# Patient Record
Sex: MALE | Race: OTHER | NOT HISPANIC OR LATINO | Employment: OTHER | URBAN - METROPOLITAN AREA
[De-identification: names, ages, dates, MRNs, and addresses within clinical notes are randomized per-mention and may not be internally consistent; named-entity substitution may affect disease eponyms.]

---

## 2017-02-03 ENCOUNTER — TELEPHONE (OUTPATIENT)
Dept: VASCULAR LAB | Facility: MEDICAL CENTER | Age: 67
End: 2017-02-03

## 2017-02-06 LAB — INR PPP: 1.9 (ref 2–3.5)

## 2017-02-07 ENCOUNTER — ANTICOAGULATION MONITORING (OUTPATIENT)
Dept: VASCULAR LAB | Facility: MEDICAL CENTER | Age: 67
End: 2017-02-07

## 2017-02-07 DIAGNOSIS — D68.51 FACTOR 5 LEIDEN MUTATION, HETEROZYGOUS (HCC): ICD-10-CM

## 2017-02-07 DIAGNOSIS — I87.009 POST-PHLEBITIC SYNDROME: ICD-10-CM

## 2017-02-07 DIAGNOSIS — Z79.01 ANTICOAGULATED ON WARFARIN: ICD-10-CM

## 2017-02-07 DIAGNOSIS — Z86.711 HISTORY OF PULMONARY EMBOLISM: ICD-10-CM

## 2017-02-07 NOTE — PROGRESS NOTES
Anticoagulation Summary as of 2/7/2017     INR goal 2.0-3.0   Selected INR 1.9! (2/6/2017)   Maintenance plan 10 mg (5 mg x 2) on Thu; 5 mg (5 mg x 1) all other days   Weekly total 40 mg   Plan last modified MARSHALL CasillasD (12/1/2016)   Next INR check 2/27/2017   Target end date Indefinite    Indications   Post-phlebitic syndrome- RIGHT LEG [I87.009]  History of pulmonary embolism [Z86.711]  Anticoagulated on warfarin [Z79.01]  Factor 5 Leiden mutation  heterozygous (CMS-HCC) [D68.51]         Anticoagulation Episode Summary     INR check location     Preferred lab     Send INR reminders to     Comments       Anticoagulation Care Providers     Provider Role Specialty Phone number    Keron Garcia M.D. Referring Internal Medicine [phone]    [facility] Responsible  [phone]    MARSHALL MacedoD Responsible          Anticoagulation Patient Findings    Left a message on the patient's voicemail today, informing the patient of a SUB-therapeutic INR of 1.9.  Instructed the patient to call the clinic with any changes to diet or medications, with any questions/concerns, or with any signs/sx of bleeding or clotting.  Patient will take an extra 2.5mg with normal 5mg dose (for total of 7.5mg) TONIGHT ONLY, then he will resume his current regimen. Patient will follow up again in 3 weeks.    Regina MedinaD

## 2017-02-24 ENCOUNTER — ANTICOAGULATION MONITORING (OUTPATIENT)
Dept: VASCULAR LAB | Facility: MEDICAL CENTER | Age: 67
End: 2017-02-24

## 2017-02-24 DIAGNOSIS — Z79.01 ANTICOAGULATED ON WARFARIN: ICD-10-CM

## 2017-02-24 DIAGNOSIS — D68.51 FACTOR 5 LEIDEN MUTATION, HETEROZYGOUS (HCC): ICD-10-CM

## 2017-02-24 DIAGNOSIS — I87.009 POST-PHLEBITIC SYNDROME: ICD-10-CM

## 2017-02-24 DIAGNOSIS — Z86.711 HISTORY OF PULMONARY EMBOLISM: ICD-10-CM

## 2017-02-24 LAB — INR PPP: 1.8 (ref 2–3.5)

## 2017-03-13 ENCOUNTER — ANTICOAGULATION MONITORING (OUTPATIENT)
Dept: VASCULAR LAB | Facility: MEDICAL CENTER | Age: 67
End: 2017-03-13

## 2017-03-13 DIAGNOSIS — Z79.01 ANTICOAGULATED ON WARFARIN: ICD-10-CM

## 2017-03-13 DIAGNOSIS — D68.51 FACTOR 5 LEIDEN MUTATION, HETEROZYGOUS (HCC): ICD-10-CM

## 2017-03-13 DIAGNOSIS — I87.009 POST-PHLEBITIC SYNDROME: ICD-10-CM

## 2017-03-13 DIAGNOSIS — Z86.711 HISTORY OF PULMONARY EMBOLISM: ICD-10-CM

## 2017-03-13 LAB — INR PPP: 1.8 (ref 2–3.5)

## 2017-03-13 NOTE — PROGRESS NOTES
Anticoagulation Summary as of 3/13/2017     INR goal 2.0-3.0   Selected INR 1.8! (3/11/2017)   Maintenance plan 10 mg (5 mg x 2) on Mon, Thu; 5 mg (5 mg x 1) all other days   Weekly total 45 mg   Plan last modified Ganesh aMo, MARSHALLD (2/24/2017)   Next INR check 3/27/2017   Target end date Indefinite    Indications   Post-phlebitic syndrome- RIGHT LEG [I87.009]  History of pulmonary embolism [Z86.711]  Anticoagulated on warfarin [Z79.01]  Factor 5 Leiden mutation  heterozygous (CMS-HCC) [D68.51]         Anticoagulation Episode Summary     INR check location     Preferred lab     Send INR reminders to     Comments       Anticoagulation Care Providers     Provider Role Specialty Phone number    Keron Garcia M.D. Referring Internal Medicine [phone]    [facility] Anticoagulation Services Responsible  [phone]    Ganesh Mao, PHARMD Responsible          Anticoagulation Patient Findings    Left voicemail message to report a SUB therapeutic INR of 1.8.  Last voicemail that was left instucted pt to increase warfarin by 12.5%.  Because this has not been confirmed with the patient, did not increase regimen today. Pt to bolus today then continue with current warfarin dosing regimen. Requested pt contact the clinic for any s/s of unusual bleeding, bruising, clotting or any changes to diet or medication. Instructed pt to contact clinic to discuss dosing regimen.    FU 2 weeks.    Ganesh Mao, MARSHALLD

## 2017-04-07 ENCOUNTER — OFFICE VISIT (OUTPATIENT)
Dept: MEDICAL GROUP | Age: 67
End: 2017-04-07
Payer: MEDICARE

## 2017-04-07 VITALS
DIASTOLIC BLOOD PRESSURE: 84 MMHG | TEMPERATURE: 97.2 F | HEIGHT: 72 IN | HEART RATE: 81 BPM | BODY MASS INDEX: 32.23 KG/M2 | WEIGHT: 238 LBS | OXYGEN SATURATION: 93 % | SYSTOLIC BLOOD PRESSURE: 120 MMHG

## 2017-04-07 DIAGNOSIS — E66.9 OBESITY (BMI 30.0-34.9): ICD-10-CM

## 2017-04-07 DIAGNOSIS — Z79.01 ANTICOAGULATED ON WARFARIN: ICD-10-CM

## 2017-04-07 DIAGNOSIS — D48.5 NEOPLASM OF UNCERTAIN BEHAVIOR OF SKIN OF FOREARM: ICD-10-CM

## 2017-04-07 DIAGNOSIS — D48.5 NEOPLASM OF UNCERTAIN BEHAVIOR OF SKIN OF FACE: ICD-10-CM

## 2017-04-07 DIAGNOSIS — G89.4 CHRONIC PAIN SYNDROME: ICD-10-CM

## 2017-04-07 DIAGNOSIS — I87.009 POST-PHLEBITIC SYNDROME: ICD-10-CM

## 2017-04-07 DIAGNOSIS — E78.2 MIXED HYPERLIPIDEMIA: ICD-10-CM

## 2017-04-07 PROBLEM — E66.811 OBESITY (BMI 30.0-34.9): Status: ACTIVE | Noted: 2017-04-07

## 2017-04-07 PROCEDURE — 1101F PT FALLS ASSESS-DOCD LE1/YR: CPT | Performed by: INTERNAL MEDICINE

## 2017-04-07 PROCEDURE — 4040F PNEUMOC VAC/ADMIN/RCVD: CPT | Performed by: INTERNAL MEDICINE

## 2017-04-07 PROCEDURE — G8419 CALC BMI OUT NRM PARAM NOF/U: HCPCS | Performed by: INTERNAL MEDICINE

## 2017-04-07 PROCEDURE — G8432 DEP SCR NOT DOC, RNG: HCPCS | Performed by: INTERNAL MEDICINE

## 2017-04-07 PROCEDURE — 99214 OFFICE O/P EST MOD 30 MIN: CPT | Performed by: INTERNAL MEDICINE

## 2017-04-07 PROCEDURE — 1036F TOBACCO NON-USER: CPT | Performed by: INTERNAL MEDICINE

## 2017-04-07 RX ORDER — OXYCODONE HYDROCHLORIDE AND ACETAMINOPHEN 5; 325 MG/1; MG/1
1-2 TABLET ORAL EVERY 4 HOURS PRN
Qty: 120 TAB | Refills: 0 | Status: SHIPPED | OUTPATIENT
Start: 2017-04-07 | End: 2017-10-13

## 2017-04-07 ASSESSMENT — ENCOUNTER SYMPTOMS
EYES NEGATIVE: 1
MUSCULOSKELETAL NEGATIVE: 1
NEUROLOGICAL NEGATIVE: 1
RESPIRATORY NEGATIVE: 1
LEG PAIN: 1
PSYCHIATRIC NEGATIVE: 1
CARDIOVASCULAR NEGATIVE: 1
CONSTITUTIONAL NEGATIVE: 1
GASTROINTESTINAL NEGATIVE: 1

## 2017-04-07 NOTE — PROGRESS NOTES
Subjective:      Tavraes Bonilla is a 66 y.o. male who presents with Leg Pain  and   The patient is here for followup of chronic medical problems listed below. The patient is compliant with medications and having no side effects from them. Denies chest pain, abdominal pain, dyspnea, myalgias, or cough.   Patient Active Problem List    Diagnosis Date Noted   • Obesity (BMI 30.0-34.9) 04/07/2017   • Post-phlebitic syndrome- RIGHT LEG 05/22/2015   • Chronic venous hypertension due to DVT 11/17/2014   • Anticoagulated on warfarin 11/17/2014   • HLD (hyperlipidemia) 11/17/2014   • Chronic pain syndrome 03/12/2014   • Hypovitaminosis D 03/26/2012   • History of pulmonary embolism 01/24/2012   • Factor 5 Leiden mutation, heterozygous (CMS-HCC) 01/24/2012     No Known Allergies  ,  Outpatient Prescriptions Prior to Visit   Medication Sig Dispense Refill   • Coenzyme Q10 (CO Q 10) 10 MG Cap Take  by mouth.     • Cyanocobalamin (VITAMIN B-12) 1000 MCG Tab Take 1,000 mcg by mouth every day.     • oxycodone-acetaminophen (PERCOCET) 5-325 MG Tab Take 1 Tab by mouth 1 time daily as needed. 120 Tab 0   • Diclofenac Sodium (VOLTAREN) 1 % Gel Apply 1 Inch to skin as directed every 3 hours as needed. 1 Tube 11   • warfarin (COUMADIN) 5 MG Tab Take 1-2 tabs by mouth daily or as directed by coumadin clinic 120 Tab 2   • lovastatin (MEVACOR) 40 MG tablet Take 1 Tab by mouth every day. 90 Tab 3   • Cholecalciferol (VITAMIN D) 2000 UNITS Cap Take 1 Cap by mouth every day. 100 Cap 3   • oxycodone-acetaminophen (PERCOCET) 5-325 MG Tab Take 1-2 Tabs by mouth every four hours as needed. Take by mouth one-two tablets by mouth every 6 hours as needed for pain. 120 Tab 0     No facility-administered medications prior to visit.               Leg Pain  This is a chronic problem. The current episode started more than 1 year ago. The problem occurs constantly. The problem has been unchanged. The symptoms are aggravated by walking. He has tried  "NSAIDs and oral narcotics for the symptoms. The treatment provided moderate relief.       Review of Systems   Constitutional: Negative.    HENT: Negative.    Eyes: Negative.    Respiratory: Negative.    Cardiovascular: Negative.    Gastrointestinal: Negative.    Genitourinary: Negative.    Musculoskeletal: Negative.    Skin: Negative.    Neurological: Negative.    Endo/Heme/Allergies: Negative.    Psychiatric/Behavioral: Negative.           Objective:     /84 mmHg  Pulse 81  Temp(Src) 36.2 °C (97.2 °F)  Ht 1.829 m (6' 0.01\")  Wt 107.956 kg (238 lb)  BMI 32.27 kg/m2  SpO2 93%     Physical Exam   Constitutional: He is oriented to person, place, and time. He appears well-developed and well-nourished. No distress.   HENT:   Head: Normocephalic and atraumatic.   Right Ear: External ear normal.   Left Ear: External ear normal.   Mouth/Throat: Oropharynx is clear and moist. No oropharyngeal exudate.   Eyes: Conjunctivae and EOM are normal. Pupils are equal, round, and reactive to light. Right eye exhibits no discharge.   Neck: Normal range of motion. Neck supple. No JVD present. No tracheal deviation present. No thyromegaly present.   Cardiovascular: Normal rate, regular rhythm, normal heart sounds and intact distal pulses.  Exam reveals no gallop.    Pulmonary/Chest: Effort normal and breath sounds normal. No respiratory distress. He has no wheezes. He has no rales. He exhibits no tenderness.   Abdominal: Soft. Bowel sounds are normal. He exhibits no distension and no mass. There is no tenderness. There is no rebound and no guarding. No hernia.   Genitourinary: Guaiac negative stool. No penile tenderness.   Musculoskeletal: He exhibits no edema or tenderness.   Lymphadenopathy:     He has no cervical adenopathy.   Neurological: He is alert and oriented to person, place, and time. He has normal reflexes. He displays normal reflexes. No cranial nerve deficit. He exhibits normal muscle tone. Coordination normal. "   Skin: Skin is warm and dry. No rash noted. He is not diaphoretic. No erythema. No pallor.   Psychiatric: He has a normal mood and affect. His behavior is normal. Judgment and thought content normal.   Nursing note and vitals reviewed.    Anticoagulation Monitoring on 03/13/2017   Component Date Value   • INR 03/11/2017 1.8       Lab Results   Component Value Date/Time    GLYCOHEMOGLOBIN 5.8 05/01/2013 08:53 AM     Lab Results   Component Value Date/Time    SODIUM 139 03/28/2016 09:45 AM    POTASSIUM 4.5 03/28/2016 09:45 AM    CHLORIDE 108 03/28/2016 09:45 AM    CO2 25 03/28/2016 09:45 AM    GLUCOSE 82 03/28/2016 09:45 AM    BUN 20 03/28/2016 09:45 AM    CREATININE 1.08 03/28/2016 09:45 AM    ALKALINE PHOSPHATASE 63 03/28/2016 09:45 AM    AST(SGOT) 21 03/28/2016 09:45 AM    ALT(SGPT) 20 03/28/2016 09:45 AM    TOTAL BILIRUBIN 0.4 03/28/2016 09:45 AM     Lab Results   Component Value Date/Time    INR 1.8 03/11/2017    INR 1.8 02/24/2017    INR 1.9 02/06/2017     Lab Results   Component Value Date/Time    CHOLESTEROL, 03/28/2016 09:45 AM    * 03/28/2016 09:45 AM    HDL 46 03/28/2016 09:45 AM    TRIGLYCERIDES 131 03/28/2016 09:45 AM       Lab Results   Component Value Date/Time    TESTOSTERONE,TOTAL 222 01/22/2014 09:04 AM     No results found for: TSH  Lab Results   Component Value Date/Time    FREE T-4 0.84 05/01/2013 08:53 AM    FREE T-4 0.90 01/24/2012 10:38 AM     No results found for: URICACID  No components found for: VITB12  Lab Results   Component Value Date/Time    25-HYDROXY   VITAMIN D 25 40 05/01/2015 09:53 AM    25-HYDROXY   VITAMIN D 25 28* 01/22/2014 09:04 AM                  Assessment/Plan:     1. Mixed hyperlipidemia  Under good control. Continue same regimen.        - LIPID PROFILE; Future  - COMP METABOLIC PANEL; Future  - CBC WITH DIFFERENTIAL; Future    2. Post-phlebitic syndrome- RIGHT LEGUnder good control. Continue same regimen.   - oxycodone-acetaminophen (PERCOCET) 5-325 MG  Tab; Take 1-2 Tabs by mouth every four hours as needed. Take by mouth one-two tablets by mouth every 6 hours as needed for pain.  Dispense: 120 Tab; Refill: 0    3. Anticoagulated on warfarinUnder good control. Continue same regimen.      4. Neoplasm of uncertain behavior of skin of face      - REFERRAL TO DERMATOLOGY    5. Neoplasm of uncertain behavior of skin of forearm     - REFERRAL TO DERMATOLOGY    6. Obesity (BMI 30.0-34.9)   diet/exercise/lose 15 lbs.; patient counseled       - Patient identified as having weight management issue.  Appropriate orders and counseling given.    7. Chronic pain syndromeUnder good control. Continue same regimen.    - oxycodone-acetaminophen (PERCOCET) 5-325 MG Tab; Take 1-2 Tabs by mouth every four hours as needed. Take by mouth one-two tablets by mouth every 6 hours as needed for pain.  Dispense: 120 Tab; Refill: 0    30 minute face-to-face encounter took place today.  More than half of this time was spent in the coordination of care of the above problems, as well as counseling.

## 2017-04-07 NOTE — MR AVS SNAPSHOT
"        Tavares Bonilla   2017 8:20 AM   Office Visit   MRN: [id number]    Department:  [address] [place] [address]   Dept Phone:  [phone]    Description:  Male : 1950   Provider:  Keron Garcia M.D.           Reason for Visit     Leg Pain Rt Leg DVT - Lab Review - Spot on Right Forearm      Allergies as of 2017     No Known Allergies      You were diagnosed with     Mixed hyperlipidemia   [272.2.ICD-9-CM]       Post-phlebitic syndrome   [073141]       Anticoagulated on warfarin   [522470]       Neoplasm of uncertain behavior of skin of face   [967237]       Neoplasm of uncertain behavior of skin of forearm   [566932]       Obesity (BMI 30.0-34.9)   [050776]       Chronic pain syndrome   [338.4.ICD-9-CM]         Vital Signs     Blood Pressure Pulse Temperature Height Weight Body Mass Index    120/84 mmHg 81 36.2 °C (97.2 °F) 1.829 m (6' 0.01\") 107.956 kg (238 lb) 32.27 kg/m2    Oxygen Saturation Smoking Status                93% Never Smoker           Basic Information     Date Of Birth Sex Race Ethnicity Preferred Language    1950 Male White Non- English      Your appointments     Oct 13, 2017  9:00 AM   Established Patient with Keron Garcia M.D.   [address] [id number] [phone]           You will be receiving a confirmation call a few days before your appointment from our automated call confirmation system.              Problem List              ICD-10-CM Priority Class Noted - Resolved    History of pulmonary embolism Z86.711   2012 - Present    Factor 5 Leiden mutation, heterozygous (CMS-HCC) D68.51   2012 - Present    Hypovitaminosis D E55.9   3/26/2012 - Present    Chronic pain syndrome G89.4   3/12/2014 - Present    Chronic venous hypertension due to DVT I87.099   2014 - Present    Anticoagulated on warfarin Z79.01   2014 - Present    HLD (hyperlipidemia) E78.5   2014 - " Present    Post-phlebitic syndrome- RIGHT LEG I87.009   5/22/2015 - Present    Obesity (BMI 30.0-34.9) E66.9   4/7/2017 - Present      Health Maintenance        Date Due Completion Dates    IMM DTaP/Tdap/Td Vaccine (1 - Tdap) 8/23/1968 ---    IMM PNEUMOCOCCAL 65+ (ADULT) LOW/MEDIUM RISK SERIES (2 of 2 - PPSV23) 9/29/2015 9/29/2014, 2/10/2009    COLONOSCOPY 3/8/2022 3/8/2012            Current Immunizations     13-VALENT PCV PREVNAR 9/29/2014    Influenza TIV (IM) 9/29/2014, 2/10/2009 11:30 PM    Pneumococcal polysaccharide vaccine (PPSV-23) 2/10/2009 11:15 PM    SHINGLES VACCINE 9/29/2014      Below and/or attached are the medications your provider expects you to take. Review all of your home medications and newly ordered medications with your provider and/or pharmacist. Follow medication instructions as directed by your provider and/or pharmacist. Please keep your medication list with you and share with your provider. Update the information when medications are discontinued, doses are changed, or new medications (including over-the-counter products) are added; and carry medication information at all times in the event of emergency situations     Allergies:  No Known Allergies          Medications  Valid as of: April 07, 2017 -  8:46 AM    Generic Name Brand Name Tablet Size Instructions for use    Cholecalciferol (Cap) Vitamin D 2000 UNITS Take 1 Cap by mouth every day.        Coenzyme Q10 (Cap) Co Q 10 10 MG Take  by mouth.        Cyanocobalamin (Tab) vitamin B-12 1000 MCG Take 1,000 mcg by mouth every day.        Diclofenac Sodium (Gel) Diclofenac Sodium 1 % Apply 1 Inch to skin as directed every 3 hours as needed.        Lovastatin (Tab) MEVACOR 40 MG Take 1 Tab by mouth every day.        Oxycodone-Acetaminophen (Tab) PERCOCET 5-325 MG Take 1 Tab by mouth 1 time daily as needed.        Oxycodone-Acetaminophen (Tab) PERCOCET 5-325 MG Take 1-2 Tabs by mouth every four hours as needed. Take by mouth one-two tablets  by mouth every 6 hours as needed for pain.        Warfarin Sodium (Tab) COUMADIN 5 MG Take 1-2 tabs by mouth daily or as directed by coumadin clinic        .                 Medicines prescribed today were sent to:     [facility] [place], [place] - [address]    [address] [place] [address]    Phone: [phone] Fax: [phone]    Open 24 Hours?: No      Medication refill instructions:       If your prescription bottle indicates you have medication refills left, it is not necessary to call your provider’s office. Please contact your pharmacy and they will refill your medication.    If your prescription bottle indicates you do not have any refills left, you may request refills at any time through one of the following ways: The online [employer] system (except Urgent Care), by calling your provider’s office, or by asking your pharmacy to contact your provider’s office with a refill request. Medication refills are processed only during regular business hours and may not be available until the next business day. Your provider may request additional information or to have a follow-up visit with you prior to refilling your medication.   *Please Note: Medication refills are assigned a new Rx number when refilled electronically. Your pharmacy may indicate that no refills were authorized even though a new prescription for the same medication is available at the pharmacy. Please request the medicine by name with the pharmacy before contacting your provider for a refill.        Your To Do List     Future Labs/Procedures Complete By Expires    CBC WITH DIFFERENTIAL  As directed 4/7/2018    COMP METABOLIC PANEL  As directed 4/7/2018    LIPID PROFILE  As directed 4/7/2018      Referral     A referral request has been sent to our patient care coordination department. Please allow 3-5 business days for us to process this request and contact you either by phone or mail. If you do not hear from us by the 5th  business day, please call us at [phone].           MyChart Status: Patient Declined

## 2017-04-12 ENCOUNTER — TELEPHONE (OUTPATIENT)
Dept: VASCULAR LAB | Facility: MEDICAL CENTER | Age: 67
End: 2017-04-12

## 2017-04-12 NOTE — TELEPHONE ENCOUNTER
[facility]    Left a voicemail to remind pt to obtain next INR.     Ganesh Mao, PHARMD

## 2017-04-18 RX ORDER — LOVASTATIN 40 MG/1
TABLET ORAL
Qty: 90 TAB | Refills: 4 | Status: SHIPPED | OUTPATIENT
Start: 2017-04-18 | End: 2017-11-28

## 2017-04-27 ENCOUNTER — TELEPHONE (OUTPATIENT)
Dept: VASCULAR LAB | Facility: MEDICAL CENTER | Age: 67
End: 2017-04-27

## 2017-05-09 LAB — INR PPP: 2.9 (ref 2–3.5)

## 2017-05-11 ENCOUNTER — ANTICOAGULATION MONITORING (OUTPATIENT)
Dept: VASCULAR LAB | Facility: MEDICAL CENTER | Age: 67
End: 2017-05-11

## 2017-05-11 DIAGNOSIS — Z79.01 ANTICOAGULATED ON WARFARIN: ICD-10-CM

## 2017-05-11 DIAGNOSIS — D68.51 FACTOR 5 LEIDEN MUTATION, HETEROZYGOUS (HCC): ICD-10-CM

## 2017-05-11 DIAGNOSIS — I87.009 POST-PHLEBITIC SYNDROME: ICD-10-CM

## 2017-05-11 DIAGNOSIS — Z86.711 HISTORY OF PULMONARY EMBOLISM: ICD-10-CM

## 2017-05-11 NOTE — PROGRESS NOTES
Anticoagulation Summary as of 5/11/2017     INR goal 2.0-3.0   Selected INR 2.9 (5/9/2017)   Maintenance plan 10 mg (5 mg x 2) on Mon, Thu; 5 mg (5 mg x 1) all other days   Weekly total 45 mg   Plan last modified Ganesh Mao, PHARMD (2/24/2017)   Next INR check 6/6/2017   Target end date Indefinite    Indications   Post-phlebitic syndrome- RIGHT LEG [I87.009]  History of pulmonary embolism [Z86.711]  Anticoagulated on warfarin [Z79.01]  Factor 5 Leiden mutation  heterozygous (CMS-HCC) [D68.51]         Anticoagulation Episode Summary     INR check location     Preferred lab     Send INR reminders to     Comments TTR is 37.5%, consider alternative therapy      Anticoagulation Care Providers     Provider Role Specialty Phone number    Keron Garcia M.D. Referring Internal Medicine [phone]    [facility] Anticoagulation Services Responsible  [phone]    Ganesh Mao, PHARMD Responsible          Anticoagulation Patient Findings    Left voicemail message to report a therapeutic INR of 2.9Pt to continue with current warfarin dosing regimen. Requested pt contact the clinic for any s/s of unusual bleeding, bruising, clotting or any changes to diet or medication. FU 4weeks.  Danelle Burrell, MARSHALLD

## 2017-06-19 ENCOUNTER — ANTICOAGULATION MONITORING (OUTPATIENT)
Dept: VASCULAR LAB | Facility: MEDICAL CENTER | Age: 67
End: 2017-06-19

## 2017-06-19 DIAGNOSIS — I87.009 POST-PHLEBITIC SYNDROME: ICD-10-CM

## 2017-06-19 DIAGNOSIS — Z86.711 HISTORY OF PULMONARY EMBOLISM: ICD-10-CM

## 2017-06-19 DIAGNOSIS — Z79.01 ANTICOAGULATED ON WARFARIN: ICD-10-CM

## 2017-06-19 DIAGNOSIS — D68.51 FACTOR 5 LEIDEN MUTATION, HETEROZYGOUS (HCC): ICD-10-CM

## 2017-06-19 LAB — INR PPP: 2.9 (ref 2–3.5)

## 2017-06-19 NOTE — PROGRESS NOTES
OP Anticoagulation Telephone Note    Date: 6/19/2017  Anticoagulation Summary as of 6/19/2017     INR goal 2.0-3.0   Selected INR 2.9 (6/17/2017)   Maintenance plan 10 mg (5 mg x 2) on Mon, Thu; 5 mg (5 mg x 1) all other days   Weekly total 45 mg   No change documented Veronica Friedman, Med Ass't   Plan last modified Ganesh Mao, PHARMD (2/24/2017)   Next INR check 7/17/2017   Target end date Indefinite    Indications   Post-phlebitic syndrome- RIGHT LEG [I87.009]  History of pulmonary embolism [Z86.711]  Anticoagulated on warfarin [Z79.01]  Factor 5 Leiden mutation  heterozygous (CMS-HCC) [D68.51]         Anticoagulation Episode Summary     INR check location     Preferred lab     Send INR reminders to     Comments TTR is 37.5%, consider alternative therapy      Anticoagulation Care Providers     Provider Role Specialty Phone number    Keron Garcia M.D. Referring Internal Medicine [phone]    [facility] Anticoagulation Services Responsible  [phone]    Ganesh Mao, PHARMD Responsible          Anticoagulation Patient Findings   Negatives Missed Doses, Extra Doses, Medication Changes, Antibiotic Use, Diet Changes, Dental/Other Procedures, Hospitalization, Bleeding Gums, Nose Bleeds, Blood in Urine, Blood in Stool, Any Bruising, Other Complaints      Plan:  Spoke with patient on the phone. Patient is therapeutic today. Patient denies any changes in medications or diet. Patient denies any signs or symptoms of bleeding or clotting. Instructed patient to call clinic if any unusual bleeding or bruising occurs. Will continue dosing as outlined above. Will follow-up with patient in 4 weeks.    Veronica Friedman, Medical Assistant    I have reviewed and agree with the plan above on 06/21/2017      Danelle Burrell, MARSHALLD

## 2017-07-31 ENCOUNTER — TELEPHONE (OUTPATIENT)
Dept: VASCULAR LAB | Facility: MEDICAL CENTER | Age: 67
End: 2017-07-31

## 2017-08-01 ENCOUNTER — PATIENT OUTREACH (OUTPATIENT)
Dept: HEALTH INFORMATION MANAGEMENT | Facility: OTHER | Age: 67
End: 2017-08-01

## 2017-08-02 ENCOUNTER — ANTICOAGULATION MONITORING (OUTPATIENT)
Dept: VASCULAR LAB | Facility: MEDICAL CENTER | Age: 67
End: 2017-08-02

## 2017-08-02 DIAGNOSIS — Z86.711 HISTORY OF PULMONARY EMBOLISM: ICD-10-CM

## 2017-08-02 DIAGNOSIS — I87.009 POST-PHLEBITIC SYNDROME: ICD-10-CM

## 2017-08-02 DIAGNOSIS — Z79.01 ANTICOAGULATED ON WARFARIN: ICD-10-CM

## 2017-08-02 DIAGNOSIS — D68.51 FACTOR 5 LEIDEN MUTATION, HETEROZYGOUS (HCC): ICD-10-CM

## 2017-08-02 LAB — INR PPP: 1.9 (ref 2–3.5)

## 2017-08-02 NOTE — PROGRESS NOTES
Anticoagulation Summary as of 8/2/2017     INR goal 2.0-3.0   Selected INR 1.9! (8/2/2017)   Maintenance plan 10 mg (5 mg x 2) on Thu; 5 mg (5 mg x 1) all other days   Weekly total 40 mg   Plan last modified Rosette Vásquez PHARMD (8/2/2017)   Next INR check 8/30/2017   Target end date Indefinite    Indications   Post-phlebitic syndrome- RIGHT LEG [I87.009]  History of pulmonary embolism [Z86.711]  Anticoagulated on warfarin [Z79.01]  Factor 5 Leiden mutation  heterozygous (CMS-HCC) [D68.51]         Anticoagulation Episode Summary     INR check location     Preferred lab     Send INR reminders to     Comments TTR is 37.5%, consider alternative therapy      Anticoagulation Care Providers     Provider Role Specialty Phone number    Keron Garcia M.D. Referring Internal Medicine [phone]    [facility] Responsible  [phone]    MARSHALL MacedoD Responsible          Anticoagulation Patient Findings    Spoke to patient on the phone. Patients INR was subtherapeutic today at 1.9 .Patient denied any signs/symptoms of bleeding or bruising. Patient denied any recent changes to medications or diet. Patient denied any missed doses. Patient states that he has been using 10 mg on Thur and 5 mg ROW for the past 4 weeks as instructed by the anticoag clinic, but this dosing does not follow the calender above. Patient will Bolus tonight with 10 mg tonight, then continue on regular regimen. Follow up in 4 weeks    Marshall Oneill.D

## 2017-09-14 ENCOUNTER — TELEPHONE (OUTPATIENT)
Dept: VASCULAR LAB | Facility: MEDICAL CENTER | Age: 67
End: 2017-09-14

## 2017-09-15 ENCOUNTER — ANTICOAGULATION MONITORING (OUTPATIENT)
Dept: VASCULAR LAB | Facility: MEDICAL CENTER | Age: 67
End: 2017-09-15

## 2017-09-15 DIAGNOSIS — Z79.01 ANTICOAGULATED ON WARFARIN: ICD-10-CM

## 2017-09-15 DIAGNOSIS — Z86.711 HISTORY OF PULMONARY EMBOLISM: ICD-10-CM

## 2017-09-15 DIAGNOSIS — D68.51 FACTOR 5 LEIDEN MUTATION, HETEROZYGOUS (HCC): ICD-10-CM

## 2017-09-15 DIAGNOSIS — I87.009 POST-PHLEBITIC SYNDROME: ICD-10-CM

## 2017-09-15 LAB — INR PPP: 3 (ref 2–3.5)

## 2017-09-15 NOTE — TELEPHONE ENCOUNTER
OP Anticoagulation Telephone Note    Date: 9/14/2017       Plan:  Called to remind patient to get PT/INR lab done.  He states he will go tomorrow    Lorena Marie, Pharm D

## 2017-10-03 DIAGNOSIS — D68.2 FACTOR V DEFICIENCY (HCC): ICD-10-CM

## 2017-10-11 ENCOUNTER — HOSPITAL ENCOUNTER (OUTPATIENT)
Dept: LAB | Facility: MEDICAL CENTER | Age: 67
End: 2017-10-11
Attending: INTERNAL MEDICINE
Payer: MEDICARE

## 2017-10-11 DIAGNOSIS — E78.2 MIXED HYPERLIPIDEMIA: ICD-10-CM

## 2017-10-11 LAB
ALBUMIN SERPL BCP-MCNC: 3.8 G/DL (ref 3.2–4.9)
ALBUMIN/GLOB SERPL: 1.5 G/DL
ALP SERPL-CCNC: 56 U/L (ref 30–99)
ALT SERPL-CCNC: 14 U/L (ref 2–50)
ANION GAP SERPL CALC-SCNC: 6 MMOL/L (ref 0–11.9)
AST SERPL-CCNC: 15 U/L (ref 12–45)
BASOPHILS # BLD AUTO: 0.8 % (ref 0–1.8)
BASOPHILS # BLD: 0.05 K/UL (ref 0–0.12)
BILIRUB SERPL-MCNC: 0.4 MG/DL (ref 0.1–1.5)
BUN SERPL-MCNC: 18 MG/DL (ref 8–22)
CALCIUM SERPL-MCNC: 9.4 MG/DL (ref 8.5–10.5)
CHLORIDE SERPL-SCNC: 110 MMOL/L (ref 96–112)
CHOLEST SERPL-MCNC: 206 MG/DL (ref 100–199)
CO2 SERPL-SCNC: 25 MMOL/L (ref 20–33)
CREAT SERPL-MCNC: 1 MG/DL (ref 0.5–1.4)
EOSINOPHIL # BLD AUTO: 0.2 K/UL (ref 0–0.51)
EOSINOPHIL NFR BLD: 3.2 % (ref 0–6.9)
ERYTHROCYTE [DISTWIDTH] IN BLOOD BY AUTOMATED COUNT: 45.1 FL (ref 35.9–50)
GFR SERPL CREATININE-BSD FRML MDRD: >60 ML/MIN/1.73 M 2
GLOBULIN SER CALC-MCNC: 2.6 G/DL (ref 1.9–3.5)
GLUCOSE SERPL-MCNC: 101 MG/DL (ref 65–99)
HCT VFR BLD AUTO: 47.6 % (ref 42–52)
HDLC SERPL-MCNC: 39 MG/DL
HGB BLD-MCNC: 15.9 G/DL (ref 14–18)
IMM GRANULOCYTES # BLD AUTO: 0.02 K/UL (ref 0–0.11)
IMM GRANULOCYTES NFR BLD AUTO: 0.3 % (ref 0–0.9)
LDLC SERPL CALC-MCNC: 144 MG/DL
LYMPHOCYTES # BLD AUTO: 1.44 K/UL (ref 1–4.8)
LYMPHOCYTES NFR BLD: 22.9 % (ref 22–41)
MCH RBC QN AUTO: 31.9 PG (ref 27–33)
MCHC RBC AUTO-ENTMCNC: 33.4 G/DL (ref 33.7–35.3)
MCV RBC AUTO: 95.4 FL (ref 81.4–97.8)
MONOCYTES # BLD AUTO: 0.71 K/UL (ref 0–0.85)
MONOCYTES NFR BLD AUTO: 11.3 % (ref 0–13.4)
NEUTROPHILS # BLD AUTO: 3.87 K/UL (ref 1.82–7.42)
NEUTROPHILS NFR BLD: 61.5 % (ref 44–72)
NRBC # BLD AUTO: 0 K/UL
NRBC BLD AUTO-RTO: 0 /100 WBC
PLATELET # BLD AUTO: 223 K/UL (ref 164–446)
PMV BLD AUTO: 10.9 FL (ref 9–12.9)
POTASSIUM SERPL-SCNC: 4.1 MMOL/L (ref 3.6–5.5)
PROT SERPL-MCNC: 6.4 G/DL (ref 6–8.2)
RBC # BLD AUTO: 4.99 M/UL (ref 4.7–6.1)
SODIUM SERPL-SCNC: 141 MMOL/L (ref 135–145)
TRIGL SERPL-MCNC: 115 MG/DL (ref 0–149)
WBC # BLD AUTO: 6.3 K/UL (ref 4.8–10.8)

## 2017-10-11 PROCEDURE — 80053 COMPREHEN METABOLIC PANEL: CPT

## 2017-10-11 PROCEDURE — 80061 LIPID PANEL: CPT

## 2017-10-11 PROCEDURE — 36415 COLL VENOUS BLD VENIPUNCTURE: CPT

## 2017-10-11 PROCEDURE — 85025 COMPLETE CBC W/AUTO DIFF WBC: CPT

## 2017-10-12 ENCOUNTER — TELEPHONE (OUTPATIENT)
Dept: MEDICAL GROUP | Age: 67
End: 2017-10-12

## 2017-10-12 NOTE — TELEPHONE ENCOUNTER
ESTABLISHED PATIENT PRE-VISIT PLANNING     Note: Patient will not be contacted if there is no indication to call.     1.  Reviewed notes from the last few office visits within the medical group: Yes    2.  If any orders were placed at last visit or intended to be done for this visit (i.e. 6 mos follow-up), do we have Results/Consult Notes?        •  Labs - Labs ordered, completed on 10/11/17 and results are in chart.   Note: If patient appointment is for lab review and patient did not complete labs,                check with provider if OK to reschedule patient until labs completed.       •  Imaging - Imaging was not ordered at last office visit.       •  Referrals - Referral ordered, patient has NOT been seen.    3. Is this appointment scheduled as a Hospital Follow-Up? No    4.  Immunizations were updated in Epic using WebIZ?: Epic matches WebIZ       •  Web Iz Recommendations: FLU, PREVNAR (PCV13)  and TD    5.  Patient is due for the following Health Maintenance Topics:   Health Maintenance Due   Topic Date Due   • Annual Wellness Visit  1950   • IMM PNEUMOCOCCAL 65+ (ADULT) LOW/MEDIUM RISK SERIES (2 of 2 - PPSV23) 09/29/2015   • IMM INFLUENZA (1) 09/01/2017       - Patient is up-to-date on all Health Maintenance topics. No records have been requested at this time.    6.  Patient was NOT informed to arrive 15 min prior to their scheduled appointment and bring in their medication bottles.

## 2017-10-13 ENCOUNTER — OFFICE VISIT (OUTPATIENT)
Dept: MEDICAL GROUP | Age: 67
End: 2017-10-13
Payer: MEDICARE

## 2017-10-13 VITALS
WEIGHT: 236 LBS | SYSTOLIC BLOOD PRESSURE: 114 MMHG | TEMPERATURE: 97.5 F | BODY MASS INDEX: 31.97 KG/M2 | DIASTOLIC BLOOD PRESSURE: 82 MMHG | HEIGHT: 72 IN | HEART RATE: 94 BPM | OXYGEN SATURATION: 96 %

## 2017-10-13 DIAGNOSIS — Z86.711 HISTORY OF PULMONARY EMBOLISM: ICD-10-CM

## 2017-10-13 DIAGNOSIS — E66.9 OBESITY (BMI 30.0-34.9): ICD-10-CM

## 2017-10-13 DIAGNOSIS — I87.009 POST-PHLEBITIC SYNDROME: ICD-10-CM

## 2017-10-13 DIAGNOSIS — G89.4 CHRONIC PAIN SYNDROME: ICD-10-CM

## 2017-10-13 DIAGNOSIS — Z79.01 ANTICOAGULATED ON WARFARIN: ICD-10-CM

## 2017-10-13 DIAGNOSIS — Z23 NEED FOR PNEUMOCOCCAL VACCINATION: ICD-10-CM

## 2017-10-13 DIAGNOSIS — E78.2 MIXED HYPERLIPIDEMIA: ICD-10-CM

## 2017-10-13 DIAGNOSIS — Z23 NEED FOR INFLUENZA VACCINATION: ICD-10-CM

## 2017-10-13 DIAGNOSIS — D68.51 FACTOR 5 LEIDEN MUTATION, HETEROZYGOUS (HCC): ICD-10-CM

## 2017-10-13 DIAGNOSIS — I87.099 CHRONIC VENOUS HYPERTENSION DUE TO DVT: ICD-10-CM

## 2017-10-13 PROCEDURE — G0008 ADMIN INFLUENZA VIRUS VAC: HCPCS | Performed by: INTERNAL MEDICINE

## 2017-10-13 PROCEDURE — 90732 PPSV23 VACC 2 YRS+ SUBQ/IM: CPT | Performed by: INTERNAL MEDICINE

## 2017-10-13 PROCEDURE — 90662 IIV NO PRSV INCREASED AG IM: CPT | Performed by: INTERNAL MEDICINE

## 2017-10-13 PROCEDURE — 99214 OFFICE O/P EST MOD 30 MIN: CPT | Mod: 25 | Performed by: INTERNAL MEDICINE

## 2017-10-13 PROCEDURE — G0009 ADMIN PNEUMOCOCCAL VACCINE: HCPCS | Performed by: INTERNAL MEDICINE

## 2017-10-13 RX ORDER — WARFARIN SODIUM 5 MG/1
TABLET ORAL
Qty: 120 TAB | Refills: 3 | Status: SHIPPED | DISCHARGE
Start: 2017-10-13 | End: 2018-03-26 | Stop reason: SDUPTHER

## 2017-10-13 RX ORDER — OXYCODONE HYDROCHLORIDE AND ACETAMINOPHEN 5; 325 MG/1; MG/1
1 TABLET ORAL
Qty: 120 TAB | Refills: 0 | Status: SHIPPED | OUTPATIENT
Start: 2017-10-13 | End: 2017-10-18 | Stop reason: SDUPTHER

## 2017-10-13 ASSESSMENT — ENCOUNTER SYMPTOMS
CONSTITUTIONAL NEGATIVE: 1
PSYCHIATRIC NEGATIVE: 1
NEUROLOGICAL NEGATIVE: 1
CARDIOVASCULAR NEGATIVE: 1
RESPIRATORY NEGATIVE: 1
MUSCULOSKELETAL NEGATIVE: 1
EYES NEGATIVE: 1
GASTROINTESTINAL NEGATIVE: 1

## 2017-10-13 ASSESSMENT — PATIENT HEALTH QUESTIONNAIRE - PHQ9: CLINICAL INTERPRETATION OF PHQ2 SCORE: 0

## 2017-10-13 NOTE — PROGRESS NOTES
Subjective:      Tavares Bonilla is a 67 y.o. male who presents with Hyperlipidemia (evaluation )    The patient is here for followup of chronic medical problems listed below. The patient is compliant with medications and having no side effects from them. Denies chest pain, abdominal pain, dyspnea, myalgias, or cough.     Patient Active Problem List    Diagnosis Date Noted   • Obesity (BMI 30.0-34.9) 04/07/2017   • Post-phlebitic syndrome- RIGHT LEG 05/22/2015   • Chronic venous hypertension due to DVT 11/17/2014   • Anticoagulated on warfarin 11/17/2014   • Mixed hyperlipidemia 11/17/2014   • Chronic pain syndrome 03/12/2014   • Hypovitaminosis D 03/26/2012   • History of pulmonary embolism 01/24/2012   • Factor 5 Leiden mutation, heterozygous (CMS-HCC) 01/24/2012       Outpatient Medications Prior to Visit   Medication Sig Dispense Refill   • Cyanocobalamin (VITAMIN B-12) 1000 MCG Tab Take 1,000 mcg by mouth every day.     • Diclofenac Sodium (VOLTAREN) 1 % Gel Apply 1 Inch to skin as directed every 3 hours as needed. 1 Tube 11   • Cholecalciferol (VITAMIN D) 2000 UNITS Cap Take 1 Cap by mouth every day. 100 Cap 3   • lovastatin (MEVACOR) 40 MG tablet TAKE ONE TABLET BY MOUTH ONCE DAILY (Patient not taking: Reported on 10/13/2017) 90 Tab 4   • oxycodone-acetaminophen (PERCOCET) 5-325 MG Tab Take 1-2 Tabs by mouth every four hours as needed. Take by mouth one-two tablets by mouth every 6 hours as needed for pain. 120 Tab 0   • warfarin (COUMADIN) 5 MG Tab TAKE ONE TO TWO TABLETS BY MOUTH ONCE DAILY OR  AS  DIRECTED  BY  COUMADIN  CLINIC 120 Tab 3   • Coenzyme Q10 (CO Q 10) 10 MG Cap Take  by mouth.     • oxycodone-acetaminophen (PERCOCET) 5-325 MG Tab Take 1 Tab by mouth 1 time daily as needed. 120 Tab 0     No facility-administered medications prior to visit.      No Known Allergies          HPI    Review of Systems   Constitutional: Negative.    HENT: Negative.    Eyes: Negative.    Respiratory: Negative.   "  Cardiovascular: Negative.    Gastrointestinal: Negative.    Genitourinary: Negative.    Musculoskeletal: Negative.    Skin: Negative.    Neurological: Negative.    Endo/Heme/Allergies: Negative.    Psychiatric/Behavioral: Negative.           Objective:     /82   Pulse 94   Temp 36.4 °C (97.5 °F)   Ht 1.829 m (6' 0.01\")   Wt 107 kg (236 lb)   SpO2 96%   BMI 32.00 kg/m²      Physical Exam   Constitutional: He is oriented to person, place, and time. He appears well-developed and well-nourished. No distress.   HENT:   Head: Normocephalic and atraumatic.   Right Ear: External ear normal.   Left Ear: External ear normal.   Nose: Nose normal.   Mouth/Throat: Oropharynx is clear and moist. No oropharyngeal exudate.   Eyes: Conjunctivae and EOM are normal. Pupils are equal, round, and reactive to light. Right eye exhibits no discharge. Left eye exhibits no discharge. No scleral icterus.   Neck: Normal range of motion. Neck supple. No JVD present. No tracheal deviation present. No thyromegaly present.   Cardiovascular: Normal rate, regular rhythm, normal heart sounds and intact distal pulses.  Exam reveals no gallop and no friction rub.    No murmur heard.  Pulmonary/Chest: Effort normal and breath sounds normal. No stridor. No respiratory distress. He has no wheezes. He has no rales. He exhibits no tenderness.   Abdominal: Soft. Bowel sounds are normal. He exhibits no distension and no mass. There is no tenderness. There is no rebound and no guarding.   Musculoskeletal: Normal range of motion. He exhibits no edema or tenderness.   Lymphadenopathy:     He has no cervical adenopathy.   Neurological: He is alert and oriented to person, place, and time. He has normal reflexes. He displays normal reflexes. He exhibits normal muscle tone. Coordination normal.   Skin: Skin is warm and dry. No rash noted. He is not diaphoretic. No erythema. No pallor.   Psychiatric: He has a normal mood and affect. His behavior is " normal. Judgment and thought content normal.     Hospital Outpatient Visit on 10/11/2017   Component Date Value   • Cholesterol,Tot 10/11/2017 206*   • Triglycerides 10/11/2017 115    • HDL 10/11/2017 39*   • LDL 10/11/2017 144*   • Sodium 10/11/2017 141    • Potassium 10/11/2017 4.1    • Chloride 10/11/2017 110    • Co2 10/11/2017 25    • Anion Gap 10/11/2017 6.0    • Glucose 10/11/2017 101*   • Bun 10/11/2017 18    • Creatinine 10/11/2017 1.00    • Calcium 10/11/2017 9.4    • AST(SGOT) 10/11/2017 15    • ALT(SGPT) 10/11/2017 14    • Alkaline Phosphatase 10/11/2017 56    • Total Bilirubin 10/11/2017 0.4    • Albumin 10/11/2017 3.8    • Total Protein 10/11/2017 6.4    • Globulin 10/11/2017 2.6    • A-G Ratio 10/11/2017 1.5    • WBC 10/11/2017 6.3    • RBC 10/11/2017 4.99    • Hemoglobin 10/11/2017 15.9    • Hematocrit 10/11/2017 47.6    • MCV 10/11/2017 95.4    • MCH 10/11/2017 31.9    • MCHC 10/11/2017 33.4*   • RDW 10/11/2017 45.1    • Platelet Count 10/11/2017 223    • MPV 10/11/2017 10.9    • Neutrophils-Polys 10/11/2017 61.50    • Lymphocytes 10/11/2017 22.90    • Monocytes 10/11/2017 11.30    • Eosinophils 10/11/2017 3.20    • Basophils 10/11/2017 0.80    • Immature Granulocytes 10/11/2017 0.30    • Nucleated RBC 10/11/2017 0.00    • Neutrophils (Absolute) 10/11/2017 3.87    • Lymphs (Absolute) 10/11/2017 1.44    • Monos (Absolute) 10/11/2017 0.71    • Eos (Absolute) 10/11/2017 0.20    • Baso (Absolute) 10/11/2017 0.05    • Immature Granulocytes (a* 10/11/2017 0.02    • NRBC (Absolute) 10/11/2017 0.00    • GFR If  10/11/2017 >60    • GFR If Non  [employer]i* 10/11/2017 >60    Anticoagulation Monitoring on 09/15/2017   Component Date Value   • INR 09/15/2017 3.0       Lab Results   Component Value Date/Time    HBA1C 5.8 05/01/2013 08:53 AM     Lab Results   Component Value Date/Time    SODIUM 141 10/11/2017 09:01 AM    POTASSIUM 4.1 10/11/2017 09:01 AM    CHLORIDE 110 10/11/2017 09:01 AM    CO2  25 10/11/2017 09:01 AM    GLUCOSE 101 (H) 10/11/2017 09:01 AM    BUN 18 10/11/2017 09:01 AM    CREATININE 1.00 10/11/2017 09:01 AM    CREATININE 1.3 02/11/2009 04:05 AM    ALKPHOSPHAT 56 10/11/2017 09:01 AM    ASTSGOT 15 10/11/2017 09:01 AM    ALTSGPT 14 10/11/2017 09:01 AM    TBILIRUBIN 0.4 10/11/2017 09:01 AM     Lab Results   Component Value Date/Time    INR 3.0 09/15/2017    INR 1.9 08/02/2017    INR 2.9 06/17/2017     Lab Results   Component Value Date/Time    CHOLSTRLTOT 206 (H) 10/11/2017 09:01 AM     (H) 10/11/2017 09:01 AM    HDL 39 (A) 10/11/2017 09:01 AM    TRIGLYCERIDE 115 10/11/2017 09:01 AM       Lab Results   Component Value Date/Time    TESTOSTERONE 222 01/22/2014 09:04 AM     No results found for: TSH  Lab Results   Component Value Date/Time    FREET4 0.84 05/01/2013 08:53 AM    FREET4 0.90 01/24/2012 10:38 AM     No results found for: URICACID  No components found for: VITB12  Lab Results   Component Value Date/Time    25HYDROXY 40 05/01/2015 09:53 AM    25HYDROXY 28 (L) 01/22/2014 09:04 AM               Assessment/Plan:     1. Need for influenza vaccination           - INFLUENZA VACCINE, HIGH DOSE (65+ ONLY)    2.  Need for pneumococcal vaccination     - Pneumococal Polysaccharide Vaccine 23-Valent =>3yo SQ/IM    3. Chronic venous hypertension due to DVT   Under good control. Continue same regimen.    4. Obesity (BMI 30.0-34.9)     - Patient identified as having weight management issue.  Appropriate orders and counseling given.    5. Mixed hyperlipidemia    Under good control. Continue same regimen.  - COMP METABOLIC PANEL; Future  - LIPID PROFILE; Future  - CBC WITH DIFFERENTIAL; Future    6. Factor 5 Leiden mutation, heterozygous (CMS-HCC)    Under good control. Continue same regimen.    7. History of pulmonary embolism    Under good control. Continue same regimen.    8. Anticoagulated on warfarin Under good control. Continue same regimen.     - warfarin (COUMADIN) 5 MG Tab; TAKE ONE TO  TWO TABLETS BY MOUTH ONCE DAILY OR  AS  DIRECTED  BY  COUMADIN  CLINIC  Dispense: 120 Tab; Refill: 3    9. Chronic pain syndrome Under good control. Continue same regimen.      - oxycodone-acetaminophen (PERCOCET) 5-325 MG Tab; Take 1 Tab by mouth 1 time daily as needed.  Dispense: 120 Tab; Refill: 0    10. Post-phlebitic syndrome- RIGHT LEG Under good control. Continue same regimen.     - oxycodone-acetaminophen (PERCOCET) 5-325 MG Tab; Take 1 Tab by mouth 1 time daily as needed.  Dispense: 120 Tab; Refill: 0     30 minute face-to-face encounter took place today.  More than half of this time was spent in the coordination of care of the above problems, as well as counseling.

## 2017-10-16 ENCOUNTER — ANTICOAGULATION VISIT (OUTPATIENT)
Dept: MEDICAL GROUP | Facility: MEDICAL CENTER | Age: 67
End: 2017-10-16
Payer: MEDICARE

## 2017-10-16 VITALS — DIASTOLIC BLOOD PRESSURE: 77 MMHG | SYSTOLIC BLOOD PRESSURE: 127 MMHG | HEART RATE: 91 BPM

## 2017-10-16 DIAGNOSIS — Z86.711 HISTORY OF PULMONARY EMBOLISM: ICD-10-CM

## 2017-10-16 DIAGNOSIS — I87.009 POST-PHLEBITIC SYNDROME: ICD-10-CM

## 2017-10-16 DIAGNOSIS — Z79.01 ANTICOAGULATED ON WARFARIN: ICD-10-CM

## 2017-10-16 DIAGNOSIS — D68.51 FACTOR 5 LEIDEN MUTATION, HETEROZYGOUS (HCC): ICD-10-CM

## 2017-10-16 LAB — INR PPP: 2.3 (ref 2–3.5)

## 2017-10-16 PROCEDURE — 85610 PROTHROMBIN TIME: CPT | Performed by: FAMILY MEDICINE

## 2017-10-16 PROCEDURE — 99211 OFF/OP EST MAY X REQ PHY/QHP: CPT | Performed by: FAMILY MEDICINE

## 2017-10-16 NOTE — PROGRESS NOTES
Anticoagulation Summary  As of 10/16/2017    INR goal:   2.0-3.0   TTR:   74.6 % (1.4 y)   Today's INR:   2.3   Maintenance plan:   10 mg (5 mg x 2) on Thu; 5 mg (5 mg x 1) all other days   Weekly total:   40 mg   Plan last modified:   Adam LinkD (8/2/2017)   Next INR check:   11/27/2017   Target end date:   Indefinite    Indications    Post-phlebitic syndrome- RIGHT LEG [I87.009]  History of pulmonary embolism [Z86.711]  Anticoagulated on warfarin [Z79.01]  Factor 5 Leiden mutation  heterozygous (CMS-HCC) [D68.51]             Anticoagulation Episode Summary     INR check location:       Preferred lab:       Send INR reminders to:       Comments:   TTR is 37.5%, consider alternative therapy      Anticoagulation Care Providers     Provider Role Specialty Phone number    Keron Garcia M.D. Referring Internal Medicine [phone]    [facility] Responsible  [phone]    Ganesh Mao, PharmD Responsible          Anticoagulation Patient Findings      HPI:  Tavares Rankin Irene seen in clinic today, on anticoagulation therapy with warfarin for PE  Changes to current medical/health status since last appt:   Denies signs/symptoms of bleeding and/or thrombosis since the last appt.    Denies any interval changes to diet  Denies any interval changes to medications since last appt.   Denies any complications or cost restrictions with current therapy.   BP recorded in vitals.    A/P   INR  therapeutic.   Continue weekly warfarin dose as noted    Follow up appointment in 6 week(s).     Coy Ramirez, AdamD

## 2017-10-18 ENCOUNTER — TELEPHONE (OUTPATIENT)
Dept: MEDICAL GROUP | Age: 67
End: 2017-10-18

## 2017-10-18 DIAGNOSIS — I87.009 POST-PHLEBITIC SYNDROME: ICD-10-CM

## 2017-10-18 DIAGNOSIS — G89.4 CHRONIC PAIN SYNDROME: ICD-10-CM

## 2017-10-18 RX ORDER — OXYCODONE HYDROCHLORIDE AND ACETAMINOPHEN 5; 325 MG/1; MG/1
1 TABLET ORAL EVERY 6 HOURS PRN
Qty: 120 TAB | Refills: 0 | Status: SHIPPED | OUTPATIENT
Start: 2017-10-18 | End: 2018-04-18 | Stop reason: SDUPTHER

## 2017-10-18 NOTE — TELEPHONE ENCOUNTER
----- Message from Lisa Rodrigues sent at 10/18/2017  8:44 AM PDT -----  Contact: [phone]  Pt says that the pharmacy would not refill his oxy they said they cant fill the quantity that dr henson wrote. Pt says he needs a new prescription he wanted to know if they can be full strength and he can just cut them in half. Pt does not have the written prescription the pharmacy says they don't have the written prescription but pt is going to go and check with them again. Please call pt with any questions.

## 2017-10-18 NOTE — TELEPHONE ENCOUNTER
Phone Number Called: [phone] (home)     Message: Pt informed that Rx is ready to be picked up. Placed at the front.    Left Message for patient to call back: no

## 2017-11-27 ENCOUNTER — ANTICOAGULATION VISIT (OUTPATIENT)
Dept: MEDICAL GROUP | Facility: MEDICAL CENTER | Age: 67
End: 2017-11-27
Payer: MEDICARE

## 2017-11-27 VITALS
DIASTOLIC BLOOD PRESSURE: 77 MMHG | SYSTOLIC BLOOD PRESSURE: 121 MMHG | WEIGHT: 236 LBS | BODY MASS INDEX: 32 KG/M2 | HEART RATE: 52 BPM

## 2017-11-27 DIAGNOSIS — I87.009 POST-PHLEBITIC SYNDROME: ICD-10-CM

## 2017-11-27 DIAGNOSIS — D68.51 FACTOR 5 LEIDEN MUTATION, HETEROZYGOUS (HCC): ICD-10-CM

## 2017-11-27 DIAGNOSIS — Z79.01 ANTICOAGULATED ON WARFARIN: ICD-10-CM

## 2017-11-27 DIAGNOSIS — Z86.711 HISTORY OF PULMONARY EMBOLISM: ICD-10-CM

## 2017-11-27 LAB — INR PPP: 2.4 (ref 2–3.5)

## 2017-11-27 PROCEDURE — 85610 PROTHROMBIN TIME: CPT | Performed by: INTERNAL MEDICINE

## 2017-11-27 NOTE — PROGRESS NOTES
OP Anticoagulation Service Note    Date: 11/27/2017  Vitals:    11/27/17 0853   BP: 121/77   Pulse: (!) 52   Weight: 107 kg (236 lb)       Anticoagulation Summary  As of 11/27/2017    INR goal:   2.0-3.0   TTR:   76.5 % (1.5 y)   Today's INR:   2.4   Maintenance plan:   10 mg (5 mg x 2) on Thu; 5 mg (5 mg x 1) all other days   Weekly total:   40 mg   Plan last modified:   Rosette Vásquez, PharmD (8/2/2017)   Next INR check:   1/8/2018   Target end date:   Indefinite    Indications    Post-phlebitic syndrome- RIGHT LEG [I87.009]  History of pulmonary embolism [Z86.711]  Anticoagulated on warfarin [Z79.01]  Factor 5 Leiden mutation  heterozygous (CMS-HCC) [D68.51]             Anticoagulation Episode Summary     INR check location:       Preferred lab:       Send INR reminders to:       Comments:   TTR is 37.5%, consider alternative therapy      Anticoagulation Care Providers     Provider Role Specialty Phone number    Keron Garcia M.D. Referring Internal Medicine [phone]    [facility]tico[facility] Responsible  [phone]    Ganesh Mao, PharmD Responsible          Anticoagulation Patient Findings      HPI:   Tavares Bonilla seen in clinic today, they are here today for a INR check on anticoagulation therapy with warfarin because they have PE    The reason for today's visit is to prevent morbidity and mortality from a  Blood clot  and to reduce the risk of bleeding while on a anticoagulant.     Confirmed warfarin dosing regimen  Interval Changes with foods rich in vitamin K:No  Interval Changes in ETOH:  No  Interval Changes in smoking status: No  Interval Changes in medication: No   Cost restriction: No    S/S of bleeding or bruising:  No  Signs/symptoms  thrombosis since the last appt: No  Bleed risk is: low,     3 vitals included with today's appt:Yes  (BP, HR, weight, ht, RR)     A/P   INR  therapeutic.       Intervention required today to prevent:    No change in dose needed today,  they will need to continue with the same dose and diet to prevent adverse events while on a anticoagulant.      Continue weekly warfarin dose as noted      Additional education provided today regarding reducing bleed risk and dietary constraints: No      Pt educated to contact our clinic with any changes in medications or s/s of bleeding or thrombosis    Follow up appointment in 6 week(s)    Follow up interval reason: per our collaborative practice policy as their last INR was 2.3 on 10/16/2017    Justify length:   They have a TTR of 76.5 which is not at target (TTR target/goal is 100%) and requires close follow up to prevent a adverse event (the lower the TTR the higher risk of clots, strokes, or bleeding).     CHEST guidelines recommend frequent INR monitoring at regular intervals (a few days up to a max of 12 weeks) to ensure they are on the proper dose of warfarin and not having any complications from therapy.  INRs can dramatically change over a short time period due to diet, medications, and medical conditions.

## 2017-11-28 ENCOUNTER — OFFICE VISIT (OUTPATIENT)
Dept: MEDICAL GROUP | Age: 67
End: 2017-11-28
Payer: MEDICARE

## 2017-11-28 VITALS
DIASTOLIC BLOOD PRESSURE: 70 MMHG | BODY MASS INDEX: 32.48 KG/M2 | WEIGHT: 239.8 LBS | SYSTOLIC BLOOD PRESSURE: 124 MMHG | HEIGHT: 72 IN | TEMPERATURE: 97.7 F | HEART RATE: 104 BPM | OXYGEN SATURATION: 94 % | RESPIRATION RATE: 14 BRPM

## 2017-11-28 DIAGNOSIS — D68.51 FACTOR 5 LEIDEN MUTATION, HETEROZYGOUS (HCC): ICD-10-CM

## 2017-11-28 DIAGNOSIS — E78.2 MIXED HYPERLIPIDEMIA: ICD-10-CM

## 2017-11-28 DIAGNOSIS — K40.90 LEFT INGUINAL HERNIA: Primary | ICD-10-CM

## 2017-11-28 PROCEDURE — 99214 OFFICE O/P EST MOD 30 MIN: CPT | Performed by: FAMILY MEDICINE

## 2017-11-28 ASSESSMENT — PAIN SCALES - GENERAL: PAINLEVEL: 5=MODERATE PAIN

## 2017-11-28 NOTE — PROGRESS NOTES
Subjective:   CC:Left inguinal pain    HPI:     Tavares Bonilla is a 67 y.o. male, established patient of the clinic, presents with the following concerns:     Patient was in his normal state of health until 3-4 weeks ago when he develops acute left inguinal pain while trying to life heavy rocks in the back yard. He describes burning sensation at the left inguinal area, intermittent, non-radiating, worse with prolonged sitting, better with rest. He endorses small bulging mass at the left inguinal area as well. This mass appears to get larger with lifting or Valsalva. Pt thought that he might have torn a thigh muscle and was hoping that symptoms will improve on its own. However, he is concerned of persistent pain that does not appear to improve over the past few weeks. Denies fever, chills, nausea, abdominal pain, hematochezia, melena.     Pt has hx of mixed hyperlipidemia. He used to take statin, but self discontinued due to concerns of developing type 2 DM with statin.     He is on chronic Coumadin for hx of VTE secondary to factor V Leiden. INR has been stable and at goal for the past 5 years. Denies hx of active bleeding.     Current medicines (including changes today)  Current Outpatient Prescriptions   Medication Sig Dispense Refill   • oxycodone-acetaminophen (PERCOCET) 5-325 MG Tab Take 1 Tab by mouth every 6 hours as needed. 120 Tab 0   • warfarin (COUMADIN) 5 MG Tab TAKE ONE TO TWO TABLETS BY MOUTH ONCE DAILY OR  AS  DIRECTED  BY  COUMADIN  CLINIC 120 Tab 3   • Cyanocobalamin (VITAMIN B-12) 1000 MCG Tab Take 1,000 mcg by mouth every day.     • Diclofenac Sodium (VOLTAREN) 1 % Gel Apply 1 Inch to skin as directed every 3 hours as needed. 1 Tube 11   • Cholecalciferol (VITAMIN D) 2000 UNITS Cap Take 1 Cap by mouth every day. 100 Cap 3     No current facility-administered medications for this visit.      He  has no past medical history on file.    I personally reviewed patient's problem list, allergies,  medications, family hx, social hx with patient and update EPIC.     REVIEW OF SYSTEMS:  CONSTITUTIONAL:  Denies night sweats, fatigue, malaise, lethargy, fever or chills.  RESPIRATORY:  Denies cough, wheeze, hemoptysis, or shortness of breath.  CARDIOVASCULAR:  Denies chest pains, palpitations, pedal edema     Objective:     Blood pressure 124/70, pulse (!) 104, temperature 36.5 °C (97.7 °F), resp. rate 14, height 1.829 m (6'), weight 108.8 kg (239 lb 12.8 oz), SpO2 94 %. Body mass index is 32.52 kg/m².    Physical Exam:  Constitutional: awake, alert, in no distress.  Skin: Warm, dry, good turgor, no rashes, bruises, ulcers in visible areas.  Eye: conjunctiva clear, lids neg for edema or lesions.  Respiratory: Unlabored respiratory effort, lungs clear to auscultation, no wheezes, no rhonchi, no rales.  Cardiovascular: Normal S1, S2, no murmur, no pedal edema.  Abdomen: Soft, non-tender to palpation, active BS, no hernia, no hepatosplenomegaly, negative rebound or guarding. Mildly tender, bulging mass at the left inguinal area, enlarges with valsalva.   Psych: Oriented x3, affect and mood wnl, intact judgement and insight.       Assessment and Plan:   The following treatment plan was discussed    1. Left inguinal hernia  History and exam consistent with left inguinal hernia. Negative signs or symptoms of strangulation. Plan:   - REFERRAL TO GENERAL SURGERY  -strict ED precautions for worsening pain, nausea, abdominal pain, hematochezia, melena.    2. Mixed hyperlipidemia  Chronic, use to take statin, but self discontinued due to concerns of developing type 2 diabetes. Plans:  - Discussed dietary modification, exercise, weight loss.  - Follow up with PCP as instructed.    3. Factor 5 Leiden mutation, heterozygous (CMS-HCC)  Patient is on chronic anticoagulation secondary to history of DVT and factor V Leiden mutation. His INR has been stable and at goal. Patient denies history of active bleeding. Plans:  - Continue  Coumadin per [facility].  - Advised patient to discuss history of chronic anticoagulation with general surgeon if inguinal repair surgery is planned.       Constance Mart M.D.      Followup: Return for As needed.    Please note that this dictation was created using voice recognition software. I have made every reasonable attempt to correct obvious errors, but I expect that there are errors of grammar and possibly content that I did not discover before finalizing the note.

## 2017-12-01 DIAGNOSIS — G89.4 CHRONIC PAIN SYNDROME: ICD-10-CM

## 2017-12-13 ENCOUNTER — APPOINTMENT (OUTPATIENT)
Dept: ADMISSIONS | Facility: MEDICAL CENTER | Age: 67
End: 2017-12-13
Attending: SURGERY
Payer: MEDICARE

## 2017-12-13 DIAGNOSIS — Z01.812 PRE-OPERATIVE LABORATORY EXAMINATION: ICD-10-CM

## 2017-12-13 DIAGNOSIS — Z01.810 PRE-OPERATIVE CARDIOVASCULAR EXAMINATION: ICD-10-CM

## 2017-12-13 LAB
BASOPHILS # BLD AUTO: 0.5 % (ref 0–1.8)
BASOPHILS # BLD: 0.04 K/UL (ref 0–0.12)
EKG IMPRESSION: NORMAL
EOSINOPHIL # BLD AUTO: 0.18 K/UL (ref 0–0.51)
EOSINOPHIL NFR BLD: 2.3 % (ref 0–6.9)
ERYTHROCYTE [DISTWIDTH] IN BLOOD BY AUTOMATED COUNT: 43 FL (ref 35.9–50)
HCT VFR BLD AUTO: 50.3 % (ref 42–52)
HGB BLD-MCNC: 16.9 G/DL (ref 14–18)
IMM GRANULOCYTES # BLD AUTO: 0.03 K/UL (ref 0–0.11)
IMM GRANULOCYTES NFR BLD AUTO: 0.4 % (ref 0–0.9)
LYMPHOCYTES # BLD AUTO: 1.25 K/UL (ref 1–4.8)
LYMPHOCYTES NFR BLD: 16.1 % (ref 22–41)
MCH RBC QN AUTO: 31.5 PG (ref 27–33)
MCHC RBC AUTO-ENTMCNC: 33.6 G/DL (ref 33.7–35.3)
MCV RBC AUTO: 93.8 FL (ref 81.4–97.8)
MONOCYTES # BLD AUTO: 0.67 K/UL (ref 0–0.85)
MONOCYTES NFR BLD AUTO: 8.7 % (ref 0–13.4)
NEUTROPHILS # BLD AUTO: 5.57 K/UL (ref 1.82–7.42)
NEUTROPHILS NFR BLD: 72 % (ref 44–72)
NRBC # BLD AUTO: 0 K/UL
NRBC BLD AUTO-RTO: 0 /100 WBC
PLATELET # BLD AUTO: 225 K/UL (ref 164–446)
PMV BLD AUTO: 10.3 FL (ref 9–12.9)
RBC # BLD AUTO: 5.36 M/UL (ref 4.7–6.1)
WBC # BLD AUTO: 7.7 K/UL (ref 4.8–10.8)

## 2017-12-13 PROCEDURE — 36415 COLL VENOUS BLD VENIPUNCTURE: CPT

## 2017-12-13 PROCEDURE — 93005 ELECTROCARDIOGRAM TRACING: CPT

## 2017-12-13 PROCEDURE — 85025 COMPLETE CBC W/AUTO DIFF WBC: CPT

## 2017-12-13 PROCEDURE — 93010 ELECTROCARDIOGRAM REPORT: CPT | Performed by: INTERNAL MEDICINE

## 2017-12-18 ENCOUNTER — HOSPITAL ENCOUNTER (OUTPATIENT)
Facility: MEDICAL CENTER | Age: 67
End: 2017-12-18
Attending: SURGERY | Admitting: SURGERY
Payer: MEDICARE

## 2017-12-18 VITALS
WEIGHT: 235.45 LBS | TEMPERATURE: 98.2 F | BODY MASS INDEX: 31.89 KG/M2 | RESPIRATION RATE: 18 BRPM | HEART RATE: 77 BPM | HEIGHT: 72 IN | SYSTOLIC BLOOD PRESSURE: 128 MMHG | DIASTOLIC BLOOD PRESSURE: 87 MMHG | OXYGEN SATURATION: 93 %

## 2017-12-18 DIAGNOSIS — K40.90 NON-RECURRENT UNILATERAL INGUINAL HERNIA WITHOUT OBSTRUCTION OR GANGRENE: ICD-10-CM

## 2017-12-18 LAB
INR PPP: 0.98 (ref 0.87–1.13)
PROTHROMBIN TIME: 12.7 SEC (ref 12–14.6)

## 2017-12-18 PROCEDURE — A9270 NON-COVERED ITEM OR SERVICE: HCPCS

## 2017-12-18 PROCEDURE — 700111 HCHG RX REV CODE 636 W/ 250 OVERRIDE (IP)

## 2017-12-18 PROCEDURE — 501570 HCHG TROCAR, SEPARATOR: Performed by: SURGERY

## 2017-12-18 PROCEDURE — 160035 HCHG PACU - 1ST 60 MINS PHASE I: Performed by: SURGERY

## 2017-12-18 PROCEDURE — 500002 HCHG ADHESIVE, DERMABOND: Performed by: SURGERY

## 2017-12-18 PROCEDURE — A6402 STERILE GAUZE <= 16 SQ IN: HCPCS | Performed by: SURGERY

## 2017-12-18 PROCEDURE — 160036 HCHG PACU - EA ADDL 30 MINS PHASE I: Performed by: SURGERY

## 2017-12-18 PROCEDURE — 160041 HCHG SURGERY MINUTES - EA ADDL 1 MIN LEVEL 4: Performed by: SURGERY

## 2017-12-18 PROCEDURE — 160048 HCHG OR STATISTICAL LEVEL 1-5: Performed by: SURGERY

## 2017-12-18 PROCEDURE — 503366 HCHG TROCAR, 12X150 KII FIOS Z THR: Performed by: SURGERY

## 2017-12-18 PROCEDURE — 85610 PROTHROMBIN TIME: CPT

## 2017-12-18 PROCEDURE — 500868 HCHG NEEDLE, SURGI(VARES): Performed by: SURGERY

## 2017-12-18 PROCEDURE — 700101 HCHG RX REV CODE 250

## 2017-12-18 PROCEDURE — 160029 HCHG SURGERY MINUTES - 1ST 30 MINS LEVEL 4: Performed by: SURGERY

## 2017-12-18 PROCEDURE — 160046 HCHG PACU - 1ST 60 MINS PHASE II: Performed by: SURGERY

## 2017-12-18 PROCEDURE — 160025 RECOVERY II MINUTES (STATS): Performed by: SURGERY

## 2017-12-18 PROCEDURE — 160002 HCHG RECOVERY MINUTES (STAT): Performed by: SURGERY

## 2017-12-18 PROCEDURE — 501838 HCHG SUTURE GENERAL: Performed by: SURGERY

## 2017-12-18 PROCEDURE — C1781 MESH (IMPLANTABLE): HCPCS | Performed by: SURGERY

## 2017-12-18 PROCEDURE — 502714 HCHG ROBOTIC SURGERY SERVICES: Performed by: SURGERY

## 2017-12-18 PROCEDURE — 160009 HCHG ANES TIME/MIN: Performed by: SURGERY

## 2017-12-18 PROCEDURE — 700102 HCHG RX REV CODE 250 W/ 637 OVERRIDE(OP)

## 2017-12-18 DEVICE — MESH PROGRIP LAPROSCOPIC SELF FIXATING (1/CA): Type: IMPLANTABLE DEVICE | Status: FUNCTIONAL

## 2017-12-18 RX ORDER — ONDANSETRON 2 MG/ML
4 INJECTION INTRAMUSCULAR; INTRAVENOUS EVERY 4 HOURS PRN
Status: DISCONTINUED | OUTPATIENT
Start: 2017-12-18 | End: 2017-12-18 | Stop reason: HOSPADM

## 2017-12-18 RX ORDER — OXYCODONE HYDROCHLORIDE AND ACETAMINOPHEN 5; 325 MG/1; MG/1
1-2 TABLET ORAL EVERY 4 HOURS PRN
Qty: 30 TAB | Refills: 0 | Status: SHIPPED | OUTPATIENT
Start: 2017-12-18 | End: 2017-12-23

## 2017-12-18 RX ORDER — SODIUM CHLORIDE, SODIUM LACTATE, POTASSIUM CHLORIDE, CALCIUM CHLORIDE 600; 310; 30; 20 MG/100ML; MG/100ML; MG/100ML; MG/100ML
INJECTION, SOLUTION INTRAVENOUS CONTINUOUS
Status: DISCONTINUED | OUTPATIENT
Start: 2017-12-18 | End: 2017-12-18 | Stop reason: HOSPADM

## 2017-12-18 RX ORDER — BUPIVACAINE HYDROCHLORIDE AND EPINEPHRINE 5; 5 MG/ML; UG/ML
INJECTION, SOLUTION EPIDURAL; INTRACAUDAL; PERINEURAL
Status: DISCONTINUED | OUTPATIENT
Start: 2017-12-18 | End: 2017-12-18 | Stop reason: HOSPADM

## 2017-12-18 RX ORDER — ASCORBIC ACID 500 MG
500 TABLET ORAL DAILY
COMMUNITY
End: 2018-05-18

## 2017-12-18 RX ORDER — OXYCODONE HCL 5 MG/5 ML
SOLUTION, ORAL ORAL
Status: COMPLETED
Start: 2017-12-18 | End: 2017-12-18

## 2017-12-18 RX ORDER — MAGNESIUM HYDROXIDE 1200 MG/15ML
LIQUID ORAL
Status: DISCONTINUED | OUTPATIENT
Start: 2017-12-18 | End: 2017-12-18 | Stop reason: HOSPADM

## 2017-12-18 RX ADMIN — OXYCODONE HYDROCHLORIDE 10 MG: 5 SOLUTION ORAL at 17:45

## 2017-12-18 RX ADMIN — FENTANYL CITRATE 50 MCG: 50 INJECTION, SOLUTION INTRAMUSCULAR; INTRAVENOUS at 17:45

## 2017-12-18 RX ADMIN — SODIUM CHLORIDE, SODIUM LACTATE, POTASSIUM CHLORIDE, CALCIUM CHLORIDE: 600; 310; 30; 20 INJECTION, SOLUTION INTRAVENOUS at 13:29

## 2017-12-18 ASSESSMENT — PAIN SCALES - GENERAL
PAINLEVEL_OUTOF10: 1
PAINLEVEL_OUTOF10: 3
PAINLEVEL_OUTOF10: 4
PAINLEVEL_OUTOF10: 3
PAINLEVEL_OUTOF10: 5
PAINLEVEL_OUTOF10: 0
PAINLEVEL_OUTOF10: 0

## 2017-12-19 NOTE — DISCHARGE INSTRUCTIONS
ACTIVITY: Rest and take it easy for the first 24 hours.  A responsible adult is recommended to remain with you during that time.  It is normal to feel sleepy.  We encourage you to not do anything that requires balance, judgment or coordination.    MILD FLU-LIKE SYMPTOMS ARE NORMAL. YOU MAY EXPERIENCE GENERALIZED MUSCLE ACHES, THROAT IRRITATION, HEADACHE AND/OR SOME NAUSEA.    FOR 24 HOURS DO NOT:  Drive, operate machinery or run household appliances.  Drink beer or alcoholic beverages.   Make important decisions or sign legal documents.    SPECIAL INSTRUCTIONS:     Hernia Repair Discharge Instructions:    1. ACTIVITIES: Upon discharge from the hospital, the day of surgery it is requested that you do no significant physical activity and limit mental activities, as you have had sedation. The day after surgery, you may resume activities of daily living, but for four weeks, it is recommended that you do no strenuous activities or heavy lifting (greater than 15 pounds).     2. DRIVING: You may drive whenever you are off pain medications and are able to perform the activities needed to drive, i.e. turning, bending, twisting, etc.     3. WOUND: It is not unusual for patients to experience swelling and even bruising at the hernia repair site. With inguinal hernias, sometimes the bruising and swelling may extend on to the penis or into the scrotum of male patients. This will resolve over the next few days.     4. ICE: please use ice on the wound to decrease the swelling for the first 24 hours and then discontinue.     5. BATHING: The dressing can be removed two days after surgery and the wound can then be wetted in a shower as normal, but avoid submersion in water (tub bath) for at least a week.    6. PAIN MEDICATION: You will be given a prescription for pain medication at discharge. Please take these as directed. It is important to remember not to take medications on an empty stomach as this may cause nausea.     7. BOWEL  FUNCTION: After hernia repair, it is not uncommon for patients to experience constipation. This is due to decreasing activity levels as well as pain medications. You may wish to use a stool softener beginning immediately after surgery, and you may or may not need to use a laxative (Milk of Magnesia, Ex-lax; Senokot, etc.) as well.            8 .CALL IF YOU HAVE: (1) Fevers to more than 1010 F, (2) Unusual chest or leg pain, (3) Drainage or fluid from incision that may be foul smelling, increased tenderness or soreness at the wound or the wound edges are no longer together,redness or swelling at the incision site. Please do not hesitate to call with any other questions.     9. APPOINTMENT: Contact our office at [phone] for a follow-up appointment in 1 to 2 weeks following your procedure.     If you have any additional questions, please do not hesitate to call the office and speak to either myself or the physician on call.     Office address:  [address] [place] [address]      Rasta Reyes M.D.  [phone]      DIET: To avoid nausea, slowly advance diet as tolerated, avoiding spicy or greasy foods for the first day.  Add more substantial food to your diet according to your physician's instructions.  Babies can be fed formula or breast milk as soon as they are hungry.  INCREASE FLUIDS AND FIBER TO AVOID CONSTIPATION.    SURGICAL DRESSING/BATHING: The dressing can be removed two days after surgery and the wound can then be wetted in a shower as normal, but avoid submersion in water (tub bath) for at least a week.    FOLLOW-UP APPOINTMENT:  A follow-up appointment should be arranged with your doctor in 1-2 weeks; call to schedule.    You should CALL YOUR PHYSICIAN if you develop:  Fever greater than 101 degrees F.  Pain not relieved by medication, or persistent nausea or vomiting.  Excessive bleeding (blood soaking through dressing) or unexpected drainage from the wound.  Extreme redness or  swelling around the incision site, drainage of pus or foul smelling drainage.  Inability to urinate or empty your bladder within 8 hours.  Problems with breathing or chest pain.    You should call 911 if you develop problems with breathing or chest pain.  If you are unable to contact your doctor or surgical center, you should go to the nearest emergency room or urgent care center.  Physician's telephone #: Dr. Reyes [phone]    If any questions arise, call your doctor.  If your doctor is not available, please feel free to call the Surgical Center at [phone].  The Center is open Monday through Friday from 7AM to 7PM.  You can also call the HEALTH HOTLINE open 24 hours/day, 7 days/week and speak to a nurse at [phone], or toll free at [phone].    A registered nurse may call you a few days after your surgery to see how you are doing after your procedure.    MEDICATIONS: Resume taking daily medication.  Take prescribed pain medication with food.  If no medication is prescribed, you may take non-aspirin pain medication if needed.  PAIN MEDICATION CAN BE VERY CONSTIPATING.  Take a stool softener or laxative such as senokot, pericolace, or milk of magnesia if needed.    Prescription given for norco.  Last pain medication given at 5:45 pm.    If your physician has prescribed pain medication that includes Acetaminophen (Tylenol), do not take additional Acetaminophen (Tylenol) while taking the prescribed medication.    Depression / Suicide Risk    As you are discharged from this [facility] facility, it is important to learn how to keep safe from harming yourself.    Recognize the warning signs:  · Abrupt changes in personality, positive or negative- including increase in energy   · Giving away possessions  · Change in eating patterns- significant weight changes-  positive or negative  · Change in sleeping patterns- unable to sleep or sleeping all the time   · Unwillingness or inability to  communicate  · Depression  · Unusual sadness, discouragement and loneliness  · Talk of wanting to die  · Neglect of personal appearance   · Rebelliousness- reckless behavior  · Withdrawal from people/activities they love  · Confusion- inability to concentrate     If you or a loved one observes any of these behaviors or has concerns about self-harm, here's what you can do:  · Talk about it- your feelings and reasons for harming yourself  · Remove any means that you might use to hurt yourself (examples: pills, rope, extension cords, firearm)  · Get professional help from the community (Mental Health, Substance Abuse, psychological counseling)  · Do not be alone:Call your Safe Contact- someone whom you trust who will be there for you.  · Call your local CRISIS HOTLINE 575-3535 or 918-112-6360  · Call your local Children's Mobile Crisis Response Team [place] (284) 288-5985 or www.[employer]  · Call the toll free National Suicide Prevention Hotlines   · National Suicide Prevention Lifeline 366-203-MFWY (7281)  · National Hope Line Network 800-SUICIDE (856-0371)

## 2017-12-19 NOTE — OP REPORT
DATE OF SERVICE:  12/18/2017    SURGEON:  Rasta Reyes MD    PREOPERATIVE DIAGNOSIS:  Left inguinal hernia.    POSTOPERATIVE DIAGNOSIS:  Left direct inguinal hernia.    PROCEDURE PERFORMED:  Robotic repair of left inguinal hernia with mesh.    ANESTHESIA:  General endotracheal anesthesia.    ANESTHESIOLOGIST:  Jose Siddiqi MD    INDICATIONS:  A 67-year-old man with a symptomatic left inguinal hernia.    Repair was indicated.    DESCRIPTION OF PROCEDURE:  Procedure discussed in detail with the patient   including the surgical robot, potential for converting to an open procedure,   the associated risk of bleeding, infection, abscess, and hematoma.  I also   discussed the use of mesh, the risk of recurrence, the risk of vascular   injury, nerve injury, injury to intraperitoneal organs, and chronic pain.  He   understood all the above and wished to proceed.  He was placed under   anesthesia by Dr. Siddiqi.  His abdomen prepped and draped with chlorhexidine   prep and sterile drapes.  After timeout, a supraumbilical incision was made   and through this incision, a Veress needle was placed.  Low pressure was   confirmed and CO2 insufflation was used to achieve pneumoperitoneum of 50   mmHg.  Through the same incision, a 12 mm port was placed and a laparoscope   was inserted.  Under direct visualization, right and left-sided da Reid ports   were placed.  Robot was docked and instruments were placed.  A relatively   large direct hernia on the left was readily evident.  Peritoneum was incised   and a preperitoneal dissection was undertaken.  The direct hernia sac was   completely reduced.  Ramon's ligament and pubis were exposed in the midline.    Dissection proceeded laterally and a space large enough for the ProGrip mesh   was created.  The direct defect was then approximated using a Stratafix   suture.  Subsequent to this, a ProGrip mesh was placed and noted to nicely   cover the pelvic floor, including the defect.   Peritoneum was then   reapproximated over the mesh using a running PDS suture.  The 2 needles that   were used for the closure was then removed.  The 12 mm port was then removed   and the fascia at that site was approximated with an 0 Vicryl stitch using the   Endoclose device.  Remaining ports removed and pneumoperitoneum was released.    The skin on all incisions was approximated with 4-0 Vicryl sutures.    Dermabond was placed.  Patient tolerated the procedure well without apparent   complication.  Final counts were reported as correct.       ____________________________________     MD MELANIE CROWLEY / EMILIANO    DD:  12/18/2017 17:27:33  DT:  12/18/2017 18:02:24    D#:  [id number]  Job#:  [id number]

## 2017-12-19 NOTE — OR NURSING
D/c orders received. IV dc'd. Pt changed into clothing with assistance. Discharge instructions given; pt and family verbalized understanding and questions answered. Prescriptions with pt's wife. Pt dc'd in w/c with hospital escort in stable condition. Pt states pain is minimal to none.

## 2018-01-08 ENCOUNTER — ANTICOAGULATION VISIT (OUTPATIENT)
Dept: MEDICAL GROUP | Facility: MEDICAL CENTER | Age: 68
End: 2018-01-08
Payer: MEDICARE

## 2018-01-08 VITALS — DIASTOLIC BLOOD PRESSURE: 79 MMHG | HEART RATE: 94 BPM | SYSTOLIC BLOOD PRESSURE: 127 MMHG

## 2018-01-08 DIAGNOSIS — I87.009 POST-PHLEBITIC SYNDROME: ICD-10-CM

## 2018-01-08 DIAGNOSIS — Z79.01 ANTICOAGULATED ON WARFARIN: ICD-10-CM

## 2018-01-08 DIAGNOSIS — Z86.711 HISTORY OF PULMONARY EMBOLISM: ICD-10-CM

## 2018-01-08 DIAGNOSIS — D68.51 FACTOR 5 LEIDEN MUTATION, HETEROZYGOUS (HCC): ICD-10-CM

## 2018-01-08 LAB — INR PPP: 3.3 (ref 2–3.5)

## 2018-01-08 PROCEDURE — 99211 OFF/OP EST MAY X REQ PHY/QHP: CPT | Performed by: FAMILY MEDICINE

## 2018-01-08 PROCEDURE — 85610 PROTHROMBIN TIME: CPT | Performed by: FAMILY MEDICINE

## 2018-01-08 NOTE — PROGRESS NOTES
OP Anticoagulation Service Note    Date: 1/8/2018  Vitals:    01/08/18 0855   BP: 127/79   Pulse: 94       Anticoagulation Summary  As of 1/8/2018    INR goal:   2.0-3.0   TTR:   73.6 % (1.6 y)   Today's INR:   3.3!   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 Referring Internal Medicine [phone]    [facility] Anticoagulation Services Responsible  [phone]    Ganesh Mao, PharmD Responsible          Anticoagulation Patient Findings      HPI:   Tavares Bonilla seen in clinic today, on anticoagulation therapy with warfarin (a high risk medication) for hx of PE and DVT,     Pt is here today to evaluate anticoagulation therapy    Previous INR was 2.4 on 11-27-17, pt was instructed to continue current regimen. Since last visit he had his hernia repaired and he was off his warfarin for 5-7 days prior then resumed his usual regimen. He did not notify our office    Confirmed warfarin dosing regimen, denies missed or extra doses of coumadin.   Diet has been consistent with foods rich in vitamin K: No, he reports eating 1/3 of what he usually eats since his hernia repair  Changes in ETOH:  No  Changes in smoking status: No  Changes in medication: No   Cost restriction: No  S/s of bleeding:  No  Signs/symptoms  thrombosis since the last appt: No    A/P   INR   supra-therapeutic today, will require dose adjust ment today to prevent bleeding complications  and closer follow up.   D/t change in diet will have pt begin decreased weekly regimen     Pt educated to contact our clinic with any changes in medications or s/s of bleeding or thrombosis    Follow up appointment in 2 week(s) to reduce risk of adverse events from warfarin  Lorena Marie, Pharm D

## 2018-01-22 ENCOUNTER — ANTICOAGULATION VISIT (OUTPATIENT)
Dept: MEDICAL GROUP | Facility: MEDICAL CENTER | Age: 68
End: 2018-01-22
Payer: MEDICARE

## 2018-01-22 VITALS — DIASTOLIC BLOOD PRESSURE: 82 MMHG | HEART RATE: 92 BPM | SYSTOLIC BLOOD PRESSURE: 135 MMHG

## 2018-01-22 DIAGNOSIS — D68.51 FACTOR 5 LEIDEN MUTATION, HETEROZYGOUS (HCC): ICD-10-CM

## 2018-01-22 DIAGNOSIS — Z79.01 ANTICOAGULATED ON WARFARIN: ICD-10-CM

## 2018-01-22 DIAGNOSIS — Z86.711 HISTORY OF PULMONARY EMBOLISM: ICD-10-CM

## 2018-01-22 DIAGNOSIS — I87.009 POST-PHLEBITIC SYNDROME: ICD-10-CM

## 2018-01-22 LAB — INR PPP: 2.2 (ref 2–3.5)

## 2018-01-22 PROCEDURE — 85610 PROTHROMBIN TIME: CPT | Performed by: INTERNAL MEDICINE

## 2018-01-22 NOTE — PROGRESS NOTES
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 Referring Internal Medicine [phone]    [facility] Anticoagulation Services Responsible  [phone]    Ganesh Mao, PharmD Responsible          Anticoagulation Patient Findings      HPI:   Tavares Bonilla seen in clinic today, on anticoagulation therapy with warfarin (a high risk medication) for hx of PE and DVT      Pt is here today to evaluate anticoagulation therapy    Previous INR was 3.3 on 1-8-18, pt was instructed to reduce weekly regimen    Confirmed warfarin dosing regimen, denies missed or extra doses of coumadin.   Diet has been consistent with foods rich in vitamin K: Yes  Changes in ETOH:  No  Changes in smoking status: No  Changes in medication: No   Cost restriction: No  S/s of bleeding:  No  Signs/symptoms  thrombosis since the last appt: No    A/P   INR  therapeutic today,   Continue current regimen, will need closer f/u as previous INR was supratherapeutic.      Pt educated to contact our clinic with any changes in medications or s/s of bleeding or thrombosis    Follow up  appointment in 3 week(s) to reduce risk of adverse events from warfarin  Lorena Marie, Pharm D

## 2018-02-12 ENCOUNTER — ANTICOAGULATION VISIT (OUTPATIENT)
Dept: MEDICAL GROUP | Facility: MEDICAL CENTER | Age: 68
End: 2018-02-12
Payer: MEDICARE

## 2018-02-12 VITALS — SYSTOLIC BLOOD PRESSURE: 114 MMHG | DIASTOLIC BLOOD PRESSURE: 76 MMHG | HEART RATE: 80 BPM

## 2018-02-12 DIAGNOSIS — Z79.01 ANTICOAGULATED ON WARFARIN: ICD-10-CM

## 2018-02-12 DIAGNOSIS — I87.009 POST-PHLEBITIC SYNDROME: ICD-10-CM

## 2018-02-12 DIAGNOSIS — D68.51 FACTOR 5 LEIDEN MUTATION, HETEROZYGOUS (HCC): ICD-10-CM

## 2018-02-12 DIAGNOSIS — Z86.711 HISTORY OF PULMONARY EMBOLISM: ICD-10-CM

## 2018-02-12 LAB — INR PPP: 2 (ref 2–3.5)

## 2018-02-12 PROCEDURE — 99999 PR NO CHARGE: CPT | Performed by: INTERNAL MEDICINE

## 2018-02-12 PROCEDURE — 85610 PROTHROMBIN TIME: CPT | Performed by: INTERNAL MEDICINE

## 2018-02-12 NOTE — PROGRESS NOTES
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 Referring Internal Medicine [phone]    [facility] Anticoagulation Services Responsible  [phone]    Ganesh Mao, PharmD Responsible          Anticoagulation Patient Findings      HPI:   Tavares Bonilla seen in clinic today, on anticoagulation therapy with warfarin (a high risk medication) for hx of PE and DVT      Pt is here today to evaluate anticoagulation therapy    Previous INR was 2.2 on 1-22-18, pt was instructed to continue current warfarin regimen    Confirmed warfarin dosing regimen, denies missed or extra doses of coumadin.   Diet has been consistent with foods rich in vitamin K: Yes  Changes in ETOH:  No  Changes in smoking status: No  Changes in medication: No   Cost restriction: No  S/s of bleeding:  No  Signs/symptoms  thrombosis since the last appt: No    A/P   INR  therapeutic today,     Continue current warfarin regimen     Pt educated to contact our clinic with any changes in medications or s/s of bleeding or thrombosis    Follow up appointment in 4 week(s) to reduce risk of  adverse events from warfarin  Lorena Marie, Pharm D

## 2018-03-12 ENCOUNTER — ANTICOAGULATION VISIT (OUTPATIENT)
Dept: MEDICAL GROUP | Facility: MEDICAL CENTER | Age: 68
End: 2018-03-12
Payer: MEDICARE

## 2018-03-12 VITALS — HEART RATE: 90 BPM | SYSTOLIC BLOOD PRESSURE: 120 MMHG | DIASTOLIC BLOOD PRESSURE: 76 MMHG

## 2018-03-12 DIAGNOSIS — Z79.01 ANTICOAGULATED ON WARFARIN: ICD-10-CM

## 2018-03-12 DIAGNOSIS — Z86.711 HISTORY OF PULMONARY EMBOLISM: ICD-10-CM

## 2018-03-12 DIAGNOSIS — I87.009 POST-PHLEBITIC SYNDROME: ICD-10-CM

## 2018-03-12 DIAGNOSIS — D68.51 FACTOR 5 LEIDEN MUTATION, HETEROZYGOUS (HCC): ICD-10-CM

## 2018-03-12 LAB — INR PPP: 1.6 (ref 2–3.5)

## 2018-03-12 PROCEDURE — 85610 PROTHROMBIN TIME: CPT | Performed by: FAMILY MEDICINE

## 2018-03-12 NOTE — PROGRESS NOTES
OP Anticoagulation Service Note    Date: 3/12/2018  Vitals:    03/12/18 1447   BP: 120/76   Pulse: 90     Anticoagulation Summary  As of 3/12/2018    INR goal:   2.0-3.0   TTR:   71.3 % (1.8 y)   Today's INR:   1.6!   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 Referring Internal Medicine [phone]    [facility] Anticoagulation Services Responsible  [phone]    Ganesh Mao, PharmD Responsible          Anticoagulation Patient Findings      HPI:   Tavares Bonilla seen in clinic today, on anticoagulation therapy with warfarin (a high risk medication) for hx of PE      Pt is here today to evaluate anticoagulation therapy    Previous INR was  2.0 on 2-12-18    Pt was instructed to Continue current warfarin regimen     Confirmed warfarin dosing regimen, denies missed or extra doses of coumadin.   Diet has been consistent with foods rich in vitamin K: Yes but pt reports eating less and more healthy  Changes in ETOH:  No  Changes in smoking status: No  Changes in medication: No   Cost restriction: No  S/s of bleeding:  No  Signs/symptoms  thrombosis since the last appt: No    A/P   INR  sub-therapeutic today, will require dose adjust ment today to prevent recurrence of thrombosis  and closer follow up.   Increase weekly regimen as outlined     Pt educated to  contact our clinic with any changes in medications or s/s of bleeding or thrombosis    Follow up appointment in 2 week(s) to reduce risk of adverse events from warfarin  Adam Sneed D

## 2018-03-26 ENCOUNTER — ANTICOAGULATION VISIT (OUTPATIENT)
Dept: MEDICAL GROUP | Facility: MEDICAL CENTER | Age: 68
End: 2018-03-26
Payer: MEDICARE

## 2018-03-26 VITALS — HEART RATE: 90 BPM | DIASTOLIC BLOOD PRESSURE: 78 MMHG | SYSTOLIC BLOOD PRESSURE: 115 MMHG

## 2018-03-26 DIAGNOSIS — I87.009 POST-PHLEBITIC SYNDROME: ICD-10-CM

## 2018-03-26 DIAGNOSIS — Z79.01 ANTICOAGULATED ON WARFARIN: ICD-10-CM

## 2018-03-26 DIAGNOSIS — Z86.711 HISTORY OF PULMONARY EMBOLISM: ICD-10-CM

## 2018-03-26 DIAGNOSIS — D68.51 FACTOR 5 LEIDEN MUTATION, HETEROZYGOUS (HCC): ICD-10-CM

## 2018-03-26 LAB — INR PPP: 1.9 (ref 2–3.5)

## 2018-03-26 PROCEDURE — 99211 OFF/OP EST MAY X REQ PHY/QHP: CPT | Performed by: PHYSICIAN ASSISTANT

## 2018-03-26 PROCEDURE — 85610 PROTHROMBIN TIME: CPT | Performed by: PHYSICIAN ASSISTANT

## 2018-03-26 RX ORDER — WARFARIN SODIUM 5 MG/1
TABLET ORAL
Qty: 135 TAB | Refills: 1 | Status: SHIPPED | OUTPATIENT
Start: 2018-03-26 | End: 2018-05-14

## 2018-03-26 NOTE — PROGRESS NOTES
OP Anticoagulation Service Note    Date: 3/26/2018  Vitals:    03/26/18 1504   BP: 115/78   Pulse: 90         Anticoagulation Summary  As of 3/26/2018    INR goal:   2.0-3.0   TTR:   69.8 % (1.8 y)   Today's INR:   1.9!   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 Referring Internal Medicine [phone]    [facility] Anticoagulation Services Responsible  [phone]    Ganesh Mao, PharmD Responsible          Anticoagulation Patient Findings      HPI:   Tavares Bonilla seen in clinic today, on anticoagulation therapy with warfarin (a high risk medication) for hx of PE and Factor 5 leiden      Pt is here today to evaluate anticoagulation therapy    Previous INR was  1.6 on 3-12-18    Pt was instructed to increase weekly regimen    Confirmed warfarin dosing regimen, denies missed or extra doses of coumadin.   Diet has been consistent with foods rich in vitamin K: Yes  Changes in ETOH:  No  Changes in smoking status: No  Changes in medication: No   Cost restriction: No  S/s of bleeding:  No  Signs/symptoms  thrombosis since the last appt: No    A/P   INR  subtherapeutic today, will require dose adjust ment today to prevent recurrence of thrombosis  and closer follow up.   Tonight increase weekly regimen     Pt educated to contact our clinic with any  changes in medications or s/s of bleeding or thrombosis    Follow up appointment in 2 week(s) to reduce risk of adverse events from warfarin  Lorena Marie, PharmD

## 2018-04-09 ENCOUNTER — ANTICOAGULATION VISIT (OUTPATIENT)
Dept: MEDICAL GROUP | Facility: MEDICAL CENTER | Age: 68
End: 2018-04-09
Payer: MEDICARE

## 2018-04-09 VITALS — SYSTOLIC BLOOD PRESSURE: 127 MMHG | HEART RATE: 89 BPM | DIASTOLIC BLOOD PRESSURE: 70 MMHG

## 2018-04-09 DIAGNOSIS — I87.009 POST-PHLEBITIC SYNDROME: ICD-10-CM

## 2018-04-09 DIAGNOSIS — Z86.711 HISTORY OF PULMONARY EMBOLISM: ICD-10-CM

## 2018-04-09 DIAGNOSIS — D68.51 FACTOR 5 LEIDEN MUTATION, HETEROZYGOUS (HCC): ICD-10-CM

## 2018-04-09 DIAGNOSIS — Z79.01 ANTICOAGULATED ON WARFARIN: ICD-10-CM

## 2018-04-09 LAB — INR PPP: 1.8 (ref 2–3.5)

## 2018-04-09 PROCEDURE — 99211 OFF/OP EST MAY X REQ PHY/QHP: CPT | Performed by: FAMILY MEDICINE

## 2018-04-09 PROCEDURE — 85610 PROTHROMBIN TIME: CPT | Performed by: FAMILY MEDICINE

## 2018-04-09 NOTE — PROGRESS NOTES
OP Anticoagulation Service Note    Date: 4/9/2018  Vitals:    04/09/18 1522   BP: 127/70   Pulse: 89       Anticoagulation Summary  As of 4/9/2018    INR goal:   2.0-3.0   TTR:   68.3 % (1.9 y)   Today's INR:   1.8!   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 Referring Internal Medicine [phone]    [facility] Anticoagulation Services Responsible  [phone]    Ganesh Mao, PharmD Responsible          Anticoagulation Patient Findings      HPI:   Tavares Bonilla seen in clinic today, on anticoagulation therapy with warfarin (a high risk medication) for hx of PE and factor V leiden      Pt is here today to evaluate anticoagulation therapy    Previous INR was  1.9 on 3-26-18    Pt was instructed to Increase weekly warfarin regimen    Confirmed warfarin dosing regimen, denies missed or extra doses of coumadin.   Diet has been consistent with foods rich in vitamin K: Yes  Changes in ETOH:  No  Changes in smoking status: No  Changes in medication: No   Cost restriction: No  S/s of bleeding:  No  Signs/symptoms  thrombosis since the last appt: No    A/P   INR  sub-therapeutic today, will require dose adjust ment today to prevent recurrence of thrombosis  and closer follow up.   Increase weekly warfarin regimen     Pt educated to contact our clinic with any  changes in medications or s/s of bleeding or thrombosis. Pt is aware to seek immediate medical attention for falls, head injury or deep cuts    Follow up appointment in 2 week(s) to reduce risk of adverse events from warfarin  Adam Sneed D

## 2018-04-13 ENCOUNTER — APPOINTMENT (OUTPATIENT)
Dept: MEDICAL GROUP | Age: 68
End: 2018-04-13
Payer: MEDICARE

## 2018-04-14 ENCOUNTER — HOSPITAL ENCOUNTER (OUTPATIENT)
Dept: LAB | Facility: MEDICAL CENTER | Age: 68
End: 2018-04-14
Attending: INTERNAL MEDICINE | Admitting: INTERNAL MEDICINE
Payer: MEDICARE

## 2018-04-14 DIAGNOSIS — E78.2 MIXED HYPERLIPIDEMIA: ICD-10-CM

## 2018-04-14 LAB
ALBUMIN SERPL BCP-MCNC: 4.2 G/DL (ref 3.2–4.9)
ALBUMIN/GLOB SERPL: 1.3 G/DL
ALP SERPL-CCNC: 67 U/L (ref 30–99)
ALT SERPL-CCNC: 21 U/L (ref 2–50)
ANION GAP SERPL CALC-SCNC: 7 MMOL/L (ref 0–11.9)
AST SERPL-CCNC: 22 U/L (ref 12–45)
BASOPHILS # BLD AUTO: 0.6 % (ref 0–1.8)
BASOPHILS # BLD: 0.04 K/UL (ref 0–0.12)
BILIRUB SERPL-MCNC: 0.6 MG/DL (ref 0.1–1.5)
BUN SERPL-MCNC: 23 MG/DL (ref 8–22)
CALCIUM SERPL-MCNC: 9.7 MG/DL (ref 8.5–10.5)
CHLORIDE SERPL-SCNC: 106 MMOL/L (ref 96–112)
CHOLEST SERPL-MCNC: 223 MG/DL (ref 100–199)
CO2 SERPL-SCNC: 26 MMOL/L (ref 20–33)
CREAT SERPL-MCNC: 1.18 MG/DL (ref 0.5–1.4)
EOSINOPHIL # BLD AUTO: 0.21 K/UL (ref 0–0.51)
EOSINOPHIL NFR BLD: 2.9 % (ref 0–6.9)
ERYTHROCYTE [DISTWIDTH] IN BLOOD BY AUTOMATED COUNT: 43.6 FL (ref 35.9–50)
GLOBULIN SER CALC-MCNC: 3.2 G/DL (ref 1.9–3.5)
GLUCOSE SERPL-MCNC: 93 MG/DL (ref 65–99)
HCT VFR BLD AUTO: 51.2 % (ref 42–52)
HDLC SERPL-MCNC: 44 MG/DL
HGB BLD-MCNC: 17.3 G/DL (ref 14–18)
IMM GRANULOCYTES # BLD AUTO: 0.04 K/UL (ref 0–0.11)
IMM GRANULOCYTES NFR BLD AUTO: 0.6 % (ref 0–0.9)
LDLC SERPL CALC-MCNC: 151 MG/DL
LYMPHOCYTES # BLD AUTO: 1.4 K/UL (ref 1–4.8)
LYMPHOCYTES NFR BLD: 19.4 % (ref 22–41)
MCH RBC QN AUTO: 31.6 PG (ref 27–33)
MCHC RBC AUTO-ENTMCNC: 33.8 G/DL (ref 33.7–35.3)
MCV RBC AUTO: 93.4 FL (ref 81.4–97.8)
MONOCYTES # BLD AUTO: 0.68 K/UL (ref 0–0.85)
MONOCYTES NFR BLD AUTO: 9.4 % (ref 0–13.4)
NEUTROPHILS # BLD AUTO: 4.83 K/UL (ref 1.82–7.42)
NEUTROPHILS NFR BLD: 67.1 % (ref 44–72)
NRBC # BLD AUTO: 0 K/UL
NRBC BLD-RTO: 0 /100 WBC
PLATELET # BLD AUTO: 274 K/UL (ref 164–446)
PMV BLD AUTO: 10.1 FL (ref 9–12.9)
POTASSIUM SERPL-SCNC: 4.7 MMOL/L (ref 3.6–5.5)
PROT SERPL-MCNC: 7.4 G/DL (ref 6–8.2)
RBC # BLD AUTO: 5.48 M/UL (ref 4.7–6.1)
SODIUM SERPL-SCNC: 139 MMOL/L (ref 135–145)
TRIGL SERPL-MCNC: 141 MG/DL (ref 0–149)
WBC # BLD AUTO: 7.2 K/UL (ref 4.8–10.8)

## 2018-04-14 PROCEDURE — 36415 COLL VENOUS BLD VENIPUNCTURE: CPT

## 2018-04-14 PROCEDURE — 85025 COMPLETE CBC W/AUTO DIFF WBC: CPT

## 2018-04-14 PROCEDURE — 80053 COMPREHEN METABOLIC PANEL: CPT

## 2018-04-14 PROCEDURE — 80061 LIPID PANEL: CPT

## 2018-04-18 ENCOUNTER — OFFICE VISIT (OUTPATIENT)
Dept: MEDICAL GROUP | Age: 68
End: 2018-04-18
Payer: MEDICARE

## 2018-04-18 VITALS
OXYGEN SATURATION: 93 % | WEIGHT: 242 LBS | TEMPERATURE: 98.2 F | HEIGHT: 72 IN | HEART RATE: 106 BPM | RESPIRATION RATE: 16 BRPM | SYSTOLIC BLOOD PRESSURE: 124 MMHG | DIASTOLIC BLOOD PRESSURE: 72 MMHG | BODY MASS INDEX: 32.78 KG/M2

## 2018-04-18 DIAGNOSIS — Z86.711 HISTORY OF PULMONARY EMBOLISM: ICD-10-CM

## 2018-04-18 DIAGNOSIS — D68.51 FACTOR 5 LEIDEN MUTATION, HETEROZYGOUS (HCC): ICD-10-CM

## 2018-04-18 DIAGNOSIS — E55.9 HYPOVITAMINOSIS D: ICD-10-CM

## 2018-04-18 DIAGNOSIS — E66.9 OBESITY (BMI 30.0-34.9): ICD-10-CM

## 2018-04-18 DIAGNOSIS — Z79.01 ANTICOAGULATED ON WARFARIN: ICD-10-CM

## 2018-04-18 DIAGNOSIS — E78.2 MIXED HYPERLIPIDEMIA: ICD-10-CM

## 2018-04-18 DIAGNOSIS — I87.099 CHRONIC VENOUS HYPERTENSION DUE TO DVT: ICD-10-CM

## 2018-04-18 DIAGNOSIS — G89.4 CHRONIC PAIN SYNDROME: ICD-10-CM

## 2018-04-18 DIAGNOSIS — I87.009 POST-PHLEBITIC SYNDROME: ICD-10-CM

## 2018-04-18 PROCEDURE — 99214 OFFICE O/P EST MOD 30 MIN: CPT | Performed by: INTERNAL MEDICINE

## 2018-04-18 RX ORDER — OXYCODONE HYDROCHLORIDE AND ACETAMINOPHEN 5; 325 MG/1; MG/1
1 TABLET ORAL EVERY 6 HOURS PRN
Qty: 120 TAB | Refills: 0 | Status: SHIPPED | OUTPATIENT
Start: 2018-04-18 | End: 2018-05-14

## 2018-04-18 ASSESSMENT — ENCOUNTER SYMPTOMS
RESPIRATORY NEGATIVE: 1
MUSCULOSKELETAL NEGATIVE: 1
EYES NEGATIVE: 1
NEUROLOGICAL NEGATIVE: 1
PSYCHIATRIC NEGATIVE: 1
GASTROINTESTINAL NEGATIVE: 1
CONSTITUTIONAL NEGATIVE: 1
CARDIOVASCULAR NEGATIVE: 1

## 2018-04-18 NOTE — PROGRESS NOTES
Subjective:      Tavares Bonilla is a 67 y.o. male who presents with Follow-Up (l[facility])    The patient is here for followup of chronic medical problems listed below. The patient is compliant with medications and having no side effects from them. Denies chest pain, abdominal pain, dyspnea, myalgias, or cough.   Patient Active Problem List    Diagnosis Date Noted   • Obesity (BMI 30-39.9) 04/18/2018   • Obesity (BMI 30.0-34.9) 04/07/2017   • Post-phlebitic syndrome- RIGHT LEG 05/22/2015   • Chronic venous hypertension due to DVT 11/17/2014   • Anticoagulated on warfarin 11/17/2014   • Mixed hyperlipidemia 11/17/2014   • Chronic pain syndrome 03/12/2014   • Hypovitaminosis D 03/26/2012   • History of pulmonary embolism 01/24/2012   • Factor 5 Leiden mutation, heterozygous ([facility]) 01/24/2012     Allergies   Allergen Reactions   • Other Food      Peanuts gives me Vertigo   • Shellfish Allergy Hives     shrimp   • Statins [Hmg-Coa-R Inhibitors]      Muscle cramps     Outpatient Medications Prior to Visit   Medication Sig Dispense Refill   • warfarin (COUMADIN) 5 MG Tab TAKE 1 to 1.5 TABLETS BY MOUTH ONCE DAILY OR  AS  DIRECTED  BY  COUMADIN  CLINIC 135 Tab 1   • ascorbic acid (ASCORBIC ACID) 500 MG Tab Take 500 mg by mouth every day.     • Cyanocobalamin (VITAMIN B-12) 1000 MCG Tab Take 1,000 mcg by mouth every day.     • Cholecalciferol (VITAMIN D) 2000 UNITS Cap Take 1 Cap by mouth every day. 100 Cap 3   • oxycodone-acetaminophen (PERCOCET) 5-325 MG Tab Take 1 Tab by mouth every 6 hours as needed. 120 Tab 0     No facility-administered medications prior to visit.              HPI    Review of Systems   Constitutional: Negative.    HENT: Negative.    Eyes: Negative.    Respiratory: Negative.    Cardiovascular: Negative.    Gastrointestinal: Negative.    Genitourinary: Negative.    Musculoskeletal: Negative.    Skin: Negative.    Neurological: Negative.    Endo/Heme/Allergies: Negative.    Psychiatric/Behavioral:  Negative.           Objective:     /72   Pulse (!) 106   Temp 36.8 °C (98.2 °F)   Resp 16   Ht 1.829 m (6')   Wt 109.8 kg (242 lb)   SpO2 93%   BMI 32.82 kg/m²      Physical Exam   Constitutional: He is oriented to person, place, and time. He appears well-developed and well-nourished. No distress.   HENT:   Head: Normocephalic and atraumatic.   Right Ear: External ear normal.   Left Ear: External ear normal.   Nose: Nose normal.   Mouth/Throat: Oropharynx is clear and moist. No oropharyngeal exudate.   Eyes: Conjunctivae and EOM are normal. Pupils are equal, round, and reactive to light. Right eye exhibits no discharge. Left eye exhibits no discharge. No scleral icterus.   Neck: Normal range of motion. Neck supple. No JVD present. No tracheal deviation present. No thyromegaly present.   Cardiovascular: Normal rate, regular rhythm, normal heart sounds and intact distal pulses.  Exam reveals no gallop and no friction rub.    No murmur heard.  Pulmonary/Chest: Effort normal and breath sounds normal. No stridor. No respiratory distress. He has no wheezes. He has no rales. He exhibits no tenderness.   Abdominal: Soft. Bowel sounds are normal. He exhibits no distension and no mass. There is no tenderness. There is no rebound and no guarding.   Musculoskeletal: Normal range of motion. He exhibits no edema or tenderness.   Lymphadenopathy:     He has no cervical adenopathy.   Neurological: He is alert and oriented to person, place, and time. He has normal reflexes. He displays normal reflexes. He exhibits normal muscle tone. Coordination normal.   Skin: Skin is warm and dry. No rash noted. He is not diaphoretic. No erythema. No pallor.   Psychiatric: He has a normal mood and affect. His behavior is normal. Judgment and thought content normal.               Hospital Outpatient Visit on 04/14/2018   Component Date Value   • Sodium 04/14/2018 139    • Potassium 04/14/2018 4.7    • Chloride 04/14/2018 106    • Co2  04/14/2018 26    • Anion Gap 04/14/2018 7.0    • Glucose 04/14/2018 93    • Bun 04/14/2018 23*   • Creatinine 04/14/2018 1.18    • Calcium 04/14/2018 9.7    • AST(SGOT) 04/14/2018 22    • ALT(SGPT) 04/14/2018 21    • Alkaline Phosphatase 04/14/2018 67    • Total Bilirubin 04/14/2018 0.6    • Albumin 04/14/2018 4.2    • Total Protein 04/14/2018 7.4    • Globulin 04/14/2018 3.2    • A-G Ratio 04/14/2018 1.3    • Cholesterol,Tot 04/14/2018 223*   • Triglycerides 04/14/2018 141    • HDL 04/14/2018 44    • LDL 04/14/2018 151*   • WBC 04/14/2018 7.2    • RBC 04/14/2018 5.48    • Hemoglobin 04/14/2018 17.3    • Hematocrit 04/14/2018 51.2    • MCV 04/14/2018 93.4    • MCH 04/14/2018 31.6    • MCHC 04/14/2018 33.8    • RDW 04/14/2018 43.6    • Platelet Count 04/14/2018 274    • MPV 04/14/2018 10.1    • Neutrophils-Polys 04/14/2018 67.10    • Lymphocytes 04/14/2018 19.40*   • Monocytes 04/14/2018 9.40    • Eosinophils 04/14/2018 2.90    • Basophils 04/14/2018 0.60    • Immature Granulocytes 04/14/2018 0.60    • Nucleated RBC 04/14/2018 0.00    • Neutrophils (Absolute) 04/14/2018 4.83    • Lymphs (Absolute) 04/14/2018 1.40    • Monos (Absolute) 04/14/2018 0.68    • Eos (Absolute) 04/14/2018 0.21    • Baso (Absolute) 04/14/2018 0.04    • Immature Granulocytes (a* 04/14/2018 0.04    • NRBC (Absolute) 04/14/2018 0.00    • GFR If  04/14/2018 >60    • GFR If Non  [employer]* 04/14/2018 >60    Anticoagulation Visit on 04/09/2018   Component Date Value   • INR 04/09/2018 1.8    Anticoagulation Visit on 03/26/2018   Component Date Value   • INR 03/26/2018 1.9       Lab Results   Component Value Date/Time    HBA1C 5.8 05/01/2013 08:53 AM     Lab Results   Component Value Date/Time    SODIUM 139 04/14/2018 11:23 AM    POTASSIUM 4.7 04/14/2018 11:23 AM    CHLORIDE 106 04/14/2018 11:23 AM    CO2 26 04/14/2018 11:23 AM    GLUCOSE 93 04/14/2018 11:23 AM    BUN 23 (H) 04/14/2018 11:23 AM    CREATININE 1.18 04/14/2018 11:23  AM    CREATININE 1.3 02/11/2009 04:05 AM    ALKPHOSPHAT 67 04/14/2018 11:23 AM    ASTSGOT 22 04/14/2018 11:23 AM    ALTSGPT 21 04/14/2018 11:23 AM    TBILIRUBIN 0.6 04/14/2018 11:23 AM     Lab Results   Component Value Date/Time    INR 1.8 04/09/2018 03:17 PM    INR 1.9 03/26/2018 03:01 PM    INR 1.6 03/12/2018 02:46 PM     Lab Results   Component Value Date/Time    CHOLSTRLTOT 223 (H) 04/14/2018 11:23 AM     (H) 04/14/2018 11:23 AM    HDL 44 04/14/2018 11:23 AM    TRIGLYCERIDE 141 04/14/2018 11:23 AM       Lab Results   Component Value Date/Time    TESTOSTERONE 222 01/22/2014 09:04 AM     No results found for: TSH  Lab Results   Component Value Date/Time    FREET4 0.84 05/01/2013 08:53 AM    FREET4 0.90 01/24/2012 10:38 AM     No results found for: URICACID  No components found for: VITB12  Lab Results   Component Value Date/Time    25HYDROXY 40 05/01/2015 09:53 AM    25HYDROXY 28 (L) 01/22/2014 09:04 AM       Assessment/Plan:     1. Obesity (BMI 30.0-34.9)     diet/exercise/lose 15 lbs.; patient counseled    - Patient identified as having weight management issue.  Appropriate orders and counseling given.    2. Post-phlebitic syndrome- RIGHT LEG   Under good control. Continue same regimen.    - oxyCODONE-acetaminophen (PERCOCET) 5-325 MG Tab; Take 1 Tab by mouth every 6 hours as needed for up to 30 days.  Dispense: 120 Tab; Refill: 0    3. Chronic venous hypertension due to DVT      Under good control. Continue same regimen.  4. Anticoagulated on warfarin       Under good control. Continue same regimen.  5. Mixed hyperlipidemia    Under good control. Continue same regimen.  - COMP METABOLIC PANEL; Future  - LIPID PROFILE; Future  - CBC WITH DIFFERENTIAL; Future    6. Chronic pain syndrome     Under good control. Continue same regimen.  - oxyCODONE-acetaminophen (PERCOCET) 5-325 MG Tab; Take 1 Tab by mouth every 6 hours as needed for up to 30 days.  Dispense: 120 Tab; Refill: 0    7. Hypovitaminosis D       Under good control. Continue same regimen.  8. History of pulmonary embolism      Under good control. Continue same regimen.  9. Factor 5 Leiden mutation, heterozygous (CMS-HCC)    Under good control. Continue same regimen.       30 minute face-to-face encounter took place today.  More than half of this time was spent in the coordination of care of the above problems, as well as counseling.

## 2018-04-23 ENCOUNTER — APPOINTMENT (OUTPATIENT)
Dept: MEDICAL GROUP | Facility: MEDICAL CENTER | Age: 68
End: 2018-04-23
Payer: MEDICARE

## 2018-05-14 ENCOUNTER — APPOINTMENT (OUTPATIENT)
Dept: RADIOLOGY | Facility: MEDICAL CENTER | Age: 68
End: 2018-05-14
Attending: EMERGENCY MEDICINE
Payer: MEDICARE

## 2018-05-14 ENCOUNTER — HOSPITAL ENCOUNTER (OUTPATIENT)
Facility: MEDICAL CENTER | Age: 68
End: 2018-05-15
Attending: EMERGENCY MEDICINE | Admitting: HOSPITALIST
Payer: MEDICARE

## 2018-05-14 DIAGNOSIS — R07.9 CHEST PAIN, UNSPECIFIED TYPE: ICD-10-CM

## 2018-05-14 DIAGNOSIS — Z79.01 ANTICOAGULATED ON WARFARIN: ICD-10-CM

## 2018-05-14 DIAGNOSIS — N28.9 RENAL INSUFFICIENCY: ICD-10-CM

## 2018-05-14 DIAGNOSIS — R11.10 VOMITING AND DIARRHEA: ICD-10-CM

## 2018-05-14 DIAGNOSIS — R19.7 VOMITING AND DIARRHEA: ICD-10-CM

## 2018-05-14 PROBLEM — D72.829 LEUKOCYTOSIS: Status: ACTIVE | Noted: 2018-05-14

## 2018-05-14 PROBLEM — R11.2 NAUSEA & VOMITING: Status: ACTIVE | Noted: 2018-05-14

## 2018-05-14 PROBLEM — N17.9 ACUTE KIDNEY INJURY (HCC): Status: ACTIVE | Noted: 2018-05-14

## 2018-05-14 LAB
ALBUMIN SERPL BCP-MCNC: 3.7 G/DL (ref 3.2–4.9)
ALBUMIN/GLOB SERPL: 0.9 G/DL
ALP SERPL-CCNC: 74 U/L (ref 30–99)
ALT SERPL-CCNC: 34 U/L (ref 2–50)
ANION GAP SERPL CALC-SCNC: 8 MMOL/L (ref 0–11.9)
APTT PPP: 32.9 SEC (ref 24.7–36)
AST SERPL-CCNC: 29 U/L (ref 12–45)
BASOPHILS # BLD AUTO: 0.2 % (ref 0–1.8)
BASOPHILS # BLD: 0.03 K/UL (ref 0–0.12)
BILIRUB SERPL-MCNC: 0.8 MG/DL (ref 0.1–1.5)
BNP SERPL-MCNC: 103 PG/ML (ref 0–100)
BUN SERPL-MCNC: 42 MG/DL (ref 8–22)
CALCIUM SERPL-MCNC: 9.4 MG/DL (ref 8.4–10.2)
CHLORIDE SERPL-SCNC: 103 MMOL/L (ref 96–112)
CO2 SERPL-SCNC: 25 MMOL/L (ref 20–33)
CREAT SERPL-MCNC: 1.74 MG/DL (ref 0.5–1.4)
DEPRECATED D DIMER PPP IA-ACNC: <200 NG/ML(D-DU)
EKG IMPRESSION: NORMAL
EOSINOPHIL # BLD AUTO: 0.03 K/UL (ref 0–0.51)
EOSINOPHIL NFR BLD: 0.2 % (ref 0–6.9)
ERYTHROCYTE [DISTWIDTH] IN BLOOD BY AUTOMATED COUNT: 45.1 FL (ref 35.9–50)
GLOBULIN SER CALC-MCNC: 4 G/DL (ref 1.9–3.5)
GLUCOSE SERPL-MCNC: 114 MG/DL (ref 65–99)
HCT VFR BLD AUTO: 47.3 % (ref 42–52)
HGB BLD-MCNC: 15.9 G/DL (ref 14–18)
IMM GRANULOCYTES # BLD AUTO: 0.1 K/UL (ref 0–0.11)
IMM GRANULOCYTES NFR BLD AUTO: 0.6 % (ref 0–0.9)
INR PPP: 1.94 (ref 0.87–1.13)
LIPASE SERPL-CCNC: 16 U/L (ref 7–58)
LIPASE SERPL-CCNC: 20 U/L (ref 7–58)
LYMPHOCYTES # BLD AUTO: 0.81 K/UL (ref 1–4.8)
LYMPHOCYTES NFR BLD: 4.5 % (ref 22–41)
MCH RBC QN AUTO: 31.2 PG (ref 27–33)
MCHC RBC AUTO-ENTMCNC: 33.6 G/DL (ref 33.7–35.3)
MCV RBC AUTO: 92.7 FL (ref 81.4–97.8)
MONOCYTES # BLD AUTO: 2.11 K/UL (ref 0–0.85)
MONOCYTES NFR BLD AUTO: 11.7 % (ref 0–13.4)
NEUTROPHILS # BLD AUTO: 14.97 K/UL (ref 1.82–7.42)
NEUTROPHILS NFR BLD: 82.8 % (ref 44–72)
NRBC # BLD AUTO: 0 K/UL
NRBC BLD-RTO: 0 /100 WBC
PLATELET # BLD AUTO: 207 K/UL (ref 164–446)
PMV BLD AUTO: 10 FL (ref 9–12.9)
POTASSIUM SERPL-SCNC: 3.8 MMOL/L (ref 3.6–5.5)
PROT SERPL-MCNC: 7.7 G/DL (ref 6–8.2)
PROTHROMBIN TIME: 21.9 SEC (ref 12–14.6)
RBC # BLD AUTO: 5.1 M/UL (ref 4.7–6.1)
SODIUM SERPL-SCNC: 136 MMOL/L (ref 135–145)
TROPONIN I SERPL-MCNC: <0.02 NG/ML (ref 0–0.04)
WBC # BLD AUTO: 18.1 K/UL (ref 4.8–10.8)

## 2018-05-14 PROCEDURE — G0378 HOSPITAL OBSERVATION PER HR: HCPCS

## 2018-05-14 PROCEDURE — 700105 HCHG RX REV CODE 258: Performed by: EMERGENCY MEDICINE

## 2018-05-14 PROCEDURE — 83880 ASSAY OF NATRIURETIC PEPTIDE: CPT

## 2018-05-14 PROCEDURE — 85730 THROMBOPLASTIN TIME PARTIAL: CPT

## 2018-05-14 PROCEDURE — 71045 X-RAY EXAM CHEST 1 VIEW: CPT

## 2018-05-14 PROCEDURE — 85610 PROTHROMBIN TIME: CPT

## 2018-05-14 PROCEDURE — A9270 NON-COVERED ITEM OR SERVICE: HCPCS | Performed by: EMERGENCY MEDICINE

## 2018-05-14 PROCEDURE — 85379 FIBRIN DEGRADATION QUANT: CPT

## 2018-05-14 PROCEDURE — 93306 TTE W/DOPPLER COMPLETE: CPT

## 2018-05-14 PROCEDURE — 85025 COMPLETE CBC W/AUTO DIFF WBC: CPT

## 2018-05-14 PROCEDURE — 700102 HCHG RX REV CODE 250 W/ 637 OVERRIDE(OP): Performed by: HOSPITALIST

## 2018-05-14 PROCEDURE — 700105 HCHG RX REV CODE 258: Performed by: HOSPITALIST

## 2018-05-14 PROCEDURE — 93306 TTE W/DOPPLER COMPLETE: CPT | Mod: 26 | Performed by: INTERNAL MEDICINE

## 2018-05-14 PROCEDURE — 36415 COLL VENOUS BLD VENIPUNCTURE: CPT

## 2018-05-14 PROCEDURE — 84484 ASSAY OF TROPONIN QUANT: CPT

## 2018-05-14 PROCEDURE — 83690 ASSAY OF LIPASE: CPT

## 2018-05-14 PROCEDURE — 93005 ELECTROCARDIOGRAM TRACING: CPT | Performed by: EMERGENCY MEDICINE

## 2018-05-14 PROCEDURE — 93005 ELECTROCARDIOGRAM TRACING: CPT

## 2018-05-14 PROCEDURE — 700102 HCHG RX REV CODE 250 W/ 637 OVERRIDE(OP): Performed by: EMERGENCY MEDICINE

## 2018-05-14 PROCEDURE — 99220 PR INITIAL OBSERVATION CARE,LEVL III: CPT | Performed by: HOSPITALIST

## 2018-05-14 PROCEDURE — 99285 EMERGENCY DEPT VISIT HI MDM: CPT

## 2018-05-14 PROCEDURE — A9270 NON-COVERED ITEM OR SERVICE: HCPCS | Performed by: HOSPITALIST

## 2018-05-14 PROCEDURE — 80053 COMPREHEN METABOLIC PANEL: CPT

## 2018-05-14 RX ORDER — OXYCODONE HYDROCHLORIDE AND ACETAMINOPHEN 5; 325 MG/1; MG/1
1-2 TABLET ORAL EVERY 6 HOURS PRN
Status: DISCONTINUED | OUTPATIENT
Start: 2018-05-14 | End: 2018-05-15 | Stop reason: HOSPADM

## 2018-05-14 RX ORDER — POLYETHYLENE GLYCOL 3350 17 G/17G
1 POWDER, FOR SOLUTION ORAL
Status: DISCONTINUED | OUTPATIENT
Start: 2018-05-14 | End: 2018-05-15 | Stop reason: HOSPADM

## 2018-05-14 RX ORDER — BISACODYL 10 MG
10 SUPPOSITORY, RECTAL RECTAL
Status: DISCONTINUED | OUTPATIENT
Start: 2018-05-14 | End: 2018-05-15 | Stop reason: HOSPADM

## 2018-05-14 RX ORDER — ASPIRIN 325 MG
325 TABLET ORAL DAILY
Status: DISCONTINUED | OUTPATIENT
Start: 2018-05-14 | End: 2018-05-15 | Stop reason: HOSPADM

## 2018-05-14 RX ORDER — ONDANSETRON 4 MG/1
4 TABLET, ORALLY DISINTEGRATING ORAL EVERY 4 HOURS PRN
Status: DISCONTINUED | OUTPATIENT
Start: 2018-05-14 | End: 2018-05-15 | Stop reason: HOSPADM

## 2018-05-14 RX ORDER — ASPIRIN 325 MG
325 TABLET ORAL ONCE
Status: COMPLETED | OUTPATIENT
Start: 2018-05-14 | End: 2018-05-14

## 2018-05-14 RX ORDER — WARFARIN SODIUM 5 MG/1
5-7.5 TABLET ORAL DAILY
COMMUNITY
End: 2018-08-02

## 2018-05-14 RX ORDER — ASCORBIC ACID 500 MG
500 TABLET ORAL DAILY
Status: DISCONTINUED | OUTPATIENT
Start: 2018-05-14 | End: 2018-05-14

## 2018-05-14 RX ORDER — ONDANSETRON 2 MG/ML
4 INJECTION INTRAMUSCULAR; INTRAVENOUS EVERY 4 HOURS PRN
Status: DISCONTINUED | OUTPATIENT
Start: 2018-05-14 | End: 2018-05-15 | Stop reason: HOSPADM

## 2018-05-14 RX ORDER — MORPHINE SULFATE 4 MG/ML
1-2 INJECTION, SOLUTION INTRAMUSCULAR; INTRAVENOUS EVERY 4 HOURS PRN
Status: DISCONTINUED | OUTPATIENT
Start: 2018-05-14 | End: 2018-05-15 | Stop reason: HOSPADM

## 2018-05-14 RX ORDER — AMOXICILLIN 250 MG
2 CAPSULE ORAL 2 TIMES DAILY
Status: DISCONTINUED | OUTPATIENT
Start: 2018-05-14 | End: 2018-05-15 | Stop reason: HOSPADM

## 2018-05-14 RX ORDER — SODIUM CHLORIDE 9 MG/ML
1000 INJECTION, SOLUTION INTRAVENOUS ONCE
Status: COMPLETED | OUTPATIENT
Start: 2018-05-14 | End: 2018-05-14

## 2018-05-14 RX ORDER — WARFARIN SODIUM 5 MG/1
5 TABLET ORAL
Status: COMPLETED | OUTPATIENT
Start: 2018-05-14 | End: 2018-05-14

## 2018-05-14 RX ORDER — OXYCODONE HYDROCHLORIDE AND ACETAMINOPHEN 5; 325 MG/1; MG/1
1-2 TABLET ORAL EVERY 6 HOURS PRN
COMMUNITY
End: 2018-08-24

## 2018-05-14 RX ORDER — CHOLECALCIFEROL (VITAMIN D3) 125 MCG
1000 CAPSULE ORAL DAILY
Status: DISCONTINUED | OUTPATIENT
Start: 2018-05-14 | End: 2018-05-15 | Stop reason: HOSPADM

## 2018-05-14 RX ORDER — NITROGLYCERIN 0.4 MG/1
0.4 TABLET SUBLINGUAL
Status: DISCONTINUED | OUTPATIENT
Start: 2018-05-14 | End: 2018-05-15 | Stop reason: HOSPADM

## 2018-05-14 RX ORDER — SODIUM CHLORIDE 9 MG/ML
INJECTION, SOLUTION INTRAVENOUS CONTINUOUS
Status: DISCONTINUED | OUTPATIENT
Start: 2018-05-14 | End: 2018-05-15 | Stop reason: HOSPADM

## 2018-05-14 RX ORDER — ACETAMINOPHEN 325 MG/1
650 TABLET ORAL EVERY 6 HOURS PRN
Status: DISCONTINUED | OUTPATIENT
Start: 2018-05-14 | End: 2018-05-15 | Stop reason: HOSPADM

## 2018-05-14 RX ADMIN — SODIUM CHLORIDE 1000 ML: 9 INJECTION, SOLUTION INTRAVENOUS at 13:03

## 2018-05-14 RX ADMIN — SODIUM CHLORIDE: 9 INJECTION, SOLUTION INTRAVENOUS at 18:55

## 2018-05-14 RX ADMIN — ASPIRIN 325 MG ORAL TABLET 325 MG: 325 PILL ORAL at 11:18

## 2018-05-14 RX ADMIN — OXYCODONE HYDROCHLORIDE AND ACETAMINOPHEN 1 TABLET: 5; 325 TABLET ORAL at 18:55

## 2018-05-14 RX ADMIN — STANDARDIZED SENNA CONCENTRATE AND DOCUSATE SODIUM 2 TABLET: 8.6; 5 TABLET, FILM COATED ORAL at 22:19

## 2018-05-14 RX ADMIN — WARFARIN SODIUM 5 MG: 5 TABLET ORAL at 18:55

## 2018-05-14 ASSESSMENT — ENCOUNTER SYMPTOMS
ABDOMINAL PAIN: 0
FEVER: 0
SENSORY CHANGE: 0
STRIDOR: 0
WHEEZING: 0
HALLUCINATIONS: 0
TREMORS: 0
FOCAL WEAKNESS: 0
PALPITATIONS: 0
NAUSEA: 1
WEAKNESS: 0
SPEECH CHANGE: 0
SHORTNESS OF BREATH: 1
COUGH: 0
EYE DISCHARGE: 0
CHILLS: 0
HEADACHES: 1
FLANK PAIN: 0
DIAPHORESIS: 0
DIZZINESS: 0
VOMITING: 1
BLOOD IN STOOL: 0
NECK PAIN: 0
BACK PAIN: 0
DIARRHEA: 0
EYE REDNESS: 0

## 2018-05-14 ASSESSMENT — PATIENT HEALTH QUESTIONNAIRE - PHQ9
2. FEELING DOWN, DEPRESSED, IRRITABLE, OR HOPELESS: NOT AT ALL
SUM OF ALL RESPONSES TO PHQ9 QUESTIONS 1 AND 2: 0
1. LITTLE INTEREST OR PLEASURE IN DOING THINGS: NOT AT ALL

## 2018-05-14 ASSESSMENT — PAIN SCALES - GENERAL
PAINLEVEL_OUTOF10: 0
PAINLEVEL_OUTOF10: 5
PAINLEVEL_OUTOF10: 4

## 2018-05-14 ASSESSMENT — COPD QUESTIONNAIRES
DO YOU EVER COUGH UP ANY MUCUS OR PHLEGM?: NO/ONLY WITH OCCASIONAL COLDS OR INFECTIONS
IN THE PAST 12 MONTHS DO YOU DO LESS THAN YOU USED TO BECAUSE OF YOUR BREATHING PROBLEMS: DISAGREE/UNSURE
HAVE YOU SMOKED AT LEAST 100 CIGARETTES IN YOUR ENTIRE LIFE: NO/DON'T KNOW
DURING THE PAST 4 WEEKS HOW MUCH DID YOU FEEL SHORT OF BREATH: NONE/LITTLE OF THE TIME
COPD SCREENING SCORE: 2

## 2018-05-14 ASSESSMENT — LIFESTYLE VARIABLES
ALCOHOL_USE: NO
SUBSTANCE_ABUSE: 0

## 2018-05-14 NOTE — ED NOTES
Med rec updated and complete  Allergies reviewed  Pt reports no antibiotics in the last 30 days.

## 2018-05-14 NOTE — ASSESSMENT & PLAN NOTE
Likely due to dehydration associated with nausea and decreased oral intake.  Provide IV normal saline  Follow-up renal function, electrolytes and urine output.

## 2018-05-14 NOTE — PROGRESS NOTES
Inpatient Anticoagulation Service Note    Date: 5/14/2018  Reason for Anticoagulation: Pulmonary Embolism        Hemoglobin Value: 15.9  Hematocrit Value: 47.3  Lab Platelet Value: 207  Target INR: 2.0 to 3.0    INR from last 7 days     Date/Time INR Value    05/14/18 1102 (!)  1.94        Dose from last 7 days     Date/Time Dose (mg)    05/14/18 1500  5        Significant Interactions: Aspirin  Bridge Therapy: No (If less than 5 days and overlap therapy discontinued -- document reason (i.e. Bleed Risk))    (If still on overlap therapy, if No -- document reason (i.e. Bleed Risk))    Comments:  (Home dose 5 mg daily expect 7.5 mg on Sat.)    Plan:  Give Coumadin 5 mg tonight for INR 1.94.     Pharmacist suggested discharge dosing: Resume home dosing     Shilpa Nguyen

## 2018-05-14 NOTE — ED NOTES
ERP at bedside. Pt agrees with plan of care discussed by ERP. AIDET acknowledged with patient. Mary in low position, side rail up for pt safety. IV established. Blood sent to lab.Call light within reach. Will continue to monitor.

## 2018-05-14 NOTE — ASSESSMENT & PLAN NOTE
Abdominal exam benign. He has no diarrhea, hematochezia or fever-  Possible attributed to gastroenteritis, viral illness. Improved symptoms overnight.  We'll provide IV fluids  When necessary IV antiemetics

## 2018-05-14 NOTE — H&P
Hospital Medicine History and Physical    Date of Service  5/14/2018    Chief Complaint  Chief Complaint   Patient presents with   • Chest Pressure       History of Presenting Illness  67 y.o. Male Hx of right lower extremity DVT, pulmonary embolism on Coumadin who presented 5/14/2018 with chest pain. 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 He noted left chest pressure then crossed to his right side.  Symptoms have been intermittent, currently improved. He had shortness of breath at the time now resolved. Has had cough and congestion in addition to nausea and decreased oral intake since Thursday .  Felt better yesterday and had some soup and had drank milk this morning.  Patient without diarrhea.   He felt constipated up until yesterday and had a bowel movement. No bloody stools. Also has had headaches . No numbness or localized weakness no change in vision or hearing .  No reported fevers chills, no calf pain or leg swelling. No abdominal pain. Symptoms are not worsened with particular activity or exertion.     Primary Care Physician  Keron Garcia M.D.    Consultants  None     Code Status  Code: Full code    Review of Systems  Review of Systems   Constitutional: Negative for chills, diaphoresis, fever and malaise/fatigue.   HENT: Negative for congestion.    Eyes: Negative for discharge and redness.   Respiratory: Positive for shortness of breath. Negative for cough, wheezing and stridor.    Cardiovascular: Positive for chest pain. Negative for palpitations and leg swelling.   Gastrointestinal: Positive for nausea and vomiting. Negative for abdominal pain, blood in stool and diarrhea.   Genitourinary: Negative for flank pain and hematuria.   Musculoskeletal: Negative for back pain, joint pain and neck pain.   Neurological: Positive for headaches. Negative for dizziness, tremors, sensory change, speech change, focal weakness and  weakness.   Psychiatric/Behavioral: Negative for hallucinations and substance abuse.          Past Medical History  Past Medical History:   Diagnosis Date   • Blood clotting disorder (HCC)     leg and lung     Surgical History  Past Surgical History:   Procedure Laterality Date   • INGUINAL HERNIA REPAIR ROBOTIC Left 2017    Procedure: INGUINAL HERNIA REPAIR ROBOTIC- WITH MESH PLACEMENT;  Surgeon: Rasta Reyes M.D.;  Location: SURGERY [facility];  Service: General   • COLONOSCOPY  2013       Medications  No current facility-administered medications on file prior to encounter.      Current Outpatient Prescriptions on File Prior to Encounter   Medication Sig Dispense Refill   • ascorbic acid (ASCORBIC ACID) 500 MG Tab Take 500 mg by mouth every day.     • Cyanocobalamin (VITAMIN B-12) 1000 MCG Tab Take 1,000 mcg by mouth every day.     • Cholecalciferol (VITAMIN D) 2000 UNITS Cap Take 1 Cap by mouth every day. 100 Cap 3       Family History  History reviewed. No pertinent family history.    Social History  Social History   Substance Use Topics   • Smoking status: Never Smoker   • Smokeless tobacco: Never Used   • Alcohol use Yes      Comment: 1 per week       Allergies  Allergies   Allergen Reactions   • Other Food Hives     Peanuts gives me Vertigo and hives   • Shellfish Allergy Hives     shrimp   • Statins [Hmg-Coa-R Inhibitors]      Muscle cramps        Physical Exam  Laboratory   Hemodynamics  Temp (24hrs), Av.7 °C (98 °F), Min:36.7 °C (98 °F), Max:36.7 °C (98 °F)   Temperature: 36.7 °C (98 °F)  Pulse  Av  Min: 58  Max: 58    Blood Pressure : 123/70      Respiratory      Respiration: 18, Pulse Oximetry: 96 %             Physical Exam   Constitutional: He is oriented to person, place, and time. No distress.   HENT:   Head: Normocephalic and atraumatic.   Nose: Nose normal.   Dry mucous membranes   Eyes: Conjunctivae and EOM are normal. No scleral icterus.   Neck: Normal range of motion.  No JVD present.   Cardiovascular: Intact distal pulses.    No murmur heard.  Irregular rhythm, regular rate  2+ symmetric radial pulses.    Pulmonary/Chest: Effort normal. No stridor. No respiratory distress. He has no wheezes. He has no rales.   Abdominal: Soft. Bowel sounds are normal. He exhibits no distension. There is no tenderness.   Obese   Musculoskeletal: He exhibits no edema or tenderness.   Neurological: He is alert and oriented to person, place, and time. No cranial nerve deficit.   No focal weakness  No gross focal weakness   Skin: Skin is warm and dry. He is not diaphoretic. No pallor.   Psychiatric: He has a normal mood and affect. His behavior is normal.   Vitals reviewed.        Assessment/Plan  * Chest pain   Assessment & Plan    Patient with cardiovascular rate disease risk factors, obesity, gender, age.  We'll repeat serial cardiac enzymes to evaluate for acute coronary syndrome.  Start aspirin therapy when necessary nitroglycerin and morphine. Patient has had cough and congestion may suggest a musculoskeletal or  inflammatory cause. He is not hypoxic or currently short of breath or tachycardic and is on anticoagulation, less likely pulmonary embolism.  Chest x-ray without signs for pneumonia.   We'll evaluate for ischemic heart disease with stress test.        Acute kidney injury (HCC)   Assessment & Plan    Likely due to dehydration associated with nausea and decreased oral intake.  Provide IV normal saline  Follow-up renal function, electrolytes and urine output.         Nausea & vomiting   Assessment & Plan    Abdominal exam benign. He has no diarrhea, hematochezia or fever-  Possible attributed to gastroenteritis, viral illness. Improved symptoms overnight.  We'll provide IV fluids  When necessary IV antiemetics          Chronic venous hypertension due to DVT- (present on admission)   Assessment & Plan    Continue on warfarin        Leukocytosis   Assessment & Plan    With symptoms of cough  congestion nausea vomiting, possible viral illness.  Afebrile nontoxic appearing, abdominal exam benign. Follow clinically.         Obesity (BMI 30.0-34.9)- (present on admission)   Assessment & Plan    Will need continued counseling, weight loss efforts.         Anticoagulated on warfarin- (present on admission)   Assessment & Plan    To be resumed        History of pulmonary embolism- (present on admission)   Assessment & Plan    Without hypoxia or shortness of breath.   Check echocardiogram to evaluate pulmonary pressures  Continue warfarin            I anticipate this patient is appropriate for observation status at this time.    Prophylaxis: warfarin    Recent Labs      05/14/18   1102   WBC  18.1*   RBC  5.10   HEMOGLOBIN  15.9   HEMATOCRIT  47.3   MCV  92.7   MCH  31.2   MCHC  33.6*   RDW  45.1   PLATELETCT  207   MPV  10.0     Recent Labs      05/14/18   1102   SODIUM  136   POTASSIUM  3.8   CHLORIDE  103   CO2  25   GLUCOSE  114*   BUN  42*   CREATININE  1.74*   CALCIUM  9.4     Recent Labs      05/14/18   1102   ALTSGPT  34   ASTSGOT  29   ALKPHOSPHAT  74   TBILIRUBIN  0.8   GLUCOSE  114*     Recent Labs      05/14/18   1102   APTT  32.9   INR  1.94*     Recent Labs      05/14/18   1102   BNPBTYPENAT  103*         Lab Results   Component Value Date    TROPONINI <0.02 05/14/2018     EKG my interpretation reveals sinus rhythm rate approximately 98 ventricular trigeminy  Imaging  DX-CHEST-LIMITED (1 VIEW)   Final Result      Linear opacity in the left base likely represents atelectasis.

## 2018-05-14 NOTE — ASSESSMENT & PLAN NOTE
Patient with cardiovascular rate disease risk factors, obesity, gender, age.  We'll repeat serial cardiac enzymes to evaluate for acute coronary syndrome.  Start aspirin therapy when necessary nitroglycerin and morphine. Patient has had cough and congestion may suggest a musculoskeletal or  inflammatory cause. He is not hypoxic or currently short of breath or tachycardic and is on anticoagulation, less likely pulmonary embolism.  Chest x-ray without signs for pneumonia.   We'll evaluate for ischemic heart disease with stress test.

## 2018-05-14 NOTE — ASSESSMENT & PLAN NOTE
With symptoms of cough congestion nausea vomiting, possible viral illness.  Afebrile nontoxic appearing, abdominal exam benign. Follow clinically.

## 2018-05-14 NOTE — ASSESSMENT & PLAN NOTE
Without hypoxia or shortness of breath.   Check echocardiogram to evaluate pulmonary pressures  Continue warfarin

## 2018-05-14 NOTE — ED PROVIDER NOTES
ED Provider Note    CHIEF COMPLAINT  Chief Complaint   Patient presents with   • Chest Pressure       HPI  Tavares Bonilla is a 67 y.o. male who presents for evaluation of chest pain.  Patient states that 2 days ago he started developing achiness all over his body.  He states that he felt like he was getting better yesterday, but this morning developed substernal chest pain.  He describes a pressure sensation lasting 4 minutes with some associated shortness of breath but no associated diaphoresis, palpitations, or syncope.  Patient denies any radiation of the pain.  He has had generalized malaise with this.  He's had a very mild nonproductive cough and feels like he may have had a fever but is unsure.  Upon further questioning after the daughter presented to the room, the patient admits to 3-4 days of vomiting and diarrhea.  Again he denies any hematemesis melena or hematochezia.  No other acute symptomatology or complaints.    REVIEW OF SYSTEMS  See HPI for further details.  Patient relates a history of a DVT with pulmonary embolism in the past.  He is currently anticoagulated on Coumadin.  He also relates recent inguinal hernia surgery.  He denies a history of: Hypertension, diabetes, thyroid dysfunction, seizures, heart disease, cancer, stroke.  He was treated for dyslipidemia in the past.  All other systems negative.    PAST MEDICAL HISTORY  Past Medical History:   Diagnosis Date   • Blood clotting disorder (HCC) 2012    leg and lung       FAMILY HISTORY  History reviewed. No pertinent family history.    SOCIAL HISTORY  Nonsmoker; positive alcohol use;    SURGICAL HISTORY  Past Surgical History:   Procedure Laterality Date   • INGUINAL HERNIA REPAIR ROBOTIC Left 12/18/2017    Procedure: INGUINAL HERNIA REPAIR ROBOTIC- WITH MESH PLACEMENT;  Surgeon: Rasta Reyes M.D.;  Location: SURGERY [facility];  Service: General   • COLONOSCOPY  2013       CURRENT MEDICATIONS  See nurse's  notes    ALLERGIES  Allergies   Allergen Reactions   • Other Food      Peanuts gives me Vertigo   • Shellfish Allergy Hives     shrimp   • Statins [Hmg-Coa-R Inhibitors]      Muscle cramps       PHYSICAL EXAM  VITAL SIGNS: /70   Pulse (!) 58   Temp 36.7 °C (98 °F)   Resp 18   Ht 1.829 m (6') Comment: Stated  Wt 109 kg (240 lb 4.8 oz)   SpO2 96%   BMI 32.59 kg/m²    Constitutional: 59-year-old male, fairly Well nourished, appears mildly weak, Non-toxic appearance.   HENT: ,Atraumatic, Bilateral external ears normal, tympanic membranes clear, Oropharynx mildly dry, No oral exudates, Nose normal.   Eyes: PERRL, EOMI, Conjunctiva normal, No discharge.   Neck: Normal range of motion, No tenderness, Supple, No stridor.   Lymphatic: No lymphadenopathy noted.   Cardiovascular: Normal heart rate, Normal rhythm, No murmurs, No rubs, No gallops.   Thorax & Lungs: Normal Equal breath sounds, No respiratory distress, No wheezing, no stridor, no rales. No chest tenderness.   Abdomen: Soft, nontender, nondistended, no organomegaly, positive bowel sounds normal in quality. No guarding or rebound.  Skin: Good skin turgor, pink, warm, dry. No rashes, petechiae, purpura. Normal capillary refill.   Back: No tenderness, No CVA tenderness.   Extremities: Intact distal pulses, No edema, No tenderness, No cyanosis, No clubbing. Vascular: Pulses are 2+, symmetric in the upper and lower extremities.  Musculoskeletal: Mild diffuse arthritic changes. No tenderness to palpation or major deformities noted.   Neurologic: Alert & oriented x 3, Normal motor function, Normal sensory function, No gross focal deficits noted.   Psychiatric: Affect normal, Judgment normal, Mood normal.     EKG  I have interpreted: Rate 95, rhythm sinus, axis normal, P-R QRS Q-T intervals normal, frequent PVCs, no acute STEMI, 12-lead EKG, no acute change compared to previous tracings;    RADIOLOGY/PROCEDURES  DX-CHEST-LIMITED (1 VIEW)   Final Result       Linear opacity in the left base likely represents atelectasis.          COURSE & MEDICAL DECISION MAKING  Pertinent Labs & Imaging studies reviewed. (See chart for details)  1.  Monitor  2.  IV normal saline; IV fluids administered for clinical dehydration along with prerenal azotemia and prolonged vomiting and diarrhea.  3.  Aspirin    Laboratory studies: CBC shows white count of 18.1, 82% neutrophils, 4% lymphocytes, 11% monocytes, hemoglobin 15.9, hematocrit 47.3; CMP shows a random glucose 114, BUN 42, creatinine 1.74, otherwise within normal; PT 21/1.94, PTT 32; troponin less than 0.01;    Discussion/consultation: At this time, the patient presents for evaluation of chest pain.  The patient has a leukocytosis which may be related to all the vomiting and diarrhea.  This most likely represents gastroenteritis.  In addition, he has renal insufficiency which may be prerenal azotemia.  Patient was given IV fluid therapy.  I spoke with the hospitalist on-call.  The patient will be admitted for further monitoring, treatment, and care.    FINAL IMPRESSION  1. Chest pain, unspecified type    2. Anticoagulated on warfarin    3. Vomiting and diarrhea    4. Renal insufficiency        PLAN  1.  Patient will be admitted for further monitoring, treatment, and care.    Electronically signed by: Guy G Gansert, 5/14/2018 11:08 AM

## 2018-05-15 ENCOUNTER — PATIENT OUTREACH (OUTPATIENT)
Dept: HEALTH INFORMATION MANAGEMENT | Facility: OTHER | Age: 68
End: 2018-05-15

## 2018-05-15 ENCOUNTER — APPOINTMENT (OUTPATIENT)
Dept: RADIOLOGY | Facility: MEDICAL CENTER | Age: 68
End: 2018-05-15
Attending: HOSPITALIST
Payer: MEDICARE

## 2018-05-15 VITALS
HEART RATE: 85 BPM | BODY MASS INDEX: 32.34 KG/M2 | OXYGEN SATURATION: 96 % | DIASTOLIC BLOOD PRESSURE: 81 MMHG | SYSTOLIC BLOOD PRESSURE: 122 MMHG | RESPIRATION RATE: 18 BRPM | WEIGHT: 238.76 LBS | HEIGHT: 72 IN | TEMPERATURE: 99.7 F

## 2018-05-15 LAB
ANION GAP SERPL CALC-SCNC: 6 MMOL/L (ref 0–11.9)
BUN SERPL-MCNC: 28 MG/DL (ref 8–22)
CALCIUM SERPL-MCNC: 8.6 MG/DL (ref 8.4–10.2)
CHLORIDE SERPL-SCNC: 108 MMOL/L (ref 96–112)
CO2 SERPL-SCNC: 22 MMOL/L (ref 20–33)
CREAT SERPL-MCNC: 1.27 MG/DL (ref 0.5–1.4)
ERYTHROCYTE [DISTWIDTH] IN BLOOD BY AUTOMATED COUNT: 45.1 FL (ref 35.9–50)
GLUCOSE SERPL-MCNC: 122 MG/DL (ref 65–99)
HCT VFR BLD AUTO: 41 % (ref 42–52)
HGB BLD-MCNC: 14 G/DL (ref 14–18)
INR PPP: 2.63 (ref 0.87–1.13)
LV EJECT FRACT  99904: 65
LV EJECT FRACT MOD 2C 99903: 66.31
LV EJECT FRACT MOD 4C 99902: 63.14
LV EJECT FRACT MOD BP 99901: 64.4
MCH RBC QN AUTO: 31.3 PG (ref 27–33)
MCHC RBC AUTO-ENTMCNC: 34.1 G/DL (ref 33.7–35.3)
MCV RBC AUTO: 91.5 FL (ref 81.4–97.8)
PLATELET # BLD AUTO: 162 K/UL (ref 164–446)
PMV BLD AUTO: 10 FL (ref 9–12.9)
POTASSIUM SERPL-SCNC: 3.7 MMOL/L (ref 3.6–5.5)
PROTHROMBIN TIME: 27.7 SEC (ref 12–14.6)
RBC # BLD AUTO: 4.48 M/UL (ref 4.7–6.1)
SODIUM SERPL-SCNC: 136 MMOL/L (ref 135–145)
WBC # BLD AUTO: 14.8 K/UL (ref 4.8–10.8)

## 2018-05-15 PROCEDURE — 85027 COMPLETE CBC AUTOMATED: CPT

## 2018-05-15 PROCEDURE — 85610 PROTHROMBIN TIME: CPT

## 2018-05-15 PROCEDURE — A9270 NON-COVERED ITEM OR SERVICE: HCPCS | Performed by: HOSPITALIST

## 2018-05-15 PROCEDURE — 700105 HCHG RX REV CODE 258: Performed by: HOSPITALIST

## 2018-05-15 PROCEDURE — 700102 HCHG RX REV CODE 250 W/ 637 OVERRIDE(OP): Performed by: HOSPITALIST

## 2018-05-15 PROCEDURE — 93306 TTE W/DOPPLER COMPLETE: CPT

## 2018-05-15 PROCEDURE — G0378 HOSPITAL OBSERVATION PER HR: HCPCS

## 2018-05-15 PROCEDURE — 99217 PR OBSERVATION CARE DISCHARGE: CPT | Performed by: HOSPITALIST

## 2018-05-15 PROCEDURE — 80048 BASIC METABOLIC PNL TOTAL CA: CPT

## 2018-05-15 PROCEDURE — A9502 TC99M TETROFOSMIN: HCPCS

## 2018-05-15 RX ORDER — OMEPRAZOLE 20 MG/1
20 CAPSULE, DELAYED RELEASE ORAL DAILY
Qty: 30 CAP | Refills: 2 | Status: SHIPPED | OUTPATIENT
Start: 2018-05-15 | End: 2018-08-24

## 2018-05-15 RX ORDER — WARFARIN SODIUM 5 MG/1
5 TABLET ORAL
Status: DISCONTINUED | OUTPATIENT
Start: 2018-05-15 | End: 2018-05-15 | Stop reason: HOSPADM

## 2018-05-15 RX ADMIN — Medication 1000 MCG: at 08:29

## 2018-05-15 RX ADMIN — SODIUM CHLORIDE: 9 INJECTION, SOLUTION INTRAVENOUS at 03:00

## 2018-05-15 RX ADMIN — CHOLECALCIFEROL TAB 25 MCG (1000 UNIT) 2000 UNITS: 25 TAB at 08:30

## 2018-05-15 RX ADMIN — ASPIRIN 325 MG ORAL TABLET 325 MG: 325 PILL ORAL at 08:29

## 2018-05-15 RX ADMIN — STANDARDIZED SENNA CONCENTRATE AND DOCUSATE SODIUM 2 TABLET: 8.6; 5 TABLET, FILM COATED ORAL at 08:30

## 2018-05-15 ASSESSMENT — PAIN SCALES - GENERAL: PAINLEVEL_OUTOF10: 0

## 2018-05-15 NOTE — DISCHARGE INSTRUCTIONS
Discharge Instructions    Discharged to home by car with relative. Discharged via walking, hospital escort: Refused.  Special equipment needed: Not Applicable    Be sure to schedule a follow-up appointment with your primary care doctor or any specialists as instructed.     Discharge Plan:   Diet Plan: Discussed  Activity Level: Discussed  Confirmed Follow up Appointment: Appointment Scheduled  Confirmed Symptoms Management: Discussed  Medication Reconciliation Updated: Yes  Influenza Vaccine Indication: Not indicated: Previously immunized this influenza season and > 8 years of age    I understand that a diet low in cholesterol, fat, and sodium is recommended for good health. Unless I have been given specific instructions below for another diet, I accept this instruction as my diet prescription.       Special Instructions: None    · Is patient discharged on Warfarin / Coumadin?   Yes    You are receiving the drug warfarin. Please understand the importance of monitoring warfarin with scheduled PT/INR blood draws.  Follow-up with the [facility] in one week for INR lab..    IMPORTANT: HOW TO USE THIS INFORMATION:  This is a summary and does NOT have all possible information about this product. This information does not assure that this product is safe, effective, or appropriate for you. This information is not individual medical advice and does not substitute for the advice of your health care professional. Always ask your health care professional for complete information about this product and your specific health needs.      WARFARIN - ORAL (WARF-uh-rin)      COMMON BRAND NAME(S): Coumadin      WARNING:  Warfarin can cause very serious (possibly fatal) bleeding. This is more likely to occur when you first start taking this medication or if you take too much warfarin. To decrease your risk for bleeding, your doctor or other health care provider will monitor you closely and check your lab results (INR test) to make  "sure you are not taking too much warfarin. Keep all medical and laboratory appointments. Tell your doctor right away if you notice any signs of serious bleeding. See also Side Effects section.      USES:  This medication is used to treat blood clots (such as in deep vein thrombosis-DVT or pulmonary embolus-PE) and/or to prevent new clots from forming in your body. Preventing harmful blood clots helps to reduce the risk of a stroke or heart attack. Conditions that increase your risk of developing blood clots include a certain type of irregular heart rhythm (atrial fibrillation), heart valve replacement, recent heart attack, and certain surgeries (such as hip/knee replacement). Warfarin is commonly called a \"blood thinner,\" but the more correct term is \"anticoagulant.\" It helps to keep blood flowing smoothly in your body by decreasing the amount of certain substances (clotting proteins) in your blood.      HOW TO USE:  Read the Medication Guide provided by your pharmacist before you start taking warfarin and each time you get a refill. If you have any questions, ask your doctor or pharmacist. Take this medication by mouth with or without food as directed by your doctor or other health care professional, usually once a day. It is very important to take it exactly as directed. Do not increase the dose, take it more frequently, or stop using it unless directed by your doctor. Dosage is based on your medical condition, laboratory tests (such as INR), and response to treatment. Your doctor or other health care provider will monitor you closely while you are taking this medication to determine the right dose for you. Use this medication regularly to get the most benefit from it. To help you remember, take it at the same time each day. It is important to eat a balanced, consistent diet while taking warfarin. Some foods can affect how warfarin works in your body and may affect your treatment and dose. Avoid sudden large " increases or decreases in your intake of foods high in vitamin K (such as broccoli, cauliflower, cabbage, brussels sprouts, kale, spinach, and other green leafy vegetables, liver, green tea, certain vitamin supplements). If you are trying to lose weight, check with your doctor before you try to go on a diet. Cranberry products may also affect how your warfarin works. Limit the amount of cranberry juice (16 ounces/480 milliliters a day) or other cranberry products you may drink or eat.      SIDE EFFECTS:  Nausea, loss of appetite, or stomach/abdominal pain may occur. If any of these effects persist or worsen, tell your doctor or pharmacist promptly. Remember that your doctor has prescribed this medication because he or she has judged that the benefit to you is greater than the risk of side effects. Many people using this medication do not have serious side effects. This medication can cause serious bleeding if it affects your blood clotting proteins too much (shown by unusually high INR lab results). Even if your doctor stops your medication, this risk of bleeding can continue for up to a week. Tell your doctor right away if you have any signs of serious bleeding, including: unusual pain/swelling/discomfort, unusual/easy bruising, prolonged bleeding from cuts or gums, persistent/frequent nosebleeds, unusually heavy/prolonged menstrual flow, pink/dark urine, coughing up blood, vomit that is bloody or looks like coffee grounds, severe headache, dizziness/fainting, unusual or persistent tiredness/weakness, bloody/black/tarry stools, chest pain, shortness of breath, difficulty swallowing. Tell your doctor right away if any of these unlikely but serious side effects occur: persistent nausea/vomiting, severe stomach/abdominal pain, yellowing eyes/skin. This drug rarely has caused very serious (possibly fatal) problems if its effects lead to small blood clots (usually at the beginning of treatment). This can lead to severe  skin/tissue damage that may require surgery or amputation if left untreated. Patients with certain blood conditions (protein C or S deficiency) may be at greater risk. Get medical help right away if any of these rare but serious side effects occur: painful/red/purplish patches on the skin (such as on the toe, breast, abdomen), change in the amount of urine, vision changes, confusion, slurred speech, weakness on one side of the body. A very serious allergic reaction to this drug is rare. However, get medical help right away if you notice any symptoms of a serious allergic reaction, including: rash, itching/swelling (especially of the face/tongue/throat), severe dizziness, trouble breathing. This is not a complete list of possible side effects. If you notice other effects not listed above, contact your doctor or pharmacist. In the [place] - Call your doctor for medical advice about side effects. You may report side effects to FDA at 1-960-QGC-1926. In [place] - Call your doctor for medical advice about side effects. You may report side effects to [facility] [place] at 1-238.398.1027.      PRECAUTIONS:  Before taking warfarin, tell your doctor or pharmacist if you are allergic to it; or if you have any other allergies. This product may contain inactive ingredients, which can cause allergic reactions or other problems. Talk to your pharmacist for more details. Before using this medication, tell your doctor or pharmacist your medical history, especially of: blood disorders (such as anemia, hemophilia), bleeding problems (such as bleeding of the stomach/intestines, bleeding in the brain), blood vessel disorders (such as aneurysms), recent major injury/surgery, liver disease, alcohol use, mental/mood disorders (including memory problems), frequent falls/injuries. It is important that all your doctors and dentists know that you take warfarin. Before having surgery or any medical/dental procedures, tell your doctor or dentist that you  are taking this medication and about all the products you use (including prescription drugs, nonprescription drugs, and herbal products). Avoid getting injections into the muscles. If you must have an injection into a muscle (for example, a flu shot), it should be given in the arm. This way, it will be easier to check for bleeding and/or apply pressure bandages. This medication may cause stomach bleeding. Daily use of alcohol while using this medicine will increase your risk for stomach bleeding and may also affect how this medication works. Limit or avoid alcoholic beverages. If you have not been eating well, if you have an illness or infection that causes fever, vomiting, or diarrhea for more than 2 days, or if you start using any antibiotic medications, contact your doctor or pharmacist immediately because these conditions can affect how warfarin works. This medication can cause heavy bleeding. To lower the chance of getting cut, bruised, or injured, use great caution with sharp objects like safety razors and nail cutters. Use an electric razor when shaving and a soft toothbrush when brushing your teeth. Avoid activities such as contact sports. If you fall or injure yourself, especially if you hit your head, call your doctor immediately. Your doctor may need to check you. The Food & Drug Administration has stated that generic warfarin products are interchangeable. However, consult your doctor or pharmacist before switching warfarin products. Be careful not to take more than one medication that contains warfarin unless specifically directed by the doctor or health care provider who is monitoring your warfarin treatment. Older adults may be at greater risk for bleeding while using this drug. This medication is not recommended for use during pregnancy because of serious (possibly fatal) harm to an unborn baby. Discuss the use of reliable forms of birth control with your doctor. If you become pregnant or think you  "may be pregnant, tell your doctor immediately. If you are planning pregnancy, discuss a plan for managing your condition with your doctor before you become pregnant. Your doctor may switch the type of medication you use during pregnancy. Very small amounts of this medication may pass into breast milk but is unlikely to harm a nursing infant. Consult your doctor before breast-feeding.      DRUG INTERACTIONS:  Drug interactions may change how your medications work or increase your risk for serious side effects. This document does not contain all possible drug interactions. Keep a list of all the products you use (including prescription/nonprescription drugs and herbal products) and share it with your doctor and pharmacist. Do not start, stop, or change the dosage of any medicines without your doctor's approval. Warfarin interacts with many prescription, nonprescription, vitamin, and herbal products. This includes medications that are applied to the skin or inside the vagina or rectum. The interactions with warfarin usually result in an increase or decrease in the \"blood-thinning\" (anticoagulant) effect. Your doctor or other health care professional should closely monitor you to prevent serious bleeding or clotting problems. While taking warfarin, it is very important to tell your doctor or pharmacist of any changes in medications, vitamins, or herbal products that you are taking. Some products that may interact with this drug include: capecitabine, imatinib, mifepristone. Aspirin, aspirin-like drugs (salicylates), and nonsteroidal anti-inflammatory drugs (NSAIDs such as ibuprofen, naproxen, celecoxib) may have effects similar to warfarin. These drugs may increase the risk of bleeding problems if taken during treatment with warfarin. Carefully check all prescription/nonprescription product labels (including drugs applied to the skin such as pain-relieving creams) since the products may contain NSAIDs or salicylates. " Talk to your doctor about using a different medication (such as acetaminophen) to treat pain/fever. Low-dose aspirin and related drugs (such as clopidogrel, ticlopidine) should be continued if prescribed by your doctor for specific medical reasons such as heart attack or stroke prevention. Consult your doctor or pharmacist for more details. Many herbal products interact with warfarin. Tell your doctor before taking any herbal products, especially bromelains, coenzyme Q10, cranberry, danshen, dong quai, fenugreek, garlic, ginkgo biloba, ginseng, and Selina's wort, among others. This medication may interfere with a certain laboratory test to measure theophylline levels, possibly causing false test results. Make sure laboratory personnel and all your doctors know you use this drug.      OVERDOSE:  If overdose is suspected, contact a poison control center or emergency room immediately. [place] residents can call the [employer] Poison Hotline at 1-789.955.1486. [place] residents can call a provincial poison control center. Symptoms of overdose may include: bloody/black/tarry stools, pink/dark urine, unusual/prolonged bleeding.      NOTES:  Do not share this medication with others. Laboratory and/or medical tests (such as INR, complete blood count) must be performed periodically to monitor your progress or check for side effects. Consult your doctor for more details.      MISSED DOSE:  For the best possible benefit, do not miss any doses. If you do miss a dose and remember on the same day, take it as soon as you remember. If you remember on the next day, skip the missed dose and resume your usual dosing schedule. Do not double the dose to catch up because this could increase your risk for bleeding. Keep a record of missed doses to give to your doctor or pharmacist. Contact your doctor or pharmacist if you miss 2 or more doses in a row.      STORAGE:  Store at room temperature away from light and moisture. Do not store in the  bathroom. Keep all medications away from children and pets. Do not flush medications down the toilet or pour them into a drain unless instructed to do so. Properly discard this product when it is  or no longer needed. Consult your pharmacist or local waste disposal company for more details about how to safely discard your product.      MEDICAL ALERT:  Your condition and medication can cause complications in a medical emergency. For information about enrolling in MedicAlert, call 1-267.438.7655 ([place]) or 1-123.824.4617 ([place]).      Information last revised 2010 Copyright(c) 2010 [employer]         Chest Pain, Nonspecific  It is often hard to give a specific diagnosis for the cause of chest pain. There is always a chance that your pain could be related to something serious, like a heart attack or a blood clot in the lungs. You need to follow up with your caregiver for further evaluation. More lab tests or other studies such as X-rays, electrocardiography, stress testing, or cardiac imaging may be needed to find the cause of your pain.  Most of the time, nonspecific chest pain improves within 2 to 3 days with rest and mild pain medicine. For the next few days, avoid physical exertion or activities that bring on pain. Do not smoke. Avoid drinking alcohol. Call your caregiver for routine follow-up as advised.   SEEK IMMEDIATE MEDICAL CARE IF:  · You develop increased chest pain or pain that radiates to the arm, neck, jaw, back, or abdomen.   · You develop shortness of breath, increased coughing, or you start coughing up blood.   · You have severe back or abdominal pain, nausea, or vomiting.   · You develop severe weakness, fainting, fever, or chills.   Document Released: 2006 Document Revised: 2013 Document Reviewed: 2008  [employer]® Patient Information © [employer].    Omeprazole tablets   What is this medicine?  OMEPRAZOLE (oh ME pray zol) prevents the production of acid  in the stomach. It is used to treat the symptoms of heartburn. You can buy this medicine without a prescription. This product is not for long-term use, unless otherwise directed by your doctor or health care professional.  This medicine may be used for other purposes; ask your health care provider or pharmacist if you have questions.  COMMON BRAND NAME(S): Prilosec OTC  What should I tell my health care provider before I take this medicine?  They need to know if you have any of these conditions:  -black or bloody stools  -chest pain  -difficulty swallowing  -have had heartburn for over 3 months  -have heartburn with dizziness, lightheadedness or sweating  -liver disease  -lupus  -stomach pain  -unexplained weight loss  -vomiting with blood  -wheezing  -an unusual or allergic reaction to omeprazole, other medicines, foods, dyes, or preservatives  -pregnant or trying to get pregnant  -breast-feeding  How should I use this medicine?  Take this medicine by mouth. Follow the directions on the product label. If you are taking this medicine without a prescription, take one tablet every day. Do not use for longer than 14 days or repeat a course of treatment more often than every 4 months unless directed by a doctor or healthcare professional. Take your dose at regular intervals every 24 hours. Swallow the tablet whole with a drink of water. Do not crush, break or chew. This medicine works best if taken on an empty stomach 30 minutes before breakfast. If you are using this medicine with the prescription of your doctor or healthcare professional, follow the directions you were given. Do not take your medicine more often than directed.  Talk to your pediatrician regarding the use of this medicine in children. Special care may be needed.  Overdosage: If you think you have taken too much of this medicine contact a poison control center or emergency room at once.  NOTE: This medicine is only for you. Do not share this medicine  with others.  What if I miss a dose?  If you miss a dose, take it as soon as you can. If it is almost time for your next dose, take only that dose. Do not take double or extra doses.  What may interact with this medicine?  Do not take this medicine with any of the following medications:  -atazanavir  -clopidogrel  -nelfinavir  This medicine may also interact with the following medications:  -ampicillin  -certain medicines for anxiety or sleep  -certain medicines that treat or prevent blood clots like warfarin  -cyclosporine  -diazepam  -digoxin  -disulfiram  -iron salts  -methotrexate  -mycophenolate mofetil  -phenytoin  -prescription medicine for fungal or yeast infection like itraconazole, ketoconazole, voriconazole  -saquinavir  -tacrolimus  This list may not describe all possible interactions. Give your health care provider a list of all the medicines, herbs, non-prescription drugs, or dietary supplements you use. Also tell them if you smoke, drink alcohol, or use illegal drugs. Some items may interact with your medicine.  What should I watch for while using this medicine?  It can take several days before your heartburn gets better. Check with your doctor or health care professional if your condition does not start to get better, or if it gets worse.  Do not treat diarrhea with over the counter products. Contact your doctor if you have diarrhea that lasts more than 2 days or if it is severe and watery.  Do not treat yourself for heartburn with this medicine for more than 14 days in a row. You should only use this medicine for a 2-week treatment period once every 4 months. If your symptoms return shortly after your therapy is complete, or within the 4 month time frame, call your doctor or health care professional.  What side effects may I notice from receiving this medicine?  Side effects that you should report to your doctor or health care professional as soon as possible:  -allergic reactions like skin rash,  itching or hives, swelling of the face, lips, or tongue  -bone, muscle or joint pain  -breathing problems  -chest pain or chest tightness  -dark yellow or brown urine  -diarrhea  -dizziness  -fast, irregular heartbeat  -feeling faint or lightheaded  -fever or sore throat  -muscle spasm  -palpitations  -rash on cheeks or arms that gets worse in the sun  -redness, blistering, peeling or loosening of the skin, including inside the mouth  -seizures  -tremors  -unusual bleeding or bruising  -unusually weak or tired  -yellowing of the eyes or skin  Side effects that usually do not require medical attention (report to your doctor or health care professional if they continue or are bothersome):  -constipation  -dry mouth  -headache  -loose stools  -nausea  This list may not describe all possible side effects. Call your doctor for medical advice about side effects. You may report side effects to FDA at 2-070-FDA-8577.  Where should I keep my medicine?  Keep out of the reach of children.  Store at room temperature between 20 and 25 degrees C (68 and 77 degrees F). Protect from light and moisture. Throw away any unused medicine after the expiration date.  NOTE: This sheet is a summary. It may not cover all possible information. If you have questions about this medicine, talk to your doctor, pharmacist, or health care provider.  © 2018 [employer]/Gold [employer] (2017-01-19 13:06:31)      Depression / Suicide Risk    As you are discharged from this [facility] facility, it is important to learn how to keep safe from harming yourself.    Recognize the warning signs:  · Abrupt changes in personality, positive or negative- including increase in energy   · Giving away possessions  · Change in eating patterns- significant weight changes-  positive or negative  · Change in sleeping patterns- unable to sleep or sleeping all the time   · Unwillingness or inability to communicate  · Depression  · Unusual sadness, discouragement and  loneliness  · Talk of wanting to die  · Neglect of personal appearance   · Rebelliousness- reckless behavior  · Withdrawal from people/activities they love  · Confusion- inability to concentrate     If you or a loved one observes any of these behaviors or has concerns about self-harm, here's what you can do:  · Talk about it- your feelings and reasons for harming yourself  · Remove any means that you might use to hurt yourself (examples: pills, rope, extension cords, firearm)  · Get professional help from the community (Mental Health, Substance Abuse, psychological counseling)  · Do not be alone:Call your Safe Contact- someone whom you trust who will be there for you.  · Call your local CRISIS HOTLINE 056-5613 or 209-474-0222  · Call your local Children's Mobile Crisis Response Team [place] (854) 656-0191 or www.WonderHowTo  · Call the toll free National Suicide Prevention Hotlines   · National Suicide Prevention Lifeline 819-254-TYMB (6323)  · National Hope Line Network 800-SUICIDE (720-2745)

## 2018-05-15 NOTE — PROGRESS NOTES
Inpatient Anticoagulation Service Note    Date: 5/15/2018  Reason for Anticoagulation: Pulmonary Embolism        Hemoglobin Value: 14  Hematocrit Value: (!) 41  Lab Platelet Value: (!) 162  Target INR: 2.0 to 3.0    INR from last 7 days     Date/Time INR Value    05/15/18 0353 (!)  2.63    05/14/18 1102 (!)  1.94        Dose from last 7 days     Date/Time Dose (mg)    05/15/18 0353  5    05/14/18 1500  5        Significant Interactions: Aspirin  Bridge Therapy: No (Not currently indicated)    Comments:  (Home dose 5 mg daily expect 7.5 mg on Sat.)    Plan:  Warfarin 5 mg tonight per outpatient regimen.  Education Material Provided?: No ([facility] Patient)  Pharmacist suggested discharge dosing: Resume outpatient regimen listed above.     Orlando Lowry, PharmD BCPS

## 2018-05-15 NOTE — PROGRESS NOTES
VS taken. Up to bathroom to void, then back to bed. Will be NPO at this time. Denied any needs. Continue to monitor.

## 2018-05-15 NOTE — CARE PLAN
Problem: Communication  Goal: The ability to communicate needs accurately and effectively will improve    Intervention: [place] patient and significant other/support system to call light to alert staff of needs  Call light provided and educated to use for assistance, pt verbalizes understanding.       Problem: Knowledge Deficit  Goal: Knowledge of disease process/condition, treatment plan, diagnostic tests, and medications will improve    Intervention: Assess knowledge level of disease process/condition, treatment plan, diagnostic tests, and medications  POC dicussed, lab work and stress test ordered.

## 2018-05-15 NOTE — PROGRESS NOTES
EKG Rhythm Strip    VA Interval: 0.12  QRS Interval: 0.06  QT Interval: 0.32  Rhythm Interpretation: SR,ST.    Ectopy: F-PVC/PAC, coup, trigeminy.

## 2018-05-15 NOTE — PROGRESS NOTES
"D-dimer not processed yet. Lab previously notified by JOSE RAUL Mariscal to add on test to blood obtained earlier. Still appears as \"need to be collected\".  Yolanda notified by this RN to collect.  "

## 2018-05-15 NOTE — PROGRESS NOTES
Assumed care of patient. Bedside report from JOSE RAUL Mariscal. POC discussed w/ pt. Lines patent. CLIP. Fall precautions in place. Visitors at bedside.

## 2018-05-15 NOTE — DISCHARGE SUMMARY
CHIEF COMPLAINT ON ADMISSION  Chief Complaint   Patient presents with   • Chest Pressure       CODE STATUS  Full Code    HPI & HOSPITAL COURSE  This is a 67 y.o. male here with chest pressure. He was admitted, ruled out for a myocardial infarction with serial troponins and had a normal stress test here. Etiology of his pain may me GI related and he will be given a trial of PPI therapy.          Therefore, he is discharged in good and stable condition with close outpatient follow-up.    SPECIFIC OUTPATIENT FOLLOW-UP  pcp    DISCHARGE PROBLEM LIST  Principal Problem:    Chest pain POA: Unknown  Active Problems:    Chronic venous hypertension due to DVT POA: Yes    Nausea & vomiting POA: Unknown    Acute kidney injury (HCC) POA: Unknown    History of pulmonary embolism POA: Yes    Anticoagulated on warfarin POA: Yes    Obesity (BMI 30.0-34.9) POA: Yes    Leukocytosis POA: Unknown  Resolved Problems:    * No resolved hospital problems. *      FOLLOW UP  Future Appointments  Date Time Provider Department Center   5/22/2018 10:00 AM Keron Garcia M.D. [facility]   10/18/2018 3:00 PM Keron Garcia M.D. [facility] Garcia M.D.  [address]  [place] [id number]  [phone]            MEDICATIONS ON DISCHARGE   Tavares Bonilla [place]   Home Medication Instructions JEANETTE:[id number]    Printed on:05/15/18 1923   Medication Information                      ascorbic acid (ASCORBIC ACID) 500 MG Tab  Take 500 mg by mouth every day.             Cholecalciferol (VITAMIN D) 2000 UNITS Cap  Take 1 Cap by mouth every day.             Cyanocobalamin (VITAMIN B-12) 1000 MCG Tab  Take 1,000 mcg by mouth every day.             omeprazole (PRILOSEC) 20 MG delayed-release capsule  Take 1 Cap by mouth every day.             oxyCODONE-acetaminophen (PERCOCET) 5-325 MG Tab  Take 1-2 Tabs by mouth every 6 hours as needed for Severe Pain.             warfarin (COUMADIN) 5 MG Tab  Take 5-7.5 mg by mouth every day. Pt  takes 5MG every day except on Sat 7.5MG                 DIET  Orders Placed This Encounter   Procedures   • Diet Order     Standing Status:   Standing     Number of Occurrences:   1     Order Specific Question:   Diet:     Answer:   Cardiac [6]   • DISCONTINUE DIET TRAY     Standing Status:   Standing     Number of Occurrences:   1       ACTIVITY  As tolerated.  Weight bearing as tolerated      CONSULTATIONS  none    PROCEDURES  Nuc meds stress test normal.     LABORATORY  Lab Results   Component Value Date/Time    SODIUM 136 05/15/2018 03:53 AM    POTASSIUM 3.7 05/15/2018 03:53 AM    CHLORIDE 108 05/15/2018 03:53 AM    CO2 22 05/15/2018 03:53 AM    GLUCOSE 122 (H) 05/15/2018 03:53 AM    BUN 28 (H) 05/15/2018 03:53 AM    CREATININE 1.27 05/15/2018 03:53 AM    CREATININE 1.3 02/11/2009 04:05 AM        Lab Results   Component Value Date/Time    WBC 14.8 (H) 05/15/2018 03:53 AM    HEMOGLOBIN 14.0 05/15/2018 03:53 AM    HEMATOCRIT 41.0 (L) 05/15/2018 03:53 AM    PLATELETCT 162 (L) 05/15/2018 03:53 AM        Total time of the discharge process exceeds 36 minutes

## 2018-05-15 NOTE — PROGRESS NOTES
Patient has been d/c home, feeling well, no complaints, vital signs are stable, wife came to pick him up, d/c instructions provided.

## 2018-05-15 NOTE — PROGRESS NOTES
AOx4, very pleasant. Afebrile. Denied any chest pain, stated previous pain is all resolved and rated pain 0/10 now. Assessment per doc flowsheet. PIV intact & patent. IVF infusing.  States understanding of POC. Family visiting at bedside. No additional concerns at this time. CLIP. Personal belongings within reach. Non-skid socks. Bed locked & in low position.

## 2018-05-15 NOTE — PROGRESS NOTES
Bedside report given to Natasha BLUNT. POC discussed. Pt resting comfortably in bed. Safety precautions in place.

## 2018-05-15 NOTE — CARE PLAN
Problem: Venous Thromboembolism (VTW)/Deep Vein Thrombosis (DVT) Prevention:  Goal: Patient will participate in Venous Thrombosis (VTE)/Deep Vein Thrombosis (DVT)Prevention Measures  Hx PE/DVT. Coumadin per MD order. Ambulatory. Active ROM when awake. No s/s DVT/VTE at this time.      Problem: Knowledge Deficit  Goal: Knowledge of disease process/condition, treatment plan, diagnostic tests, and medications will improve  Outcome: PROGRESSING AS EXPECTED  Admit for CP. POC discussed w/ pt and family. Understands treatment plan. Educated on meds & procedures/tests. Trending troponin levels. Will have stress test in AM. Denies questions.

## 2018-05-15 NOTE — PROGRESS NOTES
Nursing care plan includes knowledge deficit, potential for discomfort, potential for compromised cardiac output. POC includes teaching, comfort measures and reassurance, and access to code cart, cardiology stand by and availability of rapid response team. Pt verbalizes good understanding of benefits and risks of Alok protocol cardiac stress test. Informed consent obtained.Pt completed six minutes of Alok protocol with isotope injected at 85% max HR and competing 2 more minutes, pt developed the following symptoms none VS stable, major symptoms resolved. To waiting room, caffeinated fluids and/or snacks given, awaiting second scan. Nursing goals met. Will return to room after second scan.

## 2018-05-15 NOTE — PROGRESS NOTES
Tele strip at 1905 shows sinus tach w/ HR of 116.   Measurements: 0.14 / 0.08 / 0.26    Tele Shift Summary:  Rhythm : SR/ST  Rate : 80-110s (up to 130s)  Ectopy : Per MT Omid, pt had frequent PVC/PACs, couplets, and trigem.     Telemetry monitoring strips placed in pt chart.

## 2018-05-18 ENCOUNTER — TELEPHONE (OUTPATIENT)
Dept: VASCULAR LAB | Facility: MEDICAL CENTER | Age: 68
End: 2018-05-18

## 2018-05-18 ENCOUNTER — HOSPITAL ENCOUNTER (OUTPATIENT)
Dept: LAB | Facility: MEDICAL CENTER | Age: 68
End: 2018-05-18
Attending: INTERNAL MEDICINE
Payer: MEDICARE

## 2018-05-18 ENCOUNTER — HOSPITAL ENCOUNTER (OUTPATIENT)
Facility: MEDICAL CENTER | Age: 68
End: 2018-05-18
Attending: INTERNAL MEDICINE
Payer: MEDICARE

## 2018-05-18 ENCOUNTER — OFFICE VISIT (OUTPATIENT)
Dept: MEDICAL GROUP | Age: 68
End: 2018-05-18
Payer: MEDICARE

## 2018-05-18 VITALS
BODY MASS INDEX: 31.69 KG/M2 | HEIGHT: 72 IN | SYSTOLIC BLOOD PRESSURE: 116 MMHG | HEART RATE: 94 BPM | DIASTOLIC BLOOD PRESSURE: 62 MMHG | WEIGHT: 234 LBS | TEMPERATURE: 98.6 F | OXYGEN SATURATION: 100 %

## 2018-05-18 DIAGNOSIS — D68.51 FACTOR 5 LEIDEN MUTATION, HETEROZYGOUS (HCC): ICD-10-CM

## 2018-05-18 DIAGNOSIS — G89.4 CHRONIC PAIN SYNDROME: ICD-10-CM

## 2018-05-18 DIAGNOSIS — N30.01 ACUTE CYSTITIS WITH HEMATURIA: ICD-10-CM

## 2018-05-18 DIAGNOSIS — R35.1 NOCTURIA: ICD-10-CM

## 2018-05-18 DIAGNOSIS — I87.009 POST-PHLEBITIC SYNDROME: ICD-10-CM

## 2018-05-18 DIAGNOSIS — K21.9 GASTROESOPHAGEAL REFLUX DISEASE WITHOUT ESOPHAGITIS: ICD-10-CM

## 2018-05-18 DIAGNOSIS — Z79.01 ANTICOAGULATED ON WARFARIN: ICD-10-CM

## 2018-05-18 DIAGNOSIS — R35.0 URINARY FREQUENCY: ICD-10-CM

## 2018-05-18 DIAGNOSIS — D72.823 LEUKEMOID REACTION: ICD-10-CM

## 2018-05-18 DIAGNOSIS — R07.89 OTHER CHEST PAIN: ICD-10-CM

## 2018-05-18 DIAGNOSIS — N18.30 STAGE 3 CHRONIC KIDNEY DISEASE (HCC): ICD-10-CM

## 2018-05-18 DIAGNOSIS — Z86.711 HISTORY OF PULMONARY EMBOLISM: ICD-10-CM

## 2018-05-18 DIAGNOSIS — E78.2 MIXED HYPERLIPIDEMIA: ICD-10-CM

## 2018-05-18 DIAGNOSIS — R31.29 MICROHEMATURIA: ICD-10-CM

## 2018-05-18 DIAGNOSIS — E66.9 OBESITY (BMI 30.0-34.9): ICD-10-CM

## 2018-05-18 DIAGNOSIS — F11.20 UNCOMPLICATED OPIOID DEPENDENCE (HCC): ICD-10-CM

## 2018-05-18 DIAGNOSIS — M54.50 ACUTE BILATERAL LOW BACK PAIN WITHOUT SCIATICA: ICD-10-CM

## 2018-05-18 DIAGNOSIS — N41.9 PROSTATITIS, UNSPECIFIED PROSTATITIS TYPE: ICD-10-CM

## 2018-05-18 PROBLEM — R11.2 NAUSEA & VOMITING: Status: RESOLVED | Noted: 2018-05-14 | Resolved: 2018-05-18

## 2018-05-18 PROBLEM — N17.9 ACUTE KIDNEY INJURY (HCC): Status: RESOLVED | Noted: 2018-05-14 | Resolved: 2018-05-18

## 2018-05-18 LAB
ALBUMIN SERPL BCP-MCNC: 3.3 G/DL (ref 3.2–4.9)
ALBUMIN/GLOB SERPL: 1 G/DL
ALP SERPL-CCNC: 97 U/L (ref 30–99)
ALT SERPL-CCNC: 42 U/L (ref 2–50)
ANION GAP SERPL CALC-SCNC: 7 MMOL/L (ref 0–11.9)
APPEARANCE UR: CLEAR
AST SERPL-CCNC: 18 U/L (ref 12–45)
BASOPHILS # BLD AUTO: 0.6 % (ref 0–1.8)
BASOPHILS # BLD: 0.08 K/UL (ref 0–0.12)
BILIRUB SERPL-MCNC: 0.5 MG/DL (ref 0.1–1.5)
BILIRUB UR STRIP-MCNC: NORMAL MG/DL
BUN SERPL-MCNC: 21 MG/DL (ref 8–22)
CALCIUM SERPL-MCNC: 9.3 MG/DL (ref 8.5–10.5)
CHLORIDE SERPL-SCNC: 106 MMOL/L (ref 96–112)
CO2 SERPL-SCNC: 26 MMOL/L (ref 20–33)
COLOR UR AUTO: YELLOW
CREAT SERPL-MCNC: 1.48 MG/DL (ref 0.5–1.4)
EOSINOPHIL # BLD AUTO: 0.09 K/UL (ref 0–0.51)
EOSINOPHIL NFR BLD: 0.6 % (ref 0–6.9)
ERYTHROCYTE [DISTWIDTH] IN BLOOD BY AUTOMATED COUNT: 47.4 FL (ref 35.9–50)
ERYTHROCYTE [SEDIMENTATION RATE] IN BLOOD BY WESTERGREN METHOD: 67 MM/HOUR (ref 0–20)
GLOBULIN SER CALC-MCNC: 3.3 G/DL (ref 1.9–3.5)
GLUCOSE SERPL-MCNC: 88 MG/DL (ref 65–99)
GLUCOSE UR STRIP.AUTO-MCNC: NEGATIVE MG/DL
HCT VFR BLD AUTO: 46.3 % (ref 42–52)
HGB BLD-MCNC: 14.7 G/DL (ref 14–18)
IMM GRANULOCYTES # BLD AUTO: 0.28 K/UL (ref 0–0.11)
IMM GRANULOCYTES NFR BLD AUTO: 1.9 % (ref 0–0.9)
KETONES UR STRIP.AUTO-MCNC: NEGATIVE MG/DL
LEUKOCYTE ESTERASE UR QL STRIP.AUTO: NORMAL
LYMPHOCYTES # BLD AUTO: 1.19 K/UL (ref 1–4.8)
LYMPHOCYTES NFR BLD: 8.2 % (ref 22–41)
MCH RBC QN AUTO: 30.3 PG (ref 27–33)
MCHC RBC AUTO-ENTMCNC: 31.7 G/DL (ref 33.7–35.3)
MCV RBC AUTO: 95.5 FL (ref 81.4–97.8)
MONOCYTES # BLD AUTO: 1.13 K/UL (ref 0–0.85)
MONOCYTES NFR BLD AUTO: 7.8 % (ref 0–13.4)
NEUTROPHILS # BLD AUTO: 11.66 K/UL (ref 1.82–7.42)
NEUTROPHILS NFR BLD: 80.9 % (ref 44–72)
NITRITE UR QL STRIP.AUTO: NEGATIVE
NRBC # BLD AUTO: 0 K/UL
NRBC BLD-RTO: 0 /100 WBC
PH UR STRIP.AUTO: 5 [PH] (ref 5–8)
PLATELET # BLD AUTO: 283 K/UL (ref 164–446)
PMV BLD AUTO: 10.5 FL (ref 9–12.9)
POTASSIUM SERPL-SCNC: 4 MMOL/L (ref 3.6–5.5)
PROT SERPL-MCNC: 6.6 G/DL (ref 6–8.2)
PROT UR QL STRIP: NEGATIVE MG/DL
RBC # BLD AUTO: 4.85 M/UL (ref 4.7–6.1)
RBC UR QL AUTO: NORMAL
SODIUM SERPL-SCNC: 139 MMOL/L (ref 135–145)
SP GR UR STRIP.AUTO: 1
TSH SERPL DL<=0.005 MIU/L-ACNC: 0.75 UIU/ML (ref 0.38–5.33)
UROBILINOGEN UR STRIP-MCNC: NEGATIVE MG/DL
WBC # BLD AUTO: 14.4 K/UL (ref 4.8–10.8)

## 2018-05-18 PROCEDURE — 80053 COMPREHEN METABOLIC PANEL: CPT

## 2018-05-18 PROCEDURE — 99215 OFFICE O/P EST HI 40 MIN: CPT | Performed by: INTERNAL MEDICINE

## 2018-05-18 PROCEDURE — 84443 ASSAY THYROID STIM HORMONE: CPT

## 2018-05-18 PROCEDURE — 85025 COMPLETE CBC W/AUTO DIFF WBC: CPT

## 2018-05-18 PROCEDURE — 36415 COLL VENOUS BLD VENIPUNCTURE: CPT

## 2018-05-18 PROCEDURE — 85652 RBC SED RATE AUTOMATED: CPT

## 2018-05-18 PROCEDURE — 87186 SC STD MICRODIL/AGAR DIL: CPT

## 2018-05-18 PROCEDURE — 81002 URINALYSIS NONAUTO W/O SCOPE: CPT | Performed by: INTERNAL MEDICINE

## 2018-05-18 PROCEDURE — 87077 CULTURE AEROBIC IDENTIFY: CPT

## 2018-05-18 PROCEDURE — 87086 URINE CULTURE/COLONY COUNT: CPT

## 2018-05-18 RX ORDER — OXYCODONE HYDROCHLORIDE AND ACETAMINOPHEN 5; 325 MG/1; MG/1
1 TABLET ORAL EVERY 6 HOURS PRN
Qty: 120 TAB | Refills: 0 | Status: SHIPPED | OUTPATIENT
Start: 2018-05-18 | End: 2018-08-24 | Stop reason: SDUPTHER

## 2018-05-18 RX ORDER — CIPROFLOXACIN 500 MG/1
500 TABLET, FILM COATED ORAL 2 TIMES DAILY
Qty: 20 TAB | Refills: 1 | Status: SHIPPED | OUTPATIENT
Start: 2018-05-18 | End: 2018-05-23

## 2018-05-18 ASSESSMENT — ENCOUNTER SYMPTOMS
NEUROLOGICAL NEGATIVE: 1
EYES NEGATIVE: 1
PSYCHIATRIC NEGATIVE: 1
CARDIOVASCULAR NEGATIVE: 1
GASTROINTESTINAL NEGATIVE: 1
FLANK PAIN: 0
RESPIRATORY NEGATIVE: 1
BACK PAIN: 1
CONSTITUTIONAL NEGATIVE: 1

## 2018-05-18 NOTE — TELEPHONE ENCOUNTER
[facility]    LM for pt to call clinic and follow up with anticoagulation and new DDI.     Ganesh Mao, PharmD

## 2018-05-18 NOTE — PROGRESS NOTES
Subjective:      Tavares Bonilla is a 67 y.o. male who presents with Low Back Pain and Urinary Retention (urine issues/prostate x1 month; nocturia x5)    In for follow-up of acute chest pain for which he was hospitalized this past week, and which was quite severe and lasted 2 hours.  He was hospitalized overnight and underwent treadmill nuclear medicine stress testing which was negative for ischemia, an echocardiogram which was normal.  Chest x-ray was normal.  CT of the chest was not done because he has been fully anticoagulated on warfarin at therapeutic doses, and suspicion of PE was very low.         In addition this he has had a one-month history of nocturia ×5 and frequent urination throughout the day but no dysuria.  He does have urgency throughout the day as well.  No fever chills flank pain or gross blood in the urine.  Symptoms are getting worse.  No pain.  Has not tried any remedies to make it better.  Not worse any particular position or situation.   No prior history of any urological difficulties.  Was told that he had a possible kidney problem when he is admitted to the hospital but review of the records just shows that his BUN was slightly elevated at 26 his creatinine was normal and his GFR was estimated to be 57.  Would like referral to urologist.    Patient also has chronic low back pain for the last few months worse in the last few weeks.  This started before his episode of chest pain.  It does not radiate and is aggravated by changes in position and is rated 5 out of 10.  Not better with any anti-inflammatories or other medications.  However he does take Percocet occasionally for his postphlebitic pain syndrome in his leg which is controlled on one a day sometimes 2 a day of the 10/325 Percocet.    Patient has had no excessive bleeding problems.  He remains on warfarin and gets his INR checked monthly, for his history of DVT ×2 and pulmonary embolus ×2.,  And a history of Leiden factor V  "deficiency.    Review his records shows he had an elevated white cell count when hospitalized earlier this week with a white count of 14,000 but no left shift.  This needs follow-up.    Other medical problems are stable.  However he stopped taking his statin because of myalgias in his legs and his cholesterol went up 100 points in the total and 50 points on his LDL.  His HDL is borderline low in the low 40s.  However he has had no problems with stroke cardiovascular disease or other vascular disorders.    He is following a high-protein low-carb diet for his obesity and trying to exercise regularly.                Urinary Retention         Review of Systems   Constitutional: Negative.    HENT: Negative.    Eyes: Negative.  Double vision: [id number].   Respiratory: Negative.    Cardiovascular: Negative.    Gastrointestinal: Negative.    Genitourinary: Positive for dysuria, frequency and urgency. Negative for flank pain and hematuria.   Musculoskeletal: Positive for back pain ([id number]).   Skin: Negative.    Neurological: Negative.    Endo/Heme/Allergies: Negative.    Psychiatric/Behavioral: Negative.           Objective:     /62   Pulse 94   Temp 37 °C (98.6 °F)   Ht 1.829 m (6' 0.01\")   Wt 106.1 kg (234 lb)   SpO2 100%   BMI 31.73 kg/m²      Physical Exam   Constitutional: He is oriented to person, place, and time. He appears well-developed and well-nourished. No distress.   HENT:   Head: Normocephalic and atraumatic.   Right Ear: External ear normal.   Left Ear: External ear normal.   Nose: Nose normal.   Mouth/Throat: Oropharynx is clear and moist. No oropharyngeal exudate.   Eyes: Conjunctivae and EOM are normal. Pupils are equal, round, and reactive to light. Right eye exhibits no discharge. Left eye exhibits no discharge. No scleral icterus.   Neck: Normal range of motion. Neck supple. No JVD present. No tracheal deviation present. No thyromegaly present.   Cardiovascular: Normal rate, regular rhythm, " normal heart sounds and intact distal pulses.  Exam reveals no gallop and no friction rub.    No murmur heard.  Pulmonary/Chest: Effort normal and breath sounds normal. No stridor. No respiratory distress. He has no wheezes. He has no rales. He exhibits no tenderness.   Abdominal: Soft. Bowel sounds are normal. He exhibits no distension and no mass. There is no tenderness. There is no rebound and no guarding.   Musculoskeletal: Normal range of motion. He exhibits no edema or tenderness.   Lymphadenopathy:     He has no cervical adenopathy.   Neurological: He is alert and oriented to person, place, and time. He has normal reflexes. He displays normal reflexes. He exhibits normal muscle tone. Coordination normal.   Skin: Skin is warm and dry. No rash noted. He is not diaphoretic. No erythema. No pallor.   Psychiatric: He has a normal mood and affect. His behavior is normal. Judgment and thought content normal.     Admission on 05/14/2018, Discharged on 05/15/2018   Component Date Value   • Troponin I 05/14/2018 <0.02    • B Natriuretic Peptide 05/14/2018 103*   • WBC 05/14/2018 18.1*   • RBC 05/14/2018 5.10    • Hemoglobin 05/14/2018 15.9    • Hematocrit 05/14/2018 47.3    • MCV 05/14/2018 92.7    • MCH 05/14/2018 31.2    • MCHC 05/14/2018 33.6*   • RDW 05/14/2018 45.1    • Platelet Count 05/14/2018 207    • MPV 05/14/2018 10.0    • Neutrophils-Polys 05/14/2018 82.80*   • Lymphocytes 05/14/2018 4.50*   • Monocytes 05/14/2018 11.70    • Eosinophils 05/14/2018 0.20    • Basophils 05/14/2018 0.20    • Immature Granulocytes 05/14/2018 0.60    • Nucleated RBC 05/14/2018 0.00    • Neutrophils (Absolute) 05/14/2018 14.97*   • Lymphs (Absolute) 05/14/2018 0.81*   • Monos (Absolute) 05/14/2018 2.11*   • Eos (Absolute) 05/14/2018 0.03    • Baso (Absolute) 05/14/2018 0.03    • Immature Granulocytes (a* 05/14/2018 0.10    • NRBC (Absolute) 05/14/2018 0.00    • Sodium 05/14/2018 136    • Potassium 05/14/2018 3.8    • Chloride  2018 103    • Co2 2018 25    • Anion Gap 2018 8.0    • Glucose 2018 114*   • Bun 2018 42*   • Creatinine 2018 1.74*   • Calcium 2018 9.4    • AST(SGOT) 2018 29    • ALT(SGPT) 2018 34    • Alkaline Phosphatase 2018 74    • Total Bilirubin 2018 0.8    • Albumin 2018 3.7    • Total Protein 2018 7.7    • Globulin 2018 4.0*   • A-G Ratio 2018 0.9    • PT 2018 21.9*   • INR 2018 1.94*   • APTT 2018 32.9    • Lipase 2018 20    • Report 2018                      Value:[facility].    Test Date:  2018  Pt Name:    CLEMENCIA JUNIOR               Department: [facility]  MRN:        [id number]                      [facility]  Gender:     Male                         Technician: ALEKSANDR  :        1950                   Requested By:ER TRIAGE PROTOCOL  Order #:    [id number]                    Reading MD:    Measurements  Intervals                                Axis  Rate:       98                           P:          64  WV:         148                          QRS:        2  QRSD:       72                           T:          58  QT:         328  QTc:        419    Interpretive Statements  SINUS TACHYCARDIA  VENTRICULAR TRIGEMINY  EARLY PRECORDIAL R/S TRANSITION  Compared to ECG 2017 10:48:17  Sinus arrhythmia no longer present     • GFR If  2018 47*   • GFR If Non  Ameri* 2018 39*   • Troponin I 2018 <0.02    • Eject.Frac. MOD BP 2018 64.4    • Eject.Frac. MOD 4C 2018 63.14    • Eject.Frac. MOD 2C 2018 66.31    • Left Ventrical Ejection * 2018 65    • Lipase 2018 16    • Troponin I 2018 <0.02    • D-Dimer Screen 2018 <200    • WBC 05/15/2018 14.8*   • RBC 05/15/2018 4.48*   • Hemoglobin 05/15/2018 14.0    • Hematocrit 05/15/2018 41.0*   • MCV 05/15/2018 91.5    • MCH  05/15/2018 31.3    • MCHC 05/15/2018 34.1    • RDW 05/15/2018 45.1    • Platelet Count 05/15/2018 162*   • MPV 05/15/2018 10.0    • Sodium 05/15/2018 136    • Potassium 05/15/2018 3.7    • Chloride 05/15/2018 108    • Co2 05/15/2018 22    • Glucose 05/15/2018 122*   • Bun 05/15/2018 28*   • Creatinine 05/15/2018 1.27    • Calcium 05/15/2018 8.6    • Anion Gap 05/15/2018 6.0    • PT 05/15/2018 27.7*   • INR 05/15/2018 2.63*   • GFR If  05/15/2018 >60    • GFR If Non  Ameri* 05/15/2018 56*      Lab Results   Component Value Date/Time    HBA1C 5.8 05/01/2013 08:53 AM     Lab Results   Component Value Date/Time    SODIUM 136 05/15/2018 03:53 AM    POTASSIUM 3.7 05/15/2018 03:53 AM    CHLORIDE 108 05/15/2018 03:53 AM    CO2 22 05/15/2018 03:53 AM    GLUCOSE 122 (H) 05/15/2018 03:53 AM    BUN 28 (H) 05/15/2018 03:53 AM    CREATININE 1.27 05/15/2018 03:53 AM    CREATININE 1.3 02/11/2009 04:05 AM    ALKPHOSPHAT 74 05/14/2018 11:02 AM    ASTSGOT 29 05/14/2018 11:02 AM    ALTSGPT 34 05/14/2018 11:02 AM    TBILIRUBIN 0.8 05/14/2018 11:02 AM     Lab Results   Component Value Date/Time    INR 2.63 (H) 05/15/2018 03:53 AM    INR 1.94 (H) 05/14/2018 11:02 AM    INR 1.8 04/09/2018 03:17 PM     Lab Results   Component Value Date/Time    CHOLSTRLTOT 223 (H) 04/14/2018 11:23 AM     (H) 04/14/2018 11:23 AM    HDL 44 04/14/2018 11:23 AM    TRIGLYCERIDE 141 04/14/2018 11:23 AM       Lab Results   Component Value Date/Time    TESTOSTERONE 222 01/22/2014 09:04 AM     No results found for: TSH  Lab Results   Component Value Date/Time    FREET4 0.84 05/01/2013 08:53 AM    FREET4 0.90 01/24/2012 10:38 AM     No results found for: URICACID  No components found for: VITB12  Lab Results   Component Value Date/Time    25HYDROXY 40 05/01/2015 09:53 AM    25HYDROXY 28 (L) 01/22/2014 09:04 AM                 Assessment/Plan:     1. Nocturia-this is a new problem.    .  Probably secondary to acute prostatitis.  UA  confirms UTI with 3+ WBCs and RBCs.  Start Cipro DS.  Less interaction and sulfa with his warfarin.  However explained that he may still have a prolongation of his ProTime/INR With the Cipro and to be aware of this.     - POCT Urinalysis  - REFERRAL TO UROLOGY  - URINE CULTURE(NEW); Future    2. Other chest pain- neg NM TM test; normal echo 5/2018- resolved     this problem has resolved.  Will check a sed rate to make sure there is no evidence of any inflammation presently.   - WESTERGREN SED RATE; Future    3. Leukemoid [id number]  If it remains persistently elevated will investigate further.   is a new problem and needs recheck.  - CBC WITH DIFFERENTIAL; Future    4. Factor 5 Leiden mutation, heterozygous (HCC)   Under good control. Continue same regimen.      5. Obesity (BMI 30.0-34.9)    diet/exercise/lose 15 lbs.; patient counseled    6. Mixed hyperlipidemia  This is not at goal.  It previously was well controlled but now has relapsed.  Secondary to stopping his statin.  Stressed importance of resuming the medication to reduce risk for stroke or heart attack but patient is convinced that taking statins will cause diabetes.  We will discuss further at follow-up visit and encouraged him to continue with diet exercise and weight reduction to control this.  - COMP METABOLIC PANEL; Future  - TSH; Future    7. Urinary frequency -as above.  This is borderline and stable.  Will follow.  GFR was only 57 and  - REFERRAL TO UROLOGY  - URINE CULTURE(NEW); Future    8. Stage 3 chronic kidney disease-this is a new problem.  May correct with correction of his dehydration and oral fluids.  - COMP METABOLIC PANEL; Future    9. Prostatitis, unspecified prostatitis type  As above  - REFERRAL TO UROLOGY  - ciprofloxacin (CIPRO) 500 MG Tab; Take 1 Tab by mouth 2 times a day.  Dispense: 20 Tab; Refill: 1  - URINE CULTURE(NEW); Future    10. Acute cystitis with hematuria     - REFERRAL TO UROLOGY  - URINE CULTURE(NEW);  Future    11. Microhematuria     - REFERRAL TO UROLOGY    12. Acute bilateral low back pain without sciatica      .  Unclear etiology.  May be related to prostatitis UTI prostatitis.  However will need further evaluation follow-up.  Will hold off on doing any imaging studies at this time until we can correct and resolve his prostatitis.    13. Gastroesophageal reflux disease without esophagitis     Under good control. Continue same regimen.  This was an acute new problem causing chest pain and resolved with Prilosec.  Continue on a as needed basis.    14. 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  Continue on Percocet once a day as needed.     - oxyCODONE-acetaminophen (PERCOCET) 5-325 MG Tab; Take 1 Tab by mouth every 6 hours as needed for up to 90 days.  Dispense: 120 Tab; Refill: 0    15. Post-phlebitic syndrome- RIGHT LEG     - oxyCODONE-acetaminophen (PERCOCET) 5-325 MG Tab; Take 1 Tab by mouth every 6 hours as needed for up to 90 days.  Dispense: 120 Tab; Refill: 0    16. Uncomplicated opioid dependence (HCC)  As above    17. Anticoagulated on warfarin  Stable.  Continue on full anticoagulation lifelong.  No complications to date other than some blood in the urine microscopically from his UTI prostatitis.    18. History of pulmonary embolism        Under good control. Continue same regimen.          40 minute face-to-face encounter took place today.  More than half of this time was spent in the coordination of care of the above problems, as well as counseling.

## 2018-05-21 ENCOUNTER — TELEPHONE (OUTPATIENT)
Dept: MEDICAL GROUP | Age: 68
End: 2018-05-21

## 2018-05-21 ENCOUNTER — TELEPHONE (OUTPATIENT)
Dept: VASCULAR LAB | Facility: MEDICAL CENTER | Age: 68
End: 2018-05-21

## 2018-05-21 NOTE — TELEPHONE ENCOUNTER
Spoke with pt.   He's starting Cipro.   Overdue for INR. Booked into  at 228p    Jacquelin Lowery, PharmD

## 2018-05-21 NOTE — TELEPHONE ENCOUNTER
Phone Number Called: [phone] (home) [phone] (work)      Message: please call office    Left Message for patient to call back: yes

## 2018-05-21 NOTE — LETTER
May 21, 2018         Patient: Tavares Bonilla   YOB: 1950   Date of Visit: 5/21/2018           To Whom it May Concern:    Tavares Bonilla was seen in my clinic on 5/18/2018. He may return to work on 05/30/2018..    If you have any questions or concerns, please don't hesitate to call.        Sincerely,           Keron Garcia M.D.  Electronically Signed

## 2018-05-21 NOTE — TELEPHONE ENCOUNTER
1. Caller Name: Tavares Bonilla                                           Call Back Number: [phone] (home) [phone] (work)      Pt called and wondering he needs a antibiotic and state Dr. Garcia is trying to figure it out what kind of antibiotic was it. Pt doesn't know what antibiotic it is. Pt also needs a Doctor note for his work. Pt state he just want this to heal up before he can go back to work.

## 2018-05-22 ENCOUNTER — ANTICOAGULATION VISIT (OUTPATIENT)
Dept: MEDICAL GROUP | Facility: MEDICAL CENTER | Age: 68
End: 2018-05-22
Payer: MEDICARE

## 2018-05-22 VITALS — HEART RATE: 87 BPM | DIASTOLIC BLOOD PRESSURE: 75 MMHG | SYSTOLIC BLOOD PRESSURE: 121 MMHG

## 2018-05-22 DIAGNOSIS — D68.51 FACTOR 5 LEIDEN MUTATION, HETEROZYGOUS (HCC): ICD-10-CM

## 2018-05-22 DIAGNOSIS — Z79.01 ANTICOAGULATED ON WARFARIN: Primary | ICD-10-CM

## 2018-05-22 DIAGNOSIS — Z86.711 HISTORY OF PULMONARY EMBOLISM: ICD-10-CM

## 2018-05-22 DIAGNOSIS — I87.009 POST-PHLEBITIC SYNDROME: ICD-10-CM

## 2018-05-22 PROBLEM — R31.9 URINARY TRACT INFECTION WITH HEMATURIA: Status: ACTIVE | Noted: 2018-05-22

## 2018-05-22 PROBLEM — N39.0 URINARY TRACT INFECTION WITH HEMATURIA: Status: ACTIVE | Noted: 2018-05-22

## 2018-05-22 PROBLEM — N41.0 ACUTE PROSTATITIS: Status: ACTIVE | Noted: 2018-05-22

## 2018-05-22 LAB
BACTERIA UR CULT: ABNORMAL
BACTERIA UR CULT: ABNORMAL
INR PPP: >8 (ref 2–3.5)
INR PPP: >8 (ref 2–3.5)
SIGNIFICANT IND 70042: ABNORMAL
SITE SITE: ABNORMAL
SOURCE SOURCE: ABNORMAL

## 2018-05-22 PROCEDURE — 85610 PROTHROMBIN TIME: CPT | Performed by: FAMILY MEDICINE

## 2018-05-22 PROCEDURE — 99211 OFF/OP EST MAY X REQ PHY/QHP: CPT | Performed by: FAMILY MEDICINE

## 2018-05-22 NOTE — PROGRESS NOTES
OP Anticoagulation Service Note    Date: 5/22/2018  Vitals:    05/22/18 0911   BP: 121/75   Pulse: 87         Anticoagulation Summary  As of 5/22/2018    INR goal:   2.0-3.0   TTR:   65.1 % (2 y)   Today's INR:   >8.0!   Warfarin maintenance plan:   5 mg (5 mg x 1) on Sun, Thu; 7.5 mg (5 mg x 1.5) all other days   Weekly warfarin total:   47.5 mg   Plan last modified:   Lorena Marie, PharmD (4/9/2018)   Next INR check:   5/24/2018   Target end date:   Indefinite    Indications    Post-phlebitic syndrome- RIGHT LEG [I87.009]  History of pulmonary embolism [Z86.711]  Anticoagulated on warfarin [Z79.01]  Factor 5 Leiden mutation  heterozygous (HCC) [D68.51]             Anticoagulation Episode Summary     INR check location:       Preferred lab:       Send INR reminders to:       Comments:   TTR is 37.5%, consider alternative therapy      Anticoagulation Care Providers     Provider Role Specialty Phone number    Keron Garcia M.D. Referring Internal Medicine [phone]    [facility] Anticoagulation [facility] Responsible  [phone]    Ganesh Mao, PharmD Responsible          Anticoagulation Patient Findings      HPI:   Tavares Bonilla seen in clinic today, on anticoagulation therapy with warfarin (a high risk medication) for hx of PE and factor 5 leiden      Pt is here today to evaluate anticoagulation therapy    Pt was hospitalized for UTI, discharged on Cipro. He has had a very poor appetite. Upon discharge from hospital he resume his usual warfarin dose    Confirmed warfarin dosing regimen, denies missed or extra doses of coumadin.   Diet has been consistent with foods rich in vitamin K: NO - poor appetite  Changes in ETOH:  No  Changes in smoking status: No  Changes in medication: Yes - cipro  Cost restriction: No  S/s of bleeding:  No  Signs/symptoms  thrombosis since the last appt: No    A/P   INR  SUPRA-therapeutic today, will require dose adjust ment today to prevent bleeding complications  and  closer follow up.   NO warfarin (Coumadin). Educated pt that INR is very high. He will go to lab for confirmation.        check referral      Pt educated to contact our clinic with any changes in medications or s/s of bleeding or thrombosis. Pt is aware to seek immediate medical attention for falls, head injury or deep cuts    Follow up appointment in 2 days(s) to reduce risk of adverse events from warfarin  Lorena Marie, Pharm D

## 2018-05-23 ENCOUNTER — TELEPHONE (OUTPATIENT)
Dept: MEDICAL GROUP | Age: 68
End: 2018-05-23

## 2018-05-23 DIAGNOSIS — N30.01 ACUTE CYSTITIS WITH HEMATURIA: ICD-10-CM

## 2018-05-23 RX ORDER — SULFAMETHOXAZOLE AND TRIMETHOPRIM 800; 160 MG/1; MG/1
1 TABLET ORAL 2 TIMES DAILY
Qty: 30 TAB | Refills: 1 | Status: SHIPPED | OUTPATIENT
Start: 2018-05-23 | End: 2018-08-02

## 2018-05-23 RX ORDER — SULFAMETHOXAZOLE AND TRIMETHOPRIM 800; 160 MG/1; MG/1
1 TABLET ORAL 2 TIMES DAILY
Qty: 30 TAB | Refills: 1 | Status: SHIPPED | OUTPATIENT
Start: 2018-05-23 | End: 2018-05-23 | Stop reason: SDUPTHER

## 2018-05-24 ENCOUNTER — TELEPHONE (OUTPATIENT)
Dept: MEDICAL GROUP | Facility: MEDICAL CENTER | Age: 68
End: 2018-05-24

## 2018-05-24 ENCOUNTER — TELEPHONE (OUTPATIENT)
Dept: MEDICAL GROUP | Age: 68
End: 2018-05-24

## 2018-05-24 NOTE — TELEPHONE ENCOUNTER
OP Anticoagulation Telephone Note    Date: 5/24/2018       Plan:  Pt no showed for appt. Called to remind patient please reschedule appt ASAP as INR was greater than 8.0     Lorena Marie, Pharm D

## 2018-05-24 NOTE — TELEPHONE ENCOUNTER
1. Caller Name: Tavares Bonilla                                           Call Back Number: [phone] (home) [phone] (work)        Patient approves a detailed voicemail message: N\A    told patient Rx has changed to sulfa please  at pharmacy

## 2018-05-24 NOTE — TELEPHONE ENCOUNTER
----- Message from Keron Garcia M.D. sent at 5/23/2018  6:57 PM PDT -----  Urine culture is growing staph epidermidis.  This is not sensitive to the Cipro we prescribed.  Please call patient and tell him that he will have to stop the Cipro and switch to Septra DS which I have sent to the pharmacy just now.  He needs to take it for 10 days.  He needs to stay off his warfarin while he is taking this unless his warfarin clinic instructs him otherwise.

## 2018-05-24 NOTE — TELEPHONE ENCOUNTER
Phone Number Called: [phone] (home) [phone] (work)      Message: left a voice mail to pt    Left Message for patient to call back: yes

## 2018-05-30 ENCOUNTER — TELEPHONE (OUTPATIENT)
Dept: MEDICAL GROUP | Age: 68
End: 2018-05-30

## 2018-05-30 NOTE — TELEPHONE ENCOUNTER
VOICEMAIL  1. Caller Name: Tavares Bonilla                        Call Back Number: [phone] (home) [phone] (work)      2. Message: can patient return to work while not taking coumadin. Work note returns patient to work 05/31/18    3. Patient approves office to leave a detailed voicemail/MyChart message: N\A

## 2018-05-30 NOTE — TELEPHONE ENCOUNTER
x1. Caller Name: Tavares Bonilla                                           Call Back Number: [phone] (home) [phone] (work)        Patient approves a detailed voicemail message: N\A    patient can return to work

## 2018-05-31 ENCOUNTER — TELEPHONE (OUTPATIENT)
Dept: VASCULAR LAB | Facility: MEDICAL CENTER | Age: 68
End: 2018-05-31

## 2018-05-31 ENCOUNTER — TELEPHONE (OUTPATIENT)
Dept: MEDICAL GROUP | Age: 68
End: 2018-05-31

## 2018-05-31 NOTE — TELEPHONE ENCOUNTER
OP Anticoagulation Telephone Note    Date: 5/31/2018       Plan:  Pt no showed for appt. Called to remind patient please reschedule appt. Left VM     Lorena Marie, Pharm D

## 2018-05-31 NOTE — TELEPHONE ENCOUNTER
VOICEMAIL  1. Caller Name: Tavares Bonilla                        Call Back Number: [phone] (home) [phone] (work)      2. Message: patient would like to be taken out of work he went back and is extremely tired and cannot do his job correctly     3. Patient approves office to leave a detailed voicemail/MyChart message: yes

## 2018-05-31 NOTE — LETTER
June 5, 2018         Patient: Tavares Bonilla   YOB: 1950   Date of Visit: 5/31/2018           To Whom it May Concern:    Tavares Bonilla was seen in my clinic on 5/31/2018. He may  return to work on 06/30/18..    If you have any questions or concerns, please don't hesitate to call.        Sincerely,           Keron Garcia M.D.  Electronically Signed

## 2018-06-06 ENCOUNTER — TELEPHONE (OUTPATIENT)
Dept: VASCULAR LAB | Facility: MEDICAL CENTER | Age: 68
End: 2018-06-06

## 2018-06-06 NOTE — TELEPHONE ENCOUNTER
[facility]    Left a voicemail to remind pt to obtain next INR.    Ganesh Mao, PharmD

## 2018-06-13 ENCOUNTER — TELEPHONE (OUTPATIENT)
Dept: VASCULAR LAB | Facility: MEDICAL CENTER | Age: 68
End: 2018-06-13

## 2018-06-13 NOTE — TELEPHONE ENCOUNTER
[facility]    Left a voicemail to remind pt to obtain next INR.  Left  with emergency contact.  Sent letter of compliance.    Ganesh Mao, PharmD

## 2018-06-18 ENCOUNTER — PATIENT OUTREACH (OUTPATIENT)
Dept: HEALTH INFORMATION MANAGEMENT | Facility: OTHER | Age: 68
End: 2018-06-18

## 2018-06-18 NOTE — PROGRESS NOTES
Outcome: Left Message    Please transfer to Patient Outreach Team at [phone] when patient returns call.    [facility] Verified: yes    Attempt # 1

## 2018-06-26 NOTE — PROGRESS NOTES
Outcome: Left Message    Please transfer to Patient Outreach Team at [phone] when patient returns call.      Attempt # 2

## 2018-06-27 NOTE — PROGRESS NOTES
Outcome: Left Message    Please transfer to Patient Outreach Team at [phone] when patient returns call.      Attempt # 3

## 2018-06-28 ENCOUNTER — TELEPHONE (OUTPATIENT)
Dept: VASCULAR LAB | Facility: MEDICAL CENTER | Age: 68
End: 2018-06-28

## 2018-06-28 NOTE — TELEPHONE ENCOUNTER
[facility]    Left another  with pt and emergency contact to urge obtaining next INR ASAP.    Ganesh Mao, PharmD

## 2018-06-29 NOTE — PROGRESS NOTES
Outcome: Left Message    Please transfer to Patient Outreach Team at [phone] when patient returns call.      Attempt # 4

## 2018-07-02 NOTE — PROGRESS NOTES
Outcome: Left Message    Please transfer to Patient Outreach Team at [phone] when patient returns call.        Attempt # 5

## 2018-07-03 ENCOUNTER — TELEPHONE (OUTPATIENT)
Dept: HEALTH INFORMATION MANAGEMENT | Facility: OTHER | Age: 68
End: 2018-07-03

## 2018-07-03 DIAGNOSIS — E78.2 MIXED HYPERLIPIDEMIA: ICD-10-CM

## 2018-07-03 NOTE — PROGRESS NOTES
1. Attempt #:6    2. [facility] Verified: yes    3. Verify PCP: yes    4. Care Team Updated:       •   DME Company (gait device, O2, CPAP, etc.): NO       •   Other Specialists (eye doctor, derm, GYN, cardiology, endo, etc): NO    5.  Reviewed/Updated the following with patient:       •   Communication Preference Obtained? YES       •   Preferred Pharmacy? NO       •   Preferred Lab? NO       •   Family History (document living status of immediate family members and if + hx of cancer, diabetes, hypertension, hyperlipidemia, heart attack, stroke) NO    6. [employer]: declined    7. [employer]: no    8. Annual Wellness Visit Scheduling  Scheduling Status:Scheduled      9. Care Gap Scheduling (Attempt to Schedule EACH Overdue Care Gap!)     Health Maintenance Due   Topic Date Due   • Annual Wellness Visit  1950        Scheduled patient for Annual Wellness Visit    10. Patient was NOT advised: “This is a free wellness visit. The provider will screen for medical conditions to help you stay healthy. If you have other concerns to address you may be asked to discuss these at a separate visit or there may be an additional fee.”     11. Patient was informed to arrive 15 min prior to their scheduled appointment and bring in their medication bottles.

## 2018-07-03 NOTE — TELEPHONE ENCOUNTER
Good Afternoon,  Tavares is requesting lab orders be placed in his chart I did tell him I will call him back after I hear from you thank you and have a nice day .

## 2018-08-01 ENCOUNTER — APPOINTMENT (OUTPATIENT)
Dept: MEDICAL GROUP | Age: 68
End: 2018-08-01
Payer: MEDICARE

## 2018-08-02 ENCOUNTER — ANTICOAGULATION VISIT (OUTPATIENT)
Dept: MEDICAL GROUP | Facility: MEDICAL CENTER | Age: 68
End: 2018-08-02
Payer: MEDICARE

## 2018-08-02 DIAGNOSIS — Z86.711 HISTORY OF PULMONARY EMBOLISM: ICD-10-CM

## 2018-08-02 DIAGNOSIS — Z79.01 ANTICOAGULATED ON WARFARIN: ICD-10-CM

## 2018-08-02 DIAGNOSIS — I87.009 POST-PHLEBITIC SYNDROME: ICD-10-CM

## 2018-08-02 DIAGNOSIS — D68.51 FACTOR 5 LEIDEN MUTATION, HETEROZYGOUS (HCC): ICD-10-CM

## 2018-08-02 LAB — INR PPP: 2.3 (ref 2–3.5)

## 2018-08-02 PROCEDURE — 85610 PROTHROMBIN TIME: CPT | Performed by: PHYSICIAN ASSISTANT

## 2018-08-02 PROCEDURE — 99999 PR NO CHARGE: CPT | Performed by: PHYSICIAN ASSISTANT

## 2018-08-02 RX ORDER — WARFARIN SODIUM 5 MG/1
TABLET ORAL
Qty: 135 TAB | Refills: 1 | Status: SHIPPED | OUTPATIENT
Start: 2018-08-02 | End: 2019-05-10 | Stop reason: SDUPTHER

## 2018-08-02 NOTE — PROGRESS NOTES
Anticoagulation Summary  As of 8/2/2018    INR goal:   2.0-3.0   TTR:   68.5 % (2.2 y)   Today's INR:   2.3   Warfarin maintenance plan:   7.5 mg (5 mg x 1.5) on Thu; 5 mg (5 mg x 1) all other days   Weekly warfarin total:   37.5 mg   Plan last modified:   Adam LarsonD (8/2/2018)   Next INR check:   8/30/2018   Target end date:   Indefinite    Indications    Post-phlebitic syndrome- RIGHT LEG [I87.009]  History of pulmonary embolism [Z86.711]  Anticoagulated on warfarin [Z79.01]  Factor 5 Leiden mutation  heterozygous (HCC) [D68.51]             Anticoagulation Episode Summary     INR check location:       Preferred lab:       Send INR reminders to:       Comments:   TTR is 37.5%, consider alternative therapy      Anticoagulation Care Providers     Provider Role Specialty Phone number    Keron Garcia M.D. Referring Internal Medicine [phone]    [facility] Responsible  [phone]    Ganesh Mao, PharmD Responsible          Anticoagulation Patient Findings    History of Present Illness: follow up appointment for chronic anticoagulation with the high risk medication, warfarin for PE.    Last INR was out of range, dosage adjusted: pt is now at goal. Pt is to continue with current warfarin dosing regimen.  Follow up in 4 weeks, to reduce risk of adverse events related to this high risk medication,  Warfarin.    Follow up in 4 weeks, to reduce risk of adverse events related to this high risk medication,  Warfarin.    Danelle Burrell, AdamD

## 2018-08-06 ENCOUNTER — TELEPHONE (OUTPATIENT)
Dept: MEDICAL GROUP | Age: 68
End: 2018-08-06

## 2018-08-06 ENCOUNTER — HOSPITAL ENCOUNTER (OUTPATIENT)
Dept: LAB | Facility: MEDICAL CENTER | Age: 68
End: 2018-08-06
Attending: PHYSICIAN ASSISTANT
Payer: MEDICARE

## 2018-08-06 DIAGNOSIS — R35.1 NOCTURIA: ICD-10-CM

## 2018-08-06 LAB
ANION GAP SERPL CALC-SCNC: 7 MMOL/L (ref 0–11.9)
BUN SERPL-MCNC: 26 MG/DL (ref 8–22)
CALCIUM SERPL-MCNC: 9.5 MG/DL (ref 8.5–10.5)
CHLORIDE SERPL-SCNC: 105 MMOL/L (ref 96–112)
CO2 SERPL-SCNC: 26 MMOL/L (ref 20–33)
CREAT SERPL-MCNC: 1.52 MG/DL (ref 0.5–1.4)
GLUCOSE SERPL-MCNC: 91 MG/DL (ref 65–99)
POTASSIUM SERPL-SCNC: 4.8 MMOL/L (ref 3.6–5.5)
SODIUM SERPL-SCNC: 138 MMOL/L (ref 135–145)

## 2018-08-06 PROCEDURE — 36415 COLL VENOUS BLD VENIPUNCTURE: CPT

## 2018-08-06 PROCEDURE — 80048 BASIC METABOLIC PNL TOTAL CA: CPT

## 2018-08-06 RX ORDER — TAMSULOSIN HYDROCHLORIDE 0.4 MG/1
0.8 CAPSULE ORAL DAILY
Qty: 180 CAP | Refills: 4 | Status: ON HOLD | OUTPATIENT
Start: 2018-08-06 | End: 2023-01-24

## 2018-08-06 RX ORDER — TAMSULOSIN HYDROCHLORIDE 0.4 MG/1
CAPSULE ORAL
COMMUNITY
Start: 2018-06-08 | End: 2018-08-06 | Stop reason: SDUPTHER

## 2018-08-24 ENCOUNTER — OFFICE VISIT (OUTPATIENT)
Dept: MEDICAL GROUP | Age: 68
End: 2018-08-24
Payer: MEDICARE

## 2018-08-24 VITALS
OXYGEN SATURATION: 95 % | SYSTOLIC BLOOD PRESSURE: 118 MMHG | WEIGHT: 239 LBS | HEART RATE: 92 BPM | DIASTOLIC BLOOD PRESSURE: 76 MMHG | HEIGHT: 72 IN | BODY MASS INDEX: 32.37 KG/M2 | TEMPERATURE: 98.1 F

## 2018-08-24 DIAGNOSIS — I87.099 CHRONIC VENOUS HYPERTENSION DUE TO DVT: ICD-10-CM

## 2018-08-24 DIAGNOSIS — G89.4 CHRONIC PAIN SYNDROME: ICD-10-CM

## 2018-08-24 DIAGNOSIS — N18.30 STAGE 3 CHRONIC KIDNEY DISEASE (HCC): ICD-10-CM

## 2018-08-24 DIAGNOSIS — E55.9 HYPOVITAMINOSIS D: ICD-10-CM

## 2018-08-24 DIAGNOSIS — F11.20 UNCOMPLICATED OPIOID DEPENDENCE (HCC): ICD-10-CM

## 2018-08-24 DIAGNOSIS — E66.9 OBESITY (BMI 30.0-34.9): ICD-10-CM

## 2018-08-24 DIAGNOSIS — D72.823 LEUKEMOID REACTION: ICD-10-CM

## 2018-08-24 DIAGNOSIS — D68.51 FACTOR 5 LEIDEN MUTATION, HETEROZYGOUS (HCC): ICD-10-CM

## 2018-08-24 DIAGNOSIS — I87.009 POST-PHLEBITIC SYNDROME: ICD-10-CM

## 2018-08-24 DIAGNOSIS — Z79.01 ANTICOAGULATED ON WARFARIN: ICD-10-CM

## 2018-08-24 DIAGNOSIS — N26.1 ATROPHY OF RIGHT KIDNEY: ICD-10-CM

## 2018-08-24 DIAGNOSIS — K21.9 GASTROESOPHAGEAL REFLUX DISEASE WITHOUT ESOPHAGITIS: ICD-10-CM

## 2018-08-24 DIAGNOSIS — N40.1 BENIGN PROSTATIC HYPERPLASIA WITH INCOMPLETE BLADDER EMPTYING: ICD-10-CM

## 2018-08-24 DIAGNOSIS — E78.2 MIXED HYPERLIPIDEMIA: ICD-10-CM

## 2018-08-24 DIAGNOSIS — R39.14 BENIGN PROSTATIC HYPERPLASIA WITH INCOMPLETE BLADDER EMPTYING: ICD-10-CM

## 2018-08-24 PROBLEM — N41.0 ACUTE PROSTATITIS: Status: RESOLVED | Noted: 2018-05-22 | Resolved: 2018-08-24

## 2018-08-24 PROBLEM — R31.9 URINARY TRACT INFECTION WITH HEMATURIA: Status: RESOLVED | Noted: 2018-05-22 | Resolved: 2018-08-24

## 2018-08-24 PROBLEM — N30.01 ACUTE CYSTITIS WITH HEMATURIA: Status: RESOLVED | Noted: 2018-05-23 | Resolved: 2018-08-24

## 2018-08-24 PROBLEM — M54.50 ACUTE BILATERAL LOW BACK PAIN WITHOUT SCIATICA: Status: RESOLVED | Noted: 2018-05-18 | Resolved: 2018-08-24

## 2018-08-24 PROBLEM — R35.1 NOCTURIA: Status: RESOLVED | Noted: 2018-05-18 | Resolved: 2018-08-24

## 2018-08-24 PROBLEM — D72.829 LEUKOCYTOSIS: Status: RESOLVED | Noted: 2018-05-14 | Resolved: 2018-08-24

## 2018-08-24 PROBLEM — R07.89 OTHER CHEST PAIN: Status: RESOLVED | Noted: 2018-05-14 | Resolved: 2018-08-24

## 2018-08-24 PROBLEM — N39.0 URINARY TRACT INFECTION WITH HEMATURIA: Status: RESOLVED | Noted: 2018-05-22 | Resolved: 2018-08-24

## 2018-08-24 PROCEDURE — 99214 OFFICE O/P EST MOD 30 MIN: CPT | Performed by: INTERNAL MEDICINE

## 2018-08-24 RX ORDER — OXYCODONE HYDROCHLORIDE AND ACETAMINOPHEN 5; 325 MG/1; MG/1
1 TABLET ORAL EVERY 6 HOURS PRN
Qty: 120 TAB | Refills: 0 | Status: SHIPPED | OUTPATIENT
Start: 2018-08-24 | End: 2018-10-18 | Stop reason: SDUPTHER

## 2018-08-24 RX ORDER — FINASTERIDE 5 MG/1
5 TABLET, FILM COATED ORAL EVERY EVENING
COMMUNITY
Start: 2018-07-02 | End: 2023-01-11

## 2018-08-24 ASSESSMENT — ENCOUNTER SYMPTOMS
MUSCULOSKELETAL NEGATIVE: 1
CONSTITUTIONAL NEGATIVE: 1
RESPIRATORY NEGATIVE: 1
PSYCHIATRIC NEGATIVE: 1
EYES NEGATIVE: 1
NEUROLOGICAL NEGATIVE: 1
CARDIOVASCULAR NEGATIVE: 1
GASTROINTESTINAL NEGATIVE: 1

## 2018-08-24 NOTE — LETTER
August 24, 2018       Patient: Tavares Bonilla   YOB: 1950   Date of Visit: 8/24/2018         To Whom It May Concern:    It is my medical opinion that Tavares Bonlila return to light duty with the following restrictions no more then 4 days per week.    If you have any questions or concerns, please don't hesitate to call [phone]          Sincerely,          Keron Garcia M.D.  Electronically Signed

## 2018-08-24 NOTE — PROGRESS NOTES
Subjective:      Tavares Bonilla is a 68 y.o. male who presents with Annual Exam (lab review)  and  The patient is here for followup of chronic medical problems listed below. The patient is compliant with medications and having no side effects from them. Denies chest pain, abdominal pain, dyspnea, myalgias, or cough.   Patient Active Problem List    Diagnosis Date Noted   • Chronic venous hypertension due to DVT 11/17/2014     Priority: Medium   • Obesity (BMI 30.0-34.9) 04/07/2017     Priority: Low   • Anticoagulated on warfarin 11/17/2014     Priority: Low   • History of pulmonary embolism 01/24/2012     Priority: Low   • Benign prostatic hyperplasia with incomplete bladder emptying 08/24/2018   • Leukemoid reaction 08/24/2018   • Atrophy of right kidney- urology nv 08/24/2018   • Stage 3 chronic kidney disease 08/24/2018   • Uncomplicated opioid dependence (HCC) 05/18/2018   • Post-phlebitic syndrome- RIGHT LEG 05/22/2015   • Mixed hyperlipidemia- mild no meds 11/17/2014   • Chronic pain syndrome- right leg from post phlebitic syn; on percocet once a day 03/12/2014   • Hypovitaminosis D 03/26/2012   • Factor 5 Leiden mutation, heterozygous ([facility]) 01/24/2012   • Gastroesophageal reflux disease without esophagitis 01/24/2012     Allergies   Allergen Reactions   • Lovastatin      Leg cramps   • Other Food Hives     Peanuts gives me Vertigo and hives   • Shellfish Allergy Hives     shrimp   • Statins [Hmg-Coa-R Inhibitors]      Muscle cramps     Outpatient Medications Prior to Visit   Medication Sig Dispense Refill   • tamsulosin (FLOMAX) 0.4 MG capsule Take 2 Caps by mouth every day. 180 Cap 4   • warfarin (COUMADIN) 5 MG Tab Take one to one and one-half (1-1.5) tablets daily as directed by [facility] 135 Tab 1   • Cyanocobalamin (VITAMIN B-12) 1000 MCG Tab Take 1,000 mcg by mouth every day.     • Cholecalciferol (VITAMIN D) 2000 UNITS Cap Take 1 Cap by mouth every day. 100 Cap 3   •  "omeprazole (PRILOSEC) 20 MG delayed-release capsule Take 1 Cap by mouth every day. 30 Cap 2   • oxyCODONE-acetaminophen (PERCOCET) 5-325 MG Tab Take 1-2 Tabs by mouth every 6 hours as needed for Severe Pain.       No facility-administered medications prior to visit.                Annual Exam         Review of Systems   Constitutional: Negative.    HENT: Negative.    Eyes: Negative.    Respiratory: Negative.    Cardiovascular: Negative.    Gastrointestinal: Negative.    Genitourinary: Negative.    Musculoskeletal: Negative.    Skin: Negative.    Neurological: Negative.    Endo/Heme/Allergies: Negative.    Psychiatric/Behavioral: Negative.           Objective:     /76   Pulse 92   Temp 36.7 °C (98.1 °F)   Ht 1.829 m (6' 0.01\")   Wt 108.4 kg (239 lb)   SpO2 95%   BMI 32.41 kg/m²      Physical Exam   Constitutional: He is oriented to person, place, and time. He appears well-developed and well-nourished. No distress.   HENT:   Head: Normocephalic and atraumatic.   Right Ear: External ear normal.   Left Ear: External ear normal.   Nose: Nose normal.   Mouth/Throat: Oropharynx is clear and moist. No oropharyngeal exudate.   Eyes: Pupils are equal, round, and reactive to light. Conjunctivae and EOM are normal. Right eye exhibits no discharge. Left eye exhibits no discharge. No scleral icterus.   Neck: Normal range of motion. Neck supple. No JVD present. No tracheal deviation present. No thyromegaly present.   Cardiovascular: Normal rate, regular rhythm, normal heart sounds and intact distal pulses.  Exam reveals no gallop and no friction rub.    No murmur heard.  Pulmonary/Chest: Effort normal and breath sounds normal. No stridor. No respiratory distress. He has no wheezes. He has no rales. He exhibits no tenderness.   Abdominal: Soft. Bowel sounds are normal. He exhibits no distension and no mass. There is no tenderness. There is no rebound and no guarding.   Musculoskeletal: Normal range of motion. He " exhibits no edema or tenderness.   Lymphadenopathy:     He has no cervical adenopathy.   Neurological: He is alert and oriented to person, place, and time. He has normal reflexes. He displays normal reflexes. He exhibits normal muscle tone. Coordination normal.   Skin: Skin is warm and dry. No rash noted. He is not diaphoretic. No erythema. No pallor.   Psychiatric: He has a normal mood and affect. His behavior is normal. Judgment and thought content normal.     Hospital Outpatient Visit on 08/06/2018   Component Date Value   • GFR If  08/06/2018 55*   • GFR If Non  Ameri* 08/06/2018 46*   • Sodium 08/06/2018 138    • Potassium 08/06/2018 4.8    • Chloride 08/06/2018 105    • Co2 08/06/2018 26    • Glucose 08/06/2018 91    • Bun 08/06/2018 26*   • Creatinine 08/06/2018 1.52*   • Calcium 08/06/2018 9.5    • Anion Gap 08/06/2018 7.0    Anticoagulation Visit on 08/02/2018   Component Date Value   • INR 08/02/2018 2.3       Lab Results   Component Value Date/Time    HBA1C 5.8 05/01/2013 08:53 AM     Lab Results   Component Value Date/Time    SODIUM 138 08/06/2018 07:09 AM    POTASSIUM 4.8 08/06/2018 07:09 AM    CHLORIDE 105 08/06/2018 07:09 AM    CO2 26 08/06/2018 07:09 AM    GLUCOSE 91 08/06/2018 07:09 AM    BUN 26 (H) 08/06/2018 07:09 AM    CREATININE 1.52 (H) 08/06/2018 07:09 AM    CREATININE 1.3 02/11/2009 04:05 AM    ALKPHOSPHAT 97 05/18/2018 10:35 AM    ASTSGOT 18 05/18/2018 10:35 AM    ALTSGPT 42 05/18/2018 10:35 AM    TBILIRUBIN 0.5 05/18/2018 10:35 AM     Lab Results   Component Value Date/Time    INR 2.3 08/02/2018 04:22 PM    INR >8.0 05/22/2018 09:09 AM    INR >8.0 05/22/2018 09:08 AM     Lab Results   Component Value Date/Time    CHOLSTRLTOT 223 (H) 04/14/2018 11:23 AM     (H) 04/14/2018 11:23 AM    HDL 44 04/14/2018 11:23 AM    TRIGLYCERIDE 141 04/14/2018 11:23 AM       Lab Results   Component Value Date/Time    TESTOSTERONE 222 01/22/2014 09:04 AM     No results found for:  TSH  Lab Results   Component Value Date/Time    FREET4 0.84 05/01/2013 08:53 AM    FREET4 0.90 01/24/2012 10:38 AM     No results found for: URICACID  No components found for: VITB12  Lab Results   Component Value Date/Time    25HYDROXY 40 05/01/2015 09:53 AM    25HYDROXY 28 (L) 01/22/2014 09:04 AM                Assessment/Plan:     1. Mixed hyperlipidemia- mild no meds    diet/exercise/lose 15 lbs.; patient counseled  - LIPID PROFILE; Future  - CBC WITH DIFFERENTIAL; Future  - COMP METABOLIC PANEL; Future    2. Benign prostatic hyperplasia with incomplete bladder emptying  No further UTIs.  Patient continues to self catheterize twice a day.  May need turp; continue Proscar and tamsulosin per urology.  - PROSTATE SPECIFIC AG DIAGNOSTIC; Future    3. Chronic pain syndrome- right leg from post phlebitic syn; on percocet once a day   Under good control. Continue same regimen.    - oxyCODONE-acetaminophen (PERCOCET) 5-325 MG Tab; Take 1 Tab by mouth every 6 hours as needed for up to 90 days.  Dispense: 120 Tab; Refill: 0  - CONSENT FOR OPIATE PRESCRIPTION  - [facility] SCREEN; Future    4. Hypovitaminosis D  Under good control. Continue same regimen.        5. Gastroesophageal reflux disease without esophagitis   Under good control. Continue same regimen.    6. Factor 5 Leiden mutation, heterozygous (HCC)   Under good control. Continue same regimen.    7. Uncomplicated opioid dependence (HCC)    Under good control. Continue same regimen.  - [facility] PAIN MANAGEMENT SCREEN; Future    8. Obesity (BMI 30.0-34.9)     diet/exercise/lose 15 lbs.; patient counseled      9. Chronic venous hypertension due to DVT   Under good control. Continue same regimen.      10. Anticoagulated on warfarin   Under good control. Continue same regimen.      11. Leukemoid reaction       12. Post-phlebitic syndrome- RIGHT LEG     - oxyCODONE-acetaminophen (PERCOCET) 5-325 MG Tab; Take 1 Tab by mouth every 6 hours as needed for  up to 90 days.  Dispense: 120 Tab; Refill: 0  - CONSENT FOR OPIATE PRESCRIPTION    13. Atrophy of right kidney- urology nv-may need cystoscopy.  Per [place] [facility].       14. Stage 3 chronic kidney disease       Under good control. Continue same regimen.      30 minute face-to-face encounter took place today.  More than half of this time was spent in the coordination of care of the above problems, as well as counseling.

## 2018-09-07 ENCOUNTER — PATIENT OUTREACH (OUTPATIENT)
Dept: HEALTH INFORMATION MANAGEMENT | Facility: OTHER | Age: 68
End: 2018-09-07

## 2018-09-21 ENCOUNTER — TELEPHONE (OUTPATIENT)
Dept: VASCULAR LAB | Facility: MEDICAL CENTER | Age: 68
End: 2018-09-21

## 2018-10-02 ENCOUNTER — PATIENT OUTREACH (OUTPATIENT)
Dept: HEALTH INFORMATION MANAGEMENT | Facility: OTHER | Age: 68
End: 2018-10-02

## 2018-10-02 NOTE — PROGRESS NOTES
Called and left message for patient regarding the [facility]. Left number for return call.

## 2018-10-05 ENCOUNTER — APPOINTMENT (OUTPATIENT)
Dept: MEDICAL GROUP | Age: 68
End: 2018-10-05
Payer: MEDICARE

## 2018-10-11 ENCOUNTER — HOSPITAL ENCOUNTER (OUTPATIENT)
Dept: LAB | Facility: MEDICAL CENTER | Age: 68
End: 2018-10-11
Attending: INTERNAL MEDICINE
Payer: MEDICARE

## 2018-10-11 DIAGNOSIS — E78.2 MIXED HYPERLIPIDEMIA: ICD-10-CM

## 2018-10-11 DIAGNOSIS — N40.1 BENIGN PROSTATIC HYPERPLASIA WITH INCOMPLETE BLADDER EMPTYING: ICD-10-CM

## 2018-10-11 DIAGNOSIS — R39.14 BENIGN PROSTATIC HYPERPLASIA WITH INCOMPLETE BLADDER EMPTYING: ICD-10-CM

## 2018-10-11 LAB
ALBUMIN SERPL BCP-MCNC: 3.9 G/DL (ref 3.2–4.9)
ALBUMIN/GLOB SERPL: 1.2 G/DL
ALP SERPL-CCNC: 80 U/L (ref 30–99)
ALT SERPL-CCNC: 14 U/L (ref 2–50)
ANION GAP SERPL CALC-SCNC: 8 MMOL/L (ref 0–11.9)
AST SERPL-CCNC: 14 U/L (ref 12–45)
BASOPHILS # BLD AUTO: 0.5 % (ref 0–1.8)
BASOPHILS # BLD: 0.05 K/UL (ref 0–0.12)
BILIRUB SERPL-MCNC: 0.4 MG/DL (ref 0.1–1.5)
BUN SERPL-MCNC: 22 MG/DL (ref 8–22)
CALCIUM SERPL-MCNC: 9.2 MG/DL (ref 8.5–10.5)
CHLORIDE SERPL-SCNC: 106 MMOL/L (ref 96–112)
CHOLEST SERPL-MCNC: 205 MG/DL (ref 100–199)
CO2 SERPL-SCNC: 25 MMOL/L (ref 20–33)
CREAT SERPL-MCNC: 1.46 MG/DL (ref 0.5–1.4)
EOSINOPHIL # BLD AUTO: 0.22 K/UL (ref 0–0.51)
EOSINOPHIL NFR BLD: 2.2 % (ref 0–6.9)
ERYTHROCYTE [DISTWIDTH] IN BLOOD BY AUTOMATED COUNT: 43.8 FL (ref 35.9–50)
GLOBULIN SER CALC-MCNC: 3.2 G/DL (ref 1.9–3.5)
GLUCOSE SERPL-MCNC: 113 MG/DL (ref 65–99)
HCT VFR BLD AUTO: 46.7 % (ref 42–52)
HDLC SERPL-MCNC: 41 MG/DL
HGB BLD-MCNC: 15.1 G/DL (ref 14–18)
IMM GRANULOCYTES # BLD AUTO: 0.07 K/UL (ref 0–0.11)
IMM GRANULOCYTES NFR BLD AUTO: 0.7 % (ref 0–0.9)
INR PPP: 2 (ref 0.87–1.13)
LDLC SERPL CALC-MCNC: 104 MG/DL
LYMPHOCYTES # BLD AUTO: 1.65 K/UL (ref 1–4.8)
LYMPHOCYTES NFR BLD: 16.7 % (ref 22–41)
MCH RBC QN AUTO: 30.3 PG (ref 27–33)
MCHC RBC AUTO-ENTMCNC: 32.3 G/DL (ref 33.7–35.3)
MCV RBC AUTO: 93.8 FL (ref 81.4–97.8)
MONOCYTES # BLD AUTO: 0.72 K/UL (ref 0–0.85)
MONOCYTES NFR BLD AUTO: 7.3 % (ref 0–13.4)
NEUTROPHILS # BLD AUTO: 7.18 K/UL (ref 1.82–7.42)
NEUTROPHILS NFR BLD: 72.6 % (ref 44–72)
NRBC # BLD AUTO: 0 K/UL
NRBC BLD-RTO: 0 /100 WBC
PLATELET # BLD AUTO: 279 K/UL (ref 164–446)
PMV BLD AUTO: 10.4 FL (ref 9–12.9)
POTASSIUM SERPL-SCNC: 4 MMOL/L (ref 3.6–5.5)
PROT SERPL-MCNC: 7.1 G/DL (ref 6–8.2)
PROTHROMBIN TIME: 22.8 SEC (ref 12–14.6)
PSA SERPL-MCNC: 1.54 NG/ML (ref 0–4)
RBC # BLD AUTO: 4.98 M/UL (ref 4.7–6.1)
SODIUM SERPL-SCNC: 139 MMOL/L (ref 135–145)
TRIGL SERPL-MCNC: 300 MG/DL (ref 0–149)
TSH SERPL DL<=0.005 MIU/L-ACNC: 1.09 UIU/ML (ref 0.38–5.33)
WBC # BLD AUTO: 9.9 K/UL (ref 4.8–10.8)

## 2018-10-11 PROCEDURE — 84153 ASSAY OF PSA TOTAL: CPT

## 2018-10-11 PROCEDURE — 85025 COMPLETE CBC W/AUTO DIFF WBC: CPT

## 2018-10-11 PROCEDURE — 85610 PROTHROMBIN TIME: CPT | Mod: GA

## 2018-10-11 PROCEDURE — 84443 ASSAY THYROID STIM HORMONE: CPT

## 2018-10-11 PROCEDURE — 36415 COLL VENOUS BLD VENIPUNCTURE: CPT

## 2018-10-11 PROCEDURE — 80061 LIPID PANEL: CPT

## 2018-10-11 PROCEDURE — 80053 COMPREHEN METABOLIC PANEL: CPT

## 2018-10-18 ENCOUNTER — OFFICE VISIT (OUTPATIENT)
Dept: MEDICAL GROUP | Age: 68
End: 2018-10-18
Payer: MEDICARE

## 2018-10-18 VITALS
SYSTOLIC BLOOD PRESSURE: 126 MMHG | WEIGHT: 246 LBS | BODY MASS INDEX: 33.32 KG/M2 | HEIGHT: 72 IN | HEART RATE: 65 BPM | OXYGEN SATURATION: 94 % | DIASTOLIC BLOOD PRESSURE: 82 MMHG | TEMPERATURE: 98.1 F

## 2018-10-18 DIAGNOSIS — F11.20 UNCOMPLICATED OPIOID DEPENDENCE (HCC): ICD-10-CM

## 2018-10-18 DIAGNOSIS — I87.009 POST-PHLEBITIC SYNDROME: ICD-10-CM

## 2018-10-18 DIAGNOSIS — N40.1 BENIGN PROSTATIC HYPERPLASIA WITH INCOMPLETE BLADDER EMPTYING: ICD-10-CM

## 2018-10-18 DIAGNOSIS — N18.30 STAGE 3 CHRONIC KIDNEY DISEASE (HCC): ICD-10-CM

## 2018-10-18 DIAGNOSIS — Z23 IMMUNIZATION DUE: ICD-10-CM

## 2018-10-18 DIAGNOSIS — E78.2 MIXED HYPERLIPIDEMIA: ICD-10-CM

## 2018-10-18 DIAGNOSIS — E66.9 OBESITY (BMI 30.0-34.9): ICD-10-CM

## 2018-10-18 DIAGNOSIS — G89.4 CHRONIC PAIN SYNDROME: ICD-10-CM

## 2018-10-18 DIAGNOSIS — R39.14 BENIGN PROSTATIC HYPERPLASIA WITH INCOMPLETE BLADDER EMPTYING: ICD-10-CM

## 2018-10-18 PROCEDURE — G0008 ADMIN INFLUENZA VIRUS VAC: HCPCS | Performed by: INTERNAL MEDICINE

## 2018-10-18 PROCEDURE — 90662 IIV NO PRSV INCREASED AG IM: CPT | Performed by: INTERNAL MEDICINE

## 2018-10-18 PROCEDURE — 99214 OFFICE O/P EST MOD 30 MIN: CPT | Mod: 25 | Performed by: INTERNAL MEDICINE

## 2018-10-18 RX ORDER — OXYCODONE HYDROCHLORIDE AND ACETAMINOPHEN 5; 325 MG/1; MG/1
1 TABLET ORAL EVERY 6 HOURS PRN
Qty: 120 TAB | Refills: 0 | Status: SHIPPED | OUTPATIENT
Start: 2018-10-18 | End: 2019-04-04 | Stop reason: SDUPTHER

## 2018-10-18 ASSESSMENT — ENCOUNTER SYMPTOMS
PSYCHIATRIC NEGATIVE: 1
RESPIRATORY NEGATIVE: 1
GASTROINTESTINAL NEGATIVE: 1
NEUROLOGICAL NEGATIVE: 1
CONSTITUTIONAL NEGATIVE: 1
CARDIOVASCULAR NEGATIVE: 1
EYES NEGATIVE: 1
MUSCULOSKELETAL NEGATIVE: 1

## 2018-10-18 NOTE — LETTER
October 18, 2018        To Whom It May It Concern      It is my medical opinion that Tavares Bonilla return to light duty with the following restrictions no more then 4 days per week. Days off  are to be consecutive.    If you have any questions or concerns, please don't hesitate to call [phone]                          Dr Keron Garcia M.D.

## 2018-10-19 NOTE — PROGRESS NOTES
Subjective:      Tavares Bonilla is a 68 y.o. male who presents with Follow-Up (lab review)  The patient is here for followup of chronic medical problems listed below. The patient is compliant with medications and having no side effects from them. Denies chest pain, abdominal pain, dyspnea, myalgias, or cough.   Patient Active Problem List    Diagnosis Date Noted   • Chronic venous hypertension due to DVT 11/17/2014     Priority: Medium   • Obesity (BMI 30.0-34.9) 04/07/2017     Priority: Low   • Anticoagulated on warfarin 11/17/2014     Priority: Low   • History of pulmonary embolism 01/24/2012     Priority: Low   • Benign prostatic hyperplasia with incomplete bladder emptying- self cath since 5/2018 08/24/2018   • Leukemoid reaction 08/24/2018   • Atrophy of right kidney- urology nv 08/24/2018   • Stage 3 chronic kidney disease (HCC) 08/24/2018   • Uncomplicated opioid dependence (HCC) 05/18/2018   • Post-phlebitic syndrome- RIGHT LEG 05/22/2015   • Mixed hyperlipidemia- mild no meds 11/17/2014   • Chronic pain syndrome- right leg from post phlebitic syn; on percocet once a day 03/12/2014   • Hypovitaminosis D 03/26/2012   • Factor 5 Leiden mutation, heterozygous (formerly [facility]) 01/24/2012   • Gastroesophageal reflux disease without esophagitis 01/24/2012     Allergies   Allergen Reactions   • Lovastatin      Leg cramps   • Other Food Hives     Peanuts gives me Vertigo and hives   • Shellfish Allergy Hives     shrimp   • Statins [Hmg-Coa-R Inhibitors]      Muscle cramps     Outpatient Medications Prior to Visit   Medication Sig Dispense Refill   • finasteride (PROSCAR) 5 MG Tab      • tamsulosin (FLOMAX) 0.4 MG capsule Take 2 Caps by mouth every day. 180 Cap 4   • warfarin (COUMADIN) 5 MG Tab Take one to one and one-half (1-1.5) tablets daily as directed by [facility] 135 Tab 1   • Cyanocobalamin (VITAMIN B-12) 1000 MCG Tab Take 1,000 mcg by mouth every day.     • Cholecalciferol (VITAMIN D) 2000  "UNITS Cap Take 1 Cap by mouth every day. 100 Cap 3   • oxyCODONE-acetaminophen (PERCOCET) 5-325 MG Tab Take 1 Tab by mouth every 6 hours as needed for up to 90 days. 120 Tab 0     No facility-administered medications prior to visit.                HPI    Review of Systems   Constitutional: Negative.    HENT: Negative.    Eyes: Negative.    Respiratory: Negative.    Cardiovascular: Negative.    Gastrointestinal: Negative.    Genitourinary: Negative.    Musculoskeletal: Negative.    Skin: Negative.    Neurological: Negative.    Endo/Heme/Allergies: Negative.    Psychiatric/Behavioral: Negative.           Objective:     /82 (BP Location: Right arm, Patient Position: Sitting)   Pulse 65   Temp 36.7 °C (98.1 °F) (Temporal)   Ht 1.829 m (6' 0.01\")   Wt 111.6 kg (246 lb)   SpO2 94%   BMI 33.36 kg/m²      Physical Exam   Constitutional: He is oriented to person, place, and time. He appears well-developed and well-nourished. No distress.   HENT:   Head: Normocephalic and atraumatic.   Right Ear: External ear normal.   Left Ear: External ear normal.   Nose: Nose normal.   Mouth/Throat: Oropharynx is clear and moist. No oropharyngeal exudate.   Eyes: Pupils are equal, round, and reactive to light. Conjunctivae and EOM are normal. Right eye exhibits no discharge. Left eye exhibits no discharge. No scleral icterus.   Neck: Normal range of motion. Neck supple. No JVD present. No tracheal deviation present. No thyromegaly present.   Cardiovascular: Normal rate, regular rhythm, normal heart sounds and intact distal pulses.  Exam reveals no gallop and no friction rub.    No murmur heard.  Pulmonary/Chest: Effort normal and breath sounds normal. No stridor. No respiratory distress. He has no wheezes. He has no rales. He exhibits no tenderness.   Abdominal: Soft. Bowel sounds are normal. He exhibits no distension and no mass. There is no tenderness. There is no rebound and no guarding.   Musculoskeletal: Normal range of " motion. He exhibits no edema or tenderness.   Lymphadenopathy:     He has no cervical adenopathy.   Neurological: He is alert and oriented to person, place, and time. He has normal reflexes. He displays normal reflexes. He exhibits normal muscle tone. Coordination normal.   Skin: Skin is warm and dry. No rash noted. He is not diaphoretic. No erythema. No pallor.   Psychiatric: He has a normal mood and affect. His behavior is normal. Judgment and thought content normal.     Hospital Outpatient Visit on 10/11/2018   Component Date Value   • TSH 10/11/2018 1.090    • Sodium 10/11/2018 139    • Potassium 10/11/2018 4.0    • Chloride 10/11/2018 106    • Co2 10/11/2018 25    • Anion Gap 10/11/2018 8.0    • Glucose 10/11/2018 113*   • Bun 10/11/2018 22    • Creatinine 10/11/2018 1.46*   • Calcium 10/11/2018 9.2    • AST(SGOT) 10/11/2018 14    • ALT(SGPT) 10/11/2018 14    • Alkaline Phosphatase 10/11/2018 80    • Total Bilirubin 10/11/2018 0.4    • Albumin 10/11/2018 3.9    • Total Protein 10/11/2018 7.1    • Globulin 10/11/2018 3.2    • A-G Ratio 10/11/2018 1.2    • Cholesterol,Tot 10/11/2018 205*   • Triglycerides 10/11/2018 300*   • HDL 10/11/2018 41    • LDL 10/11/2018 104*   • WBC 10/11/2018 9.9    • RBC 10/11/2018 4.98    • Hemoglobin 10/11/2018 15.1    • Hematocrit 10/11/2018 46.7    • MCV 10/11/2018 93.8    • MCH 10/11/2018 30.3    • MCHC 10/11/2018 32.3*   • RDW 10/11/2018 43.8    • Platelet Count 10/11/2018 279    • MPV 10/11/2018 10.4    • Neutrophils-Polys 10/11/2018 72.60*   • Lymphocytes 10/11/2018 16.70*   • Monocytes 10/11/2018 7.30    • Eosinophils 10/11/2018 2.20    • Basophils 10/11/2018 0.50    • Immature Granulocytes 10/11/2018 0.70    • Nucleated RBC 10/11/2018 0.00    • Neutrophils (Absolute) 10/11/2018 7.18    • Lymphs (Absolute) 10/11/2018 1.65    • Monos (Absolute) 10/11/2018 0.72    • Eos (Absolute) 10/11/2018 0.22    • Baso (Absolute) 10/11/2018 0.05    • Immature Granulocytes (a* 10/11/2018 0.07     • NRBC (Absolute) 10/11/2018 0.00    • Prostatic Specific Antig* 10/11/2018 1.54    • PT 10/11/2018 22.8*   • INR 10/11/2018 2.00*   • GFR If  10/11/2018 58*   • GFR If Non  Ameri* 10/11/2018 48*      Lab Results   Component Value Date/Time    HBA1C 5.8 05/01/2013 08:53 AM     Lab Results   Component Value Date/Time    SODIUM 139 10/11/2018 03:05 PM    POTASSIUM 4.0 10/11/2018 03:05 PM    CHLORIDE 106 10/11/2018 03:05 PM    CO2 25 10/11/2018 03:05 PM    GLUCOSE 113 (H) 10/11/2018 03:05 PM    BUN 22 10/11/2018 03:05 PM    CREATININE 1.46 (H) 10/11/2018 03:05 PM    CREATININE 1.3 02/11/2009 04:05 AM    ALKPHOSPHAT 80 10/11/2018 03:05 PM    ASTSGOT 14 10/11/2018 03:05 PM    ALTSGPT 14 10/11/2018 03:05 PM    TBILIRUBIN 0.4 10/11/2018 03:05 PM     Lab Results   Component Value Date/Time    INR 2.00 (H) 10/11/2018 03:05 PM    INR 2.3 08/02/2018 04:22 PM    INR >8.0 05/22/2018 09:09 AM     Lab Results   Component Value Date/Time    CHOLSTRLTOT 205 (H) 10/11/2018 03:05 PM     (H) 10/11/2018 03:05 PM    HDL 41 10/11/2018 03:05 PM    TRIGLYCERIDE 300 (H) 10/11/2018 03:05 PM       Lab Results   Component Value Date/Time    TESTOSTERONE 222 01/22/2014 09:04 AM     No results found for: TSH  Lab Results   Component Value Date/Time    FREET4 0.84 05/01/2013 08:53 AM    FREET4 0.90 01/24/2012 10:38 AM     No results found for: URICACID  No components found for: VITB12  Lab Results   Component Value Date/Time    25HYDROXY 40 05/01/2015 09:53 AM    25HYDROXY 28 (L) 01/22/2014 09:04 AM                 Assessment/Plan:     1. Immunization due      - INFLUENZA VACCINE, HIGH DOSE (65+ ONLY)    2. Post-phlebitic syndrome- RIGHT LEG   Under good control. Continue same regimen.    - oxyCODONE-acetaminophen (PERCOCET) 5-325 MG Tab; Take 1 Tab by mouth every 6 hours as needed for up to 90 days.  Dispense: 120 Tab; Refill: 0    3. Chronic pain syndrome- right leg from post phlebitic syn; on percocet once a  day  Under good control. Continue same regimen.     - oxyCODONE-acetaminophen (PERCOCET) 5-325 MG Tab; Take 1 Tab by mouth every 6 hours as needed for up to 90 days.  Dispense: 120 Tab; Refill: 0  - Consent for Opiate Prescription    4. Uncomplicated opioid dependence (HCC)  Under good control. Continue same regimen.     - oxyCODONE-acetaminophen (PERCOCET) 5-325 MG Tab; Take 1 Tab by mouth every 6 hours as needed for up to 90 days.  Dispense: 120 Tab; Refill: 0  - Consent for Opiate Prescription    5. Benign prostatic hyperplasia with incomplete bladder emptying- self cath since 5/2018   NOT WELL CONTROLLED    - REFERRAL TO UROLOGY    6. Stage 3 chronic kidney disease (HCC)  Under good control. Continue same regimen.        7. Obesity (BMI 30.0-34.9)     diet/exercise/lose 15 lbs.; patient counseled        8. Mixed hyperlipidemia- mild no meds      Under good control. Continue same regimen.      30 minute face-to-face encounter took place today.  More than half of this time was spent in the coordination of care of the above problems, as well as counseling.

## 2018-10-24 ENCOUNTER — ANTICOAGULATION MONITORING (OUTPATIENT)
Dept: VASCULAR LAB | Facility: MEDICAL CENTER | Age: 68
End: 2018-10-24

## 2018-10-24 DIAGNOSIS — Z79.01 ANTICOAGULATED ON WARFARIN: ICD-10-CM

## 2018-10-24 DIAGNOSIS — D68.51 FACTOR 5 LEIDEN MUTATION, HETEROZYGOUS (HCC): ICD-10-CM

## 2018-10-24 DIAGNOSIS — Z86.711 HISTORY OF PULMONARY EMBOLISM: ICD-10-CM

## 2018-10-24 DIAGNOSIS — I87.009 POST-PHLEBITIC SYNDROME: ICD-10-CM

## 2018-10-24 NOTE — PROGRESS NOTES
Anticoagulation Summary  As of 10/24/2018    INR goal:   2.0-3.0   TTR:   71.1 % (2.4 y)   Today's INR:   2.00 (10/11/2018)   Warfarin maintenance plan:   7.5 mg (5 mg x 1.5) on Thu; 5 mg (5 mg x 1) all other days   Weekly warfarin total:   37.5 mg   Plan last modified:   Adam LarsonD (8/2/2018)   Next INR check:   11/8/2018   Target end date:   Indefinite    Indications    Post-phlebitic syndrome- RIGHT LEG [I87.009]  History of pulmonary embolism [Z86.711]  Anticoagulated on warfarin [Z79.01]  Factor 5 Leiden mutation  heterozygous (HCC) [D68.51]             Anticoagulation Episode Summary     INR check location:       Preferred lab:       Send INR reminders to:       Comments:   TTR is 37.5%, consider alternative therapy      Anticoagulation Care Providers     Provider Role Specialty Phone number    Keron Garcia M.D. Referring Internal Medicine [phone]    [facility] Responsible  [phone]    Ganesh Mao, PharmD Responsible          Anticoagulation Patient Findings      Spoke with patient to report a therapeutic INR.    Pt instructed to continue with current warfarin dosing regimen, confirms dosing.   Pt denies any s/s of bleeding, bruising, clotting or any changes to diet or medication.    Will follow up in 4 week(s).     Jacquelin Lowery, PharmD

## 2018-11-12 ENCOUNTER — TELEPHONE (OUTPATIENT)
Dept: VASCULAR LAB | Facility: MEDICAL CENTER | Age: 68
End: 2018-11-12

## 2018-11-12 NOTE — TELEPHONE ENCOUNTER
Called pt and got him scheduled for INR check at [facility] for tomorrow at 130p    Jacquelin Lowery, AdamD

## 2018-12-06 DIAGNOSIS — D68.51 FACTOR V LEIDEN (HCC): ICD-10-CM

## 2018-12-10 ENCOUNTER — APPOINTMENT (OUTPATIENT)
Dept: RADIOLOGY | Facility: MEDICAL CENTER | Age: 68
End: 2018-12-10
Payer: COMMERCIAL

## 2018-12-10 ENCOUNTER — HOSPITAL ENCOUNTER (EMERGENCY)
Facility: MEDICAL CENTER | Age: 68
End: 2018-12-10
Attending: EMERGENCY MEDICINE
Payer: COMMERCIAL

## 2018-12-10 VITALS
RESPIRATION RATE: 17 BRPM | WEIGHT: 227.07 LBS | TEMPERATURE: 98 F | DIASTOLIC BLOOD PRESSURE: 73 MMHG | SYSTOLIC BLOOD PRESSURE: 110 MMHG | BODY MASS INDEX: 30.76 KG/M2 | HEART RATE: 99 BPM | OXYGEN SATURATION: 94 % | HEIGHT: 72 IN

## 2018-12-10 DIAGNOSIS — S62.667A CLOSED NONDISPLACED FRACTURE OF DISTAL PHALANX OF LEFT LITTLE FINGER, INITIAL ENCOUNTER: ICD-10-CM

## 2018-12-10 PROCEDURE — 99284 EMERGENCY DEPT VISIT MOD MDM: CPT

## 2018-12-10 PROCEDURE — 73130 X-RAY EXAM OF HAND: CPT | Mod: LT

## 2018-12-10 ASSESSMENT — PAIN SCALES - GENERAL: PAINLEVEL_OUTOF10: 4
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
## 2018-12-10 NOTE — ED NOTES
Discharged to home with instructions and workers comp paperwork  Patient instructed of light duty and to follow up with clinic on [facility] tomorrow
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
## 2018-12-13 ENCOUNTER — ANTICOAGULATION VISIT (OUTPATIENT)
Dept: MEDICAL GROUP | Facility: MEDICAL CENTER | Age: 68
End: 2018-12-13
Payer: MEDICARE

## 2018-12-13 DIAGNOSIS — Z79.01 ANTICOAGULATED ON WARFARIN: ICD-10-CM

## 2018-12-13 DIAGNOSIS — I87.009 POST-PHLEBITIC SYNDROME: ICD-10-CM

## 2018-12-13 DIAGNOSIS — Z86.711 HISTORY OF PULMONARY EMBOLISM: ICD-10-CM

## 2018-12-13 DIAGNOSIS — D68.51 FACTOR 5 LEIDEN MUTATION, HETEROZYGOUS (HCC): ICD-10-CM

## 2018-12-13 LAB — INR PPP: 1.7 (ref 2–3.5)

## 2018-12-13 PROCEDURE — 99211 OFF/OP EST MAY X REQ PHY/QHP: CPT | Performed by: FAMILY MEDICINE

## 2018-12-13 PROCEDURE — 85610 PROTHROMBIN TIME: CPT | Performed by: FAMILY MEDICINE

## 2018-12-13 NOTE — PROGRESS NOTES
Anticoagulation Summary  As of 12/13/2018    INR goal:   2.0-3.0   TTR:   66.3 % (2.5 y)   Today's INR:   1.7!   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 Referring Internal Medicine [phone]    [facility] Anticoagulation Services Responsible  [phone]    Ganesh Mao, PharmD Responsible          Anticoagulation Patient Findings    History of Present Illness: follow up appointment for chronic anticoagulation with the high risk medication, warfarin for factor 5 Leiden    Last INR was at goal, pt is now sub therapeutic today.  He has been eating a LOT more recently.  Will bolus dose with 10mg tonight, then resume current dosing regimen. Follow up in 2 weeks, to reduce the risk of adverse events related to this high risk medication, warfarin.    Danelle Burrell, Clinical Pharmacist    Pt declines vitals today

## 2018-12-27 NOTE — ADDENDUM NOTE
Encounter addended by: Nusrat Greene on: 12/27/2018 11:13 AM<BR>    Actions taken: Charge Capture section accepted

## 2019-01-11 ENCOUNTER — TELEPHONE (OUTPATIENT)
Dept: VASCULAR LAB | Facility: MEDICAL CENTER | Age: 69
End: 2019-01-11

## 2019-02-22 ENCOUNTER — TELEPHONE (OUTPATIENT)
Dept: VASCULAR LAB | Facility: MEDICAL CENTER | Age: 69
End: 2019-02-22

## 2019-03-14 ENCOUNTER — ANTICOAGULATION VISIT (OUTPATIENT)
Dept: MEDICAL GROUP | Facility: MEDICAL CENTER | Age: 69
End: 2019-03-14
Payer: MEDICARE

## 2019-03-14 VITALS — DIASTOLIC BLOOD PRESSURE: 81 MMHG | HEART RATE: 91 BPM | SYSTOLIC BLOOD PRESSURE: 123 MMHG

## 2019-03-14 DIAGNOSIS — Z86.711 HISTORY OF PULMONARY EMBOLISM: ICD-10-CM

## 2019-03-14 DIAGNOSIS — D68.51 FACTOR 5 LEIDEN MUTATION, HETEROZYGOUS (HCC): ICD-10-CM

## 2019-03-14 DIAGNOSIS — I87.009 POST-PHLEBITIC SYNDROME: ICD-10-CM

## 2019-03-14 DIAGNOSIS — Z79.01 ANTICOAGULATED ON WARFARIN: Primary | ICD-10-CM

## 2019-03-14 LAB — INR PPP: 1.5 (ref 2–3.5)

## 2019-03-14 PROCEDURE — 85610 PROTHROMBIN TIME: CPT | Performed by: FAMILY MEDICINE

## 2019-03-14 PROCEDURE — 99211 OFF/OP EST MAY X REQ PHY/QHP: CPT | Performed by: FAMILY MEDICINE

## 2019-03-14 NOTE — PROGRESS NOTES
OP Anticoagulation Service Note    Date: 3/14/2019  Vitals:    19 1608   BP: 123/81   BP Location: Left arm   Patient Position: Sitting   BP Cuff Size: Large adult   Pulse: 91       Anticoagulation Summary  As of 3/14/2019    INR goal:   2.0-3.0   TTR:   60.4 % (2.8 y)   INR used for dosin.5! 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 Referring Internal Medicine [phone]    [facility] Anticoagulation Services Responsible  [phone]    Ganesh Mao, PharmD Responsible          Anticoagulation Patient Findings      HPI:   Tavares Bonilla seen in clinic today, on anticoagulation therapy with warfarin (a high risk medication) for hx of DVT and factor V     Pt is here today to evaluate anticoagulation therapy    Previous INR was  1.7 on 2019    Pt was instructed to increase x 1 then resume usual regimen    Confirmed warfarin dosing regimen, denies missed or extra doses of coumadin.   Diet has been consistent with foods rich in vitamin K: Yes  Changes in ETOH:  No  Changes in smoking status: No  Changes in medication: No   Cost restriction: No  S/s of bleeding:  No  Falls or accidents since last visit No  Signs/symptoms  thrombosis since the last appt: No    A/P   INR  sub-therapeutic today, will require dose adjust  ment today to prevent  thrombosis and closer follow up.    Tonight  10 mg then increase weekly regimen.     Pt has been noncompliant with follow up, discussed need for continue INR follow up and warfarin management.      check referral    Pt educated to contact our clinic with any changes in medications or s/s of bleeding or thrombosis. Pt is aware to seek immediate medical attention for falls, head injury or deep cuts    Follow up appointment in 2 week(s) to reduce risk of adverse events from warfarin   Lorena Marie, PharmD

## 2019-03-28 ENCOUNTER — ANTICOAGULATION VISIT (OUTPATIENT)
Dept: MEDICAL GROUP | Facility: MEDICAL CENTER | Age: 69
End: 2019-03-28
Payer: MEDICARE

## 2019-03-28 DIAGNOSIS — Z86.711 HISTORY OF PULMONARY EMBOLISM: ICD-10-CM

## 2019-03-28 DIAGNOSIS — Z79.01 ANTICOAGULATED ON WARFARIN: Primary | ICD-10-CM

## 2019-03-28 DIAGNOSIS — D68.51 FACTOR 5 LEIDEN MUTATION, HETEROZYGOUS (HCC): ICD-10-CM

## 2019-03-28 DIAGNOSIS — I87.009 POST-PHLEBITIC SYNDROME: ICD-10-CM

## 2019-03-28 LAB — INR PPP: 1.9 (ref 2–3.5)

## 2019-03-28 PROCEDURE — 99211 OFF/OP EST MAY X REQ PHY/QHP: CPT | Performed by: INTERNAL MEDICINE

## 2019-03-28 PROCEDURE — 85610 PROTHROMBIN TIME: CPT | Performed by: INTERNAL MEDICINE

## 2019-03-28 NOTE — PROGRESS NOTES
OP Anticoagulation Service Note    Date: 3/28/2019      Anticoagulation Summary  As of 3/28/2019    INR goal:   2.0-3.0   TTR:   59.5 % (2.8 y)   INR used for dosin.9! 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 Referring Internal Medicine [phone]    [facility] Anticoagulation Services Responsible  [phone]    Ganesh Mao, PharmD Responsible          Anticoagulation Patient Findings      HPI:   Tavares Bonilla seen in clinic today, on anticoagulation therapy with warfarin (a high risk medication) for hx of PE and factor 5 leiden      Pt is here today to evaluate anticoagulation therapy    Previous INR was  1.5 on 3-    Pt was instructed to increase weekly regimen    Confirmed warfarin dosing regimen, denies missed or extra doses of coumadin.   Diet has been consistent with foods rich in vitamin K: Yes  Changes in ETOH:  No  Changes in smoking status: No  Changes in medication: No   Cost restriction: No  S/s of bleeding:  No  Falls or accidents since last visit No  Signs/symptoms  thrombosis since the last appt: No    A/P   INR  subtherapeutic today, will require dose adjust ment today to prevent  recurrence of thrombosis  and closer follow up.   Increase weekly regimen.         check  referral    Pt educated to contact our clinic with any changes in medications or s/s of bleeding or thrombosis. Pt is aware to seek immediate medical attention for falls, head injury or deep cuts    Follow up appointment in 2 week(s) to reduce risk of adverse events from warfarin   Lorena Marie, PharmD

## 2019-04-04 ENCOUNTER — HOSPITAL ENCOUNTER (OUTPATIENT)
Dept: LAB | Facility: MEDICAL CENTER | Age: 69
End: 2019-04-04
Attending: INTERNAL MEDICINE
Payer: MEDICARE

## 2019-04-04 ENCOUNTER — OFFICE VISIT (OUTPATIENT)
Dept: MEDICAL GROUP | Age: 69
End: 2019-04-04
Payer: MEDICARE

## 2019-04-04 VITALS
DIASTOLIC BLOOD PRESSURE: 80 MMHG | BODY MASS INDEX: 33.78 KG/M2 | SYSTOLIC BLOOD PRESSURE: 126 MMHG | TEMPERATURE: 98.2 F | HEIGHT: 72 IN | WEIGHT: 249.4 LBS | HEART RATE: 93 BPM

## 2019-04-04 DIAGNOSIS — N13.9 OBSTRUCTIVE UROPATHY: ICD-10-CM

## 2019-04-04 DIAGNOSIS — E78.2 MIXED HYPERLIPIDEMIA: ICD-10-CM

## 2019-04-04 DIAGNOSIS — R39.14 BENIGN PROSTATIC HYPERPLASIA WITH INCOMPLETE BLADDER EMPTYING: ICD-10-CM

## 2019-04-04 DIAGNOSIS — E66.9 OBESITY (BMI 30.0-34.9): ICD-10-CM

## 2019-04-04 DIAGNOSIS — E55.9 HYPOVITAMINOSIS D: ICD-10-CM

## 2019-04-04 DIAGNOSIS — G89.4 CHRONIC PAIN SYNDROME: ICD-10-CM

## 2019-04-04 DIAGNOSIS — D68.51 FACTOR 5 LEIDEN MUTATION, HETEROZYGOUS (HCC): ICD-10-CM

## 2019-04-04 DIAGNOSIS — N31.9 NEUROGENIC BLADDER: ICD-10-CM

## 2019-04-04 DIAGNOSIS — N18.30 STAGE 3 CHRONIC KIDNEY DISEASE (HCC): ICD-10-CM

## 2019-04-04 DIAGNOSIS — K21.9 GASTROESOPHAGEAL REFLUX DISEASE WITHOUT ESOPHAGITIS: ICD-10-CM

## 2019-04-04 DIAGNOSIS — N40.1 BENIGN PROSTATIC HYPERPLASIA WITH INCOMPLETE BLADDER EMPTYING: ICD-10-CM

## 2019-04-04 DIAGNOSIS — M17.11 PRIMARY OSTEOARTHRITIS OF RIGHT KNEE: ICD-10-CM

## 2019-04-04 DIAGNOSIS — Z79.01 ANTICOAGULATED ON WARFARIN: ICD-10-CM

## 2019-04-04 DIAGNOSIS — F11.20 UNCOMPLICATED OPIOID DEPENDENCE (HCC): ICD-10-CM

## 2019-04-04 DIAGNOSIS — D48.5 NEOPLASM OF UNCERTAIN BEHAVIOR OF SKIN OF FACE: ICD-10-CM

## 2019-04-04 DIAGNOSIS — I87.009 POST-PHLEBITIC SYNDROME: ICD-10-CM

## 2019-04-04 DIAGNOSIS — I87.091: ICD-10-CM

## 2019-04-04 PROBLEM — D72.823 LEUKEMOID REACTION: Status: RESOLVED | Noted: 2018-08-24 | Resolved: 2019-04-04

## 2019-04-04 LAB
ALBUMIN SERPL BCP-MCNC: 3.9 G/DL (ref 3.2–4.9)
ALBUMIN/GLOB SERPL: 1.2 G/DL
ALP SERPL-CCNC: 70 U/L (ref 30–99)
ALT SERPL-CCNC: 15 U/L (ref 2–50)
ANION GAP SERPL CALC-SCNC: 7 MMOL/L (ref 0–11.9)
AST SERPL-CCNC: 15 U/L (ref 12–45)
BASOPHILS # BLD AUTO: 0.5 % (ref 0–1.8)
BASOPHILS # BLD: 0.04 K/UL (ref 0–0.12)
BILIRUB SERPL-MCNC: 0.6 MG/DL (ref 0.1–1.5)
BUN SERPL-MCNC: 21 MG/DL (ref 8–22)
CALCIUM SERPL-MCNC: 9.2 MG/DL (ref 8.5–10.5)
CHLORIDE SERPL-SCNC: 108 MMOL/L (ref 96–112)
CHOLEST SERPL-MCNC: 222 MG/DL (ref 100–199)
CO2 SERPL-SCNC: 25 MMOL/L (ref 20–33)
CREAT SERPL-MCNC: 1.27 MG/DL (ref 0.5–1.4)
EOSINOPHIL # BLD AUTO: 0.23 K/UL (ref 0–0.51)
EOSINOPHIL NFR BLD: 3 % (ref 0–6.9)
ERYTHROCYTE [DISTWIDTH] IN BLOOD BY AUTOMATED COUNT: 45.8 FL (ref 35.9–50)
FASTING STATUS PATIENT QL REPORTED: NORMAL
GLOBULIN SER CALC-MCNC: 3.2 G/DL (ref 1.9–3.5)
GLUCOSE SERPL-MCNC: 92 MG/DL (ref 65–99)
HCT VFR BLD AUTO: 49 % (ref 42–52)
HDLC SERPL-MCNC: 43 MG/DL
HGB BLD-MCNC: 16.3 G/DL (ref 14–18)
IMM GRANULOCYTES # BLD AUTO: 0.02 K/UL (ref 0–0.11)
IMM GRANULOCYTES NFR BLD AUTO: 0.3 % (ref 0–0.9)
LDLC SERPL CALC-MCNC: 149 MG/DL
LYMPHOCYTES # BLD AUTO: 1.14 K/UL (ref 1–4.8)
LYMPHOCYTES NFR BLD: 14.8 % (ref 22–41)
MCH RBC QN AUTO: 31.4 PG (ref 27–33)
MCHC RBC AUTO-ENTMCNC: 33.3 G/DL (ref 33.7–35.3)
MCV RBC AUTO: 94.4 FL (ref 81.4–97.8)
MONOCYTES # BLD AUTO: 0.73 K/UL (ref 0–0.85)
MONOCYTES NFR BLD AUTO: 9.5 % (ref 0–13.4)
NEUTROPHILS # BLD AUTO: 5.56 K/UL (ref 1.82–7.42)
NEUTROPHILS NFR BLD: 71.9 % (ref 44–72)
NRBC # BLD AUTO: 0 K/UL
NRBC BLD-RTO: 0 /100 WBC
PLATELET # BLD AUTO: 237 K/UL (ref 164–446)
PMV BLD AUTO: 10.1 FL (ref 9–12.9)
POTASSIUM SERPL-SCNC: 4.6 MMOL/L (ref 3.6–5.5)
PROT SERPL-MCNC: 7.1 G/DL (ref 6–8.2)
RBC # BLD AUTO: 5.19 M/UL (ref 4.7–6.1)
SODIUM SERPL-SCNC: 140 MMOL/L (ref 135–145)
TRIGL SERPL-MCNC: 151 MG/DL (ref 0–149)
WBC # BLD AUTO: 7.7 K/UL (ref 4.8–10.8)

## 2019-04-04 PROCEDURE — 80061 LIPID PANEL: CPT

## 2019-04-04 PROCEDURE — 99214 OFFICE O/P EST MOD 30 MIN: CPT | Performed by: INTERNAL MEDICINE

## 2019-04-04 PROCEDURE — 36415 COLL VENOUS BLD VENIPUNCTURE: CPT

## 2019-04-04 PROCEDURE — 80053 COMPREHEN METABOLIC PANEL: CPT

## 2019-04-04 PROCEDURE — 85025 COMPLETE CBC W/AUTO DIFF WBC: CPT

## 2019-04-04 RX ORDER — OXYCODONE HYDROCHLORIDE AND ACETAMINOPHEN 5; 325 MG/1; MG/1
1 TABLET ORAL EVERY 6 HOURS PRN
Qty: 120 TAB | Refills: 0 | Status: SHIPPED | OUTPATIENT
Start: 2019-04-04 | End: 2019-07-18 | Stop reason: SDUPTHER

## 2019-04-04 ASSESSMENT — ENCOUNTER SYMPTOMS
MUSCULOSKELETAL NEGATIVE: 1
NEUROLOGICAL NEGATIVE: 1
CARDIOVASCULAR NEGATIVE: 1
GASTROINTESTINAL NEGATIVE: 1
EYES NEGATIVE: 1
PSYCHIATRIC NEGATIVE: 1
RESPIRATORY NEGATIVE: 1
CONSTITUTIONAL NEGATIVE: 1

## 2019-04-04 ASSESSMENT — PATIENT HEALTH QUESTIONNAIRE - PHQ9: CLINICAL INTERPRETATION OF PHQ2 SCORE: 0

## 2019-04-04 NOTE — LETTER
[facility]  Keron Garcia M.D.  [address]  [place] [address]  Fax: [phone]   Authorization for Release/Disclosure of   Protected Health Information   Name: TAVARES BONILLA : 1950 SSN: [id number]   Address: [address] [address]  [place] [address] Phone:    [phone] (home) [phone] (work)   I authorize the entity listed below to release/disclose the PHI below to:   [facility]/Keron Garcia M.D. and Keron Garcia M.D.   Provider or Entity Name:  [facility] [place]     Address   City, State, [facility]   Phone:      Fax:     Reason for request: continuity of care   Information to be released:    [  ] LAST COLONOSCOPY,  including any PATH REPORT and follow-up  [  ] LAST FIT/COLOGUARD RESULT [  ] LAST DEXA  [  ] LAST MAMMOGRAM  [  ] LAST PAP  [  ] LAST LABS [  ] RETINA EXAM REPORT  [  ] IMMUNIZATION RECORDS  [XXX] Release all info      [  ] Check here and initial the line next to each item to release ALL health information INCLUDING  _____ Care and treatment for drug and / or alcohol abuse  _____ HIV testing, infection status, or AIDS  _____ Genetic Testing    DATES OF SERVICE OR TIME PERIOD TO BE DISCLOSED: _____________  I understand and acknowledge that:  * This Authorization may be revoked at any time by you in writing, except if your health information has already been used or disclosed.  * Your health information that will be used or disclosed as a result of you signing this authorization could be re-disclosed by the recipient. If this occurs, your re-disclosed health information may no longer be protected by State or Federal laws.  * You may refuse to sign this Authorization. Your refusal will not affect your ability to obtain treatment.  * This Authorization becomes effective upon signing and will  on (date) __________.      If no date is indicated, this Authorization will  one (1) year from the signature date.    Name: Tavares Bonilla    Signature:   Date:       4/4/2019       PLEASE FAX REQUESTED RECORDS BACK TO: [phone]

## 2019-04-04 NOTE — PROGRESS NOTES
Subjective:      Tavares Bonilla is a 68 y.o. male who presents with Follow-Up and Chronic Kidney Disease        HPI    The patient is here for followup of chronic medical problems listed below. The patient is compliant with medications and having no side effects from them. Denies chest pain, abdominal pain, dyspnea, or cough.    Patient reports history of obstructive uropathy. He states he had an inguinal hernia repair 5-6 months ago which aggravated his incomplete bladder emptying. Patient is able to urinate on his own but does not completely empty his bladder so he self-catheterizes once per day. He notes that his urologist says prostatic surgery may resolve the problem.    Patient reports history of chronic kidney disease exacerbated by severe dehydration 6 months ago. He states he was working in the sun too long and was not drinking. He would like a referral to nephrology. Patient denies flank pain.    Patient reports history of chronic right leg pain well controlled with Diclofenac sodium 1% gel and Percocet as needed. He states he takes the Percocet every 3 days on average.    Patient Active Problem List   Diagnosis   • History of pulmonary embolism   • Factor 5 Leiden mutation, heterozygous (HCC)   • Gastroesophageal reflux disease without esophagitis   • Hypovitaminosis D   • Chronic pain syndrome- right leg from post phlebitic syn; on percocet once a day   • Chronic venous hypertension due to DVT   • Anticoagulated on warfarin   • Mixed hyperlipidemia- mild no meds   • Post-phlebitic syndrome- RIGHT LEG   • Obesity (BMI 30.0-34.9)   • Uncomplicated opioid dependence (HCC)   • Benign prostatic hyperplasia with incomplete bladder emptying- self cath since 5/2018 ing hernia repair   • Atrophy of right kidney- urology [facility]   • Stage 3 chronic kidney disease (HCC)   • Neoplasm of uncertain behavior of skin of face   • Obstructive uropathy   • Neurogenic bladder- self cath since 5/2018   • Primary  osteoarthritis of right knee   • Post-phlebitic dermatosis of right lower extremity       Outpatient Medications Prior to Visit   Medication Sig Dispense Refill   • finasteride (PROSCAR) 5 MG Tab      • tamsulosin (FLOMAX) 0.4 MG capsule Take 2 Caps by mouth every day. 180 Cap 4   • warfarin (COUMADIN) 5 MG Tab Take one to one and one-half (1-1.5) tablets daily as directed by [facility] 135 Tab 1   • Cyanocobalamin (VITAMIN B-12) 1000 MCG Tab Take 1,000 mcg by mouth every day.     • Cholecalciferol (VITAMIN D) 2000 UNITS Cap Take 1 Cap by mouth every day. 100 Cap 3   • oxyCODONE-acetaminophen (PERCOCET) 5-325 MG Tab Take 1 Tab by mouth every 6 hours as needed for up to 90 days. 120 Tab 0     No facility-administered medications prior to visit.         Allergies   Allergen Reactions   • Lovastatin      Leg cramps   • Other Food Hives     Peanuts gives me Vertigo and hives   • Shellfish Allergy Hives     shrimp   • Statins [Hmg-Coa-R Inhibitors]      Muscle cramps       Review of Systems   Constitutional: Negative.    HENT: Negative.    Eyes: Negative.    Respiratory: Negative.    Cardiovascular: Negative.    Gastrointestinal: Negative.    Genitourinary: Negative.    Musculoskeletal: Negative.    Skin: Negative.    Neurological: Negative.    Endo/Heme/Allergies: Negative.    Psychiatric/Behavioral: Negative.    All other systems reviewed and are negative.           Objective:     /80 (BP Location: Left arm, Patient Position: Sitting, BP Cuff Size: Adult)   Pulse 93   Temp 36.8 °C (98.2 °F) (Temporal)   Ht 1.829 m (6')   Wt 113.1 kg (249 lb 6.4 oz)   BMI 33.82 kg/m²     Physical Exam   Constitutional: Oriented to person, place, and time. Appears well-developed and well-nourished. No distress.   Head: Normocephalic and atraumatic.   Right Ear: External ear normal.   Left Ear: External ear normal.   Nose: Nose normal.   Mouth/Throat: Oropharynx is clear and moist. No oropharyngeal exudate.    Eyes: Pupils are equal, round, and reactive to light. Conjunctivae and EOM are normal. Right eye exhibits no discharge. Left eye exhibits no discharge. No scleral icterus.   Neck: Normal range of motion. Neck supple. No JVD present. No tracheal deviation present. No thyromegaly present.   Cardiovascular: Normal rate, regular rhythm, normal heart sounds and intact distal pulses.  Exam reveals no gallop and no friction rub.    No murmur heard.  Pulmonary/Chest: Effort normal. No stridor. No respiratory distress. No wheezing or rales. No tenderness.   Abdominal: Soft. Bowel sounds are normal. No distension and no mass. There is no tenderness. There is no rebound and no guarding. No hernia.   Musculoskeletal: Right le+ pretibial edema. No swelling to left leg. No calf tenderness. Normal range of motion No edema or tenderness.   Lymphadenopathy: No cervical adenopathy.   Neurological: Alert and oriented to person, place, and time. Normal reflexes. Normal reflexes. No cranial nerve deficit. Normal muscle tone. Coordination normal.   Skin: Skin is warm and dry. No rash noted. Not diaphoretic. No erythema. No pallor.   Psychiatric: Normal mood and affect. Behavior is normal. Judgment and thought content normal.   Nursing note and vitals reviewed.      Lab Results   Component Value Date/Time    HBA1C 5.8 2013 08:53 AM     Lab Results   Component Value Date/Time    SODIUM 139 10/11/2018 03:05 PM    POTASSIUM 4.0 10/11/2018 03:05 PM    CHLORIDE 106 10/11/2018 03:05 PM    CO2 25 10/11/2018 03:05 PM    GLUCOSE 113 (H) 10/11/2018 03:05 PM    BUN 22 10/11/2018 03:05 PM    CREATININE 1.46 (H) 10/11/2018 03:05 PM    CREATININE 1.3 2009 04:05 AM    ALKPHOSPHAT 80 10/11/2018 03:05 PM    ASTSGOT 14 10/11/2018 03:05 PM    ALTSGPT 14 10/11/2018 03:05 PM    TBILIRUBIN 0.4 10/11/2018 03:05 PM     Lab Results   Component Value Date/Time    INR 1.9 2019 03:46 PM    INR 1.5 2019 04:07 PM    INR 1.7 2018  09:14 AM     Lab Results   Component Value Date/Time    CHOLSTRLTOT 205 (H) 10/11/2018 03:05 PM     (H) 10/11/2018 03:05 PM    HDL 41 10/11/2018 03:05 PM    TRIGLYCERIDE 300 (H) 10/11/2018 03:05 PM       Lab Results   Component Value Date/Time    TESTOSTERONE 222 01/22/2014 09:04 AM     No results found for: TSH  Lab Results   Component Value Date/Time    FREET4 0.84 05/01/2013 08:53 AM    FREET4 0.90 01/24/2012 10:38 AM     No results found for: URICACID  No components found for: VITB12  Lab Results   Component Value Date/Time    25HYDROXY 40 05/01/2015 09:53 AM    25HYDROXY 28 (L) 01/22/2014 09:04 AM          Assessment/Plan:     1. Chronic pain syndrome  Under good control. Continue same regimen of Percocet as needed for pain.    - oxyCODONE-acetaminophen (PERCOCET) 5-325 MG Tab; Take 1 Tab by mouth every 6 hours as needed for up to 90 days.  Dispense: 120 Tab; Refill: 0  - Consent for Opiate Prescription    2. Benign prostatic hyperplasia with incomplete bladder emptying- self cath since 5/2018  Patient self-catheterizes once per day. Under good control. Continue same regimen of Finasteride 5mg and Tamsulosin 0.4mg.     3. Stage 3 chronic kidney disease (HCC)-recheck renal function today and if GFR is decreasing we will refer to nephrology.  Otherwise follow-up with his urologist and continue with good hydration and avoidance of nephrotoxins.  Patient denies flank pain. Under good control. Continue same regimen. Continue to follow with nephrology.    4. Neoplasm of uncertain behavior of skin of face  Patient notes some new neoplasms on his face and would like a referral to dermatology.     - REFERRAL TO DERMATOLOGY    5. Obesity (BMI 30.0-34.9)  Patient counseled to diet, exercise, and lose 15 pounds.    6. Hypovitaminosis D  Under good control. Continue same regimen of Vitamin D supplementation.    7. Mixed hyperlipidemia- mild no meds  Mild. Under good control. Continue same regimen of low fat diet.  Lipid panel completed on 10/2018. Cholesterol 205; Triglycerides 300; HDL 41; . Plan to evaluate with:    - Comp Metabolic Panel; Future  - Lipid Profile; Future  - CBC WITH DIFFERENTIAL; Future    8. Anticoagulated on warfarin  History of DVT. Under good control. Continue same regimen of Warfarin 5mg.    9. Factor 5 Leiden mutation, heterozygous (HCC)  History of DVT. Under good control. Continue same regimen of Warfarin 5mg.    10. Gastroesophageal reflux disease without esophagitis  Under good control. Continue same regimen.    11. Obstructive uropathy  Patient self-catheterizes once per day. Under good control. Continue same regimen of Finasteride 5mg and Tamsulosin 0.4mg.    12. Neurogenic bladder- self cath since 5/2018  Patient self-catheterizes once per day. Under good control. Continue same regimen of Finasteride 5mg and Tamsulosin 0.4mg.    13. Primary osteoarthritis of right knee  Under good control. Continue same regimen of Diclofenac Sodium 1 % Gel as needed.    - Diclofenac Sodium 1 % Gel; Apply 1 Inch to skin as directed every 3 hours as needed.  Dispense: 3 Tube; Refill: 4    14. Post-phlebitic dermatosis of right lower extremity  Under good control. Continue same regimen of Diclofenac Sodium 1 % Gel as needed.    - Diclofenac Sodium 1 % Gel; Apply 1 Inch to skin as directed every 3 hours as needed.  Dispense: 3 Tube; Refill: 4    15. Post-phlebitic syndrome- RIGHT LEG  Under good control. Continue same regimen of Percocet as needed for pain.    - oxyCODONE-acetaminophen (PERCOCET) 5-325 MG Tab; Take 1 Tab by mouth every 6 hours as needed for up to 90 days.  Dispense: 120 Tab; Refill: 0    16. Chronic pain syndrome- right leg from post phlebitic syn; on percocet once a day  Under good control. Continue same regimen of Percocet as needed for pain.    - oxyCODONE-acetaminophen (PERCOCET) 5-325 MG Tab; Take 1 Tab by mouth every 6 hours as needed for up to 90 days.  Dispense: 120 Tab; Refill: 0  -  Consent for Opiate Prescription    17. Uncomplicated opioid dependence (HCC)  Under good control. Continue same regimen of Percocet as needed for pain.    - oxyCODONE-acetaminophen (PERCOCET) 5-325 MG Tab; Take 1 Tab by mouth every 6 hours as needed for up to 90 days.  Dispense: 120 Tab; Refill: 0  - Consent for Opiate Prescription       30 minute face-to-face encounter took place today.  More than half of this time was spent in the coordination of care of the above problems, as well as counseling.     IAshtyn (Scribe), am scribing for, and in the presence of, Keron Garcia M.D..    Electronically signed by: Ashtyn Alexander (Dustyiberich), 4/4/2019    IKeron M.D., personally performed the services described in this documentation, as scribed by Ashtyn Alexander in my presence, and it is both accurate and complete.

## 2019-04-05 ENCOUNTER — TELEPHONE (OUTPATIENT)
Dept: MEDICAL GROUP | Age: 69
End: 2019-04-05

## 2019-04-05 DIAGNOSIS — E78.2 MIXED HYPERLIPIDEMIA: ICD-10-CM

## 2019-04-05 NOTE — TELEPHONE ENCOUNTER
The 10-year ASCVD risk score (Felicia RODRIGUEZ Jr., et al., 2013) is: 18.6%    Values used to calculate the score:      Age: 68 years      Sex: Male      Is Non- : No      Diabetic: No      Tobacco smoker: No      Systolic Blood Pressure: 126 mmHg      Is BP treated: No      HDL Cholesterol: 43 mg/dL      Total Cholesterol: 222 mg/dL

## 2019-04-06 NOTE — TELEPHONE ENCOUNTER
"Phone Number Called: [phone] (home)     Message: Left VM for Pt to contact office    \"Please call patient with results.  His cholesterol is still markedly elevated placing him at high risk for heart attack or stroke.  I would recommend he try going on a cholesterol-lowering medicine again but this time at a low dose with a medicine he has not tried before (Crestor 5 mg).  If he is agreeable this I will send in a prescription for this to his local pharmacy.  He should recheck his lipid profile again in 3-6 months.  And blood work orders have been entered for this.\"    Left Message for patient to call back: yes        "

## 2019-04-08 NOTE — TELEPHONE ENCOUNTER
Phone Number Called: [phone] (home) [phone] (work)      Message: I left a voicemail for patient to call back to inform them of message below.

## 2019-04-09 RX ORDER — ROSUVASTATIN CALCIUM 5 MG/1
5 TABLET, COATED ORAL EVERY EVENING
Qty: 30 TAB | Refills: 11 | Status: SHIPPED | OUTPATIENT
Start: 2019-04-09 | End: 2019-07-01 | Stop reason: SDUPTHER

## 2019-04-09 NOTE — TELEPHONE ENCOUNTER
Phone Number Called: [phone] (home) [phone] (work)      Message: I left a voicemail for patient to call back to inform them of message below.      Left Message for patient to call back: yes

## 2019-04-09 NOTE — TELEPHONE ENCOUNTER
Labs show no evidence of infection, and were all good except for the cholesterol as previously mentioned.  Prescription for Crestor 5 mg sent to pharmacy  to take once a day.    Please have patient activate his [employer] messaging system ASAP so we can communicate more quickly and efficiently, and not risk missed messages or dropped calls.  Thanks

## 2019-04-09 NOTE — TELEPHONE ENCOUNTER
Patient notified. He agrees to trying Crestor. He also wanted to know if his labs showed any infections. States he had hernia surgery and just wants to make sure everything looked okay. Please advise, thank you.

## 2019-04-11 ENCOUNTER — ANTICOAGULATION VISIT (OUTPATIENT)
Dept: MEDICAL GROUP | Facility: MEDICAL CENTER | Age: 69
End: 2019-04-11
Payer: MEDICARE

## 2019-04-11 DIAGNOSIS — I87.009 POST-PHLEBITIC SYNDROME: ICD-10-CM

## 2019-04-11 DIAGNOSIS — Z79.01 ANTICOAGULATED ON WARFARIN: Primary | ICD-10-CM

## 2019-04-11 DIAGNOSIS — Z86.711 HISTORY OF PULMONARY EMBOLISM: ICD-10-CM

## 2019-04-11 DIAGNOSIS — D68.51 FACTOR 5 LEIDEN MUTATION, HETEROZYGOUS (HCC): ICD-10-CM

## 2019-04-11 LAB — INR PPP: 2.2 (ref 2–3.5)

## 2019-04-11 PROCEDURE — 85610 PROTHROMBIN TIME: CPT | Performed by: NURSE PRACTITIONER

## 2019-04-11 PROCEDURE — 93793 ANTICOAG MGMT PT WARFARIN: CPT | Performed by: FAMILY MEDICINE

## 2019-04-12 NOTE — PROGRESS NOTES
OP Anticoagulation Service Note    Date: 2019      Anticoagulation Summary  As of 2019    INR goal:   2.0-3.0   TTR:   59.6 % (2.9 y)   INR used for dosin.2 (2019)   Warfarin maintenance plan:   7.5 mg (5 mg x 1.5) every Tue, Thu, Sat; 5 mg (5 mg x 1) all other days   Weekly warfarin total:   42.5 mg   Plan last modified:   Lorena Marie, PharmD (3/28/2019)   Next INR check:   2019   Target end date:   Indefinite    Indications    Post-phlebitic syndrome- RIGHT LEG [I87.009]  History of pulmonary embolism [Z86.711]  Anticoagulated on warfarin [Z79.01]  Factor 5 Leiden mutation  heterozygous (HCC) [D68.51]             Anticoagulation Episode Summary     INR check location:       Preferred lab:       Send INR reminders to:       Comments:   TTR is 37.5%, consider alternative therapy      Anticoagulation Care Providers     Provider Role Specialty Phone number    Keron Garcia M.D. Referring Internal Medicine [phone]    [facility] Responsible  [phone]    Ganesh Mao, PharmD Responsible          Anticoagulation Patient Findings      HPI:   Tavares Bonilla seen in clinic today, on anticoagulation therapy with warfarin (a high risk medication) for hx of PE and factor V    Pt is here today to evaluate anticoagulation therapy    Previous INR was  1.9 on 3-    Pt was instructed to increase weekly regimen    Confirmed warfarin dosing regimen, denies missed or extra doses of coumadin.   Diet has been consistent with foods rich in vitamin K: Yes  Changes in ETOH:  No  Changes in smoking status: No  Changes in medication: No   Cost restriction: No  S/s of bleeding:  No  Falls or accidents since last visit No  Signs/symptoms  thrombosis since the last appt: No    A/P   INR  therapeutic today, will require close follow up as previous INR was sub-therapeutic   Continue current warfarin regimen           check referral    Pt educated to contact our  clinic with any changes in medications or s/s of bleeding or thrombosis. Pt is aware to seek immediate medical attention for falls, head injury or deep cuts    Follow up appointment in 3 week(s) to reduce risk of adverse events from warfarin  Lorena Marie, PharmD

## 2019-05-10 RX ORDER — WARFARIN SODIUM 5 MG/1
TABLET ORAL
Qty: 135 TAB | Refills: 0 | Status: SHIPPED | OUTPATIENT
Start: 2019-05-10 | End: 2019-08-08 | Stop reason: SDUPTHER

## 2019-05-10 NOTE — TELEPHONE ENCOUNTER
VOICEMAIL  1. Caller Name: Tavares Bonilla                        Call Back Number: [phone] (home) [phone] (work)      2. Message: Pt LEFT VM ASKING FOR THE STATUS OF THIS REQUEST AS HE ONLY HAS 3 TABS LEFT     3. Patient approves office to leave a detailed voicemail/MyChart message: yes

## 2019-05-10 NOTE — TELEPHONE ENCOUNTER
Was the patient seen in the last year in this department? Yes    Does patient have an active prescription for medications requested? NO    Received Request Via: Pharmacy

## 2019-05-16 ENCOUNTER — HOSPITAL ENCOUNTER (OUTPATIENT)
Dept: LAB | Facility: MEDICAL CENTER | Age: 69
End: 2019-05-16
Attending: PHYSICIAN ASSISTANT
Payer: MEDICARE

## 2019-05-16 LAB — PSA SERPL-MCNC: 0.32 NG/ML (ref 0–4)

## 2019-05-16 PROCEDURE — 36415 COLL VENOUS BLD VENIPUNCTURE: CPT | Mod: GA

## 2019-05-16 PROCEDURE — 84153 ASSAY OF PSA TOTAL: CPT | Mod: GA

## 2019-05-23 ENCOUNTER — TELEPHONE (OUTPATIENT)
Dept: VASCULAR LAB | Facility: MEDICAL CENTER | Age: 69
End: 2019-05-23

## 2019-05-23 NOTE — TELEPHONE ENCOUNTER
[facility]    Left a voicemail to remind pt to obtain next INR.  Pt should be seen at  if possible.    Ganesh Mao, AdamD

## 2019-06-21 ENCOUNTER — TELEPHONE (OUTPATIENT)
Dept: VASCULAR LAB | Facility: MEDICAL CENTER | Age: 69
End: 2019-06-21

## 2019-06-21 NOTE — TELEPHONE ENCOUNTER
[facility]    Left a voicemail to remind pt to obtain next INR. Sent letter of compliance.    Ganesh Mao, AdamD

## 2019-06-21 NOTE — LETTER
June 21, 2019    Tavares Bonilla  [address] [id number]  [place] [address]    Dear Tavares Bonilla    We have been unsuccessful in our attempts to contact you regarding your Anticoagulation Service appointments.  Warfarin is a potent blood-thinning agent that requires frequent monitoring to measure its level in the blood.  If your level becomes too high, you could develop serious, sometimes life-threatening bleeding problems.  If your level becomes too low, life-threatening blood clots or stroke could result.     The last recorded INR that we have on file for you is:  INR   Date Value Ref Range Status   04/11/2019 2.2  Final     It is extremely important that you have your lab work drawn as soon as possible.  Alternatively, you may schedule an appointment for a fingerstick INR at our clinic.  We are open Monday-Friday 8 am until 5 pm.  You may reach our Service at [phone].     To monitor your warfarin level effectively, we need to be able to communicate with you.  This is a requirement to be followed by our Service.  If we are unable to contact you on three separate occasions, you will be discharged from the Anticoagulation Service.     If you repeatedly fail to keep your lab appointments, you are at risk of being discharged from the Service.        Sincerely,     Sahil Harris, PharmD, [facility]  Pharmacy Clinical Supervisor  [facility]  Outpatient Anticoagulation Service

## 2019-07-01 DIAGNOSIS — E78.2 MIXED HYPERLIPIDEMIA: ICD-10-CM

## 2019-07-01 RX ORDER — ROSUVASTATIN CALCIUM 5 MG/1
5 TABLET, COATED ORAL EVERY EVENING
Qty: 100 TAB | Refills: 1 | Status: SHIPPED | OUTPATIENT
Start: 2019-07-01 | End: 2019-07-18 | Stop reason: SDUPTHER

## 2019-07-01 NOTE — TELEPHONE ENCOUNTER
1. Caller Name: Tavares Bonilla                                           Call Back Number: [phone] (home) [phone] (work)    Patient wanted a confirmation on next appt.   Also, he is requesting a 90 day supply of medication.    Please advise.

## 2019-07-12 ENCOUNTER — HOSPITAL ENCOUNTER (OUTPATIENT)
Dept: LAB | Facility: MEDICAL CENTER | Age: 69
End: 2019-07-12
Attending: INTERNAL MEDICINE
Payer: MEDICARE

## 2019-07-12 DIAGNOSIS — E78.2 MIXED HYPERLIPIDEMIA: ICD-10-CM

## 2019-07-12 LAB
ALBUMIN SERPL BCP-MCNC: 4 G/DL (ref 3.2–4.9)
ALBUMIN/GLOB SERPL: 1.4 G/DL
ALP SERPL-CCNC: 65 U/L (ref 30–99)
ALT SERPL-CCNC: 18 U/L (ref 2–50)
ANION GAP SERPL CALC-SCNC: 6 MMOL/L (ref 0–11.9)
AST SERPL-CCNC: 17 U/L (ref 12–45)
BILIRUB SERPL-MCNC: 0.5 MG/DL (ref 0.1–1.5)
BUN SERPL-MCNC: 17 MG/DL (ref 8–22)
CALCIUM SERPL-MCNC: 8.8 MG/DL (ref 8.5–10.5)
CHLORIDE SERPL-SCNC: 108 MMOL/L (ref 96–112)
CHOLEST SERPL-MCNC: 141 MG/DL (ref 100–199)
CO2 SERPL-SCNC: 27 MMOL/L (ref 20–33)
CREAT SERPL-MCNC: 1.3 MG/DL (ref 0.5–1.4)
FASTING STATUS PATIENT QL REPORTED: NORMAL
GLOBULIN SER CALC-MCNC: 2.9 G/DL (ref 1.9–3.5)
GLUCOSE SERPL-MCNC: 104 MG/DL (ref 65–99)
HDLC SERPL-MCNC: 40 MG/DL
LDLC SERPL CALC-MCNC: 79 MG/DL
POTASSIUM SERPL-SCNC: 4.5 MMOL/L (ref 3.6–5.5)
PROT SERPL-MCNC: 6.9 G/DL (ref 6–8.2)
SODIUM SERPL-SCNC: 141 MMOL/L (ref 135–145)
TRIGL SERPL-MCNC: 111 MG/DL (ref 0–149)

## 2019-07-12 PROCEDURE — 36415 COLL VENOUS BLD VENIPUNCTURE: CPT

## 2019-07-12 PROCEDURE — 80053 COMPREHEN METABOLIC PANEL: CPT

## 2019-07-12 PROCEDURE — 80061 LIPID PANEL: CPT

## 2019-07-18 ENCOUNTER — OFFICE VISIT (OUTPATIENT)
Dept: MEDICAL GROUP | Age: 69
End: 2019-07-18
Payer: MEDICARE

## 2019-07-18 VITALS
DIASTOLIC BLOOD PRESSURE: 78 MMHG | TEMPERATURE: 98.1 F | OXYGEN SATURATION: 94 % | WEIGHT: 247.6 LBS | HEART RATE: 90 BPM | SYSTOLIC BLOOD PRESSURE: 124 MMHG | HEIGHT: 72 IN | BODY MASS INDEX: 33.54 KG/M2

## 2019-07-18 DIAGNOSIS — I87.099 CHRONIC VENOUS HYPERTENSION DUE TO DVT: ICD-10-CM

## 2019-07-18 DIAGNOSIS — E66.9 OBESITY (BMI 30.0-34.9): ICD-10-CM

## 2019-07-18 DIAGNOSIS — Z79.01 ANTICOAGULATED ON WARFARIN: ICD-10-CM

## 2019-07-18 DIAGNOSIS — D68.51 FACTOR 5 LEIDEN MUTATION, HETEROZYGOUS (HCC): ICD-10-CM

## 2019-07-18 DIAGNOSIS — E78.2 MIXED HYPERLIPIDEMIA: ICD-10-CM

## 2019-07-18 DIAGNOSIS — K21.9 GASTROESOPHAGEAL REFLUX DISEASE WITHOUT ESOPHAGITIS: ICD-10-CM

## 2019-07-18 DIAGNOSIS — F11.20 UNCOMPLICATED OPIOID DEPENDENCE (HCC): ICD-10-CM

## 2019-07-18 DIAGNOSIS — N40.1 BENIGN PROSTATIC HYPERPLASIA WITH INCOMPLETE BLADDER EMPTYING: ICD-10-CM

## 2019-07-18 DIAGNOSIS — I87.009 POST-PHLEBITIC SYNDROME: ICD-10-CM

## 2019-07-18 DIAGNOSIS — R39.14 BENIGN PROSTATIC HYPERPLASIA WITH INCOMPLETE BLADDER EMPTYING: ICD-10-CM

## 2019-07-18 DIAGNOSIS — G89.4 CHRONIC PAIN SYNDROME: ICD-10-CM

## 2019-07-18 DIAGNOSIS — N31.9 NEUROGENIC BLADDER: ICD-10-CM

## 2019-07-18 PROCEDURE — 99214 OFFICE O/P EST MOD 30 MIN: CPT | Performed by: INTERNAL MEDICINE

## 2019-07-18 RX ORDER — OXYCODONE HYDROCHLORIDE AND ACETAMINOPHEN 5; 325 MG/1; MG/1
1 TABLET ORAL EVERY 6 HOURS PRN
Qty: 120 TAB | Refills: 0 | Status: SHIPPED | OUTPATIENT
Start: 2019-07-18 | End: 2020-01-23 | Stop reason: SDUPTHER

## 2019-07-18 RX ORDER — ROSUVASTATIN CALCIUM 5 MG/1
5 TABLET, COATED ORAL EVERY EVENING
Qty: 100 TAB | Refills: 4 | Status: SHIPPED | OUTPATIENT
Start: 2019-07-18 | End: 2020-07-06 | Stop reason: SDUPTHER

## 2019-07-18 ASSESSMENT — ENCOUNTER SYMPTOMS
EYES NEGATIVE: 1
PSYCHIATRIC NEGATIVE: 1
CONSTITUTIONAL NEGATIVE: 1
RESPIRATORY NEGATIVE: 1
CARDIOVASCULAR NEGATIVE: 1
MUSCULOSKELETAL NEGATIVE: 1
GASTROINTESTINAL NEGATIVE: 1
NEUROLOGICAL NEGATIVE: 1

## 2019-07-18 NOTE — PROGRESS NOTES
Subjective:      Tavares Bonilla is a 68 y.o. male who presents with Follow-Up; Hyperlipidemia; and Gastrophageal Reflux        HPI    The patient is here for followup of chronic medical problems listed below. The patient is compliant with medications and having no side effects from them. Denies chest pain, abdominal pain, dyspnea, myalgias, or cough. The patient states his mother is close to the end of her life, and he is going to [place] in October to be with her for the next few months.     Mixed Hyperlipidemia  The patient is currently taking rosuvastatin 5 mg QD without side effects. Lipid Panel on 7/12/19 was WNL. He is requesting a 90-day supply to tide him over while he's in [place].     Factor 5 Leiden Mutation  Anticoagulation   The patient is still taking Warfarin 5 mg daily without side effects. He continues to see the Warfarin clinic to check his INRevery 2-3 months. He does have a machine at home, but doesn't use it often. Occasionally he is told to temporarily adjust his dose of his warfarin by the clinic when his INR is too low or high. He is overdue for a recheck. He will find a place in [place] to continue checking his INR. He states he has an acquaintance down there that will help him.     Benign Prostatic Hyperplasia   Neurogenic Bladder   The patient is still self-catheterizing. He continues to take tamsulosin 0.8 mg QD and finsateride 5 mg QD without side effects. He recently had a cystoscopy, which was normal. He notes his prostate looked enlarged, but his Urologist, Dr. Gracia, was not concerned.     Post-Phlebitic Syndrome  Chronic Pain Syndrome   Uncomplicated Opioid Dependence   Patient reports history of chronic right leg pain well controlled with Diclofenac sodium 1% gel and Percocet as needed. He states he takes the Percocet every 3 days on average and 120 doses lasts him 4-5 months. He is requesting a refill.          Patient Active Problem List   Diagnosis   • History of  pulmonary embolism   • Factor 5 Leiden mutation, heterozygous (HCC)   • Gastroesophageal reflux disease without esophagitis   • Hypovitaminosis D   • Chronic pain syndrome- right leg from post phlebitic syn; on percocet once a day   • Chronic venous hypertension due to DVT   • Anticoagulated on warfarin   • Mixed hyperlipidemia    • Post-phlebitic syndrome- RIGHT LEG   • Obesity (BMI 30.0-34.9)   • Uncomplicated opioid dependence (HCC)   • Benign prostatic hyperplasia with incomplete bladder emptying- self cath since 5/2018 ing hernia repair   • Atrophy of right kidney- urology [facility]   • Stage 3 chronic kidney disease (HCC)   • Neoplasm of uncertain behavior of skin of face   • Obstructive uropathy   • Neurogenic bladder- self cath since 5/2018   • Primary osteoarthritis of right knee   • Post-phlebitic dermatosis of right lower extremity       Outpatient Medications Prior to Visit   Medication Sig Dispense Refill   • rosuvastatin (CRESTOR) 5 MG Tab Take 1 Tab by mouth every evening. 100 Tab 1   • warfarin (COUMADIN) 5 MG Tab TAKE 1 TO 1 & 1/2 (ONE & ONE-HALF) TABLETS BY MOUTH ONCE DAILY AS DIRECTED BY [facility] 135 Tab 0   • Diclofenac Sodium 1 % Gel Apply 1 Inch to skin as directed every 3 hours as needed. 3 Tube 4   • finasteride (PROSCAR) 5 MG Tab      • tamsulosin (FLOMAX) 0.4 MG capsule Take 2 Caps by mouth every day. 180 Cap 4   • Cyanocobalamin (VITAMIN B-12) 1000 MCG Tab Take 1,000 mcg by mouth every day.     • Cholecalciferol (VITAMIN D) 2000 UNITS Cap Take 1 Cap by mouth every day. 100 Cap 3     No facility-administered medications prior to visit.         Allergies   Allergen Reactions   • Lovastatin      Leg cramps   • Other Food Hives     Peanuts gives me Vertigo and hives   • Shellfish Allergy Hives     shrimp   • Statins [Hmg-Coa-R Inhibitors]      Muscle cramps       Review of Systems   Constitutional: Negative.    HENT: Negative.    Eyes: Negative.    Respiratory: Negative.     Cardiovascular: Negative.    Gastrointestinal: Negative.    Genitourinary: Negative.    Musculoskeletal: Negative.    Skin: Negative.    Neurological: Negative.    Endo/Heme/Allergies: Negative.    Psychiatric/Behavioral: Negative.    All other systems reviewed and are negative.     Objective:     /78 (BP Location: Left arm, Patient Position: Sitting, BP Cuff Size: Adult)   Pulse 90   Temp 36.7 °C (98.1 °F) (Temporal)   Ht 1.829 m (6')   Wt 112.3 kg (247 lb 9.6 oz)   SpO2 94%   BMI 33.58 kg/m²     Physical Exam   Constitutional: Oriented to person, place, and time. Appears well-developed and well-nourished. No distress.   Head: Normocephalic and atraumatic.   Right Ear: External ear normal.   Left Ear: External ear normal.   Nose: Nose normal.   Mouth/Throat: Oropharynx is clear and moist. No oropharyngeal exudate.   Eyes: Pupils are equal, round, and reactive to light. Conjunctivae and EOM are normal. Right eye exhibits no discharge. Left eye exhibits no discharge. No scleral icterus.   Neck: Normal range of motion. Neck supple. No JVD present. No tracheal deviation present. No thyromegaly present.   Cardiovascular: Normal rate, regular rhythm, normal heart sounds and intact distal pulses.  Exam reveals no gallop and no friction rub.    No murmur heard.  Pulmonary/Chest: Effort normal. No stridor. No respiratory distress. No wheezing or rales. No tenderness.   Abdominal: Soft. Bowel sounds are normal. No distension and no mass. There is no tenderness. There is no rebound and no guarding. No hernia.   Musculoskeletal: Normal range of motion No edema or tenderness.   Lymphadenopathy: No cervical adenopathy.   Neurological: Alert and oriented to person, place, and time. Normal reflexes. Normal reflexes. No cranial nerve deficit. Normal muscle tone. Coordination normal.   Skin: Skin is warm and dry. No rash noted. Not diaphoretic. No erythema. No pallor.   Psychiatric: Normal mood and affect. Behavior is  normal. Judgment and thought content normal.   Nursing note and vitals reviewed.      Lab Results   Component Value Date/Time    HBA1C 5.8 05/01/2013 08:53 AM     Lab Results   Component Value Date/Time    SODIUM 141 07/12/2019 09:41 AM    POTASSIUM 4.5 07/12/2019 09:41 AM    CHLORIDE 108 07/12/2019 09:41 AM    CO2 27 07/12/2019 09:41 AM    GLUCOSE 104 (H) 07/12/2019 09:41 AM    BUN 17 07/12/2019 09:41 AM    CREATININE 1.30 07/12/2019 09:41 AM    CREATININE 1.3 02/11/2009 04:05 AM    ALKPHOSPHAT 65 07/12/2019 09:41 AM    ASTSGOT 17 07/12/2019 09:41 AM    ALTSGPT 18 07/12/2019 09:41 AM    TBILIRUBIN 0.5 07/12/2019 09:41 AM     Lab Results   Component Value Date/Time    INR 2.2 04/11/2019 03:38 PM    INR 1.9 03/28/2019 03:46 PM    INR 1.5 03/14/2019 04:07 PM     Lab Results   Component Value Date/Time    CHOLSTRLTOT 141 07/12/2019 09:41 AM    LDL 79 07/12/2019 09:41 AM    HDL 40 07/12/2019 09:41 AM    TRIGLYCERIDE 111 07/12/2019 09:41 AM       Lab Results   Component Value Date/Time    TESTOSTERONE 222 01/22/2014 09:04 AM     No results found for: TSH  Lab Results   Component Value Date/Time    FREET4 0.84 05/01/2013 08:53 AM    FREET4 0.90 01/24/2012 10:38 AM     No results found for: URICACID  No components found for: VITB12  Lab Results   Component Value Date/Time    25HYDROXY 40 05/01/2015 09:53 AM    25HYDROXY 28 (L) 01/22/2014 09:04 AM        Assessment/Plan:     1. Chronic venous hypertension due to DVT  Under good control without medication. Continue same regimen.    2. Obesity (BMI 30.0-34.9)  The patient has lost 2 lbs since his last visit. He states he is working on his diet. I advised continued diet/exercise/lose 15 lbs.; patient counseled.    3. Benign prostatic hyperplasia with incomplete bladder emptying- self cath since 5/2018 ing hernia repair  Under good control. Continue same regimen of tamsulosin and finasteride, and self catheterization.     4. Uncomplicated opioid dependence (HCC)  Under good  control. Continue same regimen of percocet as needed. I have refilled his prescription.   - oxyCODONE-acetaminophen (PERCOCET) 5-325 MG Tab; Take 1 Tab by mouth every 6 hours as needed for up to 90 days.  Dispense: 120 Tab; Refill: 0    5. Mixed hyperlipidemia   - rosuvastatin (CRESTOR) 5 MG Tab; Take 1 Tab by mouth every evening.  Dispense: 100 Tab; Refill: 4    6. Neurogenic bladder- self cath since 5/2018  Under good control. Continue same regimen of tamsulosin and finasteride, and self catheterization.     7. Gastroesophageal reflux disease without esophagitis  Under good control. Continue same regimen.    8. Anticoagulated on warfarin  I instructed the patient to try and check his INR every 2 months. Under good control. Continue same regimen of Warfarin.      9. Chronic pain syndrome- right leg from post phlebitic syn; on percocet once a day  Under good control. Continue same regimen of percocet as needed. I have refilled his prescription.   - oxyCODONE-acetaminophen (PERCOCET) 5-325 MG Tab; Take 1 Tab by mouth every 6 hours as needed for up to 90 days.  Dispense: 120 Tab; Refill: 0    10. Factor 5 Leiden mutation, heterozygous (HCC)  I instructed the patient to try and check his INR every 2 months. Under good control. Continue same regimen of Warfarin.      11. Post-phlebitic syndrome- RIGHT LEG  Under good control. Continue same regimen of percocet as needed. I have refilled his prescription.   - oxyCODONE-acetaminophen (PERCOCET) 5-325 MG Tab; Take 1 Tab by mouth every 6 hours as needed for up to 90 days.  Dispense: 120 Tab; Refill: 0        30 minute face-to-face encounter took place today.  More than half of this time was spent in the coordination of care of the above problems, as well as counseling.     Deidre BARRAZA (Scrnael), am scribing for, and in the presence of, Keron Garcia M.D..    Electronically signed by: Deidre Patel (Patricio), 7/18/2019    Keron BARRAZA M.D., personally performed  the services described in this documentation, as scribed by Deidre Patel in my presence, and it is both accurate and complete.

## 2019-07-19 ENCOUNTER — TELEPHONE (OUTPATIENT)
Dept: VASCULAR LAB | Facility: MEDICAL CENTER | Age: 69
End: 2019-07-19

## 2019-07-19 NOTE — TELEPHONE ENCOUNTER
INR   Date Value Ref Range Status   04/11/2019 2.2  Final     POC INR   Date Value Ref Range Status   07/08/2016 2.3 (H) 0.9 - 1.2 Final     Comment:     INR - Non-therapeutic Reference Range: 0.9-1.2  INR - Therapeutic Reference Range: 2.0-4.0       Tried all numbers in the chart, they are all disconnected, even ER contact.     Will send letter.     Jacquelin Lowery, PharmD

## 2019-07-19 NOTE — LETTER
July 19, 2019    Tavares Bonilla  [address] [address]  [place] [address]    Dear Tavares Bonilla    We have been unsuccessful in our attempts to contact you regarding your Anticoagulation Service appointments.  Warfarin is a potent blood-thinning agent that requires frequent monitoring to measure its level in the blood.  If your level becomes too high, you could develop serious, sometimes life-threatening bleeding problems.  If your level becomes too low, life-threatening blood clots or stroke could result.     The last recorded INR that we have on file for you is:  INR   Date Value Ref Range Status   04/11/2019 2.2  Final     It is extremely important that you have your lab work drawn as soon as possible.  Alternatively, you may schedule an appointment for a fingerstick INR at our clinic.  We are open Monday-Friday 8 am until 5 pm.  You may reach our Service at [phone].     To monitor your warfarin level effectively, we need to be able to communicate with you.  This is a requirement to be followed by our Service.  If we are unable to contact you on three separate occasions, you will be discharged from the Anticoagulation Service.     If you repeatedly fail to keep your lab appointments, you are at risk of being discharged from the Service.           Sincerely,           Sahil Harris, PharmD, [facility]  Pharmacy Clinical Supervisor  [facility]  Outpatient Anticoagulation Service

## 2019-07-31 ENCOUNTER — ANTICOAGULATION MONITORING (OUTPATIENT)
Dept: VASCULAR LAB | Facility: MEDICAL CENTER | Age: 69
End: 2019-07-31

## 2019-07-31 DIAGNOSIS — Z86.711 HISTORY OF PULMONARY EMBOLISM: ICD-10-CM

## 2019-07-31 DIAGNOSIS — D68.51 FACTOR 5 LEIDEN MUTATION, HETEROZYGOUS (HCC): ICD-10-CM

## 2019-07-31 DIAGNOSIS — I87.009 POST-PHLEBITIC SYNDROME: ICD-10-CM

## 2019-07-31 DIAGNOSIS — Z79.01 ANTICOAGULATED ON WARFARIN: ICD-10-CM

## 2019-07-31 NOTE — PROGRESS NOTES
[facility]     Unable to reach pt over the past 3 months.  No response to 4 phone calls and letter of compliance.  Unable to reach any emergency contacts.     Discharged from [facility] unless otherwise directed by our medical director.     Ganesh Mao, PharmD     CC  Dr Michael Bloch Dr Michael Haley

## 2019-08-08 ENCOUNTER — ANTICOAGULATION VISIT (OUTPATIENT)
Dept: MEDICAL GROUP | Facility: MEDICAL CENTER | Age: 69
End: 2019-08-08
Payer: MEDICARE

## 2019-08-08 DIAGNOSIS — Z79.01 ANTICOAGULATED ON WARFARIN: ICD-10-CM

## 2019-08-08 DIAGNOSIS — Z79.01 LONG TERM (CURRENT) USE OF ANTICOAGULANTS: Primary | ICD-10-CM

## 2019-08-08 LAB — INR PPP: 2.1 (ref 2–3.5)

## 2019-08-08 PROCEDURE — 93793 ANTICOAG MGMT PT WARFARIN: CPT | Performed by: INTERNAL MEDICINE

## 2019-08-08 PROCEDURE — 85610 PROTHROMBIN TIME: CPT | Performed by: INTERNAL MEDICINE

## 2019-08-08 RX ORDER — WARFARIN SODIUM 5 MG/1
TABLET ORAL
Qty: 135 TAB | Refills: 0 | Status: SHIPPED | OUTPATIENT
Start: 2019-08-08 | End: 2020-01-02

## 2019-08-08 NOTE — PROGRESS NOTES
OP Anticoagulation Service Note    Date: 2019  There were no vitals filed for this visit.   pt declined vitals    Anticoagulation Summary  As of 2019    INR goal:   2.0-3.0   TTR:   59.6 % (2.9 y)   INR used for dosin.10 (2019)   Warfarin maintenance plan:   7.5 mg (5 mg x 1.5) every Wed, Sat; 5 mg (5 mg x 1) all other days   Weekly warfarin total:   40 mg   Plan last modified:   Lorena Marie, PharmD (2019)   Next INR check:   2019   Target end date:   Indefinite    Indications    Anticoagulated on warfarin [Z79.01]  DVT (deep vein thrombosis) in pregnancy (HCC) (Resolved) [O22.30  I82.409]  DVT (deep venous thrombosis) (HCC) (Resolved) [I82.409]  Long term (current) use of anticoagulants [Z79.01]             Anticoagulation Episode Summary     INR check location:   Anticoagulation Clinic    Preferred lab:       Send INR reminders to:       Comments:         Anticoagulation Care Providers     Provider Role Specialty Phone number    [facility]   [phone]    Keron Garcia M.D.  Internal Medicine [phone]        Anticoagulation Patient Findings      HPI:   Tavares Bonilla seen in clinic today, on anticoagulation therapy with warfarin (a high risk medication) for hx of DVT     Pt is non-compliant with follow up, he is now returning to our clinic to re-establish          Confirmed warfarin dosing regimen, denies missed or extra doses of coumadin.   Diet has been consistent with foods rich in vitamin K: Yes  Changes in ETOH:  No  Changes in smoking status: No  Changes in medication: No   Cost restriction: No  S/s of bleeding:  No  Falls or accidents since last visit No  Signs/symptoms  thrombosis since the last appt: No    A/P   INR  therapeutic today,    Continue current warfarin regimen          12- check referral    Pt educated to contact our clinic with any changes in medications or s/s of bleeding or thrombosis. Pt is aware to seek immediate  medical attention for falls, head injury or deep cuts    Follow up appointment in 6 week(s) to reduce risk of adverse events from warfarin   Lorena Marie, PharmD

## 2019-08-13 ENCOUNTER — TELEPHONE (OUTPATIENT)
Dept: VASCULAR LAB | Facility: MEDICAL CENTER | Age: 69
End: 2019-08-13

## 2019-08-13 NOTE — TELEPHONE ENCOUNTER
"Received returned mail from address in patient's chart. \"Unclaimed\"    Gaurav Condon. [facility]  [facility]  "

## 2019-09-19 ENCOUNTER — APPOINTMENT (OUTPATIENT)
Dept: MEDICAL GROUP | Facility: MEDICAL CENTER | Age: 69
End: 2019-09-19
Payer: MEDICARE

## 2019-10-07 ENCOUNTER — ANTICOAGULATION MONITORING (OUTPATIENT)
Dept: VASCULAR LAB | Facility: MEDICAL CENTER | Age: 69
End: 2019-10-07

## 2019-10-07 DIAGNOSIS — Z79.01 ANTICOAGULATED ON WARFARIN: ICD-10-CM

## 2019-10-07 DIAGNOSIS — Z79.01 LONG TERM (CURRENT) USE OF ANTICOAGULANTS: ICD-10-CM

## 2019-10-08 NOTE — PROGRESS NOTES
Left message reminding patient to get INR checked. Left call back number.    Cleveland Gamble, Pharmacy Intern

## 2019-10-21 ENCOUNTER — ANTICOAGULATION MONITORING (OUTPATIENT)
Dept: VASCULAR LAB | Facility: MEDICAL CENTER | Age: 69
End: 2019-10-21

## 2019-10-21 DIAGNOSIS — Z79.01 ANTICOAGULATED ON WARFARIN: ICD-10-CM

## 2019-10-21 DIAGNOSIS — Z79.01 LONG TERM (CURRENT) USE OF ANTICOAGULANTS: ICD-10-CM

## 2019-10-22 NOTE — PROGRESS NOTES
Pt is over due for INR for warfarin monitoring. Called to remind patient to get INR lab done. He will come to appointment on 10/31 at [facility] [place] [facility]

## 2019-10-31 ENCOUNTER — ANTICOAGULATION VISIT (OUTPATIENT)
Dept: MEDICAL GROUP | Facility: MEDICAL CENTER | Age: 69
End: 2019-10-31
Payer: MEDICARE

## 2019-10-31 DIAGNOSIS — Z79.01 LONG TERM (CURRENT) USE OF ANTICOAGULANTS: ICD-10-CM

## 2019-10-31 DIAGNOSIS — Z79.01 ANTICOAGULATED ON WARFARIN: ICD-10-CM

## 2019-10-31 LAB — INR PPP: 1.4 (ref 2–3.5)

## 2019-10-31 PROCEDURE — 99211 OFF/OP EST MAY X REQ PHY/QHP: CPT | Performed by: INTERNAL MEDICINE

## 2019-10-31 PROCEDURE — 85610 PROTHROMBIN TIME: CPT | Performed by: INTERNAL MEDICINE

## 2019-10-31 NOTE — PROGRESS NOTES
Anticoagulation Summary  As of 10/31/2019    INR goal:   2.0-3.0   TTR:   3.3 % (2.5 mo)   INR used for dosin.40! (10/31/2019)   Warfarin maintenance plan:   7.5 mg (5 mg x 1.5) every Wed, Fri; 5 mg (5 mg x 1) all other days   Weekly warfarin total:   40 mg   Plan last modified:   Dulce Maria Bowens, PharmD (10/31/2019)   Next INR check:   2019   Target end date:   Indefinite    Indications    Anticoagulated on warfarin [Z79.01]  DVT (deep vein thrombosis) in pregnancy (Resolved) [O22.30]  DVT (deep venous thrombosis) (HCC) (Resolved) [I82.409]  Long term (current) use of anticoagulants [Z79.01]             Anticoagulation Episode Summary     INR check location:   Anticoagulation Clinic    Preferred lab:       Send INR reminders to:       Comments:         Anticoagulation Care Providers     Provider Role Specialty Phone number    [facility]   [phone]    Keron Garcia M.D.  Internal Medicine [phone]        Anticoagulation Patient Findings  Patient Findings     Positives:   Missed doses    Negatives:   Signs/symptoms of thrombosis, Signs/symptoms of bleeding, Laboratory test error suspected, Change in health, Change in alcohol use, Change in activity, Upcoming invasive procedure, Emergency department visit, Upcoming dental procedure, Extra doses, Change in medications, Change in diet/appetite, Hospital admission, Bruising, Other complaints            HPI:   Pt seen in clinic today, on anticoagulation therapy with warfarin for history of DVT    Patient's previous INR was therapeutic at 2.1 on , at which time patient was instructed to continue regimen.  He returns to clinic today to recheck INR to ensure it is therapeutic and thus preventing possible clotting and/or bleeding/bruising complications.    Does patient have any changes to current medical/health status since last appt (Y/N):  n  Does patient have any signs/symptoms of bleeding and/or thrombosis since the last  appt (Y/N):  n  Does patient have any interval changes to diet or medications since last appt (Y/N):  n  Are there any complications or cost restrictions with current therapy (Y/N):  n       Vitals declined    Asssessment:      INR subtherapeutic at 1.4, therefore pt at risk for thrombosis   Reason(s) for out of range INR today:  Pt has been taking lower dosage regimen than instructed: 7.5 mg W, 5 mg ROW    Plan:  Increase regimen to 7.5 mg W AND F, 5 mg ROW     Follow up:  Because warfarin is a high risk medication and current CHEST guidelines recommend regular monitoring intervals (few days up to 12 weeks), will have patient return to clinic in 1 weeks to recheck INR.    Pt's [facility] PCP is Keron Garcia M.D.    Referral due 12/2019    Dulce Maria Bowens, AdamD

## 2019-11-25 ENCOUNTER — TELEPHONE (OUTPATIENT)
Dept: VASCULAR LAB | Facility: MEDICAL CENTER | Age: 69
End: 2019-11-25

## 2019-11-25 NOTE — TELEPHONE ENCOUNTER
Left message for pt to have INR checked    INR   Date Value Ref Range Status   10/31/2019 1.40  Final     Jacquelin Lowery, PharmD

## 2019-11-26 DIAGNOSIS — Z79.01 CHRONIC ANTICOAGULATION: ICD-10-CM

## 2019-12-08 ENCOUNTER — OFFICE VISIT (OUTPATIENT)
Dept: URGENT CARE | Facility: CLINIC | Age: 69
End: 2019-12-08
Payer: MEDICARE

## 2019-12-08 VITALS
RESPIRATION RATE: 16 BRPM | TEMPERATURE: 98.6 F | WEIGHT: 233.2 LBS | DIASTOLIC BLOOD PRESSURE: 60 MMHG | SYSTOLIC BLOOD PRESSURE: 100 MMHG | HEIGHT: 72 IN | OXYGEN SATURATION: 95 % | BODY MASS INDEX: 31.59 KG/M2 | HEART RATE: 98 BPM

## 2019-12-08 DIAGNOSIS — J98.8 RTI (RESPIRATORY TRACT INFECTION): ICD-10-CM

## 2019-12-08 DIAGNOSIS — Z79.01 CHRONIC ANTICOAGULATION: ICD-10-CM

## 2019-12-08 PROCEDURE — 99214 OFFICE O/P EST MOD 30 MIN: CPT | Mod: 25 | Performed by: NURSE PRACTITIONER

## 2019-12-08 RX ORDER — DOXYCYCLINE HYCLATE 100 MG
100 TABLET ORAL 2 TIMES DAILY
Qty: 20 TAB | Refills: 0 | Status: SHIPPED | OUTPATIENT
Start: 2019-12-08 | End: 2019-12-18

## 2019-12-08 ASSESSMENT — PAIN SCALES - GENERAL: PAINLEVEL: 7=MODERATE-SEVERE PAIN

## 2019-12-08 NOTE — PROGRESS NOTES
Chief Complaint   Patient presents with   • Congestion     cough, headaches, chils and sweats, sore sinus, nasal drainage, slight wheezing, coughing as patinent's deep breathing x 1 week       HISTORY OF PRESENT ILLNESS: Patient is a 69 y.o. male who presents today due to symptoms that started several weeks ago. He was ill for one week with URI symptoms which improved for several days but then returned one week ago.  days ago. Pt reports a cough, nasal congestion, sinus pressure, mild sore throat, bilateral ear pressure, malaise, fatigue, malaise, and body aches. Denies h/o asthma/copd. Notes distant history of PNA. Has tried OTC cold medications without significant relief of symptoms. No recent ABX use. He does take coumadin daily for Factor V. No other aggravating or alleviating factors.     Patient Active Problem List    Diagnosis Date Noted   • Chronic venous hypertension due to DVT 11/17/2014     Priority: Medium   • Obesity (BMI 30.0-34.9) 04/07/2017     Priority: Low   • Anticoagulated on warfarin 11/17/2014     Priority: Low   • History of pulmonary embolism 01/24/2012     Priority: Low   • Long term (current) use of anticoagulants 08/08/2019   • Neoplasm of uncertain behavior of skin of face 04/04/2019   • Obstructive uropathy 04/04/2019   • Neurogenic bladder- self cath since 5/2018 04/04/2019   • Primary osteoarthritis of right knee 04/04/2019   • Post-phlebitic dermatosis of right lower extremity 04/04/2019   • Benign prostatic hyperplasia with incomplete bladder emptying- self cath since 5/2018 ing hernia repair 08/24/2018   • Atrophy of right kidney- urology nv 08/24/2018   • Stage 3 chronic kidney disease (HCC) 08/24/2018   • Uncomplicated opioid dependence (HCC) 05/18/2018   • Post-phlebitic syndrome- RIGHT LEG 05/22/2015   • Mixed hyperlipidemia  11/17/2014   • Chronic pain syndrome- right leg from post phlebitic syn; on percocet once a day 03/12/2014   • Hypovitaminosis D 03/26/2012   • Factor 5  Leiden mutation, heterozygous ([facility]) 01/24/2012   • Gastroesophageal reflux disease without esophagitis 01/24/2012       Allergies:Lovastatin; Other food; Shellfish allergy; and Statins [hmg-coa-r inhibitors]    Current Outpatient Medications Ordered in Epic   Medication Sig Dispense Refill   • doxycycline (VIBRAMYCIN) 100 MG Tab Take 1 Tab by mouth 2 times a day for 10 days. 20 Tab 0   • warfarin (COUMADIN) 5 MG Tab TAKE 1 TO 1 & 1/2 (ONE & ONE-HALF) TABLETS BY MOUTH ONCE DAILY AS DIRECTED BY [facility] 135 Tab 0   • rosuvastatin (CRESTOR) 5 MG Tab Take 1 Tab by mouth every evening. 100 Tab 4   • finasteride (PROSCAR) 5 MG Tab      • tamsulosin (FLOMAX) 0.4 MG capsule Take 2 Caps by mouth every day. 180 Cap 4   • Cyanocobalamin (VITAMIN B-12) 1000 MCG Tab Take 1,000 mcg by mouth every day.     • Cholecalciferol (VITAMIN D) 2000 UNITS Cap Take 1 Cap by mouth every day. 100 Cap 3   • Diclofenac Sodium 1 % Gel Apply 1 Inch to skin as directed every 3 hours as needed. (Patient not taking: Reported on 12/8/2019) 3 Tube 4     No current Epic-ordered facility-administered medications on file.        Past Medical History:   Diagnosis Date   • Blood clotting disorder ([facility]) 2012    leg and lung   • BPH (benign prostatic hyperplasia)    • Chronic pain    • Factor V deficiency ([facility])    • Stage 3 chronic kidney disease ([facility])        Social History     Tobacco Use   • Smoking status: Former Smoker     Packs/day: 0.00   • Smokeless tobacco: Never Used   Substance Use Topics   • Alcohol use: Not Currently     Alcohol/week: 0.0 - 0.6 oz   • Drug use: No       No family status information on file.   History reviewed. No pertinent family history.    ROS:  Review of Systems   Constitutional: Positive for fatigue, malaise. Negative for fever, weight loss.  HENT: Positive for congestion, ear pressure, and sore throat. Negative for ear pain, nosebleeds, and neck pain.    Eyes: Negative for vision changes.    Cardiovascular: Negative for chest pain, palpitations, orthopnea and leg swelling.   Respiratory: Positive for cough and sputum production. Negative for shortness of breath and wheezing.   Gastrointestinal: Negative for abdominal pain, nausea, vomiting or diarrhea.   Skin: Negative for rash, diaphoresis.     Exam:  /60 (BP Location: Left arm, Patient Position: Sitting, BP Cuff Size: Adult long)   Pulse 98   Temp 37 °C (98.6 °F) (Temporal)   Resp 16   Ht 1.829 m (6')   Wt 105.8 kg (233 lb 3.2 oz)   SpO2 95%   General: well-nourished, well-developed male in NAD  Head: normocephalic, atraumatic  Eyes: PERRLA, EOM within normal limits, no conjunctival injection, no scleral icterus, visual fields and acuity grossly intact.  Ears: normal shape and symmetry, no tenderness, no discharge. External canals are without any significant edema or erythema. Tympanic membranes are without any inflammation, no effusion. Gross auditory acuity is intact.  Nose: symmetrical without tenderness, mild discharge, erythema present bilateral nares.  Mouth/Throat: reasonable hygiene, no exudates or tonsillar enlargement. Mild erythema present.   Neck: no masses, range of motion within normal limits, no tracheal deviation.  Lymph: mild cervical adenopathy. No supraclavicular adenopathy.   Neuro: alert and oriented. Cranial nerves 1-12 grossly intact.   Cardiovascular: regular rate and rhythm without murmurs, rubs, or gallops. No edema.   Pulmonary: no distress. Chest is symmetrical with respiration. No wheezes, crackles, or rhonchi.   Musculoskeletal: appropriate muscle tone, gait is stable.  Skin: warm, dry, intact, no clubbing, no cyanosis.   Psych: appropriate mood, affect, judgement.         Assessment/Plan:  1. RTI (respiratory tract infection)  doxycycline (VIBRAMYCIN) 100 MG Tab   2. Chronic anticoagulation           Doxy for length of infection with double worsening. Rest, increase fluids, hand and respiratory hygiene.    Monitor INR as directed by antico clinic with doxy.   Supportive care, differential diagnoses, and indications for immediate follow-up discussed with patient.   Pathogenesis of diagnosis discussed including typical length and natural progression.  Instructed to return to clinic or nearest emergency department for any change in condition, further concerns, or worsening of symptoms.  Patient states understanding of the plan of care and discharge instructions.  Instructed to make an appointment with his primary care provider in the next 3-5 days if not significantly improving and for further care.         Please note that this dictation was created using voice recognition software. I have made every reasonable attempt to correct obvious errors, but I expect that there are errors of grammar and possibly content that I did not discover before finalizing the note.  A mask was worn for the entire visit with the patient.     ARMANI Morrissey.

## 2020-01-02 RX ORDER — WARFARIN SODIUM 5 MG/1
7.5 TABLET ORAL DAILY
Qty: 135 TAB | Refills: 4 | Status: SHIPPED
Start: 2020-01-02 | End: 2020-01-07

## 2020-01-03 ENCOUNTER — TELEPHONE (OUTPATIENT)
Dept: VASCULAR LAB | Facility: MEDICAL CENTER | Age: 70
End: 2020-01-03

## 2020-01-07 ENCOUNTER — APPOINTMENT (OUTPATIENT)
Dept: RADIOLOGY | Facility: IMAGING CENTER | Age: 70
End: 2020-01-07
Attending: NURSE PRACTITIONER
Payer: MEDICARE

## 2020-01-07 ENCOUNTER — APPOINTMENT (OUTPATIENT)
Dept: RADIOLOGY | Facility: MEDICAL CENTER | Age: 70
End: 2020-01-07
Attending: EMERGENCY MEDICINE
Payer: MEDICARE

## 2020-01-07 ENCOUNTER — OFFICE VISIT (OUTPATIENT)
Dept: URGENT CARE | Facility: CLINIC | Age: 70
End: 2020-01-07
Payer: MEDICARE

## 2020-01-07 ENCOUNTER — HOSPITAL ENCOUNTER (EMERGENCY)
Facility: MEDICAL CENTER | Age: 70
End: 2020-01-07
Attending: EMERGENCY MEDICINE
Payer: MEDICARE

## 2020-01-07 VITALS
DIASTOLIC BLOOD PRESSURE: 65 MMHG | RESPIRATION RATE: 18 BRPM | WEIGHT: 232 LBS | TEMPERATURE: 99 F | SYSTOLIC BLOOD PRESSURE: 110 MMHG | BODY MASS INDEX: 31.46 KG/M2 | HEART RATE: 124 BPM | OXYGEN SATURATION: 95 %

## 2020-01-07 VITALS
TEMPERATURE: 98.7 F | WEIGHT: 234.57 LBS | BODY MASS INDEX: 31.77 KG/M2 | OXYGEN SATURATION: 96 % | HEART RATE: 99 BPM | RESPIRATION RATE: 24 BRPM | SYSTOLIC BLOOD PRESSURE: 109 MMHG | DIASTOLIC BLOOD PRESSURE: 72 MMHG | HEIGHT: 72 IN

## 2020-01-07 DIAGNOSIS — R00.0 TACHYCARDIA: ICD-10-CM

## 2020-01-07 DIAGNOSIS — R10.10 PAIN OF UPPER ABDOMEN: ICD-10-CM

## 2020-01-07 DIAGNOSIS — N39.0 URINARY TRACT INFECTION WITHOUT HEMATURIA, SITE UNSPECIFIED: ICD-10-CM

## 2020-01-07 DIAGNOSIS — Z87.448 HISTORY OF CHRONIC KIDNEY DISEASE: ICD-10-CM

## 2020-01-07 DIAGNOSIS — R68.89 FLU-LIKE SYMPTOMS: ICD-10-CM

## 2020-01-07 DIAGNOSIS — R05.9 COUGH: ICD-10-CM

## 2020-01-07 DIAGNOSIS — D72.829 LEUKOCYTOSIS, UNSPECIFIED TYPE: ICD-10-CM

## 2020-01-07 DIAGNOSIS — R09.81 SINUS CONGESTION: ICD-10-CM

## 2020-01-07 LAB
ALBUMIN SERPL BCP-MCNC: 3.7 G/DL (ref 3.2–4.9)
ALBUMIN/GLOB SERPL: 0.9 G/DL
ALP SERPL-CCNC: 88 U/L (ref 30–99)
ALT SERPL-CCNC: 17 U/L (ref 2–50)
ANION GAP SERPL CALC-SCNC: 11 MMOL/L (ref 0–11.9)
ANION GAP SERPL CALC-SCNC: 15 MMOL/L (ref 0–11.9)
APPEARANCE UR: ABNORMAL
AST SERPL-CCNC: 14 U/L (ref 12–45)
BACTERIA #/AREA URNS HPF: ABNORMAL /HPF
BASOPHILS # BLD AUTO: 0.3 % (ref 0–1.8)
BASOPHILS # BLD: 0.05 K/UL (ref 0–0.12)
BILIRUB SERPL-MCNC: 0.6 MG/DL (ref 0.1–1.5)
BILIRUB UR QL STRIP.AUTO: NEGATIVE
BUN SERPL-MCNC: 29 MG/DL (ref 8–22)
BUN SERPL-MCNC: 30 MG/DL (ref 8–22)
CALCIUM SERPL-MCNC: 9 MG/DL (ref 8.4–10.2)
CALCIUM SERPL-MCNC: 9.8 MG/DL (ref 8.4–10.2)
CHLORIDE SERPL-SCNC: 104 MMOL/L (ref 96–112)
CHLORIDE SERPL-SCNC: 104 MMOL/L (ref 96–112)
CO2 SERPL-SCNC: 20 MMOL/L (ref 20–33)
CO2 SERPL-SCNC: 23 MMOL/L (ref 20–33)
COLOR UR: YELLOW
CREAT SERPL-MCNC: 1.57 MG/DL (ref 0.5–1.4)
CREAT SERPL-MCNC: 1.7 MG/DL (ref 0.5–1.4)
EKG IMPRESSION: NORMAL
EOSINOPHIL # BLD AUTO: 0.02 K/UL (ref 0–0.51)
EOSINOPHIL NFR BLD: 0.1 % (ref 0–6.9)
ERYTHROCYTE [DISTWIDTH] IN BLOOD BY AUTOMATED COUNT: 44.6 FL (ref 35.9–50)
FLUAV+FLUBV AG SPEC QL IA: NEGATIVE
GLOBULIN SER CALC-MCNC: 4 G/DL (ref 1.9–3.5)
GLUCOSE SERPL-MCNC: 100 MG/DL (ref 65–99)
GLUCOSE SERPL-MCNC: 121 MG/DL (ref 65–99)
GLUCOSE UR STRIP.AUTO-MCNC: NEGATIVE MG/DL
HCT VFR BLD AUTO: 43 % (ref 42–52)
HGB BLD-MCNC: 14.3 G/DL (ref 14–18)
IMM GRANULOCYTES # BLD AUTO: 0.12 K/UL (ref 0–0.11)
IMM GRANULOCYTES NFR BLD AUTO: 0.7 % (ref 0–0.9)
INT CON NEG: NORMAL
INT CON POS: NORMAL
KETONES UR STRIP.AUTO-MCNC: NEGATIVE MG/DL
LACTATE BLD-SCNC: 1 MMOL/L (ref 0.5–2)
LEUKOCYTE ESTERASE UR QL STRIP.AUTO: ABNORMAL
LIPASE SERPL-CCNC: 14 U/L (ref 7–58)
LYMPHOCYTES # BLD AUTO: 1.04 K/UL (ref 1–4.8)
LYMPHOCYTES NFR BLD: 5.8 % (ref 22–41)
MCH RBC QN AUTO: 30 PG (ref 27–33)
MCHC RBC AUTO-ENTMCNC: 33.3 G/DL (ref 33.7–35.3)
MCV RBC AUTO: 90.1 FL (ref 81.4–97.8)
MICRO URNS: ABNORMAL
MONOCYTES # BLD AUTO: 2.03 K/UL (ref 0–0.85)
MONOCYTES NFR BLD AUTO: 11.3 % (ref 0–13.4)
MUCOUS THREADS #/AREA URNS HPF: ABNORMAL /HPF
NEUTROPHILS # BLD AUTO: 14.74 K/UL (ref 1.82–7.42)
NEUTROPHILS NFR BLD: 81.8 % (ref 44–72)
NITRITE UR QL STRIP.AUTO: POSITIVE
NRBC # BLD AUTO: 0 K/UL
NRBC BLD-RTO: 0 /100 WBC
PH UR STRIP.AUTO: 6 [PH] (ref 5–8)
PLATELET # BLD AUTO: 234 K/UL (ref 164–446)
PMV BLD AUTO: 9.2 FL (ref 9–12.9)
POTASSIUM SERPL-SCNC: 4.4 MMOL/L (ref 3.6–5.5)
POTASSIUM SERPL-SCNC: 4.5 MMOL/L (ref 3.6–5.5)
PROT SERPL-MCNC: 7.7 G/DL (ref 6–8.2)
PROT UR QL STRIP: 30 MG/DL
RBC # BLD AUTO: 4.77 M/UL (ref 4.7–6.1)
RBC # URNS HPF: ABNORMAL /HPF
RBC UR QL AUTO: ABNORMAL
SODIUM SERPL-SCNC: 138 MMOL/L (ref 135–145)
SODIUM SERPL-SCNC: 139 MMOL/L (ref 135–145)
SP GR UR STRIP.AUTO: 1.01
TROPONIN T SERPL-MCNC: 17 NG/L (ref 6–19)
WBC # BLD AUTO: 18 K/UL (ref 4.8–10.8)
WBC #/AREA URNS HPF: ABNORMAL /HPF

## 2020-01-07 PROCEDURE — 94760 N-INVAS EAR/PLS OXIMETRY 1: CPT

## 2020-01-07 PROCEDURE — 83605 ASSAY OF LACTIC ACID: CPT

## 2020-01-07 PROCEDURE — 87186 SC STD MICRODIL/AGAR DIL: CPT

## 2020-01-07 PROCEDURE — 700111 HCHG RX REV CODE 636 W/ 250 OVERRIDE (IP): Performed by: EMERGENCY MEDICINE

## 2020-01-07 PROCEDURE — 93005 ELECTROCARDIOGRAM TRACING: CPT | Performed by: EMERGENCY MEDICINE

## 2020-01-07 PROCEDURE — 87804 INFLUENZA ASSAY W/OPTIC: CPT | Performed by: NURSE PRACTITIONER

## 2020-01-07 PROCEDURE — 87040 BLOOD CULTURE FOR BACTERIA: CPT

## 2020-01-07 PROCEDURE — 83690 ASSAY OF LIPASE: CPT

## 2020-01-07 PROCEDURE — 85025 COMPLETE CBC W/AUTO DIFF WBC: CPT

## 2020-01-07 PROCEDURE — 87086 URINE CULTURE/COLONY COUNT: CPT

## 2020-01-07 PROCEDURE — 71046 X-RAY EXAM CHEST 2 VIEWS: CPT | Mod: TC,FY | Performed by: NURSE PRACTITIONER

## 2020-01-07 PROCEDURE — 84484 ASSAY OF TROPONIN QUANT: CPT

## 2020-01-07 PROCEDURE — 700105 HCHG RX REV CODE 258: Performed by: EMERGENCY MEDICINE

## 2020-01-07 PROCEDURE — 96365 THER/PROPH/DIAG IV INF INIT: CPT | Mod: XU

## 2020-01-07 PROCEDURE — 99285 EMERGENCY DEPT VISIT HI MDM: CPT

## 2020-01-07 PROCEDURE — 74177 CT ABD & PELVIS W/CONTRAST: CPT

## 2020-01-07 PROCEDURE — 71250 CT THORAX DX C-: CPT

## 2020-01-07 PROCEDURE — 81001 URINALYSIS AUTO W/SCOPE: CPT

## 2020-01-07 PROCEDURE — 87077 CULTURE AEROBIC IDENTIFY: CPT

## 2020-01-07 PROCEDURE — 80053 COMPREHEN METABOLIC PANEL: CPT

## 2020-01-07 PROCEDURE — 80048 BASIC METABOLIC PNL TOTAL CA: CPT

## 2020-01-07 PROCEDURE — 36415 COLL VENOUS BLD VENIPUNCTURE: CPT

## 2020-01-07 PROCEDURE — 93005 ELECTROCARDIOGRAM TRACING: CPT

## 2020-01-07 PROCEDURE — 99214 OFFICE O/P EST MOD 30 MIN: CPT | Performed by: NURSE PRACTITIONER

## 2020-01-07 PROCEDURE — 700117 HCHG RX CONTRAST REV CODE 255: Performed by: EMERGENCY MEDICINE

## 2020-01-07 RX ORDER — CEPHALEXIN 500 MG/1
500 CAPSULE ORAL 4 TIMES DAILY
Qty: 40 CAP | Refills: 0 | Status: SHIPPED | OUTPATIENT
Start: 2020-01-07 | End: 2020-01-23 | Stop reason: SDUPTHER

## 2020-01-07 RX ORDER — SODIUM CHLORIDE 9 MG/ML
1000 INJECTION, SOLUTION INTRAVENOUS ONCE
Status: COMPLETED | OUTPATIENT
Start: 2020-01-07 | End: 2020-01-07

## 2020-01-07 RX ORDER — WARFARIN SODIUM 5 MG/1
5-7.5 TABLET ORAL EVERY MORNING
Status: SHIPPED | COMMUNITY
End: 2020-05-15 | Stop reason: SDUPTHER

## 2020-01-07 RX ADMIN — SODIUM CHLORIDE 1000 ML: 9 INJECTION, SOLUTION INTRAVENOUS at 10:24

## 2020-01-07 RX ADMIN — CEFTRIAXONE SODIUM 2 G: 2 INJECTION, POWDER, FOR SOLUTION INTRAMUSCULAR; INTRAVENOUS at 14:01

## 2020-01-07 RX ADMIN — IOHEXOL 100 ML: 350 INJECTION, SOLUTION INTRAVENOUS at 12:46

## 2020-01-07 NOTE — DISCHARGE INSTRUCTIONS
You do have a urinary tract infection.  You are being placed on antibiotics for this.  Please take the antibiotics, return if your symptoms change or worsen please follow-up with your primary doctor.

## 2020-01-07 NOTE — ED NOTES
Med Rec complete per Pt at bedside  Allergies Reviewed  No ABX in the last 14 days    Pt takes WARFARIN  7.5 mg every Tues and Saturday   5 mg all other days of the week

## 2020-01-07 NOTE — ED NOTES
assisting with care- aware of pending straight cath for urine due to pt reports self cath at home. To ct prior to same. ivf continue to infuse

## 2020-01-07 NOTE — ED NOTES
Whiteboard updated.  BC x 2 and Lactic Acid drawn and sent to lab.  Pt repositioned on gurney.  Reviewed POC w/ pt including pending tests and chart review by ERP, denied questions/concerns at this time.  Declined need for warm blanket.  Thanked RN for care.

## 2020-01-07 NOTE — ED NOTES
assisting with care-urine spec collected using straight cath with primary rn. Also clarified ct with contrast with erp prior to same

## 2020-01-07 NOTE — PROGRESS NOTES
Chief Complaint   Patient presents with   • Cough       HISTORY OF PRESENT ILLNESS: Patient is a 69 y.o. male who presents today due to symptoms that started four days ago. Pt reports a productive cough. Reports associated nasal congestion, sinus pressure, mild sore throat, bilateral ear pressure, fever, chills, fatigue, malaise, wheezing, and body aches. Denies chest pain, shortness of breath, or wheezing. Denies h/o asthma/copd/CAP. He was seen by myself one month ago, diagnosed with RTI, placed on doxycycline for 10 days. He notes approx 2 weeks of improvement now with return or URI symptoms. He did not receive a flu vaccination this year.     Patient Active Problem List    Diagnosis Date Noted   • Chronic venous hypertension due to DVT 11/17/2014     Priority: Medium   • Obesity (BMI 30.0-34.9) 04/07/2017     Priority: Low   • Anticoagulated on warfarin 11/17/2014     Priority: Low   • History of pulmonary embolism 01/24/2012     Priority: Low   • Long term (current) use of anticoagulants 08/08/2019   • Neoplasm of uncertain behavior of skin of face 04/04/2019   • Obstructive uropathy 04/04/2019   • Neurogenic bladder- self cath since 5/2018 04/04/2019   • Primary osteoarthritis of right knee 04/04/2019   • Post-phlebitic dermatosis of right lower extremity 04/04/2019   • Benign prostatic hyperplasia with incomplete bladder emptying- self cath since 5/2018 ing hernia repair 08/24/2018   • Atrophy of right kidney- urology nv 08/24/2018   • Stage 3 chronic kidney disease (HCC) 08/24/2018   • Uncomplicated opioid dependence ([facility]) 05/18/2018   • Post-phlebitic syndrome- RIGHT LEG 05/22/2015   • Mixed hyperlipidemia  11/17/2014   • Chronic pain syndrome- right leg from post phlebitic syn; on percocet once a day 03/12/2014   • Hypovitaminosis D 03/26/2012   • Factor 5 Leiden mutation, heterozygous ([facility]) 01/24/2012   • Gastroesophageal reflux disease without esophagitis 01/24/2012       Allergies:Lovastatin; Other  food; Shellfish allergy; and Statins [hmg-coa-r inhibitors]    Current Outpatient Medications Ordered in Epic   Medication Sig Dispense Refill   • warfarin (COUMADIN) 5 MG Tab Take 1.5 Tabs by mouth every day. Or as directed by anticoagulation clinic 135 Tab 4   • rosuvastatin (CRESTOR) 5 MG Tab Take 1 Tab by mouth every evening. 100 Tab 4   • finasteride (PROSCAR) 5 MG Tab      • tamsulosin (FLOMAX) 0.4 MG capsule Take 2 Caps by mouth every day. 180 Cap 4   • Diclofenac Sodium 1 % Gel Apply 1 Inch to skin as directed every 3 hours as needed. (Patient not taking: Reported on 12/8/2019) 3 Tube 4   • Cyanocobalamin (VITAMIN B-12) 1000 MCG Tab Take 1,000 mcg by mouth every day.     • Cholecalciferol (VITAMIN D) 2000 UNITS Cap Take 1 Cap by mouth every day. 100 Cap 3     No current Epic-ordered facility-administered medications on file.        Past Medical History:   Diagnosis Date   • Blood clotting disorder (HCC) 2012    leg and lung   • BPH (benign prostatic hyperplasia)    • Chronic pain    • Factor V deficiency (HCC)    • Stage 3 chronic kidney disease (HCC)        Social History     Tobacco Use   • Smoking status: Former Smoker     Packs/day: 0.00   • Smokeless tobacco: Never Used   Substance Use Topics   • Alcohol use: Not Currently     Alcohol/week: 0.0 - 0.6 oz   • Drug use: No       No family status information on file.   History reviewed. No pertinent family history.    ROS:  Review of Systems   Constitutional: Positive for subjective fever, chills, fatigue, malaise. Negative for weight loss.  HENT: Positive for congestion, ear pressure, and sore throat. Negative for ear pain, nosebleeds, and neck pain.    Eyes: Negative for vision changes.   Cardiovascular: Negative for chest pain, palpitations, orthopnea and leg swelling.   Respiratory: Positive for cough and sputum production. Negative for shortness of breath and wheezing.   Gastrointestinal: Negative for abdominal pain, nausea, vomiting or diarrhea.  "  Skin: Negative for rash, diaphoresis.     Exam:  /65   Pulse (!) 124   Temp 37.2 °C (99 °F) (Temporal)   Resp 18   Wt 105.2 kg (232 lb)   SpO2 95%   General: well-nourished, well-developed male in NAD  Head: normocephalic, atraumatic  Eyes: PERRLA, EOM within normal limits, no conjunctival injection, no scleral icterus, visual fields and acuity grossly intact.  Ears: normal shape and symmetry, no tenderness, no discharge. External canals are without any significant edema or erythema. Tympanic membranes are without any inflammation, no effusion. Gross auditory acuity is intact.  Nose: symmetrical without tenderness, mild discharge, erythema present bilateral nares. Frontal sinus tenderness.   Mouth/Throat: reasonable hygiene, no exudates or tonsillar enlargement. Mild erythema present.   Neck: no masses, range of motion within normal limits, no tracheal deviation.  Lymph: mild cervical adenopathy. No supraclavicular adenopathy.   Neuro: alert and oriented. Cranial nerves 1-12 grossly intact.   Cardiovascular: tachycardic rate and regular rhythm without murmurs, rubs, or gallops. No edema.   Pulmonary: no distress. Chest is symmetrical with respiration. No wheezes, crackles, or rhonchi.   Musculoskeletal: appropriate muscle tone, gait is stable.  Skin: warm, dry, intact, no clubbing, no cyanosis.   Psych: appropriate mood, affect, judgement.         Assessment/Plan:  1. Cough  POCT Influenza A/B    DX-CHEST-2 VIEWS    CANCELED: DX-CHEST-2 VIEWS   2. Sinus congestion     3. Tachycardia  DX-CHEST-2 VIEWS   4. History of chronic kidney disease             DX chest radiology reading \" No acute cardiopulmonary abnormality.\"    POC flu negative      The patient is a pleasant 69 year old who presents with several days of URI symptoms. He was treated by myself one month ago for similar with 10 days of doxycyline with improvement, now return of symptoms. Differential diagnoses include but are not limited to: " sinusitis, pneumonia, bronchitis, pulmonary embolism. Upon exam, he appears fatigued and is tachycardic at 124. His flu and chest x-ray are negative today.  He has several co-morbidities including clotting disorder, kidney disease, chronic anticoagulation. Due to his presentation, I feel the patient requires a higher level of care in the ED for closer monitoring, stat lab work and/or imaging for further evaluation. This has been discussed with the patient and he states agreement and understanding. The patient is stable to leave POV at this time and will go directly to ED without delay.           Please note that this dictation was created using voice recognition software. I have made every reasonable attempt to correct obvious errors, but I expect that there are errors of grammar and possibly content that I did not discover before finalizing the note.  A mask was worn for the entire visit with the patient.     ARMANI Morrsisey.

## 2020-01-07 NOTE — ED NOTES
Pt d/c home after IV antibiotics. IV removed. F/u and prescriptions reviewed with pt. Pt states no questions. Pt ambulated out of ED

## 2020-01-07 NOTE — ED PROVIDER NOTES
ED Provider Note    ED Provider    Means of arrival: Private vehicle  History obtained from: Patient  History limited by: None    CHIEF COMPLAINT  Chief Complaint   Patient presents with   • Flu Like Symptoms       HPI  Tavares Bonilla is a 69 y.o. male who presents who presents with complaints of cough, congestion, and swelling for 2 days.  He has no shortness of breath no palpitations no chest pain.  He has been having intermittent episodes of URI symptoms over the last month.  He was on one course of antibiotics already.    He was seen at the urgent care today, and it was noted that he had a tachycardia and was sent in for further evaluation.    He had a chest x-ray done that was read as no pneumonia.  He has not had any palpitations no chest pain no dizziness no lightheadedness, he did complain of sweats and feverish feeling last night but no documented fever.    He does complain of intermittent abdominal pain, different areas different times nonspecific, no nausea or vomiting with these episodes.    REVIEW OF SYSTEMS  See HPI for further details. All other systems are negative.     PAST MEDICAL HISTORY   has a past medical history of Blood clotting disorder (HCC) (2012), BPH (benign prostatic hyperplasia), Chronic pain, Factor V deficiency ([facility]), and Stage 3 chronic kidney disease ([facility]).    SOCIAL HISTORY  Social History     Tobacco Use   • Smoking status: Former Smoker     Packs/day: 0.00   • Smokeless tobacco: Never Used   Substance and Sexual Activity   • Alcohol use: Not Currently     Alcohol/week: 0.0 - 0.6 oz   • Drug use: No   • Sexual activity: Not on file       SURGICAL HISTORY   has a past surgical history that includes colonoscopy (2013) and inguinal hernia repair robotic (Left, 12/18/2017).    CURRENT MEDICATIONS  Home Medications     Reviewed by Aida Mackenzie (Pharmacy Tech) on 01/07/20 at 1103  Med List Status: Complete   Medication Last Dose Status   Cholecalciferol (VITAMIN D) 2000  UNITS Cap 1/6/2020 Active   finasteride (PROSCAR) 5 MG Tab 1/6/2020 Active   rosuvastatin (CRESTOR) 5 MG Tab 1/6/2020 Active   tamsulosin (FLOMAX) 0.4 MG capsule 1/6/2020 Active   warfarin (COUMADIN) 5 MG Tab 1/6/2020 Active                ALLERGIES  Allergies   Allergen Reactions   • Lovastatin      Leg cramps   • Other Food Hives     Peanuts gives me Vertigo and hives   • Shellfish Allergy Hives     shrimp   • Statins [Hmg-Coa-R Inhibitors]      Muscle cramps       PHYSICAL EXAM  VITAL SIGNS: /76   Pulse 93   Temp 36.9 °C (98.5 °F) (Temporal)   Resp (!) 24   Ht 1.829 m (6')   Wt 106.4 kg (234 lb 9.1 oz)   SpO2 94%   BMI 31.81 kg/m²   Constitutional: Alert in no apparent distress.  HENT: No signs of trauma, Mucous membranes are moist   Eyes:  Conjunctiva normal, Non-icteric.   Neck: Normal range of motion, No tenderness, Supple,  Lymphatic: No lymphadenopathy noted.   Cardiovascular: Tachycardia, no murmurs.   Thorax & Lungs: Normal breath sounds, No respiratory distress, No wheezing, No chest tenderness.   Abdomen: Bowel sounds normal, Soft, No tenderness, No masses, No pulsatile masses. No peritoneal signs.  Skin: Warm, Dry,Normal color  Back: No bony tenderness, No CVA tenderness.   Extremities:No edema, No tenderness, No cyanosis,    Musculoskeletal: Good range of motion in all major joints. No tenderness to palpation or major deformities noted.   Neurologic: Alert ,Oriented x4, Normal motor function, Normal sensory function, No focal deficits noted.   Psychiatric: Affect normal, Judgment normal, Mood normal.       MEDICAL DECISION MAKING  This is a 69 y.o. male who presents the patient has mild tachycardia, he fluids will be given to see if this will resolve the tachycardia.  He feels general malaise and weakness throughout but he is able to get up and still take care of himself.  He currently is afebrile, his blood pressure stable, lab test to be done to evaluate cardiac injury and electrolyte  imbalance and anemia.    DIAGNOSTIC STUDIES / PROCEDURES    EKG  Results for orders placed or performed during the hospital encounter of 01/07/20   CBC w/ Differential   Result Value Ref Range    WBC 18.0 (H) 4.8 - 10.8 K/uL    RBC 4.77 4.70 - 6.10 M/uL    Hemoglobin 14.3 14.0 - 18.0 g/dL    Hematocrit 43.0 42.0 - 52.0 %    MCV 90.1 81.4 - 97.8 fL    MCH 30.0 27.0 - 33.0 pg    MCHC 33.3 (L) 33.7 - 35.3 g/dL    RDW 44.6 35.9 - 50.0 fL    Platelet Count 234 164 - 446 K/uL    MPV 9.2 9.0 - 12.9 fL    Neutrophils-Polys 81.80 (H) 44.00 - 72.00 %    Lymphocytes 5.80 (L) 22.00 - 41.00 %    Monocytes 11.30 0.00 - 13.40 %    Eosinophils 0.10 0.00 - 6.90 %    Basophils 0.30 0.00 - 1.80 %    Immature Granulocytes 0.70 0.00 - 0.90 %    Nucleated RBC 0.00 /100 WBC    Neutrophils (Absolute) 14.74 (H) 1.82 - 7.42 K/uL    Lymphs (Absolute) 1.04 1.00 - 4.80 K/uL    Monos (Absolute) 2.03 (H) 0.00 - 0.85 K/uL    Eos (Absolute) 0.02 0.00 - 0.51 K/uL    Baso (Absolute) 0.05 0.00 - 0.12 K/uL    Immature Granulocytes (abs) 0.12 (H) 0.00 - 0.11 K/uL    NRBC (Absolute) 0.00 K/uL   Complete Metabolic Panel (CMP)   Result Value Ref Range    Sodium 139 135 - 145 mmol/L    Potassium 4.5 3.6 - 5.5 mmol/L    Chloride 104 96 - 112 mmol/L    Co2 20 20 - 33 mmol/L    Anion Gap 15.0 (H) 0.0 - 11.9    Glucose 121 (H) 65 - 99 mg/dL    Bun 30 (H) 8 - 22 mg/dL    Creatinine 1.70 (H) 0.50 - 1.40 mg/dL    Calcium 9.8 8.4 - 10.2 mg/dL    AST(SGOT) 14 12 - 45 U/L    ALT(SGPT) 17 2 - 50 U/L    Alkaline Phosphatase 88 30 - 99 U/L    Total Bilirubin 0.6 0.1 - 1.5 mg/dL    Albumin 3.7 3.2 - 4.9 g/dL    Total Protein 7.7 6.0 - 8.2 g/dL    Globulin 4.0 (H) 1.9 - 3.5 g/dL    A-G Ratio 0.9 g/dL   Troponin STAT   Result Value Ref Range    Troponin T 17 6 - 19 ng/L   Lipase   Result Value Ref Range    Lipase 14 7 - 58 U/L   LACTIC ACID   Result Value Ref Range    Lactic Acid 1.0 0.5 - 2.0 mmol/L   ESTIMATED GFR   Result Value Ref Range    GFR If  48  (A) >60 mL/min/1.73 m 2    GFR If Non African American 40 (A) >60 mL/min/1.73 m 2   URINALYSIS,CULTURE IF INDICATED   Result Value Ref Range    Color Yellow     Character Sl Cloudy (A)     Specific Gravity 1.015 <1.035    Ph 6.0 5.0 - 8.0    Glucose Negative Negative mg/dL    Ketones Negative Negative mg/dL    Protein 30 (A) Negative mg/dL    Bilirubin Negative Negative    Nitrite Positive (A) Negative    Leukocyte Esterase Moderate (A) Negative    Occult Blood Small (A) Negative    Micro Urine Req Microscopic    URINE MICROSCOPIC (W/UA)   Result Value Ref Range    WBC 20-50 (A) /hpf    RBC 2-5 (A) /hpf    Bacteria Many (A) None /hpf    Mucous Threads Rare /hpf   EKG   Result Value Ref Range    Report       [facility] Emergency Dept.    Test Date:  2020  Pt Name:    CLEMENCIA JUNIOR               Department: EDS  MRN:        [id number]                      Room:  Gender:     Male                         Technician: [id number]  :        1950                   Requested By:ER TRIAGE PROTOCOL  Order #:    [id number]                    Reading MD: ARMANDO SALAS D.O.    Measurements  Intervals                                Axis  Rate:       109                          P:          66  LA:         154                          QRS:        4  QRSD:       68                           T:          71  QT:         292  QTc:        394    Interpretive Statements  Sinus tachycardia  Compared to ECG 2018 10:47:57  Ventricular premature complex(es) no longer present  Electronically Signed On 2020 13:50:54 PST by ARMANDO SALAS D.O.           LABS      RADIOLOGY  CT-ABDOMEN-PELVIS WITH   Final Result      1.  There is no acute inflammatory process involving the bowel. There is minimal colonic diverticulosis.   2.  There are bilateral renal areas of parenchymal scarring with incidental right extrarenal pelvis.   3.  There is an incidental dependent gallstone without evidence of acute  cholecystitis or biliary dilatation.   4.  Moderate distention of the urinary bladder with mild diffuse wall thickening which could represent chronic outlet abnormalities or possibly cystitis.   5.  There are coarse prostatic calcifications.   6.  There is a small fat-containing right inguinal hernia.      CT-CHEST (THORAX) W/O   Final Result      1.  Unremarkable CT scan of the chest without contrast.   2.  There is atherosclerosis with coronary artery calcification.                COURSE  Pertinent Labs & Imaging studies reviewed. (See chart for details)    10:19 AM - Patient seen and examined at bedside. Discussed plan of care,     Patient's white blood cell count is significantly elevated, he is tachycardic.  Chest x-ray showed no pneumonia but he has URI symptoms.  CT will be done to evaluate for occult pneumonia.    Patient presented with primarily URI symptoms, tachycardia and had leukocytosis.  A CT of the chest shows no occult pneumonia or cause for his URI symptoms.  He was given IV fluids and is heart rate improved.    I do to the elevated white count is concerned about other infection.  He has been having some intermittent abdominal pain so CT was done which shows no obstructive process.  There is bladder wall thickening.  The patient does cath himself due to a history of weakened bladder wall.  And his urinalysis is positive for a UTI.  He will be started on parenteral antibiotics here and discharged home with antibiotics.      FINAL IMPRESSION  1. Leukocytosis, unspecified type    2. Tachycardia    3. Urinary tract infection without hematuria, site unspecified    4. Pain of upper abdomen

## 2020-01-09 LAB
BACTERIA UR CULT: ABNORMAL
BACTERIA UR CULT: ABNORMAL
SIGNIFICANT IND 70042: ABNORMAL
SITE SITE: ABNORMAL
SOURCE SOURCE: ABNORMAL

## 2020-01-12 LAB
BACTERIA BLD CULT: NORMAL
BACTERIA BLD CULT: NORMAL
SIGNIFICANT IND 70042: NORMAL
SIGNIFICANT IND 70042: NORMAL
SITE SITE: NORMAL
SITE SITE: NORMAL
SOURCE SOURCE: NORMAL
SOURCE SOURCE: NORMAL

## 2020-01-22 ENCOUNTER — TELEPHONE (OUTPATIENT)
Dept: VASCULAR LAB | Facility: MEDICAL CENTER | Age: 70
End: 2020-01-22

## 2020-01-22 NOTE — LETTER
January 22, 2020        Tavares Bonilla  [address] [id number]  [place] [address]        Dear Tavares Bonilla ,    We have been unsuccessful in our attempts to contact you regarding your Anticoagulation Service appointments. Warfarin is a potent blood-thinning agent that requires monitoring to ensure that the dosage is correct for your body.  If it isn't, you could develop serious, sometimes life-threatening bleeding problems or life-threatening blood clots or stroke could result.     It is extremely important that you have your lab work drawn as soon as possible.  We are open Monday-Friday 8 am until 5 pm.  You may reach our Service at [phone].     To monitor you effectively, we need to be able to communicate with you.  This is a requirement to be followed by our Service.  If we are unable to contact you on three separate occasions, you will be discharged from the Anticoagulation Service.     If you repeatedly fail to keep your lab appointments, you are at risk of being discharged from the Service.           Sincerely,           Sahil Harris, PharmD, BCPS  Pharmacy Clinical Supervisor  [facility]  Outpatient Anticoagulation Service

## 2020-01-23 ENCOUNTER — OFFICE VISIT (OUTPATIENT)
Dept: MEDICAL GROUP | Age: 70
End: 2020-01-23
Payer: MEDICARE

## 2020-01-23 VITALS
HEIGHT: 72 IN | OXYGEN SATURATION: 95 % | TEMPERATURE: 97.7 F | DIASTOLIC BLOOD PRESSURE: 70 MMHG | SYSTOLIC BLOOD PRESSURE: 110 MMHG | HEART RATE: 114 BPM | RESPIRATION RATE: 16 BRPM | WEIGHT: 240 LBS | BODY MASS INDEX: 32.51 KG/M2

## 2020-01-23 DIAGNOSIS — I87.009 POST-PHLEBITIC SYNDROME: ICD-10-CM

## 2020-01-23 DIAGNOSIS — I87.099 CHRONIC VENOUS HYPERTENSION DUE TO DVT: ICD-10-CM

## 2020-01-23 DIAGNOSIS — Z86.711 HISTORY OF PULMONARY EMBOLISM: ICD-10-CM

## 2020-01-23 DIAGNOSIS — G89.4 CHRONIC PAIN SYNDROME: ICD-10-CM

## 2020-01-23 DIAGNOSIS — Z79.01 LONG TERM (CURRENT) USE OF ANTICOAGULANTS: ICD-10-CM

## 2020-01-23 DIAGNOSIS — E78.5 DYSLIPIDEMIA: ICD-10-CM

## 2020-01-23 DIAGNOSIS — D68.51 FACTOR 5 LEIDEN MUTATION, HETEROZYGOUS (HCC): ICD-10-CM

## 2020-01-23 DIAGNOSIS — Z23 NEED FOR VACCINATION: ICD-10-CM

## 2020-01-23 DIAGNOSIS — F11.20 UNCOMPLICATED OPIOID DEPENDENCE (HCC): ICD-10-CM

## 2020-01-23 DIAGNOSIS — N10 ACUTE PYELONEPHRITIS: ICD-10-CM

## 2020-01-23 PROCEDURE — 99214 OFFICE O/P EST MOD 30 MIN: CPT | Mod: 25 | Performed by: INTERNAL MEDICINE

## 2020-01-23 PROCEDURE — 90662 IIV NO PRSV INCREASED AG IM: CPT | Performed by: INTERNAL MEDICINE

## 2020-01-23 PROCEDURE — G0008 ADMIN INFLUENZA VIRUS VAC: HCPCS | Performed by: INTERNAL MEDICINE

## 2020-01-23 RX ORDER — OXYCODONE HYDROCHLORIDE AND ACETAMINOPHEN 5; 325 MG/1; MG/1
1 TABLET ORAL EVERY 6 HOURS PRN
Qty: 120 TAB | Refills: 0 | Status: SHIPPED | OUTPATIENT
Start: 2020-01-23 | End: 2020-07-06 | Stop reason: SDUPTHER

## 2020-01-23 RX ORDER — CEPHALEXIN 500 MG/1
500 CAPSULE ORAL 4 TIMES DAILY
Qty: 40 CAP | Refills: 0 | Status: SHIPPED | OUTPATIENT
Start: 2020-01-23 | End: 2020-03-23

## 2020-01-23 ASSESSMENT — PATIENT HEALTH QUESTIONNAIRE - PHQ9: CLINICAL INTERPRETATION OF PHQ2 SCORE: 0

## 2020-01-23 NOTE — PROGRESS NOTES
Subjective:      Tavares Bonilla is a 69 y.o. male who presents with Follow-Up (Referral to Urology )        HPI    The patient is here for followup of chronic medical problems listed below. The patient is compliant with medications and having no side effects from them. Denies chest pain, abdominal pain, dyspnea, myalgias, or cough.    Patient reports recent visit to the ED on 01/07/202 for complaints of cough, congestion, and abdominal pain. He was found to have a nonspecific URI and UTI. He was discharged with course of Cephalexin which he has almost completed. He is requesting an additional prescription for antibiotics to make sure that UTI will not return. He notes that he has been placing urinary catheter himself which increases concern for return UTI. Additionally, he is requesting a referral to Urology. During his visit to the ED, chest CT showed no signs of pneumonia. He notes that symptoms are feeling improved.     He is here requesting a referral to a different clinic for Coumadin. He is required to have blood drawn every 6 weeks for refill of prescription. He notes that he has begin working again in [place] and is unavailable to visit clinic at [facility] due to their hours open. Coumadin has been increased recently to 1.5 capsules 2 times a week. He denies side effects from change in medication.     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 He is requesting refill for Percocet for pain management.     Chronic pain recheck for:    Last dose of controlled substance:    Chronic pain treated with   taken once a day    He  reports previous alcohol use.  He  reports no history of drug use.    Interval history:    Any major change in health since last appointment? No    Consequences of Chronic Opiate therapy:  (5 A's)  Analgesia: Compared to no treatment or prior treatment, pain is currently not changed  Activity: not  changed  Adverse Events: He denies constipation, dry mouth, itchy skin, nausea and sedation  Aberrant Behaviors: He reports he is taking medication as prescribed and is not veering from agreed treatment regimen or provider recommendations. There have been no inappropriate refills or lost/stolen meds reported.  Affect/Mood: Pain is not impacting patient's mood.  Patient denies depression/anxiety.    Nonnarcotic treatments that are being used: Tylenol.     Last imaging:      Opioid Risk Score: No value filed.    Interpretation of Opioid Risk Score   Score 0-3 = Low risk of abuse. Do UDS at least once per year.  Score 4-7 = Moderate risk of abuse. Do UDS 1-4 times per year.  Score 8+ = High risk of abuse. Refer to specialist.    No order of CONTROLLED SUBSTANCE TREATMENT AGREEMENT is found.     UDS Summary     Patient has no health maintenance due at this time        Most recent UDS reviewed today and is  consistent with prescribed medications.     I have reviewed the medical records, the Prescription Monitoring Program and I have determined that controlled substance treatment is medically indicated.        Patient Active Problem List   Diagnosis   • History of pulmonary embolism   • Factor 5 Leiden mutation, heterozygous (HCC)   • Gastroesophageal reflux disease without esophagitis   • Hypovitaminosis D   • Chronic pain syndrome- right leg from post phlebitic syn; on percocet once a day   • Chronic venous hypertension due to DVT   • Anticoagulated on warfarin   • Mixed hyperlipidemia    • Post-phlebitic syndrome- RIGHT LEG   • Obesity (BMI 30.0-34.9)   • Uncomplicated opioid dependence (HCC)   • Benign prostatic hyperplasia with incomplete bladder emptying- self cath since 5/2018 ing hernia repair   • Atrophy of right kidney- [place]   • Stage 3 chronic kidney disease (HCC)   • Neoplasm of uncertain behavior of skin of face   • Obstructive uropathy   • Neurogenic bladder- self cath since 5/2018   • Primary  osteoarthritis of right knee   • Post-phlebitic dermatosis of right lower extremity   • Long term (current) use of anticoagulants       Outpatient Medications Prior to Visit   Medication Sig Dispense Refill   • warfarin (COUMADIN) 5 MG Tab Take 5-7.5 mg by mouth every morning. 7.5 mg every Tues and Saturday   5 mg all other days of the week     • rosuvastatin (CRESTOR) 5 MG Tab Take 1 Tab by mouth every evening. 100 Tab 4   • finasteride (PROSCAR) 5 MG Tab Take 5 mg by mouth every evening.     • tamsulosin (FLOMAX) 0.4 MG capsule Take 2 Caps by mouth every day. 180 Cap 4   • Cholecalciferol (VITAMIN D) 2000 UNITS Cap Take 1 Cap by mouth every day. 100 Cap 3   • cephALEXin (KEFLEX) 500 MG Cap Take 1 Cap by mouth 4 times a day. 40 Cap 0     No facility-administered medications prior to visit.         Allergies   Allergen Reactions   • Lovastatin      Leg cramps   • Other Food Hives     Peanuts gives me Vertigo and hives   • Shellfish Allergy Hives     shrimp   • Statins [Hmg-Coa-R Inhibitors]      Muscle cramps       Review of Systems   All other systems reviewed and are negative.           Objective:     /70 (BP Location: Left arm, Patient Position: Sitting, BP Cuff Size: Large adult)   Pulse (!) 114   Temp 36.5 °C (97.7 °F) (Temporal)   Resp 16   Ht 1.829 m (6')   Wt 108.9 kg (240 lb)   SpO2 95%   BMI 32.55 kg/m²     Physical Exam   Constitutional: Oriented to person, place, and time. Appears well-developed and well-nourished. No distress.   Head: Normocephalic and atraumatic.   Right Ear: External ear normal.   Left Ear: External ear normal.   Nose: Nose normal.   Mouth/Throat: Oropharynx is clear and moist. No oropharyngeal exudate.   Eyes: Pupils are equal, round, and reactive to light. Conjunctivae and EOM are normal. Right eye exhibits no discharge. Left eye exhibits no discharge. No scleral icterus.   Neck: Normal range of motion. Neck supple. No JVD present. No tracheal deviation present. No  thyromegaly present.   Cardiovascular: Normal rate, regular rhythm, normal heart sounds and intact distal pulses.  Exam reveals no gallop and no friction rub.    No murmur heard.  Pulmonary/Chest: Effort normal. No stridor. No respiratory distress. No wheezing or rales. No tenderness.   Abdominal: Soft. Bowel sounds are normal. No distension and no mass. There is no tenderness. There is no rebound and no guarding. No hernia.   Musculoskeletal: Normal range of motion No edema or tenderness.   Lymphadenopathy: No cervical adenopathy.   Neurological: Alert and oriented to person, place, and time. Normal reflexes. Normal reflexes. No cranial nerve deficit. Normal muscle tone. Coordination normal.   Skin: Skin is warm and dry. No rash noted. Not diaphoretic. No erythema. No pallor.   Psychiatric: Normal mood and affect. Behavior is normal. Judgment and thought content normal.   Nursing note and vitals reviewed.      Lab Results   Component Value Date/Time    HBA1C 5.8 05/01/2013 08:53 AM     Lab Results   Component Value Date/Time    SODIUM 138 01/07/2020 01:36 PM    POTASSIUM 4.4 01/07/2020 01:36 PM    CHLORIDE 104 01/07/2020 01:36 PM    CO2 23 01/07/2020 01:36 PM    GLUCOSE 100 (H) 01/07/2020 01:36 PM    BUN 29 (H) 01/07/2020 01:36 PM    CREATININE 1.57 (H) 01/07/2020 01:36 PM    CREATININE 1.3 02/11/2009 04:05 AM    ALKPHOSPHAT 88 01/07/2020 10:14 AM    ASTSGOT 14 01/07/2020 10:14 AM    ALTSGPT 17 01/07/2020 10:14 AM    TBILIRUBIN 0.6 01/07/2020 10:14 AM     Lab Results   Component Value Date/Time    INR 1.40 10/31/2019 02:21 PM    INR 2.10 08/08/2019 10:24 AM    INR 2.2 04/11/2019 03:38 PM     Lab Results   Component Value Date/Time    CHOLSTRLTOT 141 07/12/2019 09:41 AM    LDL 79 07/12/2019 09:41 AM    HDL 40 07/12/2019 09:41 AM    TRIGLYCERIDE 111 07/12/2019 09:41 AM       Lab Results   Component Value Date/Time    TESTOSTERONE 222 01/22/2014 09:04 AM     No results found for: TSH  Lab Results   Component Value  Date/Time    FREET4 0.84 05/01/2013 08:53 AM    FREET4 0.90 01/24/2012 10:38 AM     No results found for: URICACID  No components found for: VITB12  Lab Results   Component Value Date/Time    25HYDROXY 40 05/01/2015 09:53 AM    25HYDROXY 28 (L) 01/22/2014 09:04 AM          Assessment/Plan:       1. Need for vaccination  - Patient will receive influenza vaccination during visit today.   - Influenza Vaccine, High Dose (65+ Only)    2. Long term (current) use of anticoagulants  3. History of pulmonary embolism  4. Factor 5 Leiden mutation, heterozygous (HCC)  5. Chronic venous hypertension due to DVT  - Discussed plan to refer to different clinic that patient will be able to visit to complete blood work.   - REFERRAL TO ANTICOAGULATION MONITORING  - Prothrombin Time; Future    6. Post-phlebitic syndrome- RIGHT LEG  7. Chronic pain syndrome- right leg from post phlebitic syn; on percocet once a day  8. Uncomplicated opioid dependence (HCC)  - Provided refill for Percocet for chronic pain management.   - oxyCODONE-acetaminophen (PERCOCET) 5-325 MG Tab; Take 1 Tab by mouth every 6 hours as needed for up to 90 days.  Dispense: 120 Tab; Refill: 0    9. Acute pyelonephritis  - Discussed referral to Urology. Provided patient with Keflex refill for treatment of acute pyelonephritis.   - cephALEXin (KEFLEX) 500 MG Cap; Take 1 Cap by mouth 4 times a day.  Dispense: 40 Cap; Refill: 0  - PROSTATE SPECIFIC AG DIAGNOSTIC; Future  - REFERRAL TO UROLOGY    10. Dyslipidemia  - Comp Metabolic Panel; Future  - Lipid Profile; Future  - CBC WITH DIFFERENTIAL; Future    Other orders  - Consent for Opiate Prescription           Nannette BARRAZA (Patricio), am scribing for, and in the presence of, Keron Garcia M.D..    Electronically signed by: Nannette Horn (Patricio), 1/23/2020    Keron BARRAZA M.D., personally performed the services described in this documentation, as scribed by Nannette Horn in my presence, and it is both  accurate and complete.

## 2020-02-20 ENCOUNTER — TELEPHONE (OUTPATIENT)
Dept: VASCULAR LAB | Facility: MEDICAL CENTER | Age: 70
End: 2020-02-20

## 2020-02-20 NOTE — LETTER
February 20, 2020          Tavares Bonilla  [address] [id number]  [place] [address]        Dear Tavares Bonilla ,    We have been unsuccessful in our attempts to contact you regarding your Anticoagulation Service appointments. Wafarin is a potent blood-thinning agent that requires monitoring to ensure that the dosage is correct for your body.  If it isn't, you could develop serious, sometimes life-threatening bleeding problems or life-threatening blood clots or stroke could result.     It is extremely important that you have your lab work drawn as soon as possible.  We are open Monday-Friday 8 am until 5 pm.  You may reach our Service at [phone].     To monitor you effectively, we need to be able to communicate with you.  This is a requirement to be followed by our Service.  If we are unable to contact you on three separate occasions, you will be discharged from the Anticoagulation Service.     If you repeatedly fail to keep your lab appointments, you are at risk of being discharged from the Service.           Sincerely,           Sahil Harris, PharmD, BCPS  Pharmacy Clinical Supervisor  [facility]  Outpatient Anticoagulation Service

## 2020-02-20 NOTE — TELEPHONE ENCOUNTER
Left message for pt to have INR checked  3rd call  2nd letter sent  Danelle Burrell, Clinical Pharmacist, CDE, CACP

## 2020-03-18 DIAGNOSIS — Z79.01 CHRONIC ANTICOAGULATION: ICD-10-CM

## 2020-03-18 NOTE — PROGRESS NOTES
Left message for pt to have INR checked  4th call  3rd letter sent  Danelle Burrell, Clinical Pharmacist, CDE, CACP

## 2020-03-23 RX ORDER — CEPHALEXIN 500 MG/1
CAPSULE ORAL
Qty: 40 CAP | Refills: 0 | Status: SHIPPED | OUTPATIENT
Start: 2020-03-23 | End: 2020-05-01

## 2020-04-13 ENCOUNTER — ANTICOAGULATION VISIT (OUTPATIENT)
Dept: MEDICAL GROUP | Facility: MEDICAL CENTER | Age: 70
End: 2020-04-13
Payer: MEDICARE

## 2020-04-13 DIAGNOSIS — Z79.01 LONG TERM (CURRENT) USE OF ANTICOAGULANTS: ICD-10-CM

## 2020-04-13 DIAGNOSIS — Z79.01 ANTICOAGULATED ON WARFARIN: Primary | ICD-10-CM

## 2020-04-13 LAB — INR PPP: 1.8 (ref 2–3.5)

## 2020-04-13 PROCEDURE — 99211 OFF/OP EST MAY X REQ PHY/QHP: CPT | Performed by: INTERNAL MEDICINE

## 2020-04-13 PROCEDURE — 85610 PROTHROMBIN TIME: CPT | Performed by: INTERNAL MEDICINE

## 2020-04-13 NOTE — PROGRESS NOTES
OP Anticoagulation Service Note    Date: 2020  There were no vitals filed for this visit.   pt declined vitals    Anticoagulation Summary  As of 2020    INR goal:   2.0-3.0   TTR:   1.0 % (8 mo)   INR used for dosin.80! (2020)   Warfarin maintenance plan:   7.5 mg (5 mg x 1.5) every Wed, Fri; 5 mg (5 mg x 1) all other days   Weekly warfarin total:   40 mg   Plan last modified:   Dulce Maria Bowens, PharmD (10/31/2019)   Next INR check:   2020   Target end date:   Indefinite    Indications    Anticoagulated on warfarin [Z79.01]  Long term (current) use of anticoagulants [Z79.01]  DVT (deep venous thrombosis) (HCC) (Resolved) [I82.409]             Anticoagulation Episode Summary     INR check location:   Anticoagulation Clinic    Preferred lab:       Send INR reminders to:       Comments:         Anticoagulation Care Providers     Provider Role Specialty Phone number    [facility]   [phone]    Keron Garcia M.D.  Internal Medicine [phone]        Anticoagulation Patient Findings      HPI:   Tavares Bonilla seen in clinic today, on anticoagulation therapy with warfarin (a high risk medication) for hx of DVT      Pt is here today to evaluate anticoagulation therapy    Previous INR was  1.4 on 10-31-19    Pt was instructed to increase weekly regimen    Confirmed warfarin dosing regimen, he reports he may have missed a dose of warfarin in the past week.  Diet has been consistent with foods rich in vitamin K: Yes  Changes in ETOH:  No  Changes in smoking status: No  Changes in medication: No   Cost restriction: No  S/s of bleeding:  No  Falls or accidents since last visit No  Signs/symptoms  thrombosis since the last appt: No    A/P   INR  SUBtherapeutic today, will require dose adjust ment today to prevent  recurrence of thrombosis  and closer follow up.   Tonight 7.5 mg then Continue current warfarin regimen     Discussed with pt importance of INR  monitoring           Pt educated to contact our clinic with any changes in medications or s/s of bleeding or thrombosis. Pt is aware to seek immediate medical attention for falls, head injury or deep cuts    Follow up appointment in 2 week(s) to reduce risk of adverse events from warfarin   Lorena Marie, PharmD

## 2020-04-27 ENCOUNTER — ANTICOAGULATION VISIT (OUTPATIENT)
Dept: MEDICAL GROUP | Facility: MEDICAL CENTER | Age: 70
End: 2020-04-27
Payer: MEDICARE

## 2020-04-27 DIAGNOSIS — Z79.01 ANTICOAGULATED ON WARFARIN: ICD-10-CM

## 2020-04-27 DIAGNOSIS — Z79.01 LONG TERM (CURRENT) USE OF ANTICOAGULANTS: Primary | ICD-10-CM

## 2020-04-27 LAB — INR PPP: 2.3 (ref 2–3.5)

## 2020-04-27 PROCEDURE — 93793 ANTICOAG MGMT PT WARFARIN: CPT | Performed by: INTERNAL MEDICINE

## 2020-04-27 PROCEDURE — 85610 PROTHROMBIN TIME: CPT | Performed by: INTERNAL MEDICINE

## 2020-04-27 NOTE — PROGRESS NOTES
OP Anticoagulation Service Note    Date: 2020  There were no vitals filed for this visit.   pt declined vitals    Anticoagulation Summary  As of 2020    INR goal:   2.0-3.0   TTR:   4.3 % (8.4 mo)   INR used for dosin.30 (2020)   Warfarin maintenance plan:   7.5 mg (5 mg x 1.5) every Wed, Fri; 5 mg (5 mg x 1) all other days   Weekly warfarin total:   40 mg   Plan last modified:   Dulce Maria Boewns, PharmD (10/31/2019)   Next INR check:   2020   Target end date:   Indefinite    Indications    Anticoagulated on warfarin [Z79.01]  Long term (current) use of anticoagulants [Z79.01]  DVT (deep venous thrombosis) (HCC) (Resolved) [I82.409]             Anticoagulation Episode Summary     INR check location:   Anticoagulation Clinic    Preferred lab:       Send INR reminders to:       Comments:         Anticoagulation Care Providers     Provider Role Specialty Phone number    [facility]   [phone]    Keron Garcia M.D.  Internal Medicine [phone]        Anticoagulation Patient Findings      HPI:   Tavares Bonilla seen in clinic today, on anticoagulation therapy with warfarin (a high risk medication) for hx of DVT       Pt is here today to evaluate anticoagulation therapy    Previous INR was  1.8 on 20    Pt was instructed to take 7.5 mg then increase weekly regimen    Confirmed warfarin dosing regimen, denies missed or extra doses of coumadin.   Diet has been consistent with foods rich in vitamin K: Yes  Changes in ETOH:  No  Changes in smoking status: No  Changes in medication: No   Cost restriction: No  S/s of bleeding:  No  Falls or accidents since last visit No  Signs/symptoms  thrombosis since the last appt: No    A/P   INR  therapeutic today, will require close follow up as previous INR was sub-therapeutic   Continue current warfarin regimen           check referral    Pt educated to contact our clinic with any changes in medications or s/s of  bleeding or thrombosis. Pt is aware to seek immediate medical attention for falls, head injury or deep cuts    Follow up appointment in 4 week(s) to reduce risk of adverse events from warfarin   Lorena Marie, PharmD

## 2020-05-01 RX ORDER — CEPHALEXIN 500 MG/1
CAPSULE ORAL
Qty: 40 CAP | Refills: 0 | Status: SHIPPED | OUTPATIENT
Start: 2020-05-01 | End: 2020-07-06 | Stop reason: SDUPTHER

## 2020-05-14 RX ORDER — WARFARIN SODIUM 5 MG/1
5-7.5 TABLET ORAL EVERY MORNING
Qty: 30 TAB | OUTPATIENT
Start: 2020-05-14

## 2020-05-14 NOTE — TELEPHONE ENCOUNTER
Phone Number Called:   [id number] - [place], [place] - [address]  [address]  [place] [address]  Phone: [phone] Fax: [phone]        Call outcome: Spoke to pharmacy     Message: Informed them of Dr. Garcia's message they informed me that they would attempt to contact the pt and try to receive more information on who to send the refill request too.

## 2020-05-15 DIAGNOSIS — Z79.01 CHRONIC ANTICOAGULATION: ICD-10-CM

## 2020-05-15 RX ORDER — WARFARIN SODIUM 5 MG/1
5-7.5 TABLET ORAL DAILY
Qty: 135 TAB | Refills: 1 | Status: SHIPPED | OUTPATIENT
Start: 2020-05-15 | End: 2021-01-13

## 2020-06-01 ENCOUNTER — ANTICOAGULATION VISIT (OUTPATIENT)
Dept: MEDICAL GROUP | Facility: MEDICAL CENTER | Age: 70
End: 2020-06-01
Payer: MEDICARE

## 2020-06-01 DIAGNOSIS — Z79.01 ANTICOAGULATED ON WARFARIN: ICD-10-CM

## 2020-06-01 DIAGNOSIS — Z79.01 LONG TERM (CURRENT) USE OF ANTICOAGULANTS: Primary | ICD-10-CM

## 2020-06-01 LAB — INR PPP: 2.3 (ref 2–3.5)

## 2020-06-01 PROCEDURE — 85610 PROTHROMBIN TIME: CPT | Performed by: PHYSICIAN ASSISTANT

## 2020-06-01 PROCEDURE — 93793 ANTICOAG MGMT PT WARFARIN: CPT | Performed by: FAMILY MEDICINE

## 2020-06-01 NOTE — PROGRESS NOTES
OP Anticoagulation Service Note    Date: 6/1/2020  There were no vitals filed for this visit.  pt declined vitals    Anticoagulation Summary  As of 6/1/2020    INR goal:   2.0-3.0   TTR:   4.3 % (8.4 mo)   INR used for dosing:      Plan last modified:   Dulce Maria Bowens, PharmD (10/31/2019)   Next INR check:      Target end date:   Indefinite    Indications    Anticoagulated on warfarin [Z79.01]  Long term (current) use of anticoagulants [Z79.01]  DVT (deep venous thrombosis) (HCC) (Resolved) [I82.409]             Anticoagulation Episode Summary     INR check location:   Anticoagulation Clinic    Preferred lab:       Send INR reminders to:       Comments:         Anticoagulation Care Providers     Provider Role Specialty Phone number    [facility]   [phone]    Keron Garcia M.D.  Internal Medicine [phone]        Anticoagulation Patient Findings      HPI:   Tavares Bonilla seen in clinic today, on anticoagulation therapy with warfarin (a high risk medication) for hx of DVT      Pt is here today to evaluate anticoagulation therapy    Previous INR was  2.30 on 4/27/2020    Pt was instructed to take 7.5 mg every Wed, Fri; 5 mg all other days    Confirmed warfarin dosing regimen, denies missed or extra doses of coumadin.   Diet has been consistent with foods rich in vitamin K: Yes  Changes in ETOH:  No  Changes in smoking status: No  Changes in medication: No   Cost restriction: No  S/s of bleeding:  No  Falls or accidents since last visit No  Signs/symptoms  thrombosis since the last appt: No    A/P   INR therapeutic today.    Instructed patient to take 7.5 mg Wed, Fri; 5 mg all other days.     Pt educated to contact our clinic with any changes in medications or s/s of bleeding or thrombosis. Pt is aware to seek immediate medical attention for falls, head injury or deep cuts    Follow up appointment in 6 week(s) to reduce risk of adverse events from warfarin     Alcon L  Tomas, PharmD

## 2020-07-06 ENCOUNTER — OFFICE VISIT (OUTPATIENT)
Dept: MEDICAL GROUP | Age: 70
End: 2020-07-06
Payer: MEDICARE

## 2020-07-06 VITALS
HEIGHT: 72 IN | SYSTOLIC BLOOD PRESSURE: 112 MMHG | BODY MASS INDEX: 33.59 KG/M2 | HEART RATE: 94 BPM | TEMPERATURE: 98.1 F | DIASTOLIC BLOOD PRESSURE: 74 MMHG | WEIGHT: 248 LBS | OXYGEN SATURATION: 94 %

## 2020-07-06 DIAGNOSIS — G89.4 CHRONIC PAIN SYNDROME: ICD-10-CM

## 2020-07-06 DIAGNOSIS — Z11.59 NEED FOR HEPATITIS C SCREENING TEST: ICD-10-CM

## 2020-07-06 DIAGNOSIS — R39.14 BENIGN PROSTATIC HYPERPLASIA WITH INCOMPLETE BLADDER EMPTYING: ICD-10-CM

## 2020-07-06 DIAGNOSIS — K21.9 GASTROESOPHAGEAL REFLUX DISEASE WITHOUT ESOPHAGITIS: ICD-10-CM

## 2020-07-06 DIAGNOSIS — N40.1 BENIGN PROSTATIC HYPERPLASIA WITH INCOMPLETE BLADDER EMPTYING: ICD-10-CM

## 2020-07-06 DIAGNOSIS — I87.009 POST-PHLEBITIC SYNDROME: ICD-10-CM

## 2020-07-06 DIAGNOSIS — N18.30 STAGE 3 CHRONIC KIDNEY DISEASE: ICD-10-CM

## 2020-07-06 DIAGNOSIS — F11.20 UNCOMPLICATED OPIOID DEPENDENCE (HCC): ICD-10-CM

## 2020-07-06 DIAGNOSIS — Z23 NEED FOR VACCINATION: ICD-10-CM

## 2020-07-06 DIAGNOSIS — E78.2 MIXED HYPERLIPIDEMIA: ICD-10-CM

## 2020-07-06 DIAGNOSIS — Z86.711 HISTORY OF PULMONARY EMBOLISM: ICD-10-CM

## 2020-07-06 PROCEDURE — 99214 OFFICE O/P EST MOD 30 MIN: CPT | Performed by: INTERNAL MEDICINE

## 2020-07-06 RX ORDER — ROSUVASTATIN CALCIUM 5 MG/1
5 TABLET, COATED ORAL EVERY EVENING
Qty: 90 TAB | Refills: 4 | Status: SHIPPED | OUTPATIENT
Start: 2020-07-06 | End: 2021-08-06

## 2020-07-06 RX ORDER — OXYCODONE HYDROCHLORIDE AND ACETAMINOPHEN 5; 325 MG/1; MG/1
1 TABLET ORAL EVERY 6 HOURS PRN
Qty: 120 TAB | Refills: 0 | Status: SHIPPED | OUTPATIENT
Start: 2020-07-06 | End: 2020-09-28

## 2020-07-06 RX ORDER — CEPHALEXIN 500 MG/1
500 CAPSULE ORAL DAILY
Qty: 90 CAP | Refills: 4 | Status: SHIPPED | OUTPATIENT
Start: 2020-07-06 | End: 2021-08-06

## 2020-07-06 ASSESSMENT — ENCOUNTER SYMPTOMS
MUSCULOSKELETAL NEGATIVE: 1
CARDIOVASCULAR NEGATIVE: 1
NEUROLOGICAL NEGATIVE: 1
EYES NEGATIVE: 1
PSYCHIATRIC NEGATIVE: 1
CONSTITUTIONAL NEGATIVE: 1
RESPIRATORY NEGATIVE: 1
GASTROINTESTINAL NEGATIVE: 1

## 2020-07-06 ASSESSMENT — FIBROSIS 4 INDEX: FIB4 SCORE: 1.02

## 2020-07-06 NOTE — PROGRESS NOTES
Subjective:      Derick Bonilla is a 69 y.o. male who presents with Follow-Up (6 mo F/V )  The patient is here for followup of chronic medical problems listed below. The patient is compliant with medications and having no side effects from them. Denies chest pain, abdominal pain, dyspnea, myalgias, or cough.   Patient Active Problem List    Diagnosis Date Noted   • Chronic venous hypertension due to DVT 11/17/2014     Priority: Medium   • Obesity (BMI 30.0-34.9) 04/07/2017     Priority: Low   • Anticoagulated on warfarin 11/17/2014     Priority: Low   • History of pulmonary embolism 01/24/2012     Priority: Low   • Long term (current) use of anticoagulants 08/08/2019   • Neoplasm of uncertain behavior of skin of face 04/04/2019   • Obstructive uropathy 04/04/2019   • Neurogenic bladder- self cath since 5/2018 04/04/2019   • Primary osteoarthritis of right knee 04/04/2019   • Post-phlebitic dermatosis of right lower extremity 04/04/2019   • Benign prostatic hyperplasia with incomplete bladder emptying- self cath since 5/2018 ing hernia repair 08/24/2018   • Atrophy of right kidney- [place] 08/24/2018   • Stage 3 chronic kidney disease (HCC) 08/24/2018   • Uncomplicated opioid dependence (HCC) 05/18/2018   • Post-phlebitic syndrome- RIGHT LEG 05/22/2015   • Mixed hyperlipidemia  11/17/2014   • Chronic pain syndrome- right leg from post phlebitic syn; on percocet once a day 03/12/2014   • Hypovitaminosis D 03/26/2012   • Factor 5 Leiden mutation, heterozygous ([facility]) 01/24/2012   • Gastroesophageal reflux disease without esophagitis 01/24/2012     Allergies   Allergen Reactions   • Lovastatin      Leg cramps   • Other Food Hives     Peanuts gives me Vertigo and hives   • Shellfish Allergy Hives     shrimp   • Statins [Hmg-Coa-R Inhibitors]      Muscle cramps     Outpatient Medications Prior to Visit   Medication Sig Dispense Refill   • warfarin (COUMADIN) 5 MG Tab Take 1-1.5 Tabs by mouth every day. Or as directed  by [facility] anticoagulation clinic 135 Tab 1   • finasteride (PROSCAR) 5 MG Tab Take 5 mg by mouth every evening.     • tamsulosin (FLOMAX) 0.4 MG capsule Take 2 Caps by mouth every day. 180 Cap 4   • Cholecalciferol (VITAMIN D) 2000 UNITS Cap Take 1 Cap by mouth every day. 100 Cap 3   • cephALEXin (KEFLEX) 500 MG Cap Take 1 capsule by mouth 4 times daily 40 Cap 0   • rosuvastatin (CRESTOR) 5 MG Tab Take 1 Tab by mouth every evening. 100 Tab 4     No facility-administered medications prior to visit.      No visits with results within 1 Month(s) from this visit.   Latest known visit with results is:   Anticoagulation Visit on 06/01/2020   Component Date Value   • INR 06/01/2020 2.30       Lab Results   Component Value Date/Time    HBA1C 5.8 05/01/2013 08:53 AM     Lab Results   Component Value Date/Time    SODIUM 138 01/07/2020 01:36 PM    POTASSIUM 4.4 01/07/2020 01:36 PM    CHLORIDE 104 01/07/2020 01:36 PM    CO2 23 01/07/2020 01:36 PM    GLUCOSE 100 (H) 01/07/2020 01:36 PM    BUN 29 (H) 01/07/2020 01:36 PM    CREATININE 1.57 (H) 01/07/2020 01:36 PM    CREATININE 1.3 02/11/2009 04:05 AM    ALKPHOSPHAT 88 01/07/2020 10:14 AM    ASTSGOT 14 01/07/2020 10:14 AM    ALTSGPT 17 01/07/2020 10:14 AM    TBILIRUBIN 0.6 01/07/2020 10:14 AM     Lab Results   Component Value Date/Time    INR 2.30 06/01/2020 04:36 PM    INR 2.30 04/27/2020 04:11 PM    INR 1.80 04/13/2020 03:41 PM     Lab Results   Component Value Date/Time    CHOLSTRLTOT 141 07/12/2019 09:41 AM    LDL 79 07/12/2019 09:41 AM    HDL 40 07/12/2019 09:41 AM    TRIGLYCERIDE 111 07/12/2019 09:41 AM       Lab Results   Component Value Date/Time    TESTOSTERONE 222 01/22/2014 09:04 AM     No results found for: TSH  Lab Results   Component Value Date/Time    FREET4 0.84 05/01/2013 08:53 AM    FREET4 0.90 01/24/2012 10:38 AM     No results found for: URICACID  No components found for: VITB12  Lab Results   Component Value Date/Time    25HYDROXY 40 05/01/2015 09:53 AM     25HYDROXY 28 (L) 01/22/2014 09:04 AM     \          HPI    Review of Systems   Constitutional: Negative.    HENT: Negative.    Eyes: Negative.    Respiratory: Negative.    Cardiovascular: Negative.    Gastrointestinal: Negative.    Genitourinary: Negative.    Musculoskeletal: Negative.    Skin: Negative.    Neurological: Negative.    Endo/Heme/Allergies: Negative.    Psychiatric/Behavioral: Negative.           Objective:     /74 (BP Location: Left arm, Patient Position: Sitting, BP Cuff Size: Adult)   Pulse 94   Temp 36.7 °C (98.1 °F) (Temporal)   Ht 1.829 m (6')   Wt 112.5 kg (248 lb)   SpO2 94%   BMI 33.63 kg/m²      Physical Exam  Constitutional:       General: He is not in acute distress.     Appearance: He is well-developed. He is not diaphoretic.   HENT:      Head: Normocephalic and atraumatic.      Right Ear: External ear normal.      Left Ear: External ear normal.      Nose: Nose normal.      Mouth/Throat:      Pharynx: No oropharyngeal exudate.   Eyes:      General: No scleral icterus.        Right eye: No discharge.         Left eye: No discharge.      Conjunctiva/sclera: Conjunctivae normal.      Pupils: Pupils are equal, round, and reactive to light.   Neck:      Musculoskeletal: Normal range of motion and neck supple.      Thyroid: No thyromegaly.      Vascular: No JVD.      Trachea: No tracheal deviation.   Cardiovascular:      Rate and Rhythm: Normal rate and regular rhythm.      Heart sounds: Normal heart sounds. No murmur. No friction rub. No gallop.    Pulmonary:      Effort: Pulmonary effort is normal. No respiratory distress.      Breath sounds: Normal breath sounds. No stridor. No wheezing or rales.   Chest:      Chest wall: No tenderness.   Abdominal:      General: Bowel sounds are normal. There is no distension.      Palpations: Abdomen is soft. There is no mass.      Tenderness: There is no abdominal tenderness. There is no guarding or rebound.   Musculoskeletal: Normal range of  motion.         General: No tenderness.   Lymphadenopathy:      Cervical: No cervical adenopathy.   Skin:     General: Skin is warm and dry.      Coloration: Skin is not pale.      Findings: No erythema or rash.   Neurological:      Mental Status: He is alert and oriented to person, place, and time.      Motor: No abnormal muscle tone.      Coordination: Coordination normal.      Deep Tendon Reflexes: Reflexes are normal and symmetric. Reflexes normal.   Psychiatric:         Behavior: Behavior normal.         Thought Content: Thought content normal.         Judgment: Judgment normal.                 Assessment/Plan:      1. Chronic pain syndrome- right leg from post phlebitic syn; on percocet once a day        Under good control. Continue same regimen.  - oxyCODONE-acetaminophen (PERCOCET) 5-325 MG Tab; Take 1 Tab by mouth every 6 hours as needed for up to 90 days.  Dispense: 120 Tab; Refill: 0  - Consent for Opiate Prescription    2. Gastroesophageal reflux disease without esophagitis        Under good control. Continue same regimen.  PPI PRN    3. Mixed hyperlipidemia         Under good control. Continue same regimen.  - Lipid Profile; Future  - Comp Metabolic Panel; Future  - CBC WITH DIFFERENTIAL; Future  - rosuvastatin (CRESTOR) 5 MG Tab; Take 1 Tab by mouth every evening.  Dispense: 90 Tab; Refill: 4    4. Benign prostatic hyperplasia with incomplete bladder emptying- self cath since 2018 ing hernia repair        Under good control. Continue same regimen.  5. History of pulmonary embolism        Under good control. Continue same regimen.  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 Stage 3 chronic kidney disease (HCC)        Under good control. Continue same regimen.  - PROSTATE SPECIFIC AG DIAGNOSTIC; Future    7. Need for hepatitis C screening test     - HCV Scrn ( [id number] 1xLife); Future    8. Need for vaccination     - Zoster Vac Recomb Adjuvanted (SHINGRIX) 50  MCG/0.5ML Recon Susp; 0.5 mL by Intramuscular route Once for 1 dose.  Dispense: 0.5 mL; Refill: 0    9. Post-phlebitic syndrome- RIGHT LEG        Under good control. Continue same regimen.  - oxyCODONE-acetaminophen (PERCOCET) 5-325 MG Tab; Take 1 Tab by mouth every 6 hours as needed for up to 90 days.  Dispense: 120 Tab; Refill: 0    10. Uncomplicated opioid dependence (HCC)       Under good control. Continue same regimen.    - oxyCODONE-acetaminophen (PERCOCET) 5-325 MG Tab; Take 1 Tab by mouth every 6 hours as needed for up to 90 days.  Dispense: 120 Tab; Refill: 0

## 2020-07-13 ENCOUNTER — APPOINTMENT (OUTPATIENT)
Dept: MEDICAL GROUP | Facility: MEDICAL CENTER | Age: 70
End: 2020-07-13
Payer: MEDICARE

## 2020-07-20 ENCOUNTER — ANTICOAGULATION VISIT (OUTPATIENT)
Dept: MEDICAL GROUP | Facility: MEDICAL CENTER | Age: 70
End: 2020-07-20
Payer: MEDICARE

## 2020-07-20 DIAGNOSIS — Z79.01 ANTICOAGULATED ON WARFARIN: ICD-10-CM

## 2020-07-20 DIAGNOSIS — Z79.01 LONG TERM (CURRENT) USE OF ANTICOAGULANTS: Primary | ICD-10-CM

## 2020-07-20 LAB — INR PPP: 3.4 (ref 2–3.5)

## 2020-07-20 PROCEDURE — 85610 PROTHROMBIN TIME: CPT | Performed by: INTERNAL MEDICINE

## 2020-07-20 PROCEDURE — 99211 OFF/OP EST MAY X REQ PHY/QHP: CPT | Performed by: INTERNAL MEDICINE

## 2020-07-20 NOTE — PROGRESS NOTES
OP Anticoagulation Service Note    Date: 7/20/2020  There were no vitals filed for this visit.   pt declined vitals    Anticoagulation Summary  As of 7/20/2020    INR goal:   2.0-3.0   TTR:   22.8 % (11.2 mo)   INR used for dosing:   3.40! (7/20/2020)   Warfarin maintenance plan:   7.5 mg (5 mg x 1.5) every Wed, Fri; 5 mg (5 mg x 1) all other days   Weekly warfarin total:   40 mg   Plan last modified:   Dulce Maria Bowens, PharmD (10/31/2019)   Next INR check:   8/17/2020   Target end date:   Indefinite    Indications    Anticoagulated on warfarin [Z79.01]  Long term (current) use of anticoagulants [Z79.01]  DVT (deep venous thrombosis) (HCC) (Resolved) [I82.409]             Anticoagulation Episode Summary     INR check location:   Anticoagulation Clinic    Preferred lab:       Send INR reminders to:       Comments:         Anticoagulation Care Providers     Provider Role Specialty Phone number    [facility]   [phone]    Keron Garcia M.D.  Internal Medicine [phone]        Anticoagulation Patient Findings      HPI:   Tavares Bonilla seen in clinic today, on anticoagulation therapy with warfarin (a high risk medication) for hx of DVT    Pt is here today to evaluate anticoagulation therapy    Previous INR was  2.3 on 6/1    Pt was instructed to continue current regimen    Confirmed warfarin dosing regimen, he may have doubled up on Saturday he wasn't sure if he missed it.   Diet has been consistent with foods rich in vitamin K: Yes  Changes in ETOH:  No  Changes in smoking status: No  Changes in medication: Yes - pt has been taking Tylenol COLD for sleep  Cost restriction: No  S/s of bleeding:  No  Falls or accidents since last visit No  Signs/symptoms  thrombosis since the last appt: No    A/P   INR supra therapeutic today, will require dose adjust ment today to prevent bleeding complications  and closer follow up.   Tomorrow 1/2 tablets then resume usual regimen       1/21  check referral    Pt educated to contact our clinic with any changes in medications or s/s of bleeding or thrombosis. Pt is aware to seek immediate medical attention for falls, head injury or deep cuts    Follow up appointment in 4 week(s) to reduce risk of adverse events from warfarin   Lorena Marie, PharmD

## 2020-08-17 ENCOUNTER — ANTICOAGULATION VISIT (OUTPATIENT)
Dept: MEDICAL GROUP | Facility: MEDICAL CENTER | Age: 70
End: 2020-08-17
Payer: MEDICARE

## 2020-08-17 DIAGNOSIS — Z79.01 ANTICOAGULATED ON WARFARIN: Primary | ICD-10-CM

## 2020-08-17 DIAGNOSIS — Z79.01 LONG TERM (CURRENT) USE OF ANTICOAGULANTS: ICD-10-CM

## 2020-08-17 LAB — INR PPP: 2.8 (ref 2–3.5)

## 2020-08-17 PROCEDURE — 93793 ANTICOAG MGMT PT WARFARIN: CPT | Performed by: INTERNAL MEDICINE

## 2020-08-17 PROCEDURE — 85610 PROTHROMBIN TIME: CPT | Performed by: INTERNAL MEDICINE

## 2020-08-18 NOTE — PROGRESS NOTES
OP Anticoagulation Service Note    Date: 2020  There were no vitals filed for this visit.   pt declined vitals    Anticoagulation Summary  As of 2020    INR goal:  2.0-3.0   TTR:  23.6 % (1 y)   INR used for dosin.80 (2020)   Warfarin maintenance plan:  7.5 mg (5 mg x 1.5) every Wed, Fri; 5 mg (5 mg x 1) all other days   Weekly warfarin total:  40 mg   Plan last modified:  Dulce Maria Bowens, PharmD (10/31/2019)   Next INR check:  2020   Target end date:  Indefinite    Indications    Anticoagulated on warfarin [Z79.01]  Long term (current) use of anticoagulants [Z79.01]  DVT (deep venous thrombosis) (HCC) (Resolved) [I82.409]             Anticoagulation Episode Summary     INR check location:  Anticoagulation Clinic    Preferred lab:      Send INR reminders to:      Comments:        Anticoagulation Care Providers     Provider Role Specialty Phone number    [facility]   [phone]    Keron Garcia M.D.  Internal Medicine [phone]        Anticoagulation Patient Findings      HPI:   Tavares Bonilla seen in clinic today, on anticoagulation therapy with warfarin (a high risk medication) for hx of DVT     Pt is here today to evaluate anticoagulation therapy    Previous INR was  3.4 on 20    Pt was instructed to lower x 1 then resume usual regimen    Confirmed warfarin dosing regimen, denies missed or extra doses of coumadin.   Diet has been consistent with foods rich in vitamin K: Yes  Changes in ETOH:  No  Changes in smoking status: No  Changes in medication: No   Cost restriction: No  S/s of bleeding:  No  Falls or accidents since last visit No  Signs/symptoms  thrombosis since the last appt: No    A/P   INR  -therapeutic today,   Continue current warfarin regimen           check referral    Pt educated to contact our clinic with any changes in medications or s/s of bleeding or thrombosis. Pt is aware to seek immediate medical attention for falls,  head injury or deep cuts    Follow up appointment in 6 week(s) to reduce risk of adverse events from warfarin  Lorena Marie, PharmD

## 2020-09-28 ENCOUNTER — ANTICOAGULATION VISIT (OUTPATIENT)
Dept: MEDICAL GROUP | Facility: MEDICAL CENTER | Age: 70
End: 2020-09-28
Payer: MEDICARE

## 2020-09-28 DIAGNOSIS — Z79.01 ANTICOAGULATED ON WARFARIN: ICD-10-CM

## 2020-09-28 DIAGNOSIS — Z79.01 LONG TERM (CURRENT) USE OF ANTICOAGULANTS: Primary | ICD-10-CM

## 2020-09-28 LAB — INR PPP: 1.6 (ref 2–3.5)

## 2020-09-28 PROCEDURE — 85610 PROTHROMBIN TIME: CPT | Performed by: INTERNAL MEDICINE

## 2020-09-28 PROCEDURE — 99211 OFF/OP EST MAY X REQ PHY/QHP: CPT | Performed by: INTERNAL MEDICINE

## 2020-09-28 RX ORDER — OXYCODONE HYDROCHLORIDE 5 MG/1
5-30 CAPSULE ORAL PRN
COMMUNITY
End: 2020-11-03

## 2020-09-28 NOTE — PROGRESS NOTES
OP Anticoagulation Service Note    Date: 2020  There were no vitals filed for this visit.   pt declined vitals    Anticoagulation Summary  As of 2020    INR goal:  2.0-3.0   TTR:  28.1 % (1.1 y)   INR used for dosin.60 (2020)   Warfarin maintenance plan:  7.5 mg (5 mg x 1.5) every Wed, Fri; 5 mg (5 mg x 1) all other days   Weekly warfarin total:  40 mg   Plan last modified:  Dulce Maria Bowens, PharmD (10/31/2019)   Next INR check:  10/19/2020   Target end date:  Indefinite    Indications    Anticoagulated on warfarin [Z79.01]  Long term (current) use of anticoagulants [Z79.01]  DVT (deep venous thrombosis) (HCC) (Resolved) [I82.409]             Anticoagulation Episode Summary     INR check location:  Anticoagulation Clinic    Preferred lab:      Send INR reminders to:      Comments:        Anticoagulation Care Providers     Provider Role Specialty Phone number    [facility]   [phone]    Keron Garcia M.D.  Internal Medicine [phone]        Anticoagulation Patient Findings      HPI:   Tavares Bonilla seen in clinic today, on anticoagulation therapy with warfarin (a high risk medication) for hx of DVT       Pt is here today to evaluate anticoagulation therapy    Previous INR was  2.8 on 2020    Pt was instructed to continue current regimen.    Confirmed warfarin dosing regimen, pt thinks he may have missed a dose   Diet has been consistent with foods rich in vitamin K: Yes  Changes in ETOH:  No  Changes in smoking status: No  Changes in medication: No   Cost restriction: No  S/s of bleeding:  No  Falls or accidents since last visit No  Signs/symptoms  thrombosis since the last appt: No    A/P   INR  SUBtherapeutic today, will require dose adjust ment today to prevent recurrence of thrombosis and closer follow up.   Tonight 7.5 mg then resume usual regimen        check referral    Pt educated to contact our clinic with any changes in medications or  s/s of bleeding or thrombosis. Pt is aware to seek immediate medical attention for falls, head injury or deep cuts    Follow up appointment in 2 week(s) to reduce risk of adverse events from warfarin   Lorena Marie, PharmD

## 2020-10-22 ENCOUNTER — TELEPHONE (OUTPATIENT)
Dept: VASCULAR LAB | Facility: MEDICAL CENTER | Age: 70
End: 2020-10-22

## 2020-11-03 ENCOUNTER — OFFICE VISIT (OUTPATIENT)
Dept: MEDICAL GROUP | Age: 70
End: 2020-11-03
Payer: MEDICARE

## 2020-11-03 VITALS
BODY MASS INDEX: 34.13 KG/M2 | WEIGHT: 252 LBS | OXYGEN SATURATION: 95 % | HEART RATE: 92 BPM | HEIGHT: 72 IN | SYSTOLIC BLOOD PRESSURE: 124 MMHG | TEMPERATURE: 97.3 F | DIASTOLIC BLOOD PRESSURE: 82 MMHG

## 2020-11-03 DIAGNOSIS — E55.9 HYPOVITAMINOSIS D: ICD-10-CM

## 2020-11-03 DIAGNOSIS — F11.20 UNCOMPLICATED OPIOID DEPENDENCE (HCC): ICD-10-CM

## 2020-11-03 DIAGNOSIS — R39.14 BENIGN PROSTATIC HYPERPLASIA WITH INCOMPLETE BLADDER EMPTYING: ICD-10-CM

## 2020-11-03 DIAGNOSIS — Z23 NEED FOR VACCINATION: ICD-10-CM

## 2020-11-03 DIAGNOSIS — E78.2 MIXED HYPERLIPIDEMIA: ICD-10-CM

## 2020-11-03 DIAGNOSIS — M25.50 POLYARTHRALGIA: ICD-10-CM

## 2020-11-03 DIAGNOSIS — Z79.2 USING PROPHYLACTIC ANTIBIOTIC ON DAILY BASIS: ICD-10-CM

## 2020-11-03 DIAGNOSIS — G89.4 CHRONIC PAIN SYNDROME: ICD-10-CM

## 2020-11-03 DIAGNOSIS — K21.9 GASTROESOPHAGEAL REFLUX DISEASE WITHOUT ESOPHAGITIS: ICD-10-CM

## 2020-11-03 DIAGNOSIS — N18.32 STAGE 3B CHRONIC KIDNEY DISEASE: ICD-10-CM

## 2020-11-03 DIAGNOSIS — N31.9 NEUROGENIC BLADDER: ICD-10-CM

## 2020-11-03 DIAGNOSIS — I87.009 POST-PHLEBITIC SYNDROME: ICD-10-CM

## 2020-11-03 DIAGNOSIS — N40.1 BENIGN PROSTATIC HYPERPLASIA WITH INCOMPLETE BLADDER EMPTYING: ICD-10-CM

## 2020-11-03 PROBLEM — N13.9 OBSTRUCTIVE UROPATHY: Status: RESOLVED | Noted: 2019-04-04 | Resolved: 2020-11-03

## 2020-11-03 PROBLEM — D48.5 NEOPLASM OF UNCERTAIN BEHAVIOR OF SKIN OF FACE: Status: RESOLVED | Noted: 2019-04-04 | Resolved: 2020-11-03

## 2020-11-03 PROCEDURE — 90662 IIV NO PRSV INCREASED AG IM: CPT | Performed by: INTERNAL MEDICINE

## 2020-11-03 PROCEDURE — G0008 ADMIN INFLUENZA VIRUS VAC: HCPCS | Performed by: INTERNAL MEDICINE

## 2020-11-03 PROCEDURE — 99214 OFFICE O/P EST MOD 30 MIN: CPT | Mod: 25 | Performed by: INTERNAL MEDICINE

## 2020-11-03 RX ORDER — OXYCODONE HYDROCHLORIDE AND ACETAMINOPHEN 5; 325 MG/1; MG/1
1 TABLET ORAL EVERY 6 HOURS PRN
Qty: 120 TAB | Refills: 0 | Status: SHIPPED | OUTPATIENT
Start: 2020-11-03 | End: 2021-03-03 | Stop reason: SDUPTHER

## 2020-11-03 ASSESSMENT — ENCOUNTER SYMPTOMS
MUSCULOSKELETAL NEGATIVE: 1
GASTROINTESTINAL NEGATIVE: 1
RESPIRATORY NEGATIVE: 1
PSYCHIATRIC NEGATIVE: 1
CONSTITUTIONAL NEGATIVE: 1
CARDIOVASCULAR NEGATIVE: 1
EYES NEGATIVE: 1
NEUROLOGICAL NEGATIVE: 1

## 2020-11-03 ASSESSMENT — FIBROSIS 4 INDEX: FIB4 SCORE: 1.03

## 2020-11-03 NOTE — PROGRESS NOTES
Subjective:      Derick Bonilla is a 70 y.o. male who presents with Arthritis (bilateral hands and bilateral knees )  and  'The patient is here for followup of chronic medical problems listed below. The patient is compliant with medications and having no side effects from them. Denies chest pain, abdominal pain, dyspnea, myalgias, or cough.     The last visit patient has complained of daily swelling in both knees and hands with stiffness in the morning for fevers.  Mild joint pain in both areas.  No recent viral syndrome sore throat back pain dysuria.  No skin rash.  No fever chills.      Patient Active Problem List    Diagnosis Date Noted   • Chronic venous hypertension due to DVT 11/17/2014     Priority: Medium   • Obesity (BMI 30.0-34.9) 04/07/2017     Priority: Low   • Anticoagulated on warfarin 11/17/2014     Priority: Low   • History of pulmonary embolism 01/24/2012     Priority: Low   • Using prophylactic antibiotic on daily basis- keflex for recurrent uti form self cath daily/neurogenic bladder 11/03/2020   • Long term (current) use of anticoagulants 08/08/2019   • Obstructive uropathy 04/04/2019   • Neurogenic bladder- self cath since 5/2018 04/04/2019   • Primary osteoarthritis of right knee 04/04/2019   • Post-phlebitic dermatosis of right lower extremity 04/04/2019   • Benign prostatic hyperplasia with incomplete bladder emptying- self cath since 5/2018 ing hernia repair 08/24/2018   • Atrophy of right kidney- urology nv 08/24/2018   • Stage 3b chronic kidney disease 08/24/2018   • Uncomplicated opioid dependence (HCC) 05/18/2018   • Post-phlebitic syndrome- RIGHT LEG 05/22/2015   • Mixed hyperlipidemia  11/17/2014   • Chronic pain syndrome- right leg from post phlebitic syn; on percocet once a day 03/12/2014   • Hypovitaminosis D 03/26/2012   • Factor 5 Leiden mutation, heterozygous (HCC) 01/24/2012   • Gastroesophageal reflux disease without esophagitis 01/24/2012     Allergies   Allergen Reactions    • Lovastatin      Leg cramps   • Other Food Hives     Peanuts gives me Vertigo and hives   • Shellfish Allergy Hives     shrimp   • Statins [Hmg-Coa-R Inhibitors]      Muscle cramps     Outpatient Medications Prior to Visit   Medication Sig Dispense Refill   • rosuvastatin (CRESTOR) 5 MG Tab Take 1 Tab by mouth every evening. 90 Tab 4   • cephALEXin (KEFLEX) 500 MG Cap Take 1 Cap by mouth every day. For antibiotic prophylaxis due to self catherization daily. 90 Cap 4   • warfarin (COUMADIN) 5 MG Tab Take 1-1.5 Tabs by mouth every day. Or as directed by [facility] 135 Tab 1   • finasteride (PROSCAR) 5 MG Tab Take 5 mg by mouth every evening.     • tamsulosin (FLOMAX) 0.4 MG capsule Take 2 Caps by mouth every day. 180 Cap 4   • Cholecalciferol (VITAMIN D) 2000 UNITS Cap Take 1 Cap by mouth every day. 100 Cap 3   • oxycodone 5 MG capsule Take 5-30 mg by mouth as needed.       No facility-administered medications prior to visit.      No visits with results within 1 Month(s) from this visit.   Latest known visit with results is:   Anticoagulation Visit on 09/28/2020   Component Date Value   • INR 09/28/2020 1.60       Lab Results   Component Value Date/Time    HBA1C 5.8 05/01/2013 08:53 AM     Lab Results   Component Value Date/Time    SODIUM 138 01/07/2020 01:36 PM    POTASSIUM 4.4 01/07/2020 01:36 PM    CHLORIDE 104 01/07/2020 01:36 PM    CO2 23 01/07/2020 01:36 PM    GLUCOSE 100 (H) 01/07/2020 01:36 PM    BUN 29 (H) 01/07/2020 01:36 PM    CREATININE 1.57 (H) 01/07/2020 01:36 PM    CREATININE 1.3 02/11/2009 04:05 AM    ALKPHOSPHAT 88 01/07/2020 10:14 AM    ASTSGOT 14 01/07/2020 10:14 AM    ALTSGPT 17 01/07/2020 10:14 AM    TBILIRUBIN 0.6 01/07/2020 10:14 AM     Lab Results   Component Value Date/Time    INR 1.60 09/28/2020 04:31 PM    INR 2.80 08/17/2020 04:41 PM    INR 3.40 07/20/2020 04:34 PM     Lab Results   Component Value Date/Time    CHOLSTRLTOT 141 07/12/2019 09:41 AM    LDL 79 07/12/2019  09:41 AM    HDL 40 07/12/2019 09:41 AM    TRIGLYCERIDE 111 07/12/2019 09:41 AM       Lab Results   Component Value Date/Time    TESTOSTERONE 222 01/22/2014 09:04 AM     No results found for: TSH  Lab Results   Component Value Date/Time    FREET4 0.84 05/01/2013 08:53 AM    FREET4 0.90 01/24/2012 10:38 AM     No results found for: URICACID  No components found for: VITB12  Lab Results   Component Value Date/Time    25HYDROXY 40 05/01/2015 09:53 AM    25HYDROXY 28 (L) 01/22/2014 09:04 AM     No visits with results within 1 Month(s) from this visit.   Latest known visit with results is:   Anticoagulation Visit on 09/28/2020   Component Date Value   • INR 09/28/2020 1.60                 HPI    Review of Systems   Constitutional: Negative.    HENT: Negative.    Eyes: Negative.    Respiratory: Negative.    Cardiovascular: Negative.    Gastrointestinal: Negative.    Genitourinary: Negative.    Musculoskeletal: Negative.    Skin: Negative.    Neurological: Negative.    Endo/Heme/Allergies: Negative.    Psychiatric/Behavioral: Negative.           Objective:     /82 (BP Location: Left arm, Patient Position: Sitting, BP Cuff Size: Adult)   Pulse 92   Temp 36.3 °C (97.3 °F) (Temporal)   Ht 1.829 m (6')   Wt 114.3 kg (252 lb)   SpO2 95%   BMI 34.18 kg/m²      Physical Exam  Constitutional:       General: He is not in acute distress.     Appearance: He is well-developed. He is not diaphoretic.   HENT:      Head: Normocephalic and atraumatic.      Right Ear: External ear normal.      Left Ear: External ear normal.      Nose: Nose normal.      Mouth/Throat:      Pharynx: No oropharyngeal exudate.   Eyes:      General: No scleral icterus.        Right eye: No discharge.         Left eye: No discharge.      Conjunctiva/sclera: Conjunctivae normal.      Pupils: Pupils are equal, round, and reactive to light.   Neck:      Musculoskeletal: Normal range of motion and neck supple.      Thyroid: No thyromegaly.      Vascular:  No JVD.      Trachea: No tracheal deviation.   Cardiovascular:      Rate and Rhythm: Normal rate and regular rhythm.      Heart sounds: Normal heart sounds. No murmur. No friction rub. No gallop.    Pulmonary:      Effort: Pulmonary effort is normal. No respiratory distress.      Breath sounds: Normal breath sounds. No stridor. No wheezing or rales.   Chest:      Chest wall: No tenderness.   Abdominal:      General: Bowel sounds are normal. There is no distension.      Palpations: Abdomen is soft. There is no mass.      Tenderness: There is no abdominal tenderness. There is no guarding or rebound.   Musculoskeletal: Normal range of motion.         General: No tenderness.   Lymphadenopathy:      Cervical: No cervical adenopathy.   Skin:     General: Skin is warm and dry.      Coloration: Skin is not pale.      Findings: No erythema or rash.   Neurological:      Mental Status: He is alert and oriented to person, place, and time.      Motor: No abnormal muscle tone.      Coordination: Coordination normal.      Deep Tendon Reflexes: Reflexes are normal and symmetric. Reflexes normal.   Psychiatric:         Behavior: Behavior normal.         Thought Content: Thought content normal.         Judgment: Judgment normal.                 Assessment/Plan:        1. Post-phlebitic syndrome- RIGHT LEG   Under good control. Continue same regimen.  '  The patient is doing well with 1-2 a day of Percocet 5/325 daily.  120 pills have lasted him 4 months, last prescribed on 7/6/2020  - oxyCODONE-acetaminophen (PERCOCET) 5-325 MG Tab; Take 1 Tab by mouth every 6 hours as needed for up to 90 days.  Dispense: 120 Tab; Refill: 0  - Consent for Opiate Prescription    2. Chronic pain syndrome- right leg from post phlebitic syn; on percocet  Under good control. Continue same regimen.once a day     - oxyCODONE-acetaminophen (PERCOCET) 5-325 MG Tab; Take 1 Tab by mouth every 6 hours as needed for up to 90 days.  Dispense: 120 Tab; Refill: 0  -  Consent for Opiate Prescription    3. Uncomplicated opioid dependence (HCC)    Under good control. Continue same regimen.- oxyCODONE-acetaminophen (PERCOCET) 5-325 MG Tab; Take 1 Tab by mouth every 6 hours as needed for up to 90 days.  Dispense: 120 Tab; Refill: 0  - Consent for Opiate Prescription    4. Need for vaccination     - INFLUENZA VACCINE, HIGH DOSE (65+ ONLY)    5. Mixed hyperlipidemia     Under good control. Continue same regimen.    6. Hypovitaminosis D      Under good control. Continue same regimen.  7. Gastroesophageal reflux disease without esophagitis    Under good control. Continue same regimen.    8. Stage 3b chronic kidney disease    Under good control. Continue same regimen.  9. Benign prostatic hyperplasia with incomplete bladder emptying- self cath since 5/2018 ing hernia repair    Under good control. Continue same regimen.    10. Neurogenic bladder- self cath since 5/2018; daily keflex rx    Under good control. Continue same regimen.  11. Using prophylactic antibiotic on daily basis- keflex for recurrent uti form self cath daily/neurogenic bladder          Under good control. Continue same regimen.  12.  Polyarthralgias.  Probably secondary to DJD but if symptoms persist will need connective tissue disease work-up with RA, URIAH, and CCP antibodies as well as plain films of the hands and feet.    For now the patient will just use Tylenol as needed and avoid NSAIDs due to his chronic anticoagulation and advanced age and impaired renal function.       .-Recheck 4 months

## 2020-11-12 ENCOUNTER — ANTICOAGULATION VISIT (OUTPATIENT)
Dept: MEDICAL GROUP | Facility: MEDICAL CENTER | Age: 70
End: 2020-11-12
Payer: MEDICARE

## 2020-11-12 DIAGNOSIS — Z79.01 LONG TERM (CURRENT) USE OF ANTICOAGULANTS: Primary | ICD-10-CM

## 2020-11-12 DIAGNOSIS — Z79.01 ANTICOAGULATED ON WARFARIN: ICD-10-CM

## 2020-11-12 LAB — INR PPP: 2.3 (ref 2–3.5)

## 2020-11-12 PROCEDURE — 85610 PROTHROMBIN TIME: CPT | Performed by: PHYSICIAN ASSISTANT

## 2020-11-12 PROCEDURE — 93793 ANTICOAG MGMT PT WARFARIN: CPT | Performed by: PHYSICIAN ASSISTANT

## 2020-11-12 NOTE — PROGRESS NOTES
OP Anticoagulation Service Note    Date: 2020  There were no vitals filed for this visit.   pt declined vitals    Anticoagulation Summary  As of 2020    INR goal:  2.0-3.0   TTR:  29.5 % (1.2 y)   INR used for dosin.30 (2020)   Warfarin maintenance plan:  7.5 mg (5 mg x 1.5) every Wed, Fri; 5 mg (5 mg x 1) all other days   Weekly warfarin total:  40 mg   Plan last modified:  Dulce Maria Bowens, PharmD (10/31/2019)   Next INR check:  2020   Target end date:  Indefinite    Indications    Anticoagulated on warfarin [Z79.01]  Long term (current) use of anticoagulants [Z79.01]  DVT (deep venous thrombosis) (HCC) (Resolved) [I82.409]             Anticoagulation Episode Summary     INR check location:  Anticoagulation Clinic    Preferred lab:      Send INR reminders to:      Comments:        Anticoagulation Care Providers     Provider Role Specialty Phone number    [facility]   [phone]    Keron Garcia M.D.  Internal Medicine [phone]        Anticoagulation Patient Findings      HPI:   Tavares Bonilla seen in clinic today, on anticoagulation therapy with warfarin (a high risk medication) for hx of DVT       Pt is here today to evaluate anticoagulation therapy    Previous INR was  1.6 on 2020    Pt was instructed to continue current regimen    Confirmed warfarin dosing regimen, denies missed or extra doses of coumadin.   Diet has been consistent with foods rich in vitamin K: Yes  Changes in ETOH:  No  Changes in smoking status: No  Changes in medication: No   Cost restriction: No  S/s of bleeding:  No  Falls or accidents since last visit No  Signs/symptoms  thrombosis since the last appt: No    A/P   INR  therapeutic today,  Continue current warfarin regimen           check referral    Pt educated to contact our clinic with any changes in medications or s/s of bleeding or thrombosis. Pt is aware to seek immediate medical attention for falls, head  injury or deep cuts    Follow up appointment in 5 week(s) to reduce risk of adverse events from warfarin   Lorena Marie, PharmD

## 2020-12-17 ENCOUNTER — ANTICOAGULATION VISIT (OUTPATIENT)
Dept: MEDICAL GROUP | Facility: MEDICAL CENTER | Age: 70
End: 2020-12-17
Payer: MEDICARE

## 2020-12-17 DIAGNOSIS — Z79.01 ANTICOAGULATED ON WARFARIN: ICD-10-CM

## 2020-12-17 DIAGNOSIS — Z79.01 LONG TERM (CURRENT) USE OF ANTICOAGULANTS: Primary | ICD-10-CM

## 2020-12-17 LAB — INR PPP: 2.7 (ref 2–3.5)

## 2020-12-17 PROCEDURE — 93793 ANTICOAG MGMT PT WARFARIN: CPT | Performed by: INTERNAL MEDICINE

## 2020-12-17 PROCEDURE — 85610 PROTHROMBIN TIME: CPT | Performed by: INTERNAL MEDICINE

## 2020-12-17 NOTE — PROGRESS NOTES
OP Anticoagulation Service Note    Date: 2020  There were no vitals filed for this visit.   pt declined vitals    Anticoagulation Summary  As of 2020    INR goal:  2.0-3.0   TTR:  34.6 % (1.3 y)   INR used for dosin.70 (2020)   Warfarin maintenance plan:  7.5 mg (5 mg x 1.5) every Wed, Fri; 5 mg (5 mg x 1) all other days   Weekly warfarin total:  40 mg   Plan last modified:  Dulce Maria Bowens, PharmD (10/31/2019)   Next INR check:  2021   Target end date:  Indefinite    Indications    Anticoagulated on warfarin [Z79.01]  Long term (current) use of anticoagulants [Z79.01]  DVT (deep venous thrombosis) (HCC) (Resolved) [I82.409]             Anticoagulation Episode Summary     INR check location:  Anticoagulation Clinic    Preferred lab:      Send INR reminders to:      Comments:        Anticoagulation Care Providers     Provider Role Specialty Phone number    [facility]   [phone]    Keron Garcia M.D.  Internal Medicine [phone]        Anticoagulation Patient Findings      HPI:   Tavares Bonilla seen in clinic today, on anticoagulation therapy with warfarin (a high risk medication) for hx of DVT     Pt is here today to evaluate anticoagulation therapy    Previous INR was  2.3 on 2020    Pt was instructed to continue current regimen    Confirmed warfarin dosing regimen, denies missed or extra doses of coumadin.   Diet has been consistent with foods rich in vitamin K: Yes  Changes in ETOH:  No  Changes in smoking status: No  Changes in medication: No     Cost restriction: No  S/s of bleeding:  No  Falls or accidents since last visit No  Signs/symptoms  thrombosis since the last appt: No      A/P   INR  therapeutic today,     Pt educated to contact our clinic with any changes in medications or s/s of bleeding or thrombosis. Pt is aware to seek immediate medical attention for falls, head injury or deep cuts    Follow up appointment in 6 week(s) to  reduce risk of adverse events from warfarin   Lorena Marie, PharmD

## 2021-01-13 DIAGNOSIS — Z79.01 CHRONIC ANTICOAGULATION: ICD-10-CM

## 2021-01-13 RX ORDER — WARFARIN SODIUM 5 MG/1
TABLET ORAL
Qty: 135 TAB | Refills: 1 | Status: SHIPPED | OUTPATIENT
Start: 2021-01-13 | End: 2021-09-22

## 2021-01-15 DIAGNOSIS — Z23 NEED FOR VACCINATION: ICD-10-CM

## 2021-01-15 DIAGNOSIS — Z79.01 CHRONIC ANTICOAGULATION: ICD-10-CM

## 2021-01-28 ENCOUNTER — ANTICOAGULATION VISIT (OUTPATIENT)
Dept: MEDICAL GROUP | Facility: MEDICAL CENTER | Age: 71
End: 2021-01-28
Payer: MEDICARE

## 2021-01-28 DIAGNOSIS — Z79.01 ANTICOAGULATED ON WARFARIN: ICD-10-CM

## 2021-01-28 DIAGNOSIS — Z79.01 LONG TERM (CURRENT) USE OF ANTICOAGULANTS: ICD-10-CM

## 2021-01-28 LAB — INR PPP: 3.2 (ref 2–3.5)

## 2021-01-28 PROCEDURE — 99211 OFF/OP EST MAY X REQ PHY/QHP: CPT | Performed by: INTERNAL MEDICINE

## 2021-01-28 PROCEDURE — 85610 PROTHROMBIN TIME: CPT | Performed by: INTERNAL MEDICINE

## 2021-01-28 NOTE — PROGRESS NOTES
OP Anticoagulation Service Note    Date: 1/28/2021  There were no vitals filed for this visit.   pt declined vitals    Anticoagulation Summary  As of 1/28/2021    INR goal:  2.0-3.0   TTR:  36.6 % (1.4 y)   INR used for dosing:  3.20 (1/28/2021)   Warfarin maintenance plan:  7.5 mg (5 mg x 1.5) every Wed, Fri; 5 mg (5 mg x 1) all other days   Weekly warfarin total:  40 mg   Plan last modified:  Dulce Maria Bowens, PharmD (10/31/2019)   Next INR check:  3/11/2021   Target end date:  Indefinite    Indications    Anticoagulated on warfarin [Z79.01]  Long term (current) use of anticoagulants [Z79.01]  DVT (deep venous thrombosis) (HCC) (Resolved) [I82.409]             Anticoagulation Episode Summary     INR check location:  Anticoagulation Clinic    Preferred lab:      Send INR reminders to:      Comments:        Anticoagulation Care Providers     Provider Role Specialty Phone number    [facility]   [phone]    Keron Garcia M.D.  Internal Medicine [phone]        Anticoagulation Patient Findings  Patient Findings     Positives:  Extra doses    Negatives:  Signs/symptoms of thrombosis, Signs/symptoms of bleeding, Laboratory test error suspected, Change in health, Change in alcohol use, Change in activity, Upcoming invasive procedure, Emergency department visit, Upcoming dental procedure, Missed doses, Change in medications, Change in diet/appetite, Hospital admission, Bruising, Other complaints          HPI:   Tavares Bonilla seen in clinic today, on anticoagulation therapy with warfarin (a high risk medication) for DVT    Pt is here today to evaluate anticoagulation therapy    Previous INR was  2.7 on 12/17/2020    Pt was instructed to continue regimen    Confirmed warfarin dosing regimen, denies missed doses of coumadin.   Diet has been consistent with foods rich in vitamin K: Yes  Changes in ETOH:  No  Changes in smoking status: No  Changes in medication: No   Cost restriction:  No  S/s of bleeding:  No  Falls or accidents since last visit No  Signs/symptoms  thrombosis since the last appt: No  =    A/P   INR  supra-therapeutic today, will require dose adjust ment today to prevent bleeding complications or recurrence of thrombosis or stroke) and closer follow up.    Reduce dose x 1 day then continue regimen     Our protocol suggests we test in 2 weeks.  Given the pt's risk factors and previous INR stability, it is reasonable to extend to 6 weeks to reduce Covid exposure.  This decision was made using shared decision making with the pt and benefits vs risks were discussed    01/22 check referral    Pt educated to contact our clinic with any changes in medications or s/s of bleeding or thrombosis. Pt is aware to seek immediate medical attention for falls, head injury or deep cuts    Follow up appointment in 6 week(s) to reduce risk of adverse events from warfarin   Dulce Maria Bowens, AdamD

## 2021-02-09 ENCOUNTER — HOSPITAL ENCOUNTER (OUTPATIENT)
Facility: MEDICAL CENTER | Age: 71
End: 2021-02-09
Attending: UROLOGY
Payer: MEDICARE

## 2021-02-09 PROCEDURE — 84153 ASSAY OF PSA TOTAL: CPT

## 2021-02-11 LAB — PSA SERPL-MCNC: 0.23 NG/ML (ref 0–4)

## 2021-03-03 ENCOUNTER — OFFICE VISIT (OUTPATIENT)
Dept: MEDICAL GROUP | Age: 71
End: 2021-03-03
Payer: MEDICARE

## 2021-03-03 VITALS
SYSTOLIC BLOOD PRESSURE: 118 MMHG | OXYGEN SATURATION: 91 % | BODY MASS INDEX: 32.86 KG/M2 | HEIGHT: 72 IN | DIASTOLIC BLOOD PRESSURE: 60 MMHG | TEMPERATURE: 97.5 F | HEART RATE: 89 BPM | WEIGHT: 242.6 LBS

## 2021-03-03 DIAGNOSIS — N18.32 STAGE 3B CHRONIC KIDNEY DISEASE: ICD-10-CM

## 2021-03-03 DIAGNOSIS — E53.8 VITAMIN B 12 DEFICIENCY: ICD-10-CM

## 2021-03-03 DIAGNOSIS — E78.2 MIXED HYPERLIPIDEMIA: ICD-10-CM

## 2021-03-03 DIAGNOSIS — G89.4 CHRONIC PAIN SYNDROME: ICD-10-CM

## 2021-03-03 DIAGNOSIS — G89.29 CHRONIC RIGHT SHOULDER PAIN: ICD-10-CM

## 2021-03-03 DIAGNOSIS — M25.511 CHRONIC RIGHT SHOULDER PAIN: ICD-10-CM

## 2021-03-03 DIAGNOSIS — F11.20 UNCOMPLICATED OPIOID DEPENDENCE (HCC): ICD-10-CM

## 2021-03-03 DIAGNOSIS — R73.01 IFG (IMPAIRED FASTING GLUCOSE): ICD-10-CM

## 2021-03-03 DIAGNOSIS — D68.51 FACTOR 5 LEIDEN MUTATION, HETEROZYGOUS (HCC): ICD-10-CM

## 2021-03-03 DIAGNOSIS — N40.1 BENIGN PROSTATIC HYPERPLASIA WITH LOWER URINARY TRACT SYMPTOMS, SYMPTOM DETAILS UNSPECIFIED: ICD-10-CM

## 2021-03-03 DIAGNOSIS — I87.009 POST-PHLEBITIC SYNDROME: ICD-10-CM

## 2021-03-03 DIAGNOSIS — E55.9 VITAMIN D DEFICIENCY: ICD-10-CM

## 2021-03-03 PROCEDURE — 99214 OFFICE O/P EST MOD 30 MIN: CPT | Performed by: INTERNAL MEDICINE

## 2021-03-03 RX ORDER — OXYCODONE HYDROCHLORIDE AND ACETAMINOPHEN 5; 325 MG/1; MG/1
1 TABLET ORAL EVERY 6 HOURS PRN
Qty: 120 TABLET | Refills: 0 | Status: SHIPPED | OUTPATIENT
Start: 2021-03-03 | End: 2021-06-01

## 2021-03-03 ASSESSMENT — ENCOUNTER SYMPTOMS
PSYCHIATRIC NEGATIVE: 1
EYES NEGATIVE: 1
CARDIOVASCULAR NEGATIVE: 1
NEUROLOGICAL NEGATIVE: 1
GASTROINTESTINAL NEGATIVE: 1
RESPIRATORY NEGATIVE: 1
CONSTITUTIONAL NEGATIVE: 1

## 2021-03-03 ASSESSMENT — FIBROSIS 4 INDEX: FIB4 SCORE: 1.03

## 2021-03-03 ASSESSMENT — PATIENT HEALTH QUESTIONNAIRE - PHQ9: CLINICAL INTERPRETATION OF PHQ2 SCORE: 0

## 2021-03-03 NOTE — PROGRESS NOTES
Subjective:      Derick Bonilla is a 70 y.o. male who presents with Chronic Kidney Disease (Follow-up), Medication Refill, and Shoulder Pain (Pt. had a fall 2 mnths ago and right shoulder is still hurting )  The patient is here for followup of chronic medical problems listed below. The patient is compliant with medications and having no side effects from them. Denies chest pain, abdominal pain, dyspnea, myalgias, or cough.   Patient Active Problem List    Diagnosis Date Noted   • Chronic venous hypertension due to DVT 11/17/2014     Priority: Medium   • Obesity (BMI 30.0-34.9) 04/07/2017     Priority: Low   • Anticoagulated on warfarin 11/17/2014     Priority: Low   • History of pulmonary embolism 01/24/2012     Priority: Low   • Using prophylactic antibiotic on daily basis- keflex for recurrent uti form self cath daily/neurogenic bladder 11/03/2020   • Polyarthralgia 11/03/2020   • Long term (current) use of anticoagulants 08/08/2019   • Obstructive uropathy 04/04/2019   • Neurogenic bladder- self cath since 5/2018 04/04/2019   • Primary osteoarthritis of right knee 04/04/2019   • Post-phlebitic dermatosis of right lower extremity 04/04/2019   • Benign prostatic hyperplasia with incomplete bladder emptying- self cath since 5/2018 ing hernia repair 08/24/2018   • Atrophy of right kidney- urology nv 08/24/2018   • Stage 3b chronic kidney disease 08/24/2018   • Uncomplicated opioid dependence (HCC) 05/18/2018   • Post-phlebitic syndrome- RIGHT LEG 05/22/2015   • Mixed hyperlipidemia  11/17/2014   • Chronic pain syndrome- right leg from post phlebitic syn; on percocet once a day 03/12/2014   • Hypovitaminosis D 03/26/2012   • Factor 5 Leiden mutation, heterozygous (HCC) 01/24/2012   • Gastroesophageal reflux disease without esophagitis 01/24/2012     Allergies   Allergen Reactions   • Lovastatin      Leg cramps   • Other Food Hives     Peanuts gives me Vertigo and hives   • Shellfish Allergy Hives     shrimp   •  Statins [Hmg-Coa-R Inhibitors]      Muscle cramps     Outpatient Medications Prior to Visit   Medication Sig Dispense Refill   • warfarin (COUMADIN) 5 MG Tab TAKE 1 TO 1 & 1/2 (ONE & ONE-HALF) TABLETS BY MOUTH ONCE DAILY OR AS DIRECTED BY [facility] 135 Tab 1   • rosuvastatin (CRESTOR) 5 MG Tab Take 1 Tab by mouth every evening. 90 Tab 4   • cephALEXin (KEFLEX) 500 MG Cap Take 1 Cap by mouth every day. For antibiotic prophylaxis due to self catherization daily. 90 Cap 4   • finasteride (PROSCAR) 5 MG Tab Take 5 mg by mouth every evening.     • tamsulosin (FLOMAX) 0.4 MG capsule Take 2 Caps by mouth every day. 180 Cap 4   • Cholecalciferol (VITAMIN D) 2000 UNITS Cap Take 1 Cap by mouth every day. 100 Cap 3   • oxyCODONE-acetaminophen (PERCOCET) 5-325 MG Tab Take 1 Tab by mouth every 6 hours as needed for up to 90 days. 120 Tab 0     No facility-administered medications prior to visit.               HPI    Review of Systems   Constitutional: Negative.    HENT: Negative.    Eyes: Negative.    Respiratory: Negative.    Cardiovascular: Negative.    Gastrointestinal: Negative.    Genitourinary: Negative.    Musculoskeletal: Positive for joint pain.   Skin: Negative.    Neurological: Negative.    Endo/Heme/Allergies: Negative.    Psychiatric/Behavioral: Negative.           Objective:     /60 (BP Location: Left arm, Patient Position: Sitting, BP Cuff Size: Adult)   Pulse 89   Temp 36.4 °C (97.5 °F) (Temporal)   Ht 1.829 m (6')   Wt 110 kg (242 lb 9.6 oz)   SpO2 91%   BMI 32.90 kg/m²      Physical Exam  Constitutional:       General: He is not in acute distress.     Appearance: He is well-developed. He is not diaphoretic.   HENT:      Head: Normocephalic and atraumatic.      Right Ear: External ear normal.      Left Ear: External ear normal.      Nose: Nose normal.      Mouth/Throat:      Pharynx: No oropharyngeal exudate.   Eyes:      General: No scleral icterus.        Right eye: No  discharge.         Left eye: No discharge.      Conjunctiva/sclera: Conjunctivae normal.      Pupils: Pupils are equal, round, and reactive to light.   Neck:      Thyroid: No thyromegaly.      Vascular: No JVD.      Trachea: No tracheal deviation.   Cardiovascular:      Rate and Rhythm: Normal rate and regular rhythm.      Heart sounds: Normal heart sounds. No murmur. No friction rub. No gallop.    Pulmonary:      Effort: Pulmonary effort is normal. No respiratory distress.      Breath sounds: Normal breath sounds. No stridor. No wheezing or rales.   Chest:      Chest wall: No tenderness.   Abdominal:      General: Bowel sounds are normal. There is no distension.      Palpations: Abdomen is soft. There is no mass.      Tenderness: There is no abdominal tenderness. There is no guarding or rebound.   Musculoskeletal:         General: Tenderness present. Normal range of motion.      Cervical back: Normal range of motion and neck supple.      Comments: Right shoulder   Lymphadenopathy:      Cervical: No cervical adenopathy.   Skin:     General: Skin is warm and dry.      Coloration: Skin is not pale.      Findings: No erythema or rash.   Neurological:      Mental Status: He is alert and oriented to person, place, and time.      Motor: No abnormal muscle tone.      Coordination: Coordination normal.      Deep Tendon Reflexes: Reflexes are normal and symmetric. Reflexes normal.   Psychiatric:         Behavior: Behavior normal.         Thought Content: Thought content normal.         Judgment: Judgment normal.            Hospital Outpatient Visit on 02/09/2021   Component Date Value   • Prostatic Specific Antig* 02/09/2021 0.23       Lab Results   Component Value Date/Time    HBA1C 5.8 05/01/2013 08:53 AM     Lab Results   Component Value Date/Time    SODIUM 138 01/07/2020 01:36 PM    POTASSIUM 4.4 01/07/2020 01:36 PM    CHLORIDE 104 01/07/2020 01:36 PM    CO2 23 01/07/2020 01:36 PM    GLUCOSE 100 (H) 01/07/2020 01:36 PM     BUN 29 (H) 01/07/2020 01:36 PM    CREATININE 1.57 (H) 01/07/2020 01:36 PM    CREATININE 1.3 02/11/2009 04:05 AM    ALKPHOSPHAT 88 01/07/2020 10:14 AM    ASTSGOT 14 01/07/2020 10:14 AM    ALTSGPT 17 01/07/2020 10:14 AM    TBILIRUBIN 0.6 01/07/2020 10:14 AM     Lab Results   Component Value Date/Time    INR 3.20 01/28/2021 09:35 AM    INR 2.70 12/17/2020 09:25 AM    INR 2.30 11/12/2020 09:37 AM     Lab Results   Component Value Date/Time    CHOLSTRLTOT 141 07/12/2019 09:41 AM    LDL 79 07/12/2019 09:41 AM    HDL 40 07/12/2019 09:41 AM    TRIGLYCERIDE 111 07/12/2019 09:41 AM       Lab Results   Component Value Date/Time    TESTOSTERONE 222 01/22/2014 09:04 AM     No results found for: TSH  Lab Results   Component Value Date/Time    FREET4 0.84 05/01/2013 08:53 AM    FREET4 0.90 01/24/2012 10:38 AM     No results found for: URICACID  No components found for: VITB12  Lab Results   Component Value Date/Time    25HYDROXY 40 05/01/2015 09:53 AM    25HYDROXY 28 (L) 01/22/2014 09:04 AM   '       Assessment/Plan:        1. Post-phlebitic syndrome- RIGHT LEG      - oxyCODONE-acetaminophen (PERCOCET) 5-325 MG Tab; Take 1 tablet by mouth every 6 hours as needed for Moderate Pain (for chronixc pain) for up to 90 days.  Dispense: 120 tablet; Refill: 0  - Consent for Opiate Prescription    2. Chronic pain syndrome- right leg from post phlebitic syn; on percocet once a day      - oxyCODONE-acetaminophen (PERCOCET) 5-325 MG Tab; Take 1 tablet by mouth every 6 hours as needed for Moderate Pain (for chronixc pain) for up to 90 days.  Dispense: 120 tablet; Refill: 0  - Consent for Opiate Prescription    3. Uncomplicated opioid dependence (HCC)       - oxyCODONE-acetaminophen (PERCOCET) 5-325 MG Tab; Take 1 tablet by mouth every 6 hours as needed for Moderate Pain (for chronixc pain) for up to 90 days.  Dispense: 120 tablet; Refill: 0  - Consent for Opiate Prescription    4. Chronic right shoulder pain-rule out rotator cuff tear versus  tendinitis for persistent pain following recent injury to 3 months ago.     - REFERRAL TO ORTHOPEDICS    5. Mixed hyperlipidemia    Under good control. Continue same regimen.'    - TSH; Future  - Comp Metabolic Panel; Future  - Lipid Profile; Future  - CBC WITH DIFFERENTIAL; Future    6. Stage 3b chronic kidney disease   Under good control. Continue same regimen.      7. Factor 5 Leiden mutation, heterozygous (HCC)     Under good control. Continue same regimen.'    8. Vitamin D deficiency  Under good control. Continue same regimen.     - VITAMIN D,25 HYDROXY; Future    9. Vitamin B 12 deficiency     - VITAMIN B12; Future    10. Benign prostatic hyperplasia with lower urinary tract symptoms, symptom details unspecified  Patient to get TUMT next month and advised to stop Coumadin 5 to 7 days prior to surgery and resume it 2 days postop  - PROSTATE SPECIFIC AG DIAGNOSTIC; Future    11. IFG (impaired fasting glucose)   Under good control. Continue same regimen.]  - HEMOGLOBIN A1C; Future

## 2021-03-11 ENCOUNTER — ANTICOAGULATION VISIT (OUTPATIENT)
Dept: MEDICAL GROUP | Facility: MEDICAL CENTER | Age: 71
End: 2021-03-11
Payer: MEDICARE

## 2021-03-11 DIAGNOSIS — Z79.01 LONG TERM (CURRENT) USE OF ANTICOAGULANTS: ICD-10-CM

## 2021-03-11 DIAGNOSIS — Z79.01 ANTICOAGULATED ON WARFARIN: ICD-10-CM

## 2021-03-11 LAB — INR PPP: 1.9 (ref 2–3.5)

## 2021-03-11 PROCEDURE — 99211 OFF/OP EST MAY X REQ PHY/QHP: CPT | Performed by: INTERNAL MEDICINE

## 2021-03-11 PROCEDURE — 85610 PROTHROMBIN TIME: CPT | Performed by: INTERNAL MEDICINE

## 2021-03-11 NOTE — PROGRESS NOTES
OP Anticoagulation Service Note    Date: 3/11/2021  There were no vitals filed for this visit.   pt declined vitals    Anticoagulation Summary  As of 3/11/2021    INR goal:  2.0-3.0   TTR:  39.6 % (1.6 y)   INR used for dosin.90 (3/11/2021)   Warfarin maintenance plan:  7.5 mg (5 mg x 1.5) every Wed, Fri; 5 mg (5 mg x 1) all other days   Weekly warfarin total:  40 mg   Plan last modified:  Dulce Maria Bowens, PharmD (10/31/2019)   Next INR check:  2021   Target end date:  Indefinite    Indications    Anticoagulated on warfarin [Z79.01]  Long term (current) use of anticoagulants [Z79.01]  DVT (deep venous thrombosis) (HCC) (Resolved) [I82.409]             Anticoagulation Episode Summary     INR check location:  Anticoagulation Clinic    Preferred lab:      Send INR reminders to:      Comments:        Anticoagulation Care Providers     Provider Role Specialty Phone number    [facility]   [phone]    Keron Garcia M.D.  Internal Medicine [phone]        Anticoagulation Patient Findings  Patient Findings     Positives:  Missed doses    Negatives:  Signs/symptoms of thrombosis, Signs/symptoms of bleeding, Laboratory test error suspected, Change in health, Change in alcohol use, Change in activity, Upcoming invasive procedure, Emergency department visit, Upcoming dental procedure, Extra doses, Change in medications, Change in diet/appetite, Hospital admission, Bruising, Other complaints          HPI:   Tavares Rankin Irene seen in clinic today, on anticoagulation therapy with warfarin (a high risk medication) for DVT    Pt is here today to evaluate anticoagulation therapy    Previous INR was  3.2 on 21    Pt was instructed to reduce dose x 1 day then continue regimen    Confirmed warfarin dosing regimen, denies extra doses of coumadin.   Diet has been consistent with foods rich in vitamin K: Yes  Changes in ETOH:  No  Changes in smoking status: No  Changes in medication:  No   Cost restriction: No  S/s of bleeding:  No  Falls or accidents since last visit No  Signs/symptoms  thrombosis since the last appt: No      A/P   INR  slightly sub-therapeutic today     Pt is to continue with current warfarin dosing regimen since INR is 0.1 out of range    01/22 check referral    Pt educated to contact our clinic with any changes in medications or s/s of bleeding or thrombosis. Pt is aware to seek immediate medical attention for falls, head injury or deep cuts    Follow up appointment in 6 week(s) to reduce risk of adverse events from warfarin  Dulce Maria Bowens, AdamD

## 2021-05-25 ENCOUNTER — TELEPHONE (OUTPATIENT)
Dept: VASCULAR LAB | Facility: MEDICAL CENTER | Age: 71
End: 2021-05-25

## 2021-05-25 NOTE — TELEPHONE ENCOUNTER
[facility]      Pt is over due for PT/INR for warfarin monitoring. Left message for patient to have INR checked ASAP.     Clinic phone number left for any questions or concerns.    Regina Pedersen  PharmD

## 2021-05-28 ENCOUNTER — TELEPHONE (OUTPATIENT)
Dept: MEDICAL GROUP | Facility: PHYSICIAN GROUP | Age: 71
End: 2021-05-28

## 2021-05-28 NOTE — TELEPHONE ENCOUNTER
[facility]    Received message from [facility] pharmacy stating they want our approval to release Bactrim from another provider.  I left a VM with the pt requesting a call back ASAP to discuss the DDI. We do not want to delay therapy if he is in need of the Bactrim so he must call us back to discuss getting his next INR and how to adjust warfarin according to DDI.  [facility] instructed to release the prescription.     Provided pt with our clinic phone number and our on-call phone number.    Ganesh Mao, AdamD

## 2021-06-10 ENCOUNTER — TELEPHONE (OUTPATIENT)
Dept: VASCULAR LAB | Facility: MEDICAL CENTER | Age: 71
End: 2021-06-10

## 2021-06-10 NOTE — LETTER
Tavares Bonilla  [address] [phone]  [place] [address]    06/10/21    Dear Tavares Bonilla ,    We have been unsuccessful in our attempts to contact you regarding your Anticoagulation Service appointments. Warfarin is a potent blood-thinning agent that requires monitoring to ensure that the dosage is correct for your body.  If it isn't, you could develop serious, sometimes life-threatening bleeding problems or life-threatening blood clots or stroke could result.    To monitor you effectively, we need to be able to communicate with you.  This is a requirement to be followed by our Service.       If you repeatedly fail to keep your lab appointments, you are at risk of being discharged from the Anticoagulation Service.    It is extremely important that you contact the clinic as soon as possible to arrange appropriate follow up.  We are open Monday-Friday 8 am until 5 pm.  You may reach our Service at [phone].           Sincerely,           Ganesh Mao, PharmD, [facility]  Clinic Supervisor  [facility]  Outpatient Anticoagulation Service

## 2021-06-10 NOTE — TELEPHONE ENCOUNTER
Left message for pt to have INR checked  .2nd call  Letter sent  Danelle Burrell, Clinical Pharmacist, CDE, CACP

## 2021-06-16 NOTE — TELEPHONE ENCOUNTER
Assessment & Plan     Weight loss  Likely due to decreased appetite and change in eating patterns.  It sounds as though patient sleeps on and off throughout the day.  Labs will be checked as below to rule out celiac sprue or thyroid disorder as a contributor to her symptoms.  If work-up returns negative, would consider a visit with gastroenterology.  - Celiac(Gluten)Antibody Panel  - Thyroid Cascade    Hair thinning  Labs as below to rule out celiac sprue or thyroid derangement.  Likely related to bodily stress and change in lifestyle.  - Celiac(Gluten)Antibody Panel  - Thyroid Cascade    Pelvic pain in female  Work-up undertaken as below to attempt to rule out pelvic inflammatory disease.  She was not particularly tender during the pelvic exam, which is reassuring.  Her IUD appears to be in good position.  Further work-up could include a pelvic ultrasound.  Discussed also that she could have a trial of removal of IUD to see if this resolves some of her symptoms.  - Chlamydia trachomatis & Neisseria gonorrhoeae, Amplified Detection  - Wet Prep, Vaginal  - Celiac(Gluten)Antibody Panel    Abdominal bloating  This is fairly new, occurs after eating.  She does have some pain with this, no stool changes, no loose stools.  Work-up for celiac sprue undertaken today, consider visit with gastroenterology to discuss further.  Inflammatory markers done 2 months ago were normal, symptoms have not changed since that time.  Discussed with mother that it is unlikely to represent inflammatory bowel disease.  - Celiac(Gluten)Antibody Panel    Nausea  Associated with bloating and abdominal pain.  See above for further detail, labs to rule out celiac sprue.  She has been eating gluten.  - Celiac(Gluten)Antibody Panel    Screening examination for sexually transmitted disease  After discussion of options for screening for sexually transmitted infections, patient consents to the below testing.  - Chlamydia trachomatis & Neisseria  Pt no showed his last anticoagulation services appointment. Left message for pt to have INR checked  Danelle Burrell, Clinical Pharmacist, CDE, CACP     gonorrhoeae, Amplified Detection  - Wet Prep, Vaginal  - Syphilis Screen, Cascade  - HIV Antigen/Antibody Screening Cascade  - Hepatitis C Antibody (Anti-HCV)    Moderate episode of recurrent major depressive disorder (H)  Mood has been worsened, likely due to prolonged inactivity and pain.  Recommended increasing Zoloft to 100 mg daily.  Also recommended restarting therapy, this may be cost prohibitive for the patient at present.  Discussed with mother that it would be ideal to get anxiety under better control prior to initiating Wellbutrin, which has worked quite well for mother.  - sertraline (ZOLOFT) 100 MG tablet; Take 1 tablet (100 mg total) by mouth daily. Take 1/2 tab by mouth daily for 6 days, then 1 tab daily thereafter    Generalized anxiety disorder  Recommended increasing Zoloft to 100 mg daily as above.  Also recommended reestablishing care with a therapist.  - sertraline (ZOLOFT) 100 MG tablet; Take 1 tablet (100 mg total) by mouth daily. Take 1/2 tab by mouth daily for 6 days, then 1 tab daily thereafter    Hyperalgesia  Extensive work-up has been done on patient's low back pain and this is the diagnosis that she was given by orthopedics.  She was given gabapentin and recommended to try some aquatic therapy.  She has this visit scheduled next week.  - Referral to Aquatic Therapy    Mild intermittent asthma without complication  Patient desired a refill of her albuterol inhaler.  Her asthma was not discussed in detail today.  - albuterol (PROAIR HFA;PROVENTIL HFA;VENTOLIN HFA) 90 mcg/actuation inhaler; Inhale 1-2 puffs every 4 (four) hours as needed for wheezing or shortness of breath.    Assessment requiring an independent historian(s) - family - mother Mayela  Discussion of management or test interpretation with external physician/other qualified healthcare professional/appropriate source - Dr. Cruz from Orthopedics  Ordering of each unique test  Prescription drug management       Return in  "about 4 weeks (around 4/28/2021), or if symptoms worsen or fail to improve.    Kayleigh Henderson MD  [facility]   Glenys Asher is 17 y.o. and presents today for the following health issues   HPI     Patient presents today at the behest of her orthopedist Dr. Alonso primarily.  He called me to discuss her case.  She was having pain out of proportion to her exam and work-up findings in her low back.  He was worried that she may have pelvic inflammatory disease or some other abdominal cause of her discomforts.  Patient presents today with her mother, states she would also like to discuss mood change.  Patient describes having some unintentional weight loss, thinning of her hair, continuous spotting with her IUD in place, worsening of depression and anxiety to the point where she cannot leave the house.  She also experiences bloating and constipation for the past 1.5 months.  She stools approximately every 3 days.  She notes that her stools are soft and do not hurt to pass.  She states that she is very inactive throughout the day, mostly laying around the house.  If she gets up for too long, she has some abdominal and low pelvic pain along with back pain.  She states that her back hurts all the time.  She feels nauseated when she sits up, eats laying down for this reason.  She denies any symptoms of reflux or heartburn.  She snacks throughout the day and eats one large meal in the evening.  She thinks she had her IUD placed in September 2018 at [facility].  This is a Mirena IUD.  She has had it checked since insertion and it appeared to be in good position.  She is currently sexually active with a male partner.  She describes always feeling nauseated.  Her mother has some \"bowel issues\", but no diagnosis of ulcerative colitis or Crohn's.    Labs Reviewed:  Urinalysis 2/12/2021: Normal  Comprehensive metabolic panel 2/12/2021: Normal  HLA-B27 2/12/2021: " "Negative  1/26/2021: Normal CRP, ESR, and heme2    Review of Systems  Negative except as noted above      Objective    /80 (Patient Site: Right Arm, Patient Position: Sitting, Cuff Size: Adult Regular)   Pulse 87   Temp 98  F (36.7  C) (Oral)   Resp 18   Ht 5' 6\" (1.676 m)   Wt 130 lb 8 oz (59.2 kg)   SpO2 99%   BMI 21.06 kg/m    Body mass index is 21.06 kg/m .  Physical Exam  General: Well-developed well-nourished female, no acute distress, but does lay back during the examination  Cardiovascular: Regular rate and rhythm  Neck: Supple, no palpable thyromegaly  Pelvic: External genitalia normal, vaginal vault is normal without significant discharge, cervix is normal in appearance, no cervical motion tenderness, no adnexal tenderness, IUD strings are visible emanating from the external cervical os, no purulent drainage from the cervix  Psychiatric: Speech is fluent and thought process is linear, affect is actually reactive today, mood is described as depressed and anxious, affect is not congruent with degree of reported anxiety    Results for orders placed or performed in visit on 03/31/21   Wet Prep, Vaginal    Specimen: Genital   Result Value Ref Range    Yeast Result No yeast seen No yeast seen    Trichomonas No Trichomonas seen No Trichomonas seen    Clue Cells, Wet Prep No Clue cells seen No Clue cells seen               "

## 2021-07-07 ENCOUNTER — TELEPHONE (OUTPATIENT)
Dept: VASCULAR LAB | Facility: MEDICAL CENTER | Age: 71
End: 2021-07-07

## 2021-07-07 NOTE — LETTER
Tavares Bonilla  [address] [address]  [place] [address]    07/07/21    Dear Tavares Bonilla ,    We have been unsuccessful in our attempts to contact you regarding your Anticoagulation Service appointments. Warfarin is a potent blood-thinning agent that requires monitoring to ensure that the dosage is correct for your body.  If it isn't, you could develop serious, sometimes life-threatening bleeding problems or life-threatening blood clots or stroke could result.    To monitor you effectively, we need to be able to communicate with you.  This is a requirement to be followed by our Service.       If you repeatedly fail to keep your lab appointments, you are at risk of being discharged from the Anticoagulation Service.    It is extremely important that you contact the clinic as soon as possible to arrange appropriate follow up.  We are open Monday-Friday 8 am until 5 pm.  You may reach our Service at [phone].           Sincerely,           Ganesh Mao, PharmD, [facility]  Clinic Supervisor  [facility]  Outpatient Anticoagulation Service

## 2021-07-07 NOTE — TELEPHONE ENCOUNTER
Left message for pt to have INR checked  3rd call  2nd letter sent  Danelle Burrell, Clinical Pharmacist, CDE, CACP     Home

## 2021-08-05 DIAGNOSIS — E78.2 MIXED HYPERLIPIDEMIA: ICD-10-CM

## 2021-08-06 RX ORDER — CEPHALEXIN 500 MG/1
CAPSULE ORAL
Qty: 90 CAPSULE | Refills: 3 | Status: SHIPPED | OUTPATIENT
Start: 2021-08-06 | End: 2022-09-12

## 2021-08-06 RX ORDER — ROSUVASTATIN CALCIUM 5 MG/1
TABLET, COATED ORAL
Qty: 90 TABLET | Refills: 3 | Status: SHIPPED
Start: 2021-08-06 | End: 2023-01-11

## 2021-08-11 ENCOUNTER — HOSPITAL ENCOUNTER (OUTPATIENT)
Dept: LAB | Facility: MEDICAL CENTER | Age: 71
End: 2021-08-11
Attending: INTERNAL MEDICINE
Payer: MEDICARE

## 2021-08-11 DIAGNOSIS — E55.9 VITAMIN D DEFICIENCY: ICD-10-CM

## 2021-08-11 DIAGNOSIS — E53.8 VITAMIN B 12 DEFICIENCY: ICD-10-CM

## 2021-08-11 DIAGNOSIS — E78.2 MIXED HYPERLIPIDEMIA: ICD-10-CM

## 2021-08-11 DIAGNOSIS — N40.1 BENIGN PROSTATIC HYPERPLASIA WITH LOWER URINARY TRACT SYMPTOMS, SYMPTOM DETAILS UNSPECIFIED: ICD-10-CM

## 2021-08-11 DIAGNOSIS — R73.01 IFG (IMPAIRED FASTING GLUCOSE): ICD-10-CM

## 2021-08-11 LAB
25(OH)D3 SERPL-MCNC: 80 NG/ML (ref 30–100)
ALBUMIN SERPL BCP-MCNC: 4.3 G/DL (ref 3.2–4.9)
ALBUMIN/GLOB SERPL: 1.6 G/DL
ALP SERPL-CCNC: 78 U/L (ref 30–99)
ALT SERPL-CCNC: 23 U/L (ref 2–50)
ANION GAP SERPL CALC-SCNC: 12 MMOL/L (ref 7–16)
AST SERPL-CCNC: 21 U/L (ref 12–45)
BASOPHILS # BLD AUTO: 0.2 % (ref 0–1.8)
BASOPHILS # BLD: 0.02 K/UL (ref 0–0.12)
BILIRUB SERPL-MCNC: 0.5 MG/DL (ref 0.1–1.5)
BUN SERPL-MCNC: 18 MG/DL (ref 8–22)
CALCIUM SERPL-MCNC: 9.6 MG/DL (ref 8.5–10.5)
CHLORIDE SERPL-SCNC: 106 MMOL/L (ref 96–112)
CHOLEST SERPL-MCNC: 159 MG/DL (ref 100–199)
CO2 SERPL-SCNC: 22 MMOL/L (ref 20–33)
CREAT SERPL-MCNC: 1.31 MG/DL (ref 0.5–1.4)
EOSINOPHIL # BLD AUTO: 0.23 K/UL (ref 0–0.51)
EOSINOPHIL NFR BLD: 2.9 % (ref 0–6.9)
ERYTHROCYTE [DISTWIDTH] IN BLOOD BY AUTOMATED COUNT: 47.3 FL (ref 35.9–50)
EST. AVERAGE GLUCOSE BLD GHB EST-MCNC: 117 MG/DL
FASTING STATUS PATIENT QL REPORTED: NORMAL
GLOBULIN SER CALC-MCNC: 2.7 G/DL (ref 1.9–3.5)
GLUCOSE SERPL-MCNC: 95 MG/DL (ref 65–99)
HBA1C MFR BLD: 5.7 % (ref 4–5.6)
HCT VFR BLD AUTO: 48.4 % (ref 42–52)
HDLC SERPL-MCNC: 45 MG/DL
HGB BLD-MCNC: 16.4 G/DL (ref 14–18)
IMM GRANULOCYTES # BLD AUTO: 0.04 K/UL (ref 0–0.11)
IMM GRANULOCYTES NFR BLD AUTO: 0.5 % (ref 0–0.9)
LDLC SERPL CALC-MCNC: 93 MG/DL
LYMPHOCYTES # BLD AUTO: 1.65 K/UL (ref 1–4.8)
LYMPHOCYTES NFR BLD: 20.5 % (ref 22–41)
MCH RBC QN AUTO: 32.1 PG (ref 27–33)
MCHC RBC AUTO-ENTMCNC: 33.9 G/DL (ref 33.7–35.3)
MCV RBC AUTO: 94.7 FL (ref 81.4–97.8)
MONOCYTES # BLD AUTO: 0.83 K/UL (ref 0–0.85)
MONOCYTES NFR BLD AUTO: 10.3 % (ref 0–13.4)
NEUTROPHILS # BLD AUTO: 5.28 K/UL (ref 1.82–7.42)
NEUTROPHILS NFR BLD: 65.6 % (ref 44–72)
NRBC # BLD AUTO: 0 K/UL
NRBC BLD-RTO: 0 /100 WBC
PLATELET # BLD AUTO: 216 K/UL (ref 164–446)
PMV BLD AUTO: 10.2 FL (ref 9–12.9)
POTASSIUM SERPL-SCNC: 4.3 MMOL/L (ref 3.6–5.5)
PROT SERPL-MCNC: 7 G/DL (ref 6–8.2)
PSA SERPL-MCNC: 0.28 NG/ML (ref 0–4)
RBC # BLD AUTO: 5.11 M/UL (ref 4.7–6.1)
SODIUM SERPL-SCNC: 140 MMOL/L (ref 135–145)
TRIGL SERPL-MCNC: 103 MG/DL (ref 0–149)
TSH SERPL DL<=0.005 MIU/L-ACNC: 1.78 UIU/ML (ref 0.38–5.33)
VIT B12 SERPL-MCNC: 695 PG/ML (ref 211–911)
WBC # BLD AUTO: 8.1 K/UL (ref 4.8–10.8)

## 2021-08-11 PROCEDURE — 85025 COMPLETE CBC W/AUTO DIFF WBC: CPT

## 2021-08-11 PROCEDURE — 82306 VITAMIN D 25 HYDROXY: CPT

## 2021-08-11 PROCEDURE — 84153 ASSAY OF PSA TOTAL: CPT

## 2021-08-11 PROCEDURE — 36415 COLL VENOUS BLD VENIPUNCTURE: CPT

## 2021-08-11 PROCEDURE — 84443 ASSAY THYROID STIM HORMONE: CPT

## 2021-08-11 PROCEDURE — 82607 VITAMIN B-12: CPT

## 2021-08-11 PROCEDURE — 80053 COMPREHEN METABOLIC PANEL: CPT

## 2021-08-11 PROCEDURE — 80061 LIPID PANEL: CPT

## 2021-08-11 PROCEDURE — 83036 HEMOGLOBIN GLYCOSYLATED A1C: CPT | Mod: GA

## 2021-08-12 ENCOUNTER — ANTICOAGULATION VISIT (OUTPATIENT)
Dept: MEDICAL GROUP | Facility: MEDICAL CENTER | Age: 71
End: 2021-08-12
Payer: MEDICARE

## 2021-08-12 DIAGNOSIS — Z79.01 ANTICOAGULATED ON WARFARIN: ICD-10-CM

## 2021-08-12 DIAGNOSIS — Z79.01 LONG TERM (CURRENT) USE OF ANTICOAGULANTS: Primary | ICD-10-CM

## 2021-08-12 LAB — INR PPP: 1.9 (ref 2–3.5)

## 2021-08-12 PROCEDURE — 93793 ANTICOAG MGMT PT WARFARIN: CPT | Performed by: INTERNAL MEDICINE

## 2021-08-12 PROCEDURE — 85610 PROTHROMBIN TIME: CPT | Performed by: INTERNAL MEDICINE

## 2021-08-12 NOTE — PROGRESS NOTES
OP Anticoagulation Service Note    Date: 8/12/2021  There were no vitals filed for this visit.   pt declined vitals    Anticoagulation Summary  As of 8/12/2021    INR goal:  2.0-3.0   TTR:  39.6 % (1.6 y)   INR used for dosing:     Warfarin maintenance plan:  7.5 mg (5 mg x 1.5) every Wed, Fri; 5 mg (5 mg x 1) all other days   Weekly warfarin total:  40 mg   Plan last modified:  Dulce Maria Bowens, PharmD (10/31/2019)   Next INR check:     Target end date:  Indefinite    Indications    Anticoagulated on warfarin [Z79.01]  Long term (current) use of anticoagulants [Z79.01]  DVT (deep venous thrombosis) (HCC) (Resolved) [I82.409]             Anticoagulation Episode Summary     INR check location:  Anticoagulation Clinic    Preferred lab:      Send INR reminders to:      Comments:        Anticoagulation Care Providers     Provider Role Specialty Phone number    [facility]   [phone]    Keron Garcia M.D.  Internal Medicine [phone]            HPI:   Tavares Bonilla seen in clinic today, on anticoagulation therapy with warfarin (a high risk medication) for hx of DVT      Pt is here today to evaluate anticoagulation therapy    Previous INR was  1.9 on 3/11/21    Pt was instructed to continue current regimen    Anticoagulation Patient Findings  Patient Findings     Positives:  Missed doses (missed doses d/t procedure ), Other complaints    Negatives:  Signs/symptoms of thrombosis, Signs/symptoms of bleeding, Change in health, Change in alcohol use, Upcoming invasive procedure, Upcoming dental procedure, Extra doses, Change in medications, Change in diet/appetite, Bruising    Comments:  Had procedure at [facility] in May, stopped warfarin prior          Confirmed warfarin dosing regimen    Pt is not on antiplatelet/NSAID therapy    Falls or accidents since last visit No        A/P   INR  SUB therapeutic today, will require dose adjust ment today to prevent VTE and closer follow up.    Increase weekly regimen         Pt educated to contact our clinic with any changes in medications or s/s of bleeding or thrombosis. Pt is aware to seek immediate medical attention for falls, head injury or deep cuts    Follow up appointment in 4 week(s) to reduce risk of adverse events from warfarin  Lorena Marie, PharmD

## 2021-08-18 ENCOUNTER — OFFICE VISIT (OUTPATIENT)
Dept: MEDICAL GROUP | Age: 71
End: 2021-08-18
Payer: MEDICARE

## 2021-08-18 VITALS
OXYGEN SATURATION: 92 % | SYSTOLIC BLOOD PRESSURE: 94 MMHG | HEIGHT: 72 IN | TEMPERATURE: 98.7 F | BODY MASS INDEX: 34.35 KG/M2 | DIASTOLIC BLOOD PRESSURE: 60 MMHG | HEART RATE: 89 BPM | WEIGHT: 253.6 LBS

## 2021-08-18 DIAGNOSIS — F11.20 UNCOMPLICATED OPIOID DEPENDENCE (HCC): ICD-10-CM

## 2021-08-18 DIAGNOSIS — G89.4 CHRONIC PAIN SYNDROME: ICD-10-CM

## 2021-08-18 DIAGNOSIS — I87.009 POST-PHLEBITIC SYNDROME: ICD-10-CM

## 2021-08-18 DIAGNOSIS — R21 FACIAL RASH: ICD-10-CM

## 2021-08-18 PROCEDURE — 99214 OFFICE O/P EST MOD 30 MIN: CPT | Performed by: INTERNAL MEDICINE

## 2021-08-18 RX ORDER — OXYBUTYNIN CHLORIDE 10 MG/1
TABLET, EXTENDED RELEASE ORAL
COMMUNITY
End: 2021-08-18

## 2021-08-18 RX ORDER — CIPROFLOXACIN 500 MG/1
TABLET, FILM COATED ORAL
COMMUNITY
End: 2021-08-18

## 2021-08-18 RX ORDER — PHENAZOPYRIDINE HYDROCHLORIDE 100 MG/1
TABLET, FILM COATED ORAL
COMMUNITY
End: 2021-08-18

## 2021-08-18 RX ORDER — OXYCODONE HYDROCHLORIDE AND ACETAMINOPHEN 5; 325 MG/1; MG/1
1 TABLET ORAL EVERY 6 HOURS PRN
Qty: 120 TABLET | Refills: 0 | Status: SHIPPED | OUTPATIENT
Start: 2021-08-18 | End: 2021-11-23 | Stop reason: SDUPTHER

## 2021-08-18 RX ORDER — SULFAMETHOXAZOLE AND TRIMETHOPRIM 800; 160 MG/1; MG/1
TABLET ORAL
COMMUNITY
End: 2021-08-18

## 2021-08-18 RX ORDER — SULFAMETHOXAZOLE AND TRIMETHOPRIM 800; 160 MG/1; MG/1
TABLET ORAL
COMMUNITY
Start: 2021-05-28 | End: 2021-08-18

## 2021-08-18 ASSESSMENT — ENCOUNTER SYMPTOMS
PSYCHIATRIC NEGATIVE: 1
CONSTITUTIONAL NEGATIVE: 1
RESPIRATORY NEGATIVE: 1
GASTROINTESTINAL NEGATIVE: 1
EYES NEGATIVE: 1
MUSCULOSKELETAL NEGATIVE: 1
CARDIOVASCULAR NEGATIVE: 1
NEUROLOGICAL NEGATIVE: 1

## 2021-08-18 ASSESSMENT — FIBROSIS 4 INDEX: FIB4 SCORE: 1.44

## 2021-08-18 NOTE — PROGRESS NOTES
Subjective     Derick Bonilla is a 70 y.o. male who presents with Lab Results and Referral Needed (dermatology )  The patient is here for followup of chronic medical problems listed below. The patient is compliant with medications and having no side effects from them. Denies chest pain, abdominal pain, dyspnea, myalgias, or cough.   .prop  Patient Active Problem List    Diagnosis Date Noted   • Using prophylactic antibiotic on daily basis- keflex for recurrent uti form self cath daily/neurogenic bladder 11/03/2020   • Polyarthralgia 11/03/2020   • Long term (current) use of anticoagulants 08/08/2019   • Obstructive uropathy 04/04/2019   • Neurogenic bladder- self cath since 5/2018 04/04/2019   • Primary osteoarthritis of right knee 04/04/2019   • Post-phlebitic dermatosis of right lower extremity 04/04/2019   • Benign prostatic hyperplasia with incomplete bladder emptying- self cath since 5/2018 ing hernia repair 08/24/2018   • Atrophy of right kidney- [facility] 08/24/2018   • Stage 3b chronic kidney disease (HCC) 08/24/2018   • Uncomplicated opioid dependence ([facility]) 05/18/2018   • Obesity (BMI 30.0-34.9) 04/07/2017   • Post-phlebitic syndrome- RIGHT LEG 05/22/2015   • Chronic venous hypertension due to DVT 11/17/2014   • Anticoagulated on warfarin 11/17/2014   • Mixed hyperlipidemia  11/17/2014   • Chronic pain syndrome- right leg from post phlebitic syn; on percocet once a day 03/12/2014   • Hypovitaminosis D 03/26/2012   • History of pulmonary embolism 01/24/2012   • Factor 5 Leiden mutation, heterozygous ([facility]) 01/24/2012   • Gastroesophageal reflux disease without esophagitis 01/24/2012     Allergies   Allergen Reactions   • Lovastatin      Leg cramps   • Other Food Hives     Peanuts gives me Vertigo and hives   • Shellfish Allergy Hives     shrimp   • Statins [Hmg-Coa-R Inhibitors]      Muscle cramps     Outpatient Medications Prior to Visit   Medication Sig Dispense Refill   • cephALEXin (KEFLEX) 500 MG  Cap TAKE 1 CAPSULE BY MOUTH ONCE DAILY. FOR ANTIBIOTIC PROPHYLAXIS DUE TO SELF CATHERIZATION DAILY 90 capsule 3   • rosuvastatin (CRESTOR) 5 MG Tab TAKE 1 TABLET BY MOUTH ONCE DAILY IN THE EVENING 90 tablet 3   • warfarin (COUMADIN) 5 MG Tab TAKE 1 TO 1 & 1/2 (ONE & ONE-HALF) TABLETS BY MOUTH ONCE DAILY [facility] Tab 1   • finasteride (PROSCAR) 5 MG Tab Take 5 mg by mouth every evening.     • tamsulosin (FLOMAX) 0.4 MG capsule Take 2 Caps by mouth every day. 180 Cap 4   • Cholecalciferol (VITAMIN D) 2000 UNITS Cap Take 1 Cap by mouth every day. 100 Cap 3   • ciprofloxacin (CIPRO) 500 MG Tab ciprofloxacin 500 mg tablet   TAKE 1 TABLET BY MOUTH TWICE DAILY FOR 1 DAY (Patient not taking: Reported on 8/18/2021)     • oxybutynin SR (DITROPAN-XL) 10 MG CR tablet oxybutynin chloride ER 10 mg tablet,extended release 24 hr   TAKE 1 TABLET BY MOUTH ONCE DAILY FOR 10 DAYS. USE WHILE CATHETER IS IN PLACE STOP DAY BEFORE CATHETER REMOVAL (Patient not taking: Reported on 8/18/2021)     • phenazopyridine (PYRIDIUM) 100 MG Tab phenazopyridine 100 mg tablet   TAKE 1 TABLET BY MOUTH THREE TIMES DAILY FOR 3 DAYS. START DAY OF CATHETER REMOVAL (Patient not taking: Reported on 8/18/2021)     • sulfamethoxazole-trimethoprim (BACTRIM DS) 800-160 MG tablet sulfamethoxazole 800 mg-trimethoprim 160 mg tablet   TAKE 1 TABLET BY MOUTH EVERY 12 HOURS FOR 14 DAYS (Patient not taking: Reported on 8/18/2021)     • sulfamethoxazole-trimethoprim (BACTRIM DS) 800-160 MG tablet TAKE 1 TABLET BY MOUTH EVERY 12 HOURS FOR 14 DAYS (Patient not taking: Reported on 8/18/2021)       No facility-administered medications prior to visit.               HPI    Review of Systems   Constitutional: Negative.    HENT: Negative.    Eyes: Negative.    Respiratory: Negative.    Cardiovascular: Negative.    Gastrointestinal: Negative.    Genitourinary: Negative.    Musculoskeletal: Negative.    Skin: Negative.    Neurological:  Negative.    Endo/Heme/Allergies: Negative.    Psychiatric/Behavioral: Negative.               Objective     BP (!) 94/60 (BP Location: Left arm, Patient Position: Sitting, BP Cuff Size: Adult)   Pulse 89   Temp 37.1 °C (98.7 °F) (Temporal)   Ht 1.829 m (6')   Wt 115 kg (253 lb 9.6 oz)   SpO2 92%   BMI 34.39 kg/m²      Physical Exam  Constitutional:       General: He is not in acute distress.     Appearance: He is well-developed. He is not diaphoretic.   HENT:      Head: Normocephalic and atraumatic.      Right Ear: External ear normal.      Left Ear: External ear normal.      Nose: Nose normal.      Mouth/Throat:      Pharynx: No oropharyngeal exudate.   Eyes:      General: No scleral icterus.        Right eye: No discharge.         Left eye: No discharge.      Conjunctiva/sclera: Conjunctivae normal.      Pupils: Pupils are equal, round, and reactive to light.   Neck:      Thyroid: No thyromegaly.      Vascular: No JVD.      Trachea: No tracheal deviation.   Cardiovascular:      Rate and Rhythm: Normal rate and regular rhythm.      Heart sounds: Normal heart sounds. No murmur heard.   No friction rub. No gallop.    Pulmonary:      Effort: Pulmonary effort is normal. No respiratory distress.      Breath sounds: Normal breath sounds. No stridor. No wheezing or rales.   Chest:      Chest wall: No tenderness.   Abdominal:      General: Bowel sounds are normal. There is no distension.      Palpations: Abdomen is soft. There is no mass.      Tenderness: There is no abdominal tenderness. There is no guarding or rebound.   Musculoskeletal:         General: No tenderness. Normal range of motion.      Cervical back: Normal range of motion and neck supple.   Lymphadenopathy:      Cervical: No cervical adenopathy.   Skin:     General: Skin is warm and dry.      Coloration: Skin is not pale.      Findings: No erythema or rash.   Neurological:      Mental Status: He is alert and oriented to person, place, and time.       Motor: No abnormal muscle tone.      Coordination: Coordination normal.      Deep Tendon Reflexes: Reflexes are normal and symmetric. Reflexes normal.   Psychiatric:         Behavior: Behavior normal.         Thought Content: Thought content normal.         Judgment: Judgment normal.               Anticoagulation Visit on 08/12/2021   Component Date Value   • INR 08/12/2021 1.90*   Hospital Outpatient Visit on 08/11/2021   Component Date Value   • Prostatic Specific Antig* 08/11/2021 0.28    • Vitamin B12 -True Cobala* 08/11/2021 695    • 25-Hydroxy   Vitamin D 25 08/11/2021 80    • Glycohemoglobin 08/11/2021 5.7*   • Est Avg Glucose 08/11/2021 117    • WBC 08/11/2021 8.1    • RBC 08/11/2021 5.11    • Hemoglobin 08/11/2021 16.4    • Hematocrit 08/11/2021 48.4    • MCV 08/11/2021 94.7    • MCH 08/11/2021 32.1    • MCHC 08/11/2021 33.9    • RDW 08/11/2021 47.3    • Platelet Count 08/11/2021 216    • MPV 08/11/2021 10.2    • Neutrophils-Polys 08/11/2021 65.60    • Lymphocytes 08/11/2021 20.50*   • Monocytes 08/11/2021 10.30    • Eosinophils 08/11/2021 2.90    • Basophils 08/11/2021 0.20    • Immature Granulocytes 08/11/2021 0.50    • Nucleated RBC 08/11/2021 0.00    • Neutrophils (Absolute) 08/11/2021 5.28    • Lymphs (Absolute) 08/11/2021 1.65    • Monos (Absolute) 08/11/2021 0.83    • Eos (Absolute) 08/11/2021 0.23    • Baso (Absolute) 08/11/2021 0.02    • Immature Granulocytes (a* 08/11/2021 0.04    • NRBC (Absolute) 08/11/2021 0.00    • Cholesterol,Tot 08/11/2021 159    • Triglycerides 08/11/2021 103    • HDL 08/11/2021 45    • LDL 08/11/2021 93    • Sodium 08/11/2021 140    • Potassium 08/11/2021 4.3    • Chloride 08/11/2021 106    • Co2 08/11/2021 22    • Anion Gap 08/11/2021 12.0    • Glucose 08/11/2021 95    • Bun 08/11/2021 18    • Creatinine 08/11/2021 1.31    • Calcium 08/11/2021 9.6    • AST(SGOT) 08/11/2021 21    • ALT(SGPT) 08/11/2021 23    • Alkaline Phosphatase 08/11/2021 78    • Total Bilirubin  08/11/2021 0.5    • Albumin 08/11/2021 4.3    • Total Protein 08/11/2021 7.0    • Globulin 08/11/2021 2.7    • A-G Ratio 08/11/2021 1.6    • TSH 08/11/2021 1.780    • Fasting Status 08/11/2021 Fasting    • GFR If  08/11/2021 >60    • GFR If Non  [employer]* 08/11/2021 54*      Lab Results   Component Value Date/Time    HBA1C 5.7 (H) 08/11/2021 03:17 PM    HBA1C 5.8 05/01/2013 08:53 AM     Lab Results   Component Value Date/Time    SODIUM 140 08/11/2021 03:17 PM    POTASSIUM 4.3 08/11/2021 03:17 PM    CHLORIDE 106 08/11/2021 03:17 PM    CO2 22 08/11/2021 03:17 PM    GLUCOSE 95 08/11/2021 03:17 PM    BUN 18 08/11/2021 03:17 PM    CREATININE 1.31 08/11/2021 03:17 PM    CREATININE 1.3 02/11/2009 04:05 AM    ALKPHOSPHAT 78 08/11/2021 03:17 PM    ASTSGOT 21 08/11/2021 03:17 PM    ALTSGPT 23 08/11/2021 03:17 PM    TBILIRUBIN 0.5 08/11/2021 03:17 PM     Lab Results   Component Value Date/Time    INR 1.90 (A) 08/12/2021 04:45 PM    INR 1.90 (A) 03/11/2021 09:33 AM    INR 3.20 01/28/2021 09:35 AM     Lab Results   Component Value Date/Time    CHOLSTRLTOT 159 08/11/2021 03:17 PM    LDL 93 08/11/2021 03:17 PM    HDL 45 08/11/2021 03:17 PM    TRIGLYCERIDE 103 08/11/2021 03:17 PM       Lab Results   Component Value Date/Time    TESTOSTERONE 222 01/22/2014 09:04 AM     No results found for: TSH  Lab Results   Component Value Date/Time    FREET4 0.84 05/01/2013 08:53 AM    FREET4 0.90 01/24/2012 10:38 AM     No results found for: URICACID  No components found for: VITB12  Lab Results   Component Value Date/Time    25HYDROXY 80 08/11/2021 03:17 PM    25HYDROXY 40 05/01/2015 09:53 AM                   Assessment & Plan        1. Facial rash- new prob left cheek     - REFERRAL TO DERMATOLOGY    2. Post-phlebitic syndrome- RIGHT LEG    Under good control. Continue same regimen.]  - oxyCODONE-acetaminophen (PERCOCET) 5-325 MG Tab; Take 1 Tablet by mouth every 6 hours as needed for up to 90 days.  Dispense: 120 Tablet;  Refill: 0  - Consent for Opiate Prescription    3. Chronic pain syndrome- right leg from post phlebitic syn; on percocet once a day   Under good control. Continue same regimen.    - oxyCODONE-acetaminophen (PERCOCET) 5-325 MG Tab; Take 1 Tablet by mouth every 6 hours as needed for up to 90 days.  Dispense: 120 Tablet; Refill: 0  - Consent for Opiate Prescription    4. Uncomplicated opioid dependence (HCC)  Under good control. Continue same regimen.    - oxyCODONE-acetaminophen (PERCOCET) 5-325 MG Tab; Take 1 Tablet by mouth every 6 hours as needed for up to 90 days.  Dispense: 120 Tablet; Refill: 0  - Consent for Opiate Prescription

## 2021-09-21 DIAGNOSIS — Z79.01 CHRONIC ANTICOAGULATION: ICD-10-CM

## 2021-09-22 RX ORDER — WARFARIN SODIUM 5 MG/1
TABLET ORAL
Qty: 135 TABLET | Refills: 0 | Status: SHIPPED | OUTPATIENT
Start: 2021-09-22 | End: 2021-11-30 | Stop reason: SDUPTHER

## 2021-09-30 ENCOUNTER — ANTICOAGULATION VISIT (OUTPATIENT)
Dept: MEDICAL GROUP | Facility: MEDICAL CENTER | Age: 71
End: 2021-09-30
Payer: MEDICARE

## 2021-09-30 DIAGNOSIS — Z79.01 LONG TERM (CURRENT) USE OF ANTICOAGULANTS: ICD-10-CM

## 2021-09-30 DIAGNOSIS — Z79.01 ANTICOAGULATED ON WARFARIN: ICD-10-CM

## 2021-09-30 LAB — INR PPP: 2.2 (ref 2–3.5)

## 2021-09-30 PROCEDURE — 93793 ANTICOAG MGMT PT WARFARIN: CPT | Performed by: INTERNAL MEDICINE

## 2021-09-30 PROCEDURE — 99999 PR NO CHARGE: CPT | Performed by: INTERNAL MEDICINE

## 2021-09-30 PROCEDURE — 85610 PROTHROMBIN TIME: CPT | Performed by: INTERNAL MEDICINE

## 2021-09-30 NOTE — PROGRESS NOTES
Anticoagulation Summary  As of 2021    INR goal:  2.0-3.0   TTR:  33.4 % (2.1 y)   INR used for dosin.20 (2021)   Warfarin maintenance plan:  7.5 mg (5 mg x 1.5) every Mon, Wed, Fri; 5 mg (5 mg x 1) all other days   Weekly warfarin total:  42.5 mg   Plan last modified:  Lorena Marie, PharmD (2021)   Next INR check:  2021   Target end date:  Indefinite    Indications    Anticoagulated on warfarin [Z79.01]  Long term (current) use of anticoagulants [Z79.01]  DVT (deep venous thrombosis) (HCC) (Resolved) [I82.409]             Anticoagulation Episode Summary     INR check location:  Anticoagulation Clinic    Preferred lab:      Send INR reminders to:      Comments:        Anticoagulation Care Providers     Provider Role Specialty Phone number    [facility]   [phone]    Keron Garcia M.D.  Internal Medicine [phone]        Anticoagulation Patient Findings  Patient Findings     Negatives:  Signs/symptoms of thrombosis, Signs/symptoms of bleeding, Laboratory test error suspected, Change in health, Change in alcohol use, Change in activity, Upcoming invasive procedure, Emergency department visit, Upcoming dental procedure, Missed doses, Extra doses, Change in medications, Change in diet/appetite, Hospital admission, Bruising, Other complaints              History of Present Illness: follow up appointment for chronic anticoagulation with the high risk medication, warfarin for DVT     Medications reconciled yes  Pt is not on antiplatelet therapy    Last INR was out of range, dosage adjusted: pt is back at goal. Continue current dosing regimen.  Follow up in 6 weeks, to reduce the risk of adverse events related to this high risk medication, warfarin.    Danelle Burrell, Clinical Pharmacist

## 2021-10-14 ENCOUNTER — APPOINTMENT (RX ONLY)
Dept: URBAN - METROPOLITAN AREA CLINIC 36 | Facility: CLINIC | Age: 71
Setting detail: DERMATOLOGY
End: 2021-10-14

## 2021-10-14 DIAGNOSIS — L81.4 OTHER MELANIN HYPERPIGMENTATION: ICD-10-CM

## 2021-10-14 DIAGNOSIS — L57.8 OTHER SKIN CHANGES DUE TO CHRONIC EXPOSURE TO NONIONIZING RADIATION: ICD-10-CM

## 2021-10-14 DIAGNOSIS — L57.0 ACTINIC KERATOSIS: ICD-10-CM

## 2021-10-14 PROBLEM — D48.5 NEOPLASM OF UNCERTAIN BEHAVIOR OF SKIN: Status: ACTIVE | Noted: 2021-10-14

## 2021-10-14 PROCEDURE — ? SUNSCREEN RECOMMENDATIONS

## 2021-10-14 PROCEDURE — 17000 DESTRUCT PREMALG LESION: CPT | Mod: 59

## 2021-10-14 PROCEDURE — 17003 DESTRUCT PREMALG LES 2-14: CPT

## 2021-10-14 PROCEDURE — ? BIOPSY BY SHAVE METHOD

## 2021-10-14 PROCEDURE — ? LIQUID NITROGEN

## 2021-10-14 PROCEDURE — 11102 TANGNTL BX SKIN SINGLE LES: CPT

## 2021-10-14 PROCEDURE — ? COUNSELING

## 2021-10-14 PROCEDURE — 99203 OFFICE O/P NEW LOW 30 MIN: CPT | Mod: 25

## 2021-10-14 ASSESSMENT — LOCATION SIMPLE DESCRIPTION DERM
LOCATION SIMPLE: RIGHT ZYGOMA
LOCATION SIMPLE: LEFT UPPER ARM
LOCATION SIMPLE: RIGHT CHEEK
LOCATION SIMPLE: LEFT FOREARM
LOCATION SIMPLE: RIGHT FOREARM
LOCATION SIMPLE: LEFT CHEEK
LOCATION SIMPLE: RIGHT FOREHEAD
LOCATION SIMPLE: LEFT FOREHEAD
LOCATION SIMPLE: RIGHT UPPER ARM

## 2021-10-14 ASSESSMENT — LOCATION DETAILED DESCRIPTION DERM
LOCATION DETAILED: RIGHT DISTAL DORSAL FOREARM
LOCATION DETAILED: RIGHT SUPERIOR CENTRAL MALAR CHEEK
LOCATION DETAILED: RIGHT ANTERIOR DISTAL UPPER ARM
LOCATION DETAILED: LEFT VENTRAL PROXIMAL FOREARM
LOCATION DETAILED: LEFT MEDIAL MALAR CHEEK
LOCATION DETAILED: RIGHT ANTERIOR PROXIMAL UPPER ARM
LOCATION DETAILED: RIGHT MEDIAL ZYGOMA
LOCATION DETAILED: LEFT INFERIOR LATERAL FOREHEAD
LOCATION DETAILED: LEFT ANTERIOR PROXIMAL UPPER ARM
LOCATION DETAILED: LEFT INFERIOR FOREHEAD
LOCATION DETAILED: RIGHT FOREHEAD

## 2021-10-14 ASSESSMENT — LOCATION ZONE DERM
LOCATION ZONE: ARM
LOCATION ZONE: FACE

## 2021-10-14 NOTE — PROCEDURE: BIOPSY BY SHAVE METHOD
Detail Level: Detailed
Depth Of Biopsy: dermis
Was A Bandage Applied: Yes
Size Of Lesion In Cm: 0
Biopsy Type: H and E
Biopsy Method: Dermablade
Anesthesia Type: 1% lidocaine with epinephrine
Anesthesia Volume In Cc: 0.5
Hemostasis: Drysol
Wound Care: Petrolatum
Dressing: bandage
Destruction After The Procedure: No
Type Of Destruction Used: Curettage
Curettage Text: The wound bed was treated with curettage after the biopsy was performed.
Cryotherapy Text: The wound bed was treated with cryotherapy after the biopsy was performed.
Electrodesiccation Text: The wound bed was treated with electrodesiccation after the biopsy was performed.
Electrodesiccation And Curettage Text: The wound bed was treated with electrodesiccation and curettage after the biopsy was performed.
Silver Nitrate Text: The wound bed was treated with silver nitrate after the biopsy was performed.
Lab: [id number]
Lab Facility: [id number]
Consent: Written consent was obtained and risks were reviewed including but not limited to scarring, infection, bleeding, scabbing, incomplete removal, nerve damage and allergy to anesthesia.
Post-Care Instructions: I reviewed with the patient in detail post-care instructions. Patient is to keep the biopsy site dry overnight, and then apply bacitracin twice daily until healed. Patient may apply hydrogen peroxide soaks to remove any crusting.
Notification Instructions: Patient will be notified of biopsy results. However, patient instructed to call the office if not contacted within 2 weeks.
Billing Type: Third-Party Bill
Information: Selecting Yes will display possible errors in your note based on the variables you have selected. This validation is only offered as a suggestion for you. PLEASE NOTE THAT THE VALIDATION TEXT WILL BE REMOVED WHEN YOU FINALIZE YOUR NOTE. IF YOU WANT TO FAX A PRELIMINARY NOTE YOU WILL NEED TO TOGGLE THIS TO 'NO' IF YOU DO NOT WANT IT IN YOUR FAXED NOTE.

## 2021-10-14 NOTE — PROCEDURE: MIPS QUALITY
Quality 226: Preventive Care And Screening: Tobacco Use: Screening And Cessation Intervention: Patient screened for tobacco use and is an ex/non-smoker
Quality 431: Preventive Care And Screening: Unhealthy Alcohol Use - Screening: Patient not identified as an unhealthy alcohol user when screened for unhealthy alcohol use using a systematic screening method
Detail Level: Detailed
Quality 130: Documentation Of Current Medications In The Medical Record: Current Medications Documented
Quality 111:Pneumonia Vaccination Status For Older Adults: Pneumococcal Vaccination Previously Received

## 2021-11-11 ENCOUNTER — ANTICOAGULATION VISIT (OUTPATIENT)
Dept: MEDICAL GROUP | Facility: MEDICAL CENTER | Age: 71
End: 2021-11-11
Payer: MEDICARE

## 2021-11-11 DIAGNOSIS — Z79.01 LONG TERM (CURRENT) USE OF ANTICOAGULANTS: ICD-10-CM

## 2021-11-11 DIAGNOSIS — Z79.01 ANTICOAGULATED ON WARFARIN: ICD-10-CM

## 2021-11-11 LAB — INR PPP: 2.2 (ref 2–3.5)

## 2021-11-11 PROCEDURE — 99999 PR NO CHARGE: CPT | Performed by: INTERNAL MEDICINE

## 2021-11-11 PROCEDURE — 93793 ANTICOAG MGMT PT WARFARIN: CPT | Performed by: INTERNAL MEDICINE

## 2021-11-11 PROCEDURE — 85610 PROTHROMBIN TIME: CPT | Performed by: FAMILY MEDICINE

## 2021-11-11 NOTE — PROGRESS NOTES
Anticoagulation Summary  As of 2021    INR goal:  2.0-3.0   TTR:  36.8 % (2.2 y)   INR used for dosin.20 (2021)   Warfarin maintenance plan:  7.5 mg (5 mg x 1.5) every Mon, Wed, Fri; 5 mg (5 mg x 1) all other days   Weekly warfarin total:  42.5 mg   Plan last modified:  Lorena Marie, PharmD (2021)   Next INR check:  2022   Target end date:  Indefinite    Indications    Anticoagulated on warfarin [Z79.01]  Long term (current) use of anticoagulants [Z79.01]  DVT (deep venous thrombosis) (HCC) (Resolved) [I82.409]             Anticoagulation Episode Summary     INR check location:  Anticoagulation Clinic    Preferred lab:      Send INR reminders to:      Comments:        Anticoagulation Care Providers     Provider Role Specialty Phone number    [facility]   [phone]    Keron Garcia M.D.  Internal Medicine [phone]        Anticoagulation Patient Findings  Patient Findings     Negatives:  Signs/symptoms of thrombosis, Signs/symptoms of bleeding, Laboratory test error suspected, Change in health, Change in alcohol use, Change in activity, Upcoming invasive procedure, Emergency department visit, Upcoming dental procedure, Missed doses, Extra doses, Change in medications, Change in diet/appetite, Hospital admission, Bruising, Other complaints              History of Present Illness: follow up appointment for chronic anticoagulation with the high risk medication, warfarin for DVt    Medications reconciled yes  Pt is not on antiplatelet therapy    Pt remains at goal. Continue current dosing regimen.  Follow up in 8 weeks, to reduce the risk of adverse events related to this high risk medication, warfarin.    Danelle Burrell, Clinical Pharmacist

## 2021-11-23 ENCOUNTER — OFFICE VISIT (OUTPATIENT)
Dept: MEDICAL GROUP | Age: 71
End: 2021-11-23
Payer: MEDICARE

## 2021-11-23 VITALS
HEIGHT: 72 IN | HEART RATE: 88 BPM | WEIGHT: 258.2 LBS | SYSTOLIC BLOOD PRESSURE: 108 MMHG | TEMPERATURE: 98.2 F | OXYGEN SATURATION: 95 % | BODY MASS INDEX: 34.97 KG/M2 | DIASTOLIC BLOOD PRESSURE: 68 MMHG

## 2021-11-23 DIAGNOSIS — G89.4 CHRONIC PAIN SYNDROME: ICD-10-CM

## 2021-11-23 DIAGNOSIS — F11.20 UNCOMPLICATED OPIOID DEPENDENCE (HCC): ICD-10-CM

## 2021-11-23 DIAGNOSIS — Z79.01 LONG TERM (CURRENT) USE OF ANTICOAGULANTS: ICD-10-CM

## 2021-11-23 DIAGNOSIS — Z20.822 EXPOSURE TO COVID-19 VIRUS: ICD-10-CM

## 2021-11-23 DIAGNOSIS — E78.5 DYSLIPIDEMIA: ICD-10-CM

## 2021-11-23 DIAGNOSIS — Z23 NEED FOR VACCINATION: ICD-10-CM

## 2021-11-23 DIAGNOSIS — D68.51 FACTOR 5 LEIDEN MUTATION, HETEROZYGOUS (HCC): ICD-10-CM

## 2021-11-23 DIAGNOSIS — Z11.59 NEED FOR HEPATITIS C SCREENING TEST: ICD-10-CM

## 2021-11-23 DIAGNOSIS — I87.009 POST-PHLEBITIC SYNDROME: ICD-10-CM

## 2021-11-23 DIAGNOSIS — U07.1 COVID-19 VIRUS RNA DETECTED: ICD-10-CM

## 2021-11-23 PROCEDURE — 90662 IIV NO PRSV INCREASED AG IM: CPT | Performed by: INTERNAL MEDICINE

## 2021-11-23 PROCEDURE — G0008 ADMIN INFLUENZA VIRUS VAC: HCPCS | Performed by: INTERNAL MEDICINE

## 2021-11-23 PROCEDURE — 99214 OFFICE O/P EST MOD 30 MIN: CPT | Performed by: INTERNAL MEDICINE

## 2021-11-23 RX ORDER — OXYCODONE HYDROCHLORIDE AND ACETAMINOPHEN 5; 325 MG/1; MG/1
1 TABLET ORAL EVERY 6 HOURS PRN
Qty: 120 TABLET | Refills: 0 | Status: SHIPPED | OUTPATIENT
Start: 2021-11-23 | End: 2022-03-01 | Stop reason: SDUPTHER

## 2021-11-23 ASSESSMENT — FIBROSIS 4 INDEX: FIB4 SCORE: 1.459600898399523339

## 2021-11-23 ASSESSMENT — ENCOUNTER SYMPTOMS
MUSCULOSKELETAL NEGATIVE: 1
EYES NEGATIVE: 1
CARDIOVASCULAR NEGATIVE: 1
NEUROLOGICAL NEGATIVE: 1
PSYCHIATRIC NEGATIVE: 1
CONSTITUTIONAL NEGATIVE: 1
RESPIRATORY NEGATIVE: 1
GASTROINTESTINAL NEGATIVE: 1

## 2021-11-23 NOTE — PROGRESS NOTES
Subjective     Derick Bonilla is a 71 y.o. male who presents with Follow-Up (Med-Check ), Medication Refill, and Other (pt states he is having cramps in both legs, x2-3 mon. on & off )    Patient is here primarily for 3-month follow-up visit for chronic pain syndrome and refill of his narcotic pain medication which is under good control with no increased tolerance or abuse.  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  He has had some mild intermittent calf pain but no chest pain or dyspnea.  He has had no leg swelling or persistent pain in his calves.    Patient also wants to note stating that it is okay for him to get tested weekly for COVID-19 which I agreed to do, but I stated very clearly that I am not able to sign any note stating that I have exempted him from getting the Covid vaccine, nor that we are doing the testing for him instead of getting the Covid vaccine.  I stated if he wants to be tested or his employer wants to be tested I have no problem with that.  But I prefer that he get vaccinated instead.  We a long talk regarding the safety and efficacy of the vaccine.  Patient is deathly afraid that he will get a blood clot if he gets a vaccine which I reassured him that he would not.  There is no evidence to suggest that for the mRNA vaccine.  On the contrary explained that he is more likely to get a a viral infection and blood clot if he does not get the vaccine.  Patient feels though he had Covid last fall based on symptoms that he had but no testing was done to confirm this.      \  And   the patient is here for followup of chronic medical problems listed below. The patient is compliant with medications and having no side effects from them. Denies chest pain, abdominal pain, dyspnea, myalgias, or cough.   Patient Active Problem List    Diagnosis Date Noted   • COVID-19 virus RNA detected- Fall 2020 11/23/2021   • Using prophylactic antibiotic on daily basis- keflex for recurrent uti  form self cath daily/neurogenic bladder 11/03/2020   • Polyarthralgia 11/03/2020   • Long term (current) use of anticoagulants 08/08/2019   • Obstructive uropathy 04/04/2019   • Neurogenic bladder- self cath since 5/2018 04/04/2019   • Primary osteoarthritis of right knee 04/04/2019   • Post-phlebitic dermatosis of right lower extremity 04/04/2019   • Benign prostatic hyperplasia with incomplete bladder emptying- self cath since 5/2018 ing hernia repair 08/24/2018   • Atrophy of right kidney- [facility]rology [facility] 08/24/2018   • Stage 3b chronic kidney disease (HCC) 08/24/2018   • Uncomplicated opioid dependence (HCC) 05/18/2018   • Obesity (BMI 30.0-34.9) 04/07/2017   • Post-phlebitic syndrome- RIGHT LEG 05/22/2015   • Chronic venous hypertension due to DVT 11/17/2014   • Anticoagulated on warfarin 11/17/2014   • Mixed hyperlipidemia  11/17/2014   • Chronic pain syndrome- right leg from post phlebitic syn; on percocet once a day 03/12/2014   • Hypovitaminosis D 03/26/2012   • History of pulmonary embolism 01/24/2012   • Factor 5 Leiden mutation, heterozygous ([facility]) 01/24/2012   • Gastroesophageal reflux disease without esophagitis 01/24/2012       Outpatient Medications Prior to Visit   Medication Sig Dispense Refill   • warfarin (COUMADIN) 5 MG Tab TAKE 1 TO 1 & 1/2 (ONE & ONE-HALF) TABLETS BY MOUTH ONCE DAILY OR  AS  DIRECTED  BY  [facility]  ANTICOAGULATION  CLINIC 135 Tablet 0   • cephALEXin (KEFLEX) 500 MG Cap TAKE 1 CAPSULE BY MOUTH ONCE DAILY. FOR ANTIBIOTIC PROPHYLAXIS DUE TO SELF CATHERIZATION DAILY 90 capsule 3   • rosuvastatin (CRESTOR) 5 MG Tab TAKE 1 TABLET BY MOUTH ONCE DAILY IN THE EVENING 90 tablet 3   • finasteride (PROSCAR) 5 MG Tab Take 5 mg by mouth every evening.     • tamsulosin (FLOMAX) 0.4 MG capsule Take 2 Caps by mouth every day. 180 Cap 4   • Cholecalciferol (VITAMIN D) 2000 UNITS Cap Take 1 Cap by mouth every day. 100 Cap 3     No facility-administered medications prior to visit.     Allergies    Allergen Reactions   • Lovastatin      Leg cramps   • Other Food Hives     Peanuts gives me Vertigo and hives   • Shellfish Allergy Hives     shrimp   • Statins [Hmg-Coa-R Inhibitors]      Muscle cramps   • Tree Nuts Food Allergy      /lll/    Lab Results   Component Value Date/Time    HBA1C 5.7 (H) 08/11/2021 03:17 PM    HBA1C 5.8 05/01/2013 08:53 AM     Lab Results   Component Value Date/Time    SODIUM 140 08/11/2021 03:17 PM    POTASSIUM 4.3 08/11/2021 03:17 PM    CHLORIDE 106 08/11/2021 03:17 PM    CO2 22 08/11/2021 03:17 PM    GLUCOSE 95 08/11/2021 03:17 PM    BUN 18 08/11/2021 03:17 PM    CREATININE 1.31 08/11/2021 03:17 PM    CREATININE 1.3 02/11/2009 04:05 AM    ALKPHOSPHAT 78 08/11/2021 03:17 PM    ASTSGOT 21 08/11/2021 03:17 PM    ALTSGPT 23 08/11/2021 03:17 PM    TBILIRUBIN 0.5 08/11/2021 03:17 PM     Lab Results   Component Value Date/Time    INR 2.20 11/11/2021 03:19 PM    INR 2.20 09/30/2021 03:14 PM    INR 1.90 (A) 08/12/2021 04:45 PM     Lab Results   Component Value Date/Time    CHOLSTRLTOT 159 08/11/2021 03:17 PM    LDL 93 08/11/2021 03:17 PM    HDL 45 08/11/2021 03:17 PM    TRIGLYCERIDE 103 08/11/2021 03:17 PM       Lab Results   Component Value Date/Time    TESTOSTERONE 222 01/22/2014 09:04 AM     No results found for: TSH  Lab Results   Component Value Date/Time    FREET4 0.84 05/01/2013 08:53 AM    FREET4 0.90 01/24/2012 10:38 AM     No results found for: URICACID  No components found for: VITB12  Lab Results   Component Value Date/Time    25HYDROXY 80 08/11/2021 03:17 PM    25HYDROXY 40 05/01/2015 09:53 AM             HPI    Review of Systems   Constitutional: Negative.    HENT: Negative.    Eyes: Negative.    Respiratory: Negative.    Cardiovascular: Negative.    Gastrointestinal: Negative.    Genitourinary: Negative.    Musculoskeletal: Negative.    Skin: Negative.    Neurological: Negative.    Endo/Heme/Allergies: Negative.    Psychiatric/Behavioral: Negative.               Objective      /68 (BP Location: Right arm, Patient Position: Sitting, BP Cuff Size: Adult)   Pulse 88   Temp 36.8 °C (98.2 °F) (Temporal)   Ht 1.829 m (6')   Wt 117 kg (258 lb 3.2 oz)   SpO2 95%   BMI 35.02 kg/m²      Physical Exam  Constitutional:       General: He is not in acute distress.     Appearance: He is well-developed. He is not diaphoretic.   HENT:      Head: Normocephalic and atraumatic.      Right Ear: External ear normal.      Left Ear: External ear normal.      Nose: Nose normal.      Mouth/Throat:      Pharynx: No oropharyngeal exudate.   Eyes:      General: No scleral icterus.        Right eye: No discharge.         Left eye: No discharge.      Conjunctiva/sclera: Conjunctivae normal.      Pupils: Pupils are equal, round, and reactive to light.   Neck:      Thyroid: No thyromegaly.      Vascular: No JVD.      Trachea: No tracheal deviation.   Cardiovascular:      Rate and Rhythm: Normal rate and regular rhythm.      Heart sounds: Normal heart sounds. No murmur heard.  No friction rub. No gallop.    Pulmonary:      Effort: Pulmonary effort is normal. No respiratory distress.      Breath sounds: Normal breath sounds. No stridor. No wheezing or rales.   Chest:      Chest wall: No tenderness.   Abdominal:      General: Bowel sounds are normal. There is no distension.      Palpations: Abdomen is soft. There is no mass.      Tenderness: There is no abdominal tenderness. There is no guarding or rebound.   Musculoskeletal:         General: No tenderness. Normal range of motion.      Cervical back: Normal range of motion and neck supple.   Lymphadenopathy:      Cervical: No cervical adenopathy.   Skin:     General: Skin is warm and dry.      Coloration: Skin is not pale.      Findings: No erythema or rash.   Neurological:      Mental Status: He is alert and oriented to person, place, and time.      Motor: No abnormal muscle tone.      Coordination: Coordination normal.      Deep Tendon Reflexes: Reflexes  are normal and symmetric. Reflexes normal.   Psychiatric:         Behavior: Behavior normal.         Thought Content: Thought content normal.         Judgment: Judgment normal.                             Assessment & Plan        1. Need for vaccination     - Influenza Vaccine, High Dose (65+ Only)    2. COVID-19 virus  suspected in the 2020 based on symptoms but no testing was done to confirm this.       3. Need for hepatitis C screening test     - HCV Scrn ( [id number] 1xLife); Future    4. Post-phlebitic syndrome- RIGHT LEG  Stable under good control continue current regimen.  - oxyCODONE-acetaminophen (PERCOCET) 5-325 MG Tab; Take 1 Tablet by mouth every 6 hours as needed for up to 90 days.  Dispense: 120 Tablet; Refill: 0    5. Chronic pain syndrome- right leg from post phlebitic syn; on percocet once a day  As above  - oxyCODONE-acetaminophen (PERCOCET) 5-325 MG Tab; Take 1 Tablet by mouth every 6 hours as needed for up to 90 days.  Dispense: 120 Tablet; Refill: 0    6. Uncomplicated opioid dependence (HCC)  As above  - oxyCODONE-acetaminophen (PERCOCET) 5-325 MG Tab; Take 1 Tablet by mouth every 6 hours as needed for up to 90 days.  Dispense: 120 Tablet; Refill: 0    7. Dyslipidemia        Good control continue current regimen check labs  - CBC WITH DIFFERENTIAL; Future  - Lipid Profile; Future  - Comp Metabolic Panel; Future    8. Exposure to COVID-19 virus  Patient once weekly screening test to rule out COVID-19 at his employer's request.  I agreed to do the screening but I was not able to provide him with any documentation that we approve of him doing this in place of getting the vaccine.  Explained that this may or may not be covered by his insurance and the only time will tell.  In the meantime he will think about getting the vaccination.  - POCT SARS-COV Antigen EUGENIO manual result ([facility] Only); Standing  - POCT SARS-COV Antigen EUGENIO manual result ([facility] Only)    9. Long term (current) use of  anticoagulants  Under good control continue warfarin.  Continue monthly INRs.  These have been therapeutic between 2 and 3.    10. Factor 5 Leiden mutation, heterozygous (HCC)  Continue anticoagulation lifelong.

## 2021-11-23 NOTE — LETTER
November 23, 2021        Tavares Bonilla  [address] [facility] [place] [address]        Dear To Whom it May Concern:    Starting immediately patient will be getting COVID testing here at our location once a week.    If you have any questions or concerns, please don't hesitate to call.        Sincerely,        Keron Garcia M.D.    Electronically Signed

## 2021-11-30 DIAGNOSIS — Z79.01 CHRONIC ANTICOAGULATION: ICD-10-CM

## 2021-11-30 RX ORDER — WARFARIN SODIUM 5 MG/1
TABLET ORAL
Qty: 135 TABLET | Refills: 1 | Status: SHIPPED | OUTPATIENT
Start: 2021-11-30 | End: 2022-11-02 | Stop reason: SDUPTHER

## 2021-12-13 ENCOUNTER — HOSPITAL ENCOUNTER (OUTPATIENT)
Facility: MEDICAL CENTER | Age: 71
End: 2021-12-13
Attending: INTERNAL MEDICINE
Payer: MEDICARE

## 2021-12-13 ENCOUNTER — NON-PROVIDER VISIT (OUTPATIENT)
Dept: MEDICAL GROUP | Age: 71
End: 2021-12-13
Payer: MEDICARE

## 2021-12-13 DIAGNOSIS — Z28.21 COVID-19 VACCINATION DECLINED: ICD-10-CM

## 2021-12-13 PROCEDURE — U0003 INFECTIOUS AGENT DETECTION BY NUCLEIC ACID (DNA OR RNA); SEVERE ACUTE RESPIRATORY SYNDROME CORONAVIRUS 2 (SARS-COV-2) (CORONAVIRUS DISEASE [COVID-19]), AMPLIFIED PROBE TECHNIQUE, MAKING USE OF HIGH THROUGHPUT TECHNOLOGIES AS DESCRIBED BY CMS-2020-01-R: HCPCS

## 2021-12-13 PROCEDURE — U0005 INFEC AGEN DETEC AMPLI PROBE: HCPCS

## 2021-12-13 NOTE — PROGRESS NOTES
Derick Bonilla is a 71 y.o. male here for a non-provider visit for COVID testing.    Clinic collect COVID order in system?: Yes    Patient tolerated specimen collection and no adverse effects were observed or reported: Yes

## 2021-12-14 DIAGNOSIS — Z28.21 COVID-19 VACCINATION DECLINED: ICD-10-CM

## 2021-12-14 LAB — COVID ORDER STATUS COVID19: NORMAL

## 2021-12-15 LAB
SARS-COV-2 RNA RESP QL NAA+PROBE: DETECTED
SPECIMEN SOURCE: ABNORMAL

## 2021-12-16 ENCOUNTER — TELEPHONE (OUTPATIENT)
Dept: MEDICAL GROUP | Age: 71
End: 2021-12-16

## 2021-12-16 NOTE — TELEPHONE ENCOUNTER
----- Message from Keron Garcia M.D. sent at 12/15/2021  5:38 PM PST -----  Called patient's home phone and left a message voicemail stating that he is positive for COVID-19 by PCR nasal swab and that he needs to self quarantine for least 10 days and that we could offer him the Regeneron monoclonal antibody injections if he qualifies needs to call us tomorrow to get that set up and to assess his eligibility for it.  I did mention on the voicemail that he would have to get the injection within 10 days of onset of symptoms of COVID-19.  I also said that if he is having any shortness of breath or difficulty breathing or high fever chills listlessness severe headache or mental confusion he also needs to go the emergency room immediately for further treatment evaluation.

## 2021-12-16 NOTE — TELEPHONE ENCOUNTER
Received message from Dr Garcia. He spoke to pt who stated his symptoms began 2 weeks ago. He does not qualify for regeneron d/t symptoms starting > 10 days since positive test.

## 2022-01-11 DIAGNOSIS — Z79.01 CHRONIC ANTICOAGULATION: ICD-10-CM

## 2022-01-27 ENCOUNTER — TELEPHONE (OUTPATIENT)
Dept: VASCULAR LAB | Facility: MEDICAL CENTER | Age: 72
End: 2022-01-27

## 2022-02-03 ENCOUNTER — APPOINTMENT (OUTPATIENT)
Dept: MEDICAL GROUP | Facility: MEDICAL CENTER | Age: 72
End: 2022-02-03
Payer: MEDICARE

## 2022-02-10 ENCOUNTER — ANTICOAGULATION VISIT (OUTPATIENT)
Dept: MEDICAL GROUP | Facility: MEDICAL CENTER | Age: 72
End: 2022-02-10
Payer: MEDICARE

## 2022-02-10 DIAGNOSIS — Z79.01 ANTICOAGULATED ON WARFARIN: ICD-10-CM

## 2022-02-10 DIAGNOSIS — Z79.01 LONG TERM (CURRENT) USE OF ANTICOAGULANTS: Primary | ICD-10-CM

## 2022-02-10 LAB — INR PPP: 2.5 (ref 2–3.5)

## 2022-02-10 PROCEDURE — 85610 PROTHROMBIN TIME: CPT | Performed by: FAMILY MEDICINE

## 2022-02-10 PROCEDURE — 93793 ANTICOAG MGMT PT WARFARIN: CPT | Performed by: FAMILY MEDICINE

## 2022-02-10 NOTE — PROGRESS NOTES
OP Anticoagulation Service Note    Date: 2/10/2022  There were no vitals filed for this visit.   pt declined vitals    Anticoagulation Summary  As of 2/10/2022    INR goal:  2.0-3.0   TTR:  43.2 % (2.5 y)   INR used for dosin.50 (2/10/2022)   Warfarin maintenance plan:  7.5 mg (5 mg x 1.5) every Wed, Sat; 5 mg (5 mg x 1) all other days   Weekly warfarin total:  40 mg   Plan last modified:  Lorena Marie, PharmD (2/10/2022)   Next INR check:  2022   Target end date:  Indefinite    Indications    Anticoagulated on warfarin [Z79.01]  Long term (current) use of anticoagulants [Z79.01]  DVT (deep venous thrombosis) (HCC) (Resolved) [I82.409]             Anticoagulation Episode Summary     INR check location:  Anticoagulation Clinic    Preferred lab:      Send INR reminders to:      Comments:        Anticoagulation Care Providers     Provider Role Specialty Phone number    [facility]   [phone]    Keron Garcia M.D.  Internal Medicine [phone]            HPI:   Tavares Bonilla seen in clinic today, on anticoagulation therapy with warfarin (a high risk medication) for hx of DVT       Pt is here today to evaluate anticoagulation therapy    Previous INR was  2.2 on 2022    Pt was instructed to continue current regimen    Anticoagulation Patient Findings  Patient Findings     Positives:  Other complaints    Negatives:  Signs/symptoms of thrombosis, Signs/symptoms of bleeding, Laboratory test error suspected, Change in health, Change in alcohol use, Change in activity, Upcoming invasive procedure, Emergency department visit, Upcoming dental procedure, Missed doses, Extra doses, Change in medications, Change in diet/appetite, Hospital admission, Bruising    Comments:  Patient has been taking warfarin 7.5 mg Wed/Sat then 5 mg ROW,          Confirmed warfarin dosing regimen    Pt is not on antiplatelet/NSAID therapy    Falls or accidents since last visit No        A/P   INR   -therapeutic today,  Continue current warfarin regimen            Pt educated to contact our clinic with any changes in medications or s/s of bleeding or thrombosis. Pt is aware to seek immediate medical attention for falls, head injury or deep cuts    Follow up appointment in 12 week(s) to reduce risk of adverse events from warfarin  Lorena Marie, PharmD

## 2022-03-01 ENCOUNTER — OFFICE VISIT (OUTPATIENT)
Dept: MEDICAL GROUP | Age: 72
End: 2022-03-01
Payer: MEDICARE

## 2022-03-01 VITALS
TEMPERATURE: 97.8 F | RESPIRATION RATE: 14 BRPM | OXYGEN SATURATION: 96 % | HEART RATE: 61 BPM | DIASTOLIC BLOOD PRESSURE: 58 MMHG | BODY MASS INDEX: 33.86 KG/M2 | SYSTOLIC BLOOD PRESSURE: 104 MMHG | HEIGHT: 72 IN | WEIGHT: 250 LBS

## 2022-03-01 DIAGNOSIS — G89.4 CHRONIC PAIN SYNDROME: ICD-10-CM

## 2022-03-01 DIAGNOSIS — I87.009 POST-PHLEBITIC SYNDROME: ICD-10-CM

## 2022-03-01 DIAGNOSIS — F11.20 UNCOMPLICATED OPIOID DEPENDENCE (HCC): ICD-10-CM

## 2022-03-01 PROCEDURE — 99214 OFFICE O/P EST MOD 30 MIN: CPT | Performed by: INTERNAL MEDICINE

## 2022-03-01 RX ORDER — OXYCODONE HYDROCHLORIDE AND ACETAMINOPHEN 5; 325 MG/1; MG/1
1 TABLET ORAL EVERY 6 HOURS PRN
Qty: 120 TABLET | Refills: 0 | Status: SHIPPED | OUTPATIENT
Start: 2022-03-01 | End: 2022-06-06 | Stop reason: SDUPTHER

## 2022-03-01 ASSESSMENT — FIBROSIS 4 INDEX: FIB4 SCORE: 1.459600898399523339

## 2022-03-01 ASSESSMENT — ENCOUNTER SYMPTOMS
PSYCHIATRIC NEGATIVE: 1
MUSCULOSKELETAL NEGATIVE: 1
CONSTITUTIONAL NEGATIVE: 1
GASTROINTESTINAL NEGATIVE: 1
CARDIOVASCULAR NEGATIVE: 1
NEUROLOGICAL NEGATIVE: 1
EYES NEGATIVE: 1
RESPIRATORY NEGATIVE: 1

## 2022-03-01 ASSESSMENT — PATIENT HEALTH QUESTIONNAIRE - PHQ9: CLINICAL INTERPRETATION OF PHQ2 SCORE: 0

## 2022-03-02 NOTE — PROGRESS NOTES
Subjective     Tavares Bonilla is a 71 y.o. male who presents with Follow-Up  The patient is here for followup of chronic medical problems listed below. The patient is compliant with medications and having no side effects from them. Denies chest pain, abdominal pain, dyspnea, myalgias, or cough.   Patient Active Problem List    Diagnosis Date Noted   • COVID-19 virus RNA detected- 12/14/2020 11/23/2021   • Using prophylactic antibiotic on daily basis- keflex for recurrent uti form self cath daily/neurogenic bladder 11/03/2020   • Polyarthralgia 11/03/2020   • Long term (current) use of anticoagulants 08/08/2019   • Obstructive uropathy 04/04/2019   • Neurogenic bladder- self cath since 5/2018 04/04/2019   • Primary osteoarthritis of right knee 04/04/2019   • Post-phlebitic dermatosis of right lower extremity 04/04/2019   • Benign prostatic hyperplasia with incomplete bladder emptying- self cath since 5/2018 ing hernia repair 08/24/2018   • Atrophy of right kidney- [place]rology [facility] 08/24/2018   • Stage 3b chronic kidney disease (HCC) 08/24/2018   • Uncomplicated opioid dependence (HCC) 05/18/2018   • Obesity (BMI 30.0-34.9) 04/07/2017   • Post-phlebitic syndrome- RIGHT LEG 05/22/2015   • Chronic venous hypertension due to DVT 11/17/2014   • Anticoagulated on warfarin 11/17/2014   • Mixed hyperlipidemia  11/17/2014   • Chronic pain syndrome- right leg from post phlebitic syn; on percocet once a day 03/12/2014   • Hypovitaminosis D 03/26/2012   • History of pulmonary embolism 01/24/2012   • Factor 5 Leiden mutation, heterozygous ([facility]) 01/24/2012   • Gastroesophageal reflux disease without esophagitis 01/24/2012     /a  Allergies   Allergen Reactions   • Lovastatin      Leg cramps   • Other Food Hives     Peanuts gives me Vertigo and hives   • Shellfish Allergy Hives     shrimp   • Statins [Hmg-Coa-R Inhibitors]      Muscle cramps   • Tree Nuts Food Allergy      Outpatient Medications Prior to Visit   Medication Sig Dispense  Refill   • warfarin (COUMADIN) 5 MG Tab TAKE 1 TO 1 & 1/2 (ONE & ONE-HALF) TABLETS BY MOUTH ONCE DAILY OR  AS  DIRECTED  BY  [facility]  ANTICOAGULATION  CLINIC 135 Tablet 1   • cephALEXin (KEFLEX) 500 MG Cap TAKE 1 CAPSULE BY MOUTH ONCE DAILY. FOR ANTIBIOTIC PROPHYLAXIS DUE TO SELF CATHERIZATION DAILY 90 capsule 3   • rosuvastatin (CRESTOR) 5 MG Tab TAKE 1 TABLET BY MOUTH ONCE DAILY IN THE EVENING 90 tablet 3   • finasteride (PROSCAR) 5 MG Tab Take 5 mg by mouth every evening.     • tamsulosin (FLOMAX) 0.4 MG capsule Take 2 Caps by mouth every day. 180 Cap 4   • Cholecalciferol (VITAMIN D) 2000 UNITS Cap Take 1 Cap by mouth every day. 100 Cap 3     No facility-administered medications prior to visit.               HPI    Review of Systems   Constitutional: Negative.    HENT: Negative.    Eyes: Negative.    Respiratory: Negative.    Cardiovascular: Negative.    Gastrointestinal: Negative.    Genitourinary: Negative.    Musculoskeletal: Negative.    Skin: Negative.    Neurological: Negative.    Endo/Heme/Allergies: Negative.    Psychiatric/Behavioral: Negative.               Objective     /58   Pulse 61   Temp 36.6 °C (97.8 °F) (Temporal)   Resp 14   Ht 1.829 m (6')   Wt 113 kg (250 lb)   SpO2 96%   BMI 33.91 kg/m²      Physical Exam  Constitutional:       General: He is not in acute distress.     Appearance: He is well-developed. He is not diaphoretic.   HENT:      Head: Normocephalic and atraumatic.      Right Ear: External ear normal.      Left Ear: External ear normal.      Nose: Nose normal.      Mouth/Throat:      Pharynx: No oropharyngeal exudate.   Eyes:      General: No scleral icterus.        Right eye: No discharge.         Left eye: No discharge.      Conjunctiva/sclera: Conjunctivae normal.      Pupils: Pupils are equal, round, and reactive to light.   Neck:      Thyroid: No thyromegaly.      Vascular: No JVD.      Trachea: No tracheal deviation.   Cardiovascular:      Rate and Rhythm: Normal  rate and regular rhythm.      Heart sounds: Normal heart sounds. No murmur heard.    No friction rub. No gallop.   Pulmonary:      Effort: Pulmonary effort is normal. No respiratory distress.      Breath sounds: Normal breath sounds. No stridor. No wheezing or rales.   Chest:      Chest wall: No tenderness.   Abdominal:      General: Bowel sounds are normal. There is no distension.      Palpations: Abdomen is soft. There is no mass.      Tenderness: There is no abdominal tenderness. There is no guarding or rebound.   Musculoskeletal:         General: No tenderness. Normal range of motion.      Cervical back: Normal range of motion and neck supple.   Lymphadenopathy:      Cervical: No cervical adenopathy.   Skin:     General: Skin is warm and dry.      Coloration: Skin is not pale.      Findings: No erythema or rash.   Neurological:      Mental Status: He is alert and oriented to person, place, and time.      Motor: No abnormal muscle tone.      Coordination: Coordination normal.      Deep Tendon Reflexes: Reflexes are normal and symmetric. Reflexes normal.   Psychiatric:         Behavior: Behavior normal.         Thought Content: Thought content normal.         Judgment: Judgment normal.                  Anticoagulation Visit on 02/10/2022   Component Date Value   • INR 02/10/2022 2.50       Lab Results   Component Value Date/Time    HBA1C 5.7 (H) 08/11/2021 03:17 PM    HBA1C 5.8 05/01/2013 08:53 AM     Lab Results   Component Value Date/Time    SODIUM 140 08/11/2021 03:17 PM    POTASSIUM 4.3 08/11/2021 03:17 PM    CHLORIDE 106 08/11/2021 03:17 PM    CO2 22 08/11/2021 03:17 PM    GLUCOSE 95 08/11/2021 03:17 PM    BUN 18 08/11/2021 03:17 PM    CREATININE 1.31 08/11/2021 03:17 PM    CREATININE 1.3 02/11/2009 04:05 AM    ALKPHOSPHAT 78 08/11/2021 03:17 PM    ASTSGOT 21 08/11/2021 03:17 PM    ALTSGPT 23 08/11/2021 03:17 PM    TBILIRUBIN 0.5 08/11/2021 03:17 PM     Lab Results   Component Value Date/Time    INR 2.50  02/10/2022 02:46 PM    INR 2.20 11/11/2021 03:19 PM    INR 2.20 09/30/2021 03:14 PM     Lab Results   Component Value Date/Time    CHOLSTRLTOT 159 08/11/2021 03:17 PM    LDL 93 08/11/2021 03:17 PM    HDL 45 08/11/2021 03:17 PM    TRIGLYCERIDE 103 08/11/2021 03:17 PM       Lab Results   Component Value Date/Time    TESTOSTERONE 222 01/22/2014 09:04 AM     No results found for: TSH  Lab Results   Component Value Date/Time    FREET4 0.84 05/01/2013 08:53 AM    FREET4 0.90 01/24/2012 10:38 AM     No results found for: URICACID  No components found for: VITB12  Lab Results   Component Value Date/Time    25HYDROXY 80 08/11/2021 03:17 PM    25HYDROXY 40 05/01/2015 09:53 AM                 Assessment & Plan        1. Post-phlebitic syndrome- RIGHT LEG    Stable under good control.  Continue current regimen  - oxyCODONE-acetaminophen (PERCOCET) 5-325 MG Tab; Take 1 Tablet by mouth every 6 hours as needed for up to 90 days.  Dispense: 120 Tablet; Refill: 0    2. Chronic pain syndrome- right leg from post phlebitic syn; on percocet once a day        Stable under good control.  Continue current regimen  - oxyCODONE-acetaminophen (PERCOCET) 5-325 MG Tab; Take 1 Tablet by mouth every 6 hours as needed for up to 90 days.  Dispense: 120 Tablet; Refill: 0    3. Uncomplicated opioid dependence (HCC)   Stable under good control.  Continue current regimen  - oxyCODONE-acetaminophen (PERCOCET) 5-325 MG Tab; Take 1 Tablet by mouth every 6 hours as needed for up to 90 days.  Dispense: 120 Tablet; Refill: 0

## 2022-05-04 ENCOUNTER — HOSPITAL ENCOUNTER (OUTPATIENT)
Facility: MEDICAL CENTER | Age: 72
End: 2022-05-04
Attending: PHYSICIAN ASSISTANT
Payer: MEDICARE

## 2022-05-04 PROCEDURE — 84153 ASSAY OF PSA TOTAL: CPT

## 2022-05-05 LAB — PSA SERPL-MCNC: 1.87 NG/ML (ref 0–4)

## 2022-06-06 ENCOUNTER — OFFICE VISIT (OUTPATIENT)
Dept: MEDICAL GROUP | Age: 72
End: 2022-06-06
Payer: MEDICARE

## 2022-06-06 VITALS
BODY MASS INDEX: 33 KG/M2 | HEART RATE: 90 BPM | RESPIRATION RATE: 16 BRPM | OXYGEN SATURATION: 96 % | WEIGHT: 243.6 LBS | DIASTOLIC BLOOD PRESSURE: 68 MMHG | TEMPERATURE: 98.3 F | HEIGHT: 72 IN | SYSTOLIC BLOOD PRESSURE: 110 MMHG

## 2022-06-06 DIAGNOSIS — E78.2 MIXED HYPERLIPIDEMIA: ICD-10-CM

## 2022-06-06 DIAGNOSIS — F11.20 UNCOMPLICATED OPIOID DEPENDENCE (HCC): ICD-10-CM

## 2022-06-06 DIAGNOSIS — R39.14 BENIGN PROSTATIC HYPERPLASIA WITH INCOMPLETE BLADDER EMPTYING: ICD-10-CM

## 2022-06-06 DIAGNOSIS — N40.1 BENIGN PROSTATIC HYPERPLASIA WITH INCOMPLETE BLADDER EMPTYING: ICD-10-CM

## 2022-06-06 DIAGNOSIS — I87.009 POST-PHLEBITIC SYNDROME: ICD-10-CM

## 2022-06-06 DIAGNOSIS — G89.4 CHRONIC PAIN SYNDROME: ICD-10-CM

## 2022-06-06 DIAGNOSIS — F43.21 GRIEF REACTION: ICD-10-CM

## 2022-06-06 DIAGNOSIS — E55.9 VITAMIN D DEFICIENCY: ICD-10-CM

## 2022-06-06 PROBLEM — F43.20 GRIEF REACTION: Status: ACTIVE | Noted: 2022-06-06

## 2022-06-06 PROCEDURE — 99214 OFFICE O/P EST MOD 30 MIN: CPT | Performed by: INTERNAL MEDICINE

## 2022-06-06 RX ORDER — FENOFIBRATE 160 MG/1
160 TABLET ORAL DAILY
Qty: 90 TABLET | Refills: 3 | Status: SHIPPED | OUTPATIENT
Start: 2022-06-06 | End: 2023-03-07 | Stop reason: SDUPTHER

## 2022-06-06 RX ORDER — OXYCODONE HYDROCHLORIDE AND ACETAMINOPHEN 5; 325 MG/1; MG/1
1 TABLET ORAL EVERY 6 HOURS PRN
Qty: 120 TABLET | Refills: 0 | Status: SHIPPED | OUTPATIENT
Start: 2022-06-06 | End: 2022-09-07 | Stop reason: SDUPTHER

## 2022-06-06 ASSESSMENT — ENCOUNTER SYMPTOMS
GASTROINTESTINAL NEGATIVE: 1
PSYCHIATRIC NEGATIVE: 1
RESPIRATORY NEGATIVE: 1
CONSTITUTIONAL NEGATIVE: 1
NEUROLOGICAL NEGATIVE: 1
EYES NEGATIVE: 1
CARDIOVASCULAR NEGATIVE: 1
MUSCULOSKELETAL NEGATIVE: 1

## 2022-06-06 ASSESSMENT — FIBROSIS 4 INDEX: FIB4 SCORE: 1.459600898399523339

## 2022-06-06 NOTE — PROGRESS NOTES
Subjective     Tavares Bonilla is a 71 y.o. male who presents with Follow-Up (Lab review / statin gives cramps )  The patient is here for followup of chronic medical problems listed below. The patient is compliant with medications and having no side effects from them. Denies chest pain, abdominal pain, dyspnea,  , or cough.     Since patient's last visit he has been quite stressed and had to quit his job.  His wife was recently diagnosed with stage IV ovarian cancer and patient is becoming a full-time caregiver.  She is now on hospice yet because she still receiving chemotherapy.          Patient Active Problem List    Diagnosis Date Noted   • Grief reaction- wife recently dxd with stage 4 ovararian cancer 06/06/2022   • COVID-19 virus RNA detected- 12/14/2021 11/23/2021   • Using prophylactic antibiotic on daily basis- keflex for recurrent uti form self cath daily/neurogenic bladder 11/03/2020   • Polyarthralgia 11/03/2020   • Long term (current) use of anticoagulants 08/08/2019   • Obstructive uropathy 04/04/2019   • Neurogenic bladder- self cath since 5/2018 04/04/2019   • Primary osteoarthritis of right knee 04/04/2019   • Post-phlebitic dermatosis of right lower extremity 04/04/2019   • Benign prostatic hyperplasia with incomplete bladder emptying- self cath since 5/2018 ing hernia repair 08/24/2018   • Atrophy of right kidney- urology nv 08/24/2018   • Stage 3b chronic kidney disease (HCC) 08/24/2018   • Uncomplicated opioid dependence (HCC) 05/18/2018   • Obesity (BMI 30.0-34.9) 04/07/2017   • Post-phlebitic syndrome- RIGHT LEG 05/22/2015   • Chronic venous hypertension due to DVT 11/17/2014   • Anticoagulated on warfarin 11/17/2014   • Mixed hyperlipidemia  11/17/2014   • Chronic pain syndrome- right leg from post phlebitic syn; on percocet once a day 03/12/2014   • Hypovitaminosis D 03/26/2012   • History of pulmonary embolism 01/24/2012   • Factor 5 Leiden mutation, heterozygous ([facility]) 01/24/2012   •  Gastroesophageal reflux disease without esophagitis 01/24/2012     Allergies   Allergen Reactions   • Lovastatin      Leg cramps   • Other Food Hives     Peanuts gives me Vertigo and hives   • Shellfish Allergy Hives     shrimp   • Statins [Hmg-Coa-R Inhibitors] Myalgia     Muscle cramps- crestor and another unspecified statin   • Tree Nuts Food Allergy      Outpatient Medications Prior to Visit   Medication Sig Dispense Refill   • warfarin (COUMADIN) 5 MG Tab TAKE 1 TO 1 & 1/2 (ONE & ONE-HALF) TABLETS BY MOUTH ONCE DAILY OR  AS  DIRECTED  BY  [facility]  ANTICOAGULATION  CLINIC 135 Tablet 1   • cephALEXin (KEFLEX) 500 MG Cap TAKE 1 CAPSULE BY MOUTH ONCE DAILY. FOR ANTIBIOTIC PROPHYLAXIS DUE TO SELF CATHERIZATION DAILY 90 capsule 3   • rosuvastatin (CRESTOR) 5 MG Tab TAKE 1 TABLET BY MOUTH ONCE DAILY IN THE EVENING 90 tablet 3   • finasteride (PROSCAR) 5 MG Tab Take 5 mg by mouth every evening.     • tamsulosin (FLOMAX) 0.4 MG capsule Take 2 Caps by mouth every day. 180 Cap 4   • Cholecalciferol (VITAMIN D) 2000 UNITS Cap Take 1 Cap by mouth every day. 100 Cap 3     No facility-administered medications prior to visit.     ]          HPI    Review of Systems   Constitutional: Negative.    HENT: Negative.    Eyes: Negative.    Respiratory: Negative.    Cardiovascular: Negative.    Gastrointestinal: Negative.    Genitourinary: Negative.    Musculoskeletal: Negative.    Skin: Negative.    Neurological: Negative.    Endo/Heme/Allergies: Negative.    Psychiatric/Behavioral: Negative.               Objective     /68 (BP Location: Left arm, Patient Position: Sitting, BP Cuff Size: Large adult)   Pulse 90   Temp 36.8 °C (98.3 °F) (Temporal)   Resp 16   Ht 1.829 m (6')   Wt 110 kg (243 lb 9.6 oz)   SpO2 96%   BMI 33.04 kg/m²      Physical Exam  Constitutional:       General: He is not in acute distress.     Appearance: He is well-developed. He is not diaphoretic.   HENT:      Head: Normocephalic and atraumatic.       Right Ear: External ear normal.      Left Ear: External ear normal.      Nose: Nose normal.      Mouth/Throat:      Pharynx: No oropharyngeal exudate.   Eyes:      General: No scleral icterus.        Right eye: No discharge.         Left eye: No discharge.      Conjunctiva/sclera: Conjunctivae normal.      Pupils: Pupils are equal, round, and reactive to light.   Neck:      Thyroid: No thyromegaly.      Vascular: No JVD.      Trachea: No tracheal deviation.   Cardiovascular:      Rate and Rhythm: Normal rate and regular rhythm.      Heart sounds: Normal heart sounds. No murmur heard.    No friction rub. No gallop.   Pulmonary:      Effort: Pulmonary effort is normal. No respiratory distress.      Breath sounds: Normal breath sounds. No stridor. No wheezing or rales.   Chest:      Chest wall: No tenderness.   Abdominal:      General: Bowel sounds are normal. There is no distension.      Palpations: Abdomen is soft. There is no mass.      Tenderness: There is no abdominal tenderness. There is no guarding or rebound.   Musculoskeletal:         General: No tenderness. Normal range of motion.      Cervical back: Normal range of motion and neck supple.   Lymphadenopathy:      Cervical: No cervical adenopathy.   Skin:     General: Skin is warm and dry.      Coloration: Skin is not pale.      Findings: No erythema or rash.   Neurological:      Mental Status: He is alert and oriented to person, place, and time.      Motor: No abnormal muscle tone.      Coordination: Coordination normal.      Deep Tendon Reflexes: Reflexes are normal and symmetric. Reflexes normal.   Psychiatric:         Behavior: Behavior normal.         Thought Content: Thought content normal.         Judgment: Judgment normal.                  No visits with results within 1 Month(s) from this visit.   Latest known visit with results is:   Hospital Outpatient Visit on 05/04/2022   Component Date Value   • Prostatic Specific Antig* 05/04/2022 1.87       Lab  Results   Component Value Date/Time    HBA1C 5.7 (H) 08/11/2021 03:17 PM    HBA1C 5.8 05/01/2013 08:53 AM     Lab Results   Component Value Date/Time    SODIUM 140 08/11/2021 03:17 PM    POTASSIUM 4.3 08/11/2021 03:17 PM    CHLORIDE 106 08/11/2021 03:17 PM    CO2 22 08/11/2021 03:17 PM    GLUCOSE 95 08/11/2021 03:17 PM    BUN 18 08/11/2021 03:17 PM    CREATININE 1.31 08/11/2021 03:17 PM    CREATININE 1.3 02/11/2009 04:05 AM    ALKPHOSPHAT 78 08/11/2021 03:17 PM    ASTSGOT 21 08/11/2021 03:17 PM    ALTSGPT 23 08/11/2021 03:17 PM    TBILIRUBIN 0.5 08/11/2021 03:17 PM     Lab Results   Component Value Date/Time    INR 2.50 02/10/2022 02:46 PM    INR 2.20 11/11/2021 03:19 PM    INR 2.20 09/30/2021 03:14 PM     Lab Results   Component Value Date/Time    CHOLSTRLTOT 159 08/11/2021 03:17 PM    LDL 93 08/11/2021 03:17 PM    HDL 45 08/11/2021 03:17 PM    TRIGLYCERIDE 103 08/11/2021 03:17 PM       Lab Results   Component Value Date/Time    TESTOSTERONE 222 01/22/2014 09:04 AM     No results found for: TSH  Lab Results   Component Value Date/Time    FREET4 0.84 05/01/2013 08:53 AM    FREET4 0.90 01/24/2012 10:38 AM     No results found for: URICACID  No components found for: VITB12  Lab Results   Component Value Date/Time    25HYDROXY 80 08/11/2021 03:17 PM    25HYDROXY 40 05/01/2015 09:53 AM   '             Assessment & Plan        1. Mixed hyperlipidemia   Not at goal.  Patient had stopped his statin due to myalgias. .  Start fenofibrate and recheck in 3 months assess response.  - fenofibrate (TRIGLIDE) 160 MG tablet; Take 1 Tablet by mouth every day. (use generic fenofibrate only)  Dispense: 90 Tablet; Refill: 3  - TSH; Future  - Comp Metabolic Panel; Future  - Lipid Profile; Future  - CBC WITH DIFFERENTIAL; Future    2. Chronic pain syndrome- right leg from post phlebitic syn; on percocet once a day   Under good control. Continue same regimen  - oxyCODONE-acetaminophen (PERCOCET) 5-325 MG Tab; Take 1 Tablet by mouth every  6 hours as needed for Severe Pain for up to 90 days.  Dispense: 120 Tablet; Refill: 0  - Consent for Opiate Prescription    3. Uncomplicated opioid dependence (HCC)   Under good control. Continue same regimen  - oxyCODONE-acetaminophen (PERCOCET) 5-325 MG Tab; Take 1 Tablet by mouth every 6 hours as needed for Severe Pain for up to 90 days.  Dispense: 120 Tablet; Refill: 0    4. Benign prostatic hyperplasia with incomplete bladder emptying- self cath since 5/2018 ing hernia repair   Under good control. Continue same regimen  - PROSTATE SPECIFIC AG DIAGNOSTIC; Future    5. Vitamin D deficiencyUnder good control. Continue same regimen   ,     - VITAMIN D,25 HYDROXY; Future    6. Grief reaction- wife recently dxd with stage 4 ovararian cancer   declines ref for counseling or meds    7. Post-phlebitic syndrome- RIGHT LEG  Under good control. Continue same regimen.'- oxyCODONE-acetaminophen (PERCOCET) 5-325 MG Tab; Take 1 Tablet by mouth every 6 hours as needed for Severe Pain for up to 90 days.  Dispense: 120 Tablet; Refill: 0  - Consent for Opiate Prescription

## 2022-06-15 ENCOUNTER — TELEPHONE (OUTPATIENT)
Dept: VASCULAR LAB | Facility: MEDICAL CENTER | Age: 72
End: 2022-06-15
Payer: MEDICARE

## 2022-06-29 ENCOUNTER — TELEPHONE (OUTPATIENT)
Dept: VASCULAR LAB | Facility: MEDICAL CENTER | Age: 72
End: 2022-06-29
Payer: MEDICARE

## 2022-06-29 NOTE — TELEPHONE ENCOUNTER
Left message for pt to have INR checked  2nd call  Letter sent    Danelle Burrlel, Clinical Pharmacist, CDE, CACP

## 2022-07-28 ENCOUNTER — TELEPHONE (OUTPATIENT)
Dept: VASCULAR LAB | Facility: MEDICAL CENTER | Age: 72
End: 2022-07-28
Payer: MEDICARE

## 2022-07-28 NOTE — TELEPHONE ENCOUNTER
Left message for pt to have INR checked  3rd call  2nd letter sent  INR   Date Value Ref Range Status   02/10/2022 2.50 2 - 3.5 Final     POC INR   Date Value Ref Range Status   07/08/2016 2.3 (H) 0.9 - 1.2 Final     Comment:     INR - Non-therapeutic Reference Range: 0.9-1.2  INR - Therapeutic Reference Range: 2.0-4.0       Danelle Burrell, Clinical Pharmacist, CDE, CACP

## 2022-07-28 NOTE — LETTER
Tavares Bonilla  [address] [facility] [address]    07/28/22    Dear Tavares Bonilla ,    We have been unsuccessful in our attempts to contact you regarding your Anticoagulation Service appointments. Warfarin is a potent blood-thinning agent that requires monitoring to ensure that the dosage is correct for your body.  If it isn't, you could develop serious, sometimes life-threatening bleeding problems or life-threatening blood clots or stroke could result.    To monitor you effectively, we need to be able to communicate with you.  This is a requirement to be followed by our Service.       If you repeatedly fail to keep your lab appointments, you are at risk of being discharged from the Anticoagulation Service.    It is extremely important that you contact the clinic as soon as possible to arrange appropriate follow up.  We are open Monday-Friday 8 am until 5 pm.  You may reach our Service at [phone].           Sincerely,           Ganesh Mao, PharmD, [facility]  Clinic Supervisor  [facility]  Outpatient Anticoagulation Service

## 2022-08-30 ENCOUNTER — HOSPITAL ENCOUNTER (OUTPATIENT)
Dept: LAB | Facility: MEDICAL CENTER | Age: 72
End: 2022-08-30
Attending: INTERNAL MEDICINE
Payer: MEDICARE

## 2022-08-30 DIAGNOSIS — E55.9 VITAMIN D DEFICIENCY: ICD-10-CM

## 2022-08-30 DIAGNOSIS — N40.1 BENIGN PROSTATIC HYPERPLASIA WITH INCOMPLETE BLADDER EMPTYING: ICD-10-CM

## 2022-08-30 DIAGNOSIS — Z11.59 NEED FOR HEPATITIS C SCREENING TEST: ICD-10-CM

## 2022-08-30 DIAGNOSIS — E78.5 DYSLIPIDEMIA: ICD-10-CM

## 2022-08-30 DIAGNOSIS — R39.14 BENIGN PROSTATIC HYPERPLASIA WITH INCOMPLETE BLADDER EMPTYING: ICD-10-CM

## 2022-08-30 DIAGNOSIS — E78.2 MIXED HYPERLIPIDEMIA: ICD-10-CM

## 2022-08-30 LAB
25(OH)D3 SERPL-MCNC: 58 NG/ML (ref 30–100)
ALBUMIN SERPL BCP-MCNC: 4.2 G/DL (ref 3.2–4.9)
ALBUMIN/GLOB SERPL: 1.7 G/DL
ALP SERPL-CCNC: 49 U/L (ref 30–99)
ALT SERPL-CCNC: 29 U/L (ref 2–50)
ANION GAP SERPL CALC-SCNC: 13 MMOL/L (ref 7–16)
AST SERPL-CCNC: 29 U/L (ref 12–45)
BASOPHILS # BLD AUTO: 0.3 % (ref 0–1.8)
BASOPHILS # BLD: 0.02 K/UL (ref 0–0.12)
BILIRUB SERPL-MCNC: 0.5 MG/DL (ref 0.1–1.5)
BUN SERPL-MCNC: 32 MG/DL (ref 8–22)
CALCIUM SERPL-MCNC: 9.8 MG/DL (ref 8.5–10.5)
CHLORIDE SERPL-SCNC: 105 MMOL/L (ref 96–112)
CHOLEST SERPL-MCNC: 192 MG/DL (ref 100–199)
CO2 SERPL-SCNC: 20 MMOL/L (ref 20–33)
CREAT SERPL-MCNC: 1.64 MG/DL (ref 0.5–1.4)
EOSINOPHIL # BLD AUTO: 0.19 K/UL (ref 0–0.51)
EOSINOPHIL NFR BLD: 2.8 % (ref 0–6.9)
ERYTHROCYTE [DISTWIDTH] IN BLOOD BY AUTOMATED COUNT: 46.3 FL (ref 35.9–50)
FASTING STATUS PATIENT QL REPORTED: NORMAL
GFR SERPLBLD CREATININE-BSD FMLA CKD-EPI: 44 ML/MIN/1.73 M 2
GLOBULIN SER CALC-MCNC: 2.5 G/DL (ref 1.9–3.5)
GLUCOSE SERPL-MCNC: 93 MG/DL (ref 65–99)
HCT VFR BLD AUTO: 47.2 % (ref 42–52)
HCV AB SER QL: NORMAL
HDLC SERPL-MCNC: 45 MG/DL
HGB BLD-MCNC: 16.2 G/DL (ref 14–18)
IMM GRANULOCYTES # BLD AUTO: 0.03 K/UL (ref 0–0.11)
IMM GRANULOCYTES NFR BLD AUTO: 0.4 % (ref 0–0.9)
LDLC SERPL CALC-MCNC: 129 MG/DL
LYMPHOCYTES # BLD AUTO: 1.37 K/UL (ref 1–4.8)
LYMPHOCYTES NFR BLD: 20.1 % (ref 22–41)
MCH RBC QN AUTO: 32.5 PG (ref 27–33)
MCHC RBC AUTO-ENTMCNC: 34.3 G/DL (ref 33.7–35.3)
MCV RBC AUTO: 94.6 FL (ref 81.4–97.8)
MONOCYTES # BLD AUTO: 0.66 K/UL (ref 0–0.85)
MONOCYTES NFR BLD AUTO: 9.7 % (ref 0–13.4)
NEUTROPHILS # BLD AUTO: 4.55 K/UL (ref 1.82–7.42)
NEUTROPHILS NFR BLD: 66.7 % (ref 44–72)
NRBC # BLD AUTO: 0 K/UL
NRBC BLD-RTO: 0 /100 WBC
PLATELET # BLD AUTO: 285 K/UL (ref 164–446)
PMV BLD AUTO: 10.3 FL (ref 9–12.9)
POTASSIUM SERPL-SCNC: 4.2 MMOL/L (ref 3.6–5.5)
PROT SERPL-MCNC: 6.7 G/DL (ref 6–8.2)
PSA SERPL-MCNC: 1.67 NG/ML (ref 0–4)
RBC # BLD AUTO: 4.99 M/UL (ref 4.7–6.1)
SODIUM SERPL-SCNC: 138 MMOL/L (ref 135–145)
TRIGL SERPL-MCNC: 89 MG/DL (ref 0–149)
TSH SERPL DL<=0.005 MIU/L-ACNC: 2.1 UIU/ML (ref 0.38–5.33)
WBC # BLD AUTO: 6.8 K/UL (ref 4.8–10.8)

## 2022-08-30 PROCEDURE — 84153 ASSAY OF PSA TOTAL: CPT

## 2022-08-30 PROCEDURE — G0472 HEP C SCREEN HIGH RISK/OTHER: HCPCS

## 2022-08-30 PROCEDURE — 80053 COMPREHEN METABOLIC PANEL: CPT

## 2022-08-30 PROCEDURE — 36415 COLL VENOUS BLD VENIPUNCTURE: CPT

## 2022-08-30 PROCEDURE — 82306 VITAMIN D 25 HYDROXY: CPT

## 2022-08-30 PROCEDURE — 85025 COMPLETE CBC W/AUTO DIFF WBC: CPT

## 2022-08-30 PROCEDURE — 84443 ASSAY THYROID STIM HORMONE: CPT

## 2022-08-30 PROCEDURE — 80061 LIPID PANEL: CPT

## 2022-09-02 ENCOUNTER — DOCUMENTATION (OUTPATIENT)
Dept: VASCULAR LAB | Facility: MEDICAL CENTER | Age: 72
End: 2022-09-02
Payer: MEDICARE

## 2022-09-02 NOTE — PROGRESS NOTES
[facility]      Pt is over due for PT/INR for warfarin monitoring.   Left message for patient to have INR checked ASAP.     Clinic phone number left for any questions or concerns.  Sent non-compliance letter to Charleen.    Regina MedinaD

## 2022-09-07 ENCOUNTER — OFFICE VISIT (OUTPATIENT)
Dept: MEDICAL GROUP | Age: 72
End: 2022-09-07
Payer: MEDICARE

## 2022-09-07 VITALS
HEIGHT: 72 IN | TEMPERATURE: 97.4 F | SYSTOLIC BLOOD PRESSURE: 120 MMHG | WEIGHT: 238 LBS | OXYGEN SATURATION: 96 % | DIASTOLIC BLOOD PRESSURE: 62 MMHG | BODY MASS INDEX: 32.23 KG/M2 | HEART RATE: 95 BPM | RESPIRATION RATE: 16 BRPM

## 2022-09-07 DIAGNOSIS — Z12.11 SCREENING FOR COLORECTAL CANCER: ICD-10-CM

## 2022-09-07 DIAGNOSIS — I87.009 POST-PHLEBITIC SYNDROME: ICD-10-CM

## 2022-09-07 DIAGNOSIS — G89.4 CHRONIC PAIN SYNDROME: ICD-10-CM

## 2022-09-07 DIAGNOSIS — Z86.711 HISTORY OF PULMONARY EMBOLISM: ICD-10-CM

## 2022-09-07 DIAGNOSIS — E66.9 OBESITY (BMI 30.0-34.9): ICD-10-CM

## 2022-09-07 DIAGNOSIS — F11.20 UNCOMPLICATED OPIOID DEPENDENCE (HCC): ICD-10-CM

## 2022-09-07 DIAGNOSIS — D68.51 FACTOR 5 LEIDEN MUTATION, HETEROZYGOUS (HCC): ICD-10-CM

## 2022-09-07 DIAGNOSIS — E66.09 CLASS 1 OBESITY DUE TO EXCESS CALORIES WITH SERIOUS COMORBIDITY AND BODY MASS INDEX (BMI) OF 32.0 TO 32.9 IN ADULT: ICD-10-CM

## 2022-09-07 DIAGNOSIS — Z79.01 LONG TERM (CURRENT) USE OF ANTICOAGULANTS: ICD-10-CM

## 2022-09-07 DIAGNOSIS — N18.32 STAGE 3B CHRONIC KIDNEY DISEASE: ICD-10-CM

## 2022-09-07 DIAGNOSIS — E78.2 MIXED HYPERLIPIDEMIA: ICD-10-CM

## 2022-09-07 DIAGNOSIS — F43.21 GRIEF REACTION: ICD-10-CM

## 2022-09-07 DIAGNOSIS — Z12.12 SCREENING FOR COLORECTAL CANCER: ICD-10-CM

## 2022-09-07 PROBLEM — N18.31 CHRONIC KIDNEY DISEASE, STAGE 3A: Status: ACTIVE | Noted: 2022-09-07

## 2022-09-07 PROCEDURE — 99214 OFFICE O/P EST MOD 30 MIN: CPT | Performed by: INTERNAL MEDICINE

## 2022-09-07 RX ORDER — OXYCODONE HYDROCHLORIDE AND ACETAMINOPHEN 5; 325 MG/1; MG/1
1 TABLET ORAL EVERY 6 HOURS PRN
Qty: 120 TABLET | Refills: 0 | Status: SHIPPED | OUTPATIENT
Start: 2022-09-07 | End: 2022-12-07 | Stop reason: SDUPTHER

## 2022-09-07 ASSESSMENT — ENCOUNTER SYMPTOMS
PSYCHIATRIC NEGATIVE: 1
RESPIRATORY NEGATIVE: 1
NEUROLOGICAL NEGATIVE: 1
MUSCULOSKELETAL NEGATIVE: 1
EYES NEGATIVE: 1
GASTROINTESTINAL NEGATIVE: 1
CARDIOVASCULAR NEGATIVE: 1
CONSTITUTIONAL NEGATIVE: 1

## 2022-09-07 ASSESSMENT — FIBROSIS 4 INDEX: FIB4 SCORE: 1.38

## 2022-09-07 NOTE — PROGRESS NOTES
Subjective     Tavares Bonilla is a 72 y.o. male who presents with Follow-Up (Lab review )  The patient is here for followup of chronic medical problems listed below. The patient is compliant with medications and having no side effects from them. Denies chest pain, abdominal pain, dyspnea, myalgias, or cough.   Patient Active Problem List    Diagnosis Date Noted    Obesity due to excess calories with serious comorbidity 09/07/2022    Chronic kidney disease, stage 3a (HCC) 09/07/2022    Grief reaction- wife dxd with stage 4 ovararian cancer; awqaiting hospice 06/06/2022    COVID-19 virus RNA detected- 12/14/2021 11/23/2021    Using prophylactic antibiotic on daily basis- keflex for recurrent uti form self cath daily/neurogenic bladder 11/03/2020    Polyarthralgia 11/03/2020    Long term (current) use of anticoagulants 08/08/2019    Obstructive uropathy 04/04/2019    Neurogenic bladder- self cath since 5/2018 04/04/2019    Primary osteoarthritis of right knee 04/04/2019    Post-phlebitic dermatosis of right lower extremity 04/04/2019    Benign prostatic hyperplasia with incomplete bladder emptying- self cath since 5/2018 ing hernia repair 08/24/2018    Atrophy of right kidney- urology nv 08/24/2018    Stage 3b chronic kidney disease (HCC) 08/24/2018    Uncomplicated opioid dependence (HCC) 05/18/2018    Obesity (BMI 30.0-34.9) 04/07/2017    Post-phlebitic syndrome- RIGHT LEG 05/22/2015    Chronic venous hypertension due to DVT 11/17/2014    Anticoagulated on warfarin 11/17/2014    Mixed hyperlipidemia  11/17/2014    Chronic pain syndrome- right leg from post phlebitic syn; on percocet once a day 03/12/2014    Hypovitaminosis D 03/26/2012    History of pulmonary embolism 01/24/2012    Factor 5 Leiden mutation, heterozygous (HCC) 01/24/2012    Gastroesophageal reflux disease without esophagitis 01/24/2012     all    Outpatient Medications Prior to Visit   Medication Sig Dispense Refill    fenofibrate (TRIGLIDE)  160 MG tablet Take 1 Tablet by mouth every day. (use generic fenofibrate only) 90 Tablet 3    warfarin (COUMADIN) 5 MG Tab TAKE 1 TO 1 & 1/2 (ONE & ONE-HALF) TABLETS BY MOUTH ONCE DAILY OR  AS  DIRECTED  BY  [facility]  ANTICOAGULATION  CLINIC 135 Tablet 1    cephALEXin (KEFLEX) 500 MG Cap TAKE 1 CAPSULE BY MOUTH ONCE DAILY. FOR ANTIBIOTIC PROPHYLAXIS DUE TO SELF CATHERIZATION DAILY 90 capsule 3    rosuvastatin (CRESTOR) 5 MG Tab TAKE 1 TABLET BY MOUTH ONCE DAILY IN THE EVENING 90 tablet 3    finasteride (PROSCAR) 5 MG Tab Take 5 mg by mouth every evening.      tamsulosin (FLOMAX) 0.4 MG capsule Take 2 Caps by mouth every day. 180 Cap 4    Cholecalciferol (VITAMIN D) 2000 UNITS Cap Take 1 Cap by mouth every day. 100 Cap 3     No facility-administered medications prior to visit.             HPI    Review of Systems   Constitutional: Negative.    HENT: Negative.     Eyes: Negative.    Respiratory: Negative.     Cardiovascular: Negative.    Gastrointestinal: Negative.    Genitourinary: Negative.    Musculoskeletal: Negative.    Skin: Negative.    Neurological: Negative.    Endo/Heme/Allergies: Negative.    Psychiatric/Behavioral: Negative.              Objective     /62 (BP Location: Right arm, Patient Position: Sitting, BP Cuff Size: Adult)   Pulse 95   Temp 36.3 °C (97.4 °F) (Temporal)   Resp 16   Ht 1.829 m (6')   Wt 108 kg (238 lb)   SpO2 96%   BMI 32.28 kg/m²      Physical Exam  Constitutional:       General: He is not in acute distress.     Appearance: He is well-developed. He is not diaphoretic.   HENT:      Head: Normocephalic and atraumatic.      Right Ear: External ear normal.      Left Ear: External ear normal.      Nose: Nose normal.      Mouth/Throat:      Pharynx: No oropharyngeal exudate.   Eyes:      General: No scleral icterus.        Right eye: No discharge.         Left eye: No discharge.      Conjunctiva/sclera: Conjunctivae normal.      Pupils: Pupils are equal, round, and reactive to  light.   Neck:      Thyroid: No thyromegaly.      Vascular: No JVD.      Trachea: No tracheal deviation.   Cardiovascular:      Rate and Rhythm: Normal rate and regular rhythm.      Heart sounds: Normal heart sounds. No murmur heard.    No friction rub. No gallop.   Pulmonary:      Effort: Pulmonary effort is normal. No respiratory distress.      Breath sounds: Normal breath sounds. No stridor. No wheezing or rales.   Chest:      Chest wall: No tenderness.   Abdominal:      General: Bowel sounds are normal. There is no distension.      Palpations: Abdomen is soft. There is no mass.      Tenderness: There is no abdominal tenderness. There is no guarding or rebound.   Musculoskeletal:         General: No tenderness. Normal range of motion.      Cervical back: Normal range of motion and neck supple.   Lymphadenopathy:      Cervical: No cervical adenopathy.   Skin:     General: Skin is warm and dry.      Coloration: Skin is not pale.      Findings: No erythema or rash.   Neurological:      Mental Status: He is alert and oriented to person, place, and time.      Motor: No abnormal muscle tone.      Coordination: Coordination normal.      Deep Tendon Reflexes: Reflexes are normal and symmetric. Reflexes normal.   Psychiatric:         Behavior: Behavior normal.         Thought Content: Thought content normal.         Judgment: Judgment normal.                Hospital Outpatient Visit on 08/30/2022   Component Date Value    WBC 08/30/2022 6.8     RBC 08/30/2022 4.99     Hemoglobin 08/30/2022 16.2     Hematocrit 08/30/2022 47.2     MCV 08/30/2022 94.6     MCH 08/30/2022 32.5     MCHC 08/30/2022 34.3     RDW 08/30/2022 46.3     Platelet Count 08/30/2022 285     MPV 08/30/2022 10.3     Neutrophils-Polys 08/30/2022 66.70     Lymphocytes 08/30/2022 20.10 (A)    Monocytes 08/30/2022 9.70     Eosinophils 08/30/2022 2.80     Basophils 08/30/2022 0.30     Immature Granulocytes 08/30/2022 0.40     Nucleated RBC 08/30/2022 0.00      Neutrophils (Absolute) 08/30/2022 4.55     Lymphs (Absolute) 08/30/2022 1.37     Monos (Absolute) 08/30/2022 0.66     Eos (Absolute) 08/30/2022 0.19     Baso (Absolute) 08/30/2022 0.02     Immature Granulocytes (a* 08/30/2022 0.03     NRBC (Absolute) 08/30/2022 0.00     Cholesterol,Tot 08/30/2022 192     Triglycerides 08/30/2022 89     HDL 08/30/2022 45     LDL 08/30/2022 129 (A)    Sodium 08/30/2022 138     Potassium 08/30/2022 4.2     Chloride 08/30/2022 105     Co2 08/30/2022 20     Anion Gap 08/30/2022 13.0     Glucose 08/30/2022 93     Bun 08/30/2022 32 (A)    Creatinine 08/30/2022 1.64 (A)    Calcium 08/30/2022 9.8     AST(SGOT) 08/30/2022 29     ALT(SGPT) 08/30/2022 29     Alkaline Phosphatase 08/30/2022 49     Total Bilirubin 08/30/2022 0.5     Albumin 08/30/2022 4.2     Total Protein 08/30/2022 6.7     Globulin 08/30/2022 2.5     A-G Ratio 08/30/2022 1.7     TSH 08/30/2022 2.100     25-Hydroxy   Vitamin D 25 08/30/2022 58     Prostatic Specific Antig* 08/30/2022 1.67     Fasting Status 08/30/2022 Fasting     Hepatitis C Antibody 08/30/2022 Non-Reactive     GFR (CKD-EPI) 08/30/2022 44 (A)      Lab Results   Component Value Date/Time    HBA1C 5.7 (H) 08/11/2021 03:17 PM    HBA1C 5.8 05/01/2013 08:53 AM     Lab Results   Component Value Date/Time    SODIUM 138 08/30/2022 11:36 AM    POTASSIUM 4.2 08/30/2022 11:36 AM    CHLORIDE 105 08/30/2022 11:36 AM    CO2 20 08/30/2022 11:36 AM    GLUCOSE 93 08/30/2022 11:36 AM    BUN 32 (H) 08/30/2022 11:36 AM    CREATININE 1.64 (H) 08/30/2022 11:36 AM    CREATININE 1.3 02/11/2009 04:05 AM    ALKPHOSPHAT 49 08/30/2022 11:36 AM    ASTSGOT 29 08/30/2022 11:36 AM    ALTSGPT 29 08/30/2022 11:36 AM    TBILIRUBIN 0.5 08/30/2022 11:36 AM     Lab Results   Component Value Date/Time    INR 2.50 02/10/2022 02:46 PM    INR 2.20 11/11/2021 03:19 PM    INR 2.20 09/30/2021 03:14 PM     Lab Results   Component Value Date/Time    CHOLSTRLTOT 192 08/30/2022 11:36 AM     (H)  08/30/2022 11:36 AM    HDL 45 08/30/2022 11:36 AM    TRIGLYCERIDE 89 08/30/2022 11:36 AM       Lab Results   Component Value Date/Time    TESTOSTERONE 222 01/22/2014 09:04 AM     No results found for: TSH  Lab Results   Component Value Date/Time    FREET4 0.84 05/01/2013 08:53 AM    FREET4 0.90 01/24/2012 10:38 AM     No results found for: URICACID  No components found for: VITB12  Lab Results   Component Value Date/Time    25HYDROXY 58 08/30/2022 11:36 AM    25HYDROXY 80 08/11/2021 03:17 PM   '           Assessment & Plan        1. Chronic pain syndrome- right leg from post phlebitic syn; on percocet once a day    Under good control. Continue same regimen.    - oxyCODONE-acetaminophen (PERCOCET) 5-325 MG Tab; Take 1 Tablet by mouth every 6 hours as needed for Severe Pain for up to 90 days.  Dispense: 120 Tablet; Refill: 0  - Consent for Opiate Prescription    2. Uncomplicated opioid dependence (HCC)     Under good control. Continue same regimen.  - oxyCODONE-acetaminophen (PERCOCET) 5-325 MG Tab; Take 1 Tablet by mouth every 6 hours as needed for Severe Pain for up to 90 days.  Dispense: 120 Tablet; Refill: 0  - Consent for Opiate Prescription    3. Grief reaction- wife dxd with stage 4 ovararian cancer; awqaiting hospice    Under good control. Continue same regimen.    4. Stage 3b chronic kidney disease (HCC)       Under good control. Continue same regimen.  5. Obesity (BMI 30.0-34.9)    Under good control. Continue same regimen.  - Patient identified as having weight management issue.  Appropriate orders and counseling given.    6. Mixed hyperlipidemia     Under good control. Continue same regimen.    7. Long term (current) use of anticoagulants    Under good control. Continue same regimen.    8. History of pulmonary embolism    Under good control. Continue same regimen.    9. Factor 5 Leiden mutation, heterozygous (HCC)    Under good control. Continue same regimen.    10. Post-phlebitic syndrome- RIGHT LEG     Under good control. Continue same regimen.  - oxyCODONE-acetaminophen (PERCOCET) 5-325 MG Tab; Take 1 Tablet by mouth every 6 hours as needed for Severe Pain for up to 90 days.  Dispense: 120 Tablet; Refill: 0  - Consent for Opiate Prescription    11. Screening for colorectal cancer    Under good control. Continue same regimen.  - Referral to GI for Colonoscopy    12. Class 1 obesity due to excess calories with serious comorbidity and body mass index (BMI) of 32.0 to 32.9 in adult    Under good control. Continue same regimen.  - Patient identified as having weight management issue.  Appropriate orders and counseling given.    13. Chronic kidney disease, stage 3a (HCC)    Under good control. Continue same regimen.

## 2022-09-12 ENCOUNTER — ANTICOAGULATION VISIT (OUTPATIENT)
Dept: MEDICAL GROUP | Facility: MEDICAL CENTER | Age: 72
End: 2022-09-12
Payer: MEDICARE

## 2022-09-12 DIAGNOSIS — Z79.01 ANTICOAGULATED ON WARFARIN: ICD-10-CM

## 2022-09-12 DIAGNOSIS — Z79.01 LONG TERM (CURRENT) USE OF ANTICOAGULANTS: ICD-10-CM

## 2022-09-12 LAB — INR PPP: 2.1 (ref 2–3.5)

## 2022-09-12 PROCEDURE — 93793 ANTICOAG MGMT PT WARFARIN: CPT | Performed by: NURSE PRACTITIONER

## 2022-09-12 PROCEDURE — 85610 PROTHROMBIN TIME: CPT | Performed by: INTERNAL MEDICINE

## 2022-09-12 PROCEDURE — 99999 PR NO CHARGE: CPT | Performed by: INTERNAL MEDICINE

## 2022-09-12 NOTE — PROGRESS NOTES
Anticoagulation Summary  As of 2022      INR goal:  2.0-3.0   TTR:  54.0 % (3.1 y)   INR used for dosin.1 (2022)   Warfarin maintenance plan:  7.5 mg (5 mg x 1.5) every Tue, Sat; 5 mg (5 mg x 1) all other days   Weekly warfarin total:  40 mg   Plan last modified:  Ganesh Mao, PharmD (2022)   Next INR check:  2022   Target end date:  Indefinite    Indications    Anticoagulated on warfarin [Z79.01]  Long term (current) use of anticoagulants [Z79.01]  DVT (deep venous thrombosis) (HCC) (Resolved) [I82.409]                 Anticoagulation Episode Summary       INR check location:  Anticoagulation Clinic    Preferred lab:      Send INR reminders to:      Comments:            Anticoagulation Care Providers       Provider Role Specialty Phone number    [facility]   [phone]    Keron Garcia M.D.  Internal Medicine [phone]          Anticoagulation Patient Findings      HPI:  Tavares Choeghluigi Bonilla seen in clinic today, on anticoagulation therapy with warfarin for DVT.   Reason for today's visit (per our collaborative practice policy) is because their last INR was > 3 months ago.   Pt I snot on antiplatelet therapy.  Changes to current medical/health status since last appt: none  No signs/symptoms of bleeding and/or thrombosis since the last appt.    No interval changes to diet or any interval changes to medications since last appt.   No complications or cost restrictions with current therapy.   BP recorded in vitals.  Confirmed dosing regimen.     A/P   INR  therapeutic.     Pt is to continue with current warfarin dosing regimen.     Follow up appointment in 12 weeks to reduce risk of adverse events related to this high risk medication, Warfarin.    Purpose of next visit:    They have a TTR of 54% which is not at target (TTR target/goal is 100%) and requires close follow up to prevent a adverse event (the lower the TTR the higher risk of clots, strokes, or  bleeding).     Other info:  Pt educated to contact our clinic with any changes in medications or s/s of bleeding or thrombosis  [facility]ST guidelines recommend frequent INR monitoring at regular intervals (a few days up to a max of 12 weeks) to ensure they are on the proper dose of warfarin and not having any complications from therapy. INRs can dramatically change over a short time period due to diet, medications, and medical conditions.     Ganesh Mao, PharmD

## 2022-10-27 ENCOUNTER — OFFICE VISIT (OUTPATIENT)
Dept: MEDICAL GROUP | Age: 72
End: 2022-10-27
Payer: MEDICARE

## 2022-10-27 VITALS
TEMPERATURE: 97.5 F | RESPIRATION RATE: 16 BRPM | WEIGHT: 243 LBS | HEART RATE: 91 BPM | DIASTOLIC BLOOD PRESSURE: 76 MMHG | OXYGEN SATURATION: 95 % | BODY MASS INDEX: 32.91 KG/M2 | HEIGHT: 72 IN | SYSTOLIC BLOOD PRESSURE: 110 MMHG

## 2022-10-27 DIAGNOSIS — F43.21 GRIEF REACTION: ICD-10-CM

## 2022-10-27 DIAGNOSIS — K40.90 RIGHT INGUINAL HERNIA: ICD-10-CM

## 2022-10-27 DIAGNOSIS — Z23 NEED FOR VACCINATION: ICD-10-CM

## 2022-10-27 DIAGNOSIS — L72.3 SEBACEOUS CYST: ICD-10-CM

## 2022-10-27 PROCEDURE — 99214 OFFICE O/P EST MOD 30 MIN: CPT | Mod: 25 | Performed by: INTERNAL MEDICINE

## 2022-10-27 PROCEDURE — 90662 IIV NO PRSV INCREASED AG IM: CPT | Performed by: INTERNAL MEDICINE

## 2022-10-27 PROCEDURE — G0008 ADMIN INFLUENZA VIRUS VAC: HCPCS | Performed by: INTERNAL MEDICINE

## 2022-10-27 ASSESSMENT — ENCOUNTER SYMPTOMS
CONSTITUTIONAL NEGATIVE: 1
CARDIOVASCULAR NEGATIVE: 1
GASTROINTESTINAL NEGATIVE: 1
NEUROLOGICAL NEGATIVE: 1
PSYCHIATRIC NEGATIVE: 1
MUSCULOSKELETAL NEGATIVE: 1
EYES NEGATIVE: 1
RESPIRATORY NEGATIVE: 1

## 2022-10-27 ASSESSMENT — FIBROSIS 4 INDEX: FIB4 SCORE: 1.38

## 2022-10-27 NOTE — PROGRESS NOTES
Subjective     Tavares Bonilla is a 72 y.o. male who presents with Follow-Up (Possible hernia on right groan / and in grown hair )  Patient complains of pain in the right groin and notices a bulge there for last several weeks after heavy lifting of his wife home he was taking With end-stage metastatic cancer.  His wife passed away just a few days ago.  Not suicidal but he has been quite despondent.  He has having worsening pain in the right groin but he notices that the bulge reduces easily when he lays down.    Is has a cyst on his anterior lower pelvic area at this very tiny measuring a few millimeters and was concerned about that although without putting any symptoms.          HPI    Review of Systems   Constitutional: Negative.    HENT: Negative.     Eyes: Negative.    Respiratory: Negative.     Cardiovascular: Negative.    Gastrointestinal: Negative.    Genitourinary: Negative.         Very large reducible right inguinal hernia.  Measures about 7 or 8 cm.   Musculoskeletal: Negative.    Skin: Negative.    Neurological: Negative.    Endo/Heme/Allergies: Negative.    Psychiatric/Behavioral: Negative.          Outpatient Medications Prior to Visit   Medication Sig Dispense Refill    oxyCODONE-acetaminophen (PERCOCET) 5-325 MG Tab Take 1 Tablet by mouth every 6 hours as needed for Severe Pain for up to 90 days. 120 Tablet 0    fenofibrate (TRIGLIDE) 160 MG tablet Take 1 Tablet by mouth every day. (use generic fenofibrate only) 90 Tablet 3    warfarin (COUMADIN) 5 MG Tab TAKE 1 TO 1 & 1/2 (ONE & ONE-HALF) TABLETS BY MOUTH ONCE DAILY OR  AS  DIRECTED  BY  [facility]  ANTICOAGULATION  CLINIC 135 Tablet 1    rosuvastatin (CRESTOR) 5 MG Tab TAKE 1 TABLET BY MOUTH ONCE DAILY IN THE EVENING 90 tablet 3    finasteride (PROSCAR) 5 MG Tab Take 5 mg by mouth every evening.      tamsulosin (FLOMAX) 0.4 MG capsule Take 2 Caps by mouth every day. 180 Cap 4    Cholecalciferol (VITAMIN D) 2000 UNITS Cap Take 1 Cap by mouth every  day. 100 Cap 3     No facility-administered medications prior to visit.     Patient Active Problem List   Diagnosis    History of pulmonary embolism    Factor 5 Leiden mutation, heterozygous (HCC)    Gastroesophageal reflux disease without esophagitis    Hypovitaminosis D    Chronic pain syndrome- right leg from post phlebitic syn; on percocet once a day    Chronic venous hypertension due to DVT    Anticoagulated on warfarin    Mixed hyperlipidemia     Post-phlebitic syndrome- RIGHT LEG    Obesity (BMI 30.0-34.9)    Uncomplicated opioid dependence (HCC)    Benign prostatic hyperplasia with incomplete bladder emptying- self cath since 2018 ing hernia repair    Atrophy of right kidney- urology [facility]    Stage 3b chronic kidney disease (HCC)    Obstructive uropathy    Neurogenic bladder- self cath since 2018    Primary osteoarthritis of right knee    Post-phlebitic dermatosis of right lower extremity    Long term (current) use of anticoagulants    Using prophylactic antibiotic on daily basis- keflex for recurrent uti form self cath daily/neurogenic bladder    Polyarthralgia    COVID-19 virus RNA detected- 2021    Grief reaction- wife  with stage 4 ovararian cancer oct 2022         Obesity due to excess calories with serious comorbidity           Objective     /76 (BP Location: Right arm, Patient Position: Sitting, BP Cuff Size: Adult)   Pulse 91   Temp 36.4 °C (97.5 °F) (Temporal)   Resp 16   Ht 1.829 m (6')   Wt 110 kg (243 lb)   SpO2 95%   BMI 32.96 kg/m²      Physical Exam  Constitutional:       General: He is not in acute distress.     Appearance: He is well-developed. He is not diaphoretic.   HENT:      Head: Normocephalic and atraumatic.      Right Ear: External ear normal.      Left Ear: External ear normal.      Nose: Nose normal.      Mouth/Throat:      Pharynx: No oropharyngeal exudate.   Eyes:      General: No scleral icterus.        Right eye: No discharge.         Left eye: No  discharge.      Conjunctiva/sclera: Conjunctivae normal.      Pupils: Pupils are equal, round, and reactive to light.   Neck:      Thyroid: No thyromegaly.      Vascular: No JVD.      Trachea: No tracheal deviation.   Cardiovascular:      Rate and Rhythm: Normal rate and regular rhythm.      Heart sounds: Normal heart sounds. No murmur heard.    No friction rub. No gallop.   Pulmonary:      Effort: Pulmonary effort is normal. No respiratory distress.      Breath sounds: Normal breath sounds. No stridor. No wheezing or rales.   Chest:      Chest wall: No tenderness.   Abdominal:      General: Bowel sounds are normal. There is no distension.      Palpations: Abdomen is soft. There is no mass.      Tenderness: There is no abdominal tenderness. There is no guarding or rebound.   Musculoskeletal:         General: No tenderness. Normal range of motion.      Cervical back: Normal range of motion and neck supple.   Lymphadenopathy:      Cervical: No cervical adenopathy.   Skin:     General: Skin is warm and dry.      Coloration: Skin is not pale.      Findings: No erythema or rash.   Neurological:      Mental Status: He is alert and oriented to person, place, and time.      Motor: No abnormal muscle tone.      Coordination: Coordination normal.      Deep Tendon Reflexes: Reflexes are normal and symmetric. Reflexes normal.   Psychiatric:         Behavior: Behavior normal.         Thought Content: Thought content normal.         Judgment: Judgment normal.     No visits with results within 1 Month(s) from this visit.   Latest known visit with results is:   Anticoagulation Visit on 09/12/2022   Component Date Value    INR 09/12/2022 2.1       Lab Results   Component Value Date/Time    HBA1C 5.7 (H) 08/11/2021 03:17 PM    HBA1C 5.8 05/01/2013 08:53 AM     Lab Results   Component Value Date/Time    SODIUM 138 08/30/2022 11:36 AM    POTASSIUM 4.2 08/30/2022 11:36 AM    CHLORIDE 105 08/30/2022 11:36 AM    CO2 20 08/30/2022 11:36 AM     GLUCOSE 93 2022 11:36 AM    BUN 32 (H) 2022 11:36 AM    CREATININE 1.64 (H) 2022 11:36 AM    CREATININE 1.3 2009 04:05 AM    ALKPHOSPHAT 49 2022 11:36 AM    ASTSGOT 29 2022 11:36 AM    ALTSGPT 29 2022 11:36 AM    TBILIRUBIN 0.5 2022 11:36 AM     Lab Results   Component Value Date/Time    INR 2.1 2022 08:04 AM    INR 2.50 02/10/2022 02:46 PM    INR 2.20 2021 03:19 PM     Lab Results   Component Value Date/Time    CHOLSTRLTOT 192 2022 11:36 AM     (H) 2022 11:36 AM    HDL 45 2022 11:36 AM    TRIGLYCERIDE 89 2022 11:36 AM       Lab Results   Component Value Date/Time    TESTOSTERONE 222 2014 09:04 AM     No results found for: TSH  Lab Results   Component Value Date/Time    FREET4 0.84 2013 08:53 AM    FREET4 0.90 2012 10:38 AM     No results found for: URICACID  No components found for: VITB12  Lab Results   Component Value Date/Time    25HYDROXY 58 2022 11:36 AM    25HYDROXY 80 2021 03:17 PM     '                      Assessment & Plan        1. Right inguinal hernia     - Referral to General Surgery    2. Sebaceous cyst- pelvis  Moist hot soaks.    3. Grief reaction- wife  with stage 4 ovararian cancer oct 2022       Reassurance.  Counseled.    4. Need for vaccination     - Influenza Vaccine, High Dose (65+ Only)

## 2022-11-02 DIAGNOSIS — Z79.01 CHRONIC ANTICOAGULATION: ICD-10-CM

## 2022-11-02 RX ORDER — WARFARIN SODIUM 5 MG/1
TABLET ORAL
Qty: 135 TABLET | Refills: 1 | Status: SHIPPED | OUTPATIENT
Start: 2022-11-02 | End: 2023-07-20

## 2022-11-08 ENCOUNTER — PATIENT MESSAGE (OUTPATIENT)
Dept: HEALTH INFORMATION MANAGEMENT | Facility: OTHER | Age: 72
End: 2022-11-08

## 2022-12-05 ENCOUNTER — ANTICOAGULATION VISIT (OUTPATIENT)
Dept: MEDICAL GROUP | Facility: MEDICAL CENTER | Age: 72
End: 2022-12-05
Payer: MEDICARE

## 2022-12-05 DIAGNOSIS — Z79.01 LONG TERM (CURRENT) USE OF ANTICOAGULANTS: ICD-10-CM

## 2022-12-05 DIAGNOSIS — Z79.01 ANTICOAGULATED ON WARFARIN: ICD-10-CM

## 2022-12-05 LAB — INR PPP: 2.3 (ref 2–3.5)

## 2022-12-05 PROCEDURE — 93793 ANTICOAG MGMT PT WARFARIN: CPT

## 2022-12-05 PROCEDURE — 85610 PROTHROMBIN TIME: CPT | Performed by: INTERNAL MEDICINE

## 2022-12-05 PROCEDURE — 99999 PR NO CHARGE: CPT | Performed by: INTERNAL MEDICINE

## 2022-12-05 NOTE — PROGRESS NOTES
Anticoagulation Summary  As of 2022      INR goal:  2.0-3.0   TTR:  57.2 % (3.3 y)   INR used for dosin.30 (2022)   Warfarin maintenance plan:  7.5 mg (5 mg x 1.5) every Tue, Sat; 5 mg (5 mg x 1) all other days; Starting 2022   Weekly warfarin total:  40 mg   Plan last modified:  Ganesh Mao, PharmD (2022)   Next INR check:  2023   Target end date:  Indefinite    Indications    Anticoagulated on warfarin [Z79.01]  Long term (current) use of anticoagulants [Z79.01]  DVT (deep venous thrombosis) (HCC) (Resolved) [I82.409]                 Anticoagulation Episode Summary       INR check location:  Anticoagulation Clinic    Preferred lab:      Send INR reminders to:      Comments:            Anticoagulation Care Providers       Provider Role Specialty Phone number    [facility]   [phone]    Keron Garcia M.D.  Internal Medicine [phone]                  Refer to Patient Findings for HPI:  Patient Findings       Negatives:  Signs/symptoms of thrombosis, Signs/symptoms of bleeding, Laboratory test error suspected, Change in health, Change in alcohol use, Change in activity, Upcoming invasive procedure, Emergency department visit, Upcoming dental procedure, Missed doses, Extra doses, Change in medications, Change in diet/appetite, Hospital admission, Bruising, Other complaints            There were no vitals filed for this visit.   pt declined vitals    Verified current warfarin dosing schedule.    Medications reconciled   Pt is not on antiplatelet therapy      A/P   INR  -therapeutic. Patient stable on current regimen.     Warfarin dosing recommendation: Continue current warfarin regimen as above without changes      Pt educated to contact our clinic with any changes in medications or s/s of bleeding or thrombosis. Pt is aware to seek immediate medical attention for falls, head injury or deep cuts.    Follow up appointment in 12 week(s).    Ammy Mclaughlin  PharmD

## 2022-12-07 ENCOUNTER — OFFICE VISIT (OUTPATIENT)
Dept: MEDICAL GROUP | Age: 72
End: 2022-12-07
Payer: MEDICARE

## 2022-12-07 ENCOUNTER — TELEPHONE (OUTPATIENT)
Dept: VASCULAR LAB | Facility: MEDICAL CENTER | Age: 72
End: 2022-12-07

## 2022-12-07 VITALS
TEMPERATURE: 96.9 F | BODY MASS INDEX: 33.05 KG/M2 | HEART RATE: 91 BPM | HEIGHT: 72 IN | SYSTOLIC BLOOD PRESSURE: 110 MMHG | DIASTOLIC BLOOD PRESSURE: 70 MMHG | OXYGEN SATURATION: 8 % | WEIGHT: 244 LBS | RESPIRATION RATE: 16 BRPM

## 2022-12-07 DIAGNOSIS — N39.0 RECURRENT UTI: ICD-10-CM

## 2022-12-07 DIAGNOSIS — N41.0 PROSTATITIS, ACUTE: ICD-10-CM

## 2022-12-07 DIAGNOSIS — N18.32 STAGE 3B CHRONIC KIDNEY DISEASE: ICD-10-CM

## 2022-12-07 DIAGNOSIS — G89.4 CHRONIC PAIN SYNDROME: ICD-10-CM

## 2022-12-07 DIAGNOSIS — D68.51 FACTOR 5 LEIDEN MUTATION, HETEROZYGOUS (HCC): ICD-10-CM

## 2022-12-07 DIAGNOSIS — I87.009 POST-PHLEBITIC SYNDROME: ICD-10-CM

## 2022-12-07 DIAGNOSIS — Z79.2 USING PROPHYLACTIC ANTIBIOTIC ON DAILY BASIS: ICD-10-CM

## 2022-12-07 DIAGNOSIS — K40.90 RIGHT INGUINAL HERNIA: ICD-10-CM

## 2022-12-07 DIAGNOSIS — F11.20 UNCOMPLICATED OPIOID DEPENDENCE (HCC): ICD-10-CM

## 2022-12-07 DIAGNOSIS — Z12.12 SCREENING FOR COLORECTAL CANCER: ICD-10-CM

## 2022-12-07 DIAGNOSIS — Z12.11 SCREENING FOR COLORECTAL CANCER: ICD-10-CM

## 2022-12-07 PROCEDURE — 99214 OFFICE O/P EST MOD 30 MIN: CPT | Performed by: INTERNAL MEDICINE

## 2022-12-07 RX ORDER — CIPROFLOXACIN 500 MG/1
500 TABLET, FILM COATED ORAL 2 TIMES DAILY
Qty: 20 TABLET | Refills: 2 | Status: SHIPPED | OUTPATIENT
Start: 2022-12-07 | End: 2023-03-07 | Stop reason: SDUPTHER

## 2022-12-07 RX ORDER — OXYCODONE HYDROCHLORIDE AND ACETAMINOPHEN 5; 325 MG/1; MG/1
1 TABLET ORAL EVERY 6 HOURS PRN
Qty: 120 TABLET | Refills: 0 | Status: ON HOLD | OUTPATIENT
Start: 2022-12-07 | End: 2023-01-24

## 2022-12-07 ASSESSMENT — FIBROSIS 4 INDEX: FIB4 SCORE: 1.38

## 2022-12-07 NOTE — TELEPHONE ENCOUNTER
Received notification that pt will be starting cipro which could increase INR d/t DDI with warfarin    S/w pt - he only takes this on an as needed basis.    Dulce Maria Russell, PharmD

## 2022-12-08 ASSESSMENT — ENCOUNTER SYMPTOMS
PSYCHIATRIC NEGATIVE: 1
NEUROLOGICAL NEGATIVE: 1
EYES NEGATIVE: 1
ABDOMINAL PAIN: 1
CONSTITUTIONAL NEGATIVE: 1
CARDIOVASCULAR NEGATIVE: 1
RESPIRATORY NEGATIVE: 1

## 2022-12-08 NOTE — PROGRESS NOTES
Subjective     Tavares Bonilla is a 72 y.o. male who presents with Follow-Up (Medication management )  The patient is here for followup of chronic medical problems listed below. The patient is compliant with medications and having no side effects from them. Denies chest pain, abdominal pain, dyspnea, myalgias, or cough.   Patient Active Problem List   Diagnosis    History of pulmonary embolism    Factor 5 Leiden mutation, heterozygous (HCC)    Gastroesophageal reflux disease without esophagitis    Hypovitaminosis D    Chronic pain syndrome- right leg from post phlebitic syn; on percocet once a day    Chronic venous hypertension due to DVT    Anticoagulated on warfarin    Mixed hyperlipidemia     Post-phlebitic syndrome- RIGHT LEG    Obesity (BMI 30.0-34.9)    Uncomplicated opioid dependence (HCC)    Benign prostatic hyperplasia with incomplete bladder emptying- self cath since 2018 ing hernia repair    Atrophy of right kidney- urology [facility]    Stage 3b chronic kidney disease (HCC)    Obstructive uropathy    Neurogenic bladder- self cath since 2018    Primary osteoarthritis of right knee    Post-phlebitic dermatosis of right lower extremity    Long term (current) use of anticoagulants    Using prophylactic antibiotic on daily basis- keflex for recurrent uti form self cath daily/neurogenic bladder    Polyarthralgia    COVID-19 virus RNA detected- 2021    Grief reaction- wife  with stage 4 ovararian cancer oct 2022         Obesity due to excess calories with serious comorbidity    Right inguinal hernia- repair tbd 2023 dr sandoval     Outpatient Medications Prior to Visit   Medication Sig Dispense Refill    warfarin (COUMADIN) 5 MG Tab TAKE 1 TO 1 & 1/2 (ONE & ONE-HALF) TABLETS BY MOUTH ONCE DAILY OR  AS  DIRECTED  BY  [facility]  ANTICOAGULATION  CLINIC 135 Tablet 1    fenofibrate (TRIGLIDE) 160 MG tablet Take 1 Tablet by mouth every day. (use generic fenofibrate only) 90 Tablet 3    rosuvastatin  (CRESTOR) 5 MG Tab TAKE 1 TABLET BY MOUTH ONCE DAILY IN THE EVENING 90 tablet 3    finasteride (PROSCAR) 5 MG Tab Take 5 mg by mouth every evening.      tamsulosin (FLOMAX) 0.4 MG capsule Take 2 Caps by mouth every day. 180 Cap 4    Cholecalciferol (VITAMIN D) 2000 UNITS Cap Take 1 Cap by mouth every day. 100 Cap 3     No facility-administered medications prior to visit.     Lab Results   Component Value Date/Time    HBA1C 5.7 (H) 08/11/2021 03:17 PM    HBA1C 5.8 05/01/2013 08:53 AM     Lab Results   Component Value Date/Time    SODIUM 138 08/30/2022 11:36 AM    POTASSIUM 4.2 08/30/2022 11:36 AM    CHLORIDE 105 08/30/2022 11:36 AM    CO2 20 08/30/2022 11:36 AM    GLUCOSE 93 08/30/2022 11:36 AM    BUN 32 (H) 08/30/2022 11:36 AM    CREATININE 1.64 (H) 08/30/2022 11:36 AM    CREATININE 1.3 02/11/2009 04:05 AM    ALKPHOSPHAT 49 08/30/2022 11:36 AM    ASTSGOT 29 08/30/2022 11:36 AM    ALTSGPT 29 08/30/2022 11:36 AM    TBILIRUBIN 0.5 08/30/2022 11:36 AM     Lab Results   Component Value Date/Time    INR 2.30 12/05/2022 08:03 AM    INR 2.1 09/12/2022 08:04 AM    INR 2.50 02/10/2022 02:46 PM     Lab Results   Component Value Date/Time    CHOLSTRLTOT 192 08/30/2022 11:36 AM     (H) 08/30/2022 11:36 AM    HDL 45 08/30/2022 11:36 AM    TRIGLYCERIDE 89 08/30/2022 11:36 AM       Lab Results   Component Value Date/Time    TESTOSTERONE 222 01/22/2014 09:04 AM     No results found for: TSH  Lab Results   Component Value Date/Time    FREET4 0.84 05/01/2013 08:53 AM    FREET4 0.90 01/24/2012 10:38 AM     No results found for: URICACID  No components found for: VITB12  Lab Results   Component Value Date/Time    25HYDROXY 58 08/30/2022 11:36 AM    25HYDROXY 80 08/11/2021 03:17 PM     Anticoagulation Visit on 12/05/2022   Component Date Value    INR 12/05/2022 2.30       .alll  Lab Results   Component Value Date/Time    HBA1C 5.7 (H) 08/11/2021 03:17 PM    HBA1C 5.8 05/01/2013 08:53 AM     Lab Results   Component Value Date/Time     SODIUM 138 08/30/2022 11:36 AM    POTASSIUM 4.2 08/30/2022 11:36 AM    CHLORIDE 105 08/30/2022 11:36 AM    CO2 20 08/30/2022 11:36 AM    GLUCOSE 93 08/30/2022 11:36 AM    BUN 32 (H) 08/30/2022 11:36 AM    CREATININE 1.64 (H) 08/30/2022 11:36 AM    CREATININE 1.3 02/11/2009 04:05 AM    ALKPHOSPHAT 49 08/30/2022 11:36 AM    ASTSGOT 29 08/30/2022 11:36 AM    ALTSGPT 29 08/30/2022 11:36 AM    TBILIRUBIN 0.5 08/30/2022 11:36 AM     Lab Results   Component Value Date/Time    INR 2.30 12/05/2022 08:03 AM    INR 2.1 09/12/2022 08:04 AM    INR 2.50 02/10/2022 02:46 PM     Lab Results   Component Value Date/Time    CHOLSTRLTOT 192 08/30/2022 11:36 AM     (H) 08/30/2022 11:36 AM    HDL 45 08/30/2022 11:36 AM    TRIGLYCERIDE 89 08/30/2022 11:36 AM       Lab Results   Component Value Date/Time    TESTOSTERONE 222 01/22/2014 09:04 AM     No results found for: TSH  Lab Results   Component Value Date/Time    FREET4 0.84 05/01/2013 08:53 AM    FREET4 0.90 01/24/2012 10:38 AM     No results found for: URICACID  No components found for: VITB12  Lab Results   Component Value Date/Time    25HYDROXY 58 08/30/2022 11:36 AM    25HYDROXY 80 08/11/2021 03:17 PM               HPI    Review of Systems   Constitutional: Negative.    HENT: Negative.     Eyes: Negative.    Respiratory: Negative.     Cardiovascular: Negative.    Gastrointestinal:  Positive for abdominal pain.   Genitourinary: Negative.    Musculoskeletal:  Positive for joint pain.   Skin: Negative.    Neurological: Negative.    Endo/Heme/Allergies: Negative.    Psychiatric/Behavioral: Negative.              Objective     /70 (BP Location: Right arm, Patient Position: Sitting, BP Cuff Size: Adult)   Pulse 91   Temp 36.1 °C (96.9 °F) (Temporal)   Resp 16   Ht 1.829 m (6')   Wt 111 kg (244 lb)   SpO2 (!) 8%   BMI 33.09 kg/m²      Physical Exam  Constitutional:       General: He is not in acute distress.     Appearance: He is well-developed. He is not  diaphoretic.   HENT:      Head: Normocephalic and atraumatic.      Right Ear: External ear normal.      Left Ear: External ear normal.      Nose: Nose normal.      Mouth/Throat:      Pharynx: No oropharyngeal exudate.   Eyes:      General: No scleral icterus.        Right eye: No discharge.         Left eye: No discharge.      Conjunctiva/sclera: Conjunctivae normal.      Pupils: Pupils are equal, round, and reactive to light.   Neck:      Thyroid: No thyromegaly.      Vascular: No JVD.      Trachea: No tracheal deviation.   Cardiovascular:      Rate and Rhythm: Normal rate and regular rhythm.      Heart sounds: Normal heart sounds. No murmur heard.    No friction rub. No gallop.   Pulmonary:      Effort: Pulmonary effort is normal. No respiratory distress.      Breath sounds: Normal breath sounds. No stridor. No wheezing or rales.   Chest:      Chest wall: No tenderness.   Abdominal:      General: Bowel sounds are normal. There is no distension.      Palpations: Abdomen is soft. There is no mass.      Tenderness: There is no abdominal tenderness. There is no guarding or rebound.   Musculoskeletal:         General: No tenderness. Normal range of motion.      Cervical back: Normal range of motion and neck supple.   Lymphadenopathy:      Cervical: No cervical adenopathy.   Skin:     General: Skin is warm and dry.      Coloration: Skin is not pale.      Findings: No erythema or rash.   Neurological:      Mental Status: He is alert and oriented to person, place, and time.      Motor: No abnormal muscle tone.      Coordination: Coordination normal.      Deep Tendon Reflexes: Reflexes are normal and symmetric. Reflexes normal.   Psychiatric:         Behavior: Behavior normal.         Thought Content: Thought content normal.         Judgment: Judgment normal.                           Assessment & Plan        1. Right inguinal hernia- repair tbd 1/25/2023 dr sandoval   -Patient still having significant pain in the right  groin from his hernia.  We will have it repaired next month.  No nausea or vomiting.  She will worsen significantly he will go to the emergency room in case it develops into the incarceration    2. Uncomplicated opioid dependence (HCC)  Under good control.  Continue Percocet.  For his right leg postphlebitic syndrome  - oxyCODONE-acetaminophen (PERCOCET) 5-325 MG Tab; Take 1 Tablet by mouth every 6 hours as needed for Severe Pain for up to 90 days.  Dispense: 120 Tablet; Refill: 0    3. Chronic pain syndrome- right leg from post phlebitic syn; on percocet once a day  As above  - oxyCODONE-acetaminophen (PERCOCET) 5-325 MG Tab; Take 1 Tablet by mouth every 6 hours as needed for Severe Pain for up to 90 days.  Dispense: 120 Tablet; Refill: 0    4. Post-phlebitic syndrome- RIGHT LEG  As above- oxyCODONE-acetaminophen (PERCOCET) 5-325 MG Tab; Take 1 Tablet by mouth every 6 hours as needed for Severe Pain for up to 90 days.  Dispense: 120 Tablet; Refill: 0    5. Prostatitis, acute  Is a recurrent problem for which he takes Cipro as needed.  Prescribed refills as needed.  - ciprofloxacin (CIPRO) 500 MG Tab; Take 1 Tablet by mouth 2 times a day. As needed for recurrent prostate or urinary tract infections. May have to reduce  warfarin dosage in half while taking.  Dispense: 20 Tablet; Refill: 2    6. Recurrent UTI  As above.  Often gets UTIs from self cathing  - ciprofloxacin (CIPRO) 500 MG Tab; Take 1 Tablet by mouth 2 times a day. As needed for recurrent prostate or urinary tract infections. May have to reduce  warfarin dosage in half while taking.  Dispense: 20 Tablet; Refill: 2    7. Stage 3b chronic kidney disease (HCC)  Continue good hydration and avoidance of NSAIDs and other nephrotoxins  - ciprofloxacin (CIPRO) 500 MG Tab; Take 1 Tablet by mouth 2 times a day. As needed for recurrent prostate or urinary tract infections. May have to reduce  warfarin dosage in half while taking.  Dispense: 20 Tablet; Refill:  2    8. Using prophylactic antibiotic on daily basis- keflex for recurrent uti form self cath daily/neurogenic bladder  As above    9. Factor 5 Leiden mutation, heterozygous (HCC)  Continue anticoagulation.  No recurrent thromboses.    10. Screening for colorectal cancer     - Referral to GI for Colonoscopy

## 2023-01-11 ENCOUNTER — PRE-ADMISSION TESTING (OUTPATIENT)
Dept: ADMISSIONS | Facility: MEDICAL CENTER | Age: 73
End: 2023-01-11
Attending: SURGERY
Payer: MEDICARE

## 2023-01-11 DIAGNOSIS — Z01.810 PRE-OPERATIVE CARDIOVASCULAR EXAMINATION: ICD-10-CM

## 2023-01-11 DIAGNOSIS — Z01.812 PRE-OPERATIVE LABORATORY EXAMINATION: ICD-10-CM

## 2023-01-11 LAB
EKG IMPRESSION: NORMAL
ERYTHROCYTE [DISTWIDTH] IN BLOOD BY AUTOMATED COUNT: 45.8 FL (ref 35.9–50)
HCT VFR BLD AUTO: 47.8 % (ref 42–52)
HGB BLD-MCNC: 16.2 G/DL (ref 14–18)
MCH RBC QN AUTO: 32 PG (ref 27–33)
MCHC RBC AUTO-ENTMCNC: 33.9 G/DL (ref 33.7–35.3)
MCV RBC AUTO: 94.5 FL (ref 81.4–97.8)
PLATELET # BLD AUTO: 267 K/UL (ref 164–446)
PMV BLD AUTO: 10.1 FL (ref 9–12.9)
RBC # BLD AUTO: 5.06 M/UL (ref 4.7–6.1)
WBC # BLD AUTO: 8.5 K/UL (ref 4.8–10.8)

## 2023-01-11 PROCEDURE — 36415 COLL VENOUS BLD VENIPUNCTURE: CPT

## 2023-01-11 PROCEDURE — 85027 COMPLETE CBC AUTOMATED: CPT

## 2023-01-11 PROCEDURE — 93010 ELECTROCARDIOGRAM REPORT: CPT | Performed by: INTERNAL MEDICINE

## 2023-01-11 PROCEDURE — 93005 ELECTROCARDIOGRAM TRACING: CPT

## 2023-01-11 ASSESSMENT — FIBROSIS 4 INDEX: FIB4 SCORE: 1.38

## 2023-01-12 ENCOUNTER — APPOINTMENT (OUTPATIENT)
Dept: ADMISSIONS | Facility: MEDICAL CENTER | Age: 73
End: 2023-01-12
Attending: SURGERY
Payer: MEDICARE

## 2023-01-24 ENCOUNTER — ANESTHESIA EVENT (OUTPATIENT)
Dept: SURGERY | Facility: MEDICAL CENTER | Age: 73
End: 2023-01-24
Payer: MEDICARE

## 2023-01-24 ENCOUNTER — ANESTHESIA (OUTPATIENT)
Dept: SURGERY | Facility: MEDICAL CENTER | Age: 73
End: 2023-01-24
Payer: MEDICARE

## 2023-01-24 ENCOUNTER — HOSPITAL ENCOUNTER (OUTPATIENT)
Facility: MEDICAL CENTER | Age: 73
End: 2023-01-24
Attending: SURGERY | Admitting: SURGERY
Payer: MEDICARE

## 2023-01-24 VITALS
HEIGHT: 72 IN | WEIGHT: 240.52 LBS | RESPIRATION RATE: 16 BRPM | BODY MASS INDEX: 32.58 KG/M2 | SYSTOLIC BLOOD PRESSURE: 137 MMHG | HEART RATE: 83 BPM | OXYGEN SATURATION: 92 % | TEMPERATURE: 97.9 F | DIASTOLIC BLOOD PRESSURE: 82 MMHG

## 2023-01-24 DIAGNOSIS — G89.18 POST-OP PAIN: ICD-10-CM

## 2023-01-24 LAB
ANION GAP SERPL CALC-SCNC: 9 MMOL/L (ref 7–16)
APTT PPP: 28.7 SEC (ref 24.7–36)
BUN SERPL-MCNC: 26 MG/DL (ref 8–22)
CALCIUM SERPL-MCNC: 9.6 MG/DL (ref 8.5–10.5)
CHLORIDE SERPL-SCNC: 111 MMOL/L (ref 96–112)
CO2 SERPL-SCNC: 21 MMOL/L (ref 20–33)
CREAT SERPL-MCNC: 1.31 MG/DL (ref 0.5–1.4)
GFR SERPLBLD CREATININE-BSD FMLA CKD-EPI: 57 ML/MIN/1.73 M 2
GLUCOSE SERPL-MCNC: 100 MG/DL (ref 65–99)
INR PPP: 1.04 (ref 0.87–1.13)
POTASSIUM SERPL-SCNC: 4.6 MMOL/L (ref 3.6–5.5)
PROTHROMBIN TIME: 13.5 SEC (ref 12–14.6)
SODIUM SERPL-SCNC: 141 MMOL/L (ref 135–145)

## 2023-01-24 PROCEDURE — 700102 HCHG RX REV CODE 250 W/ 637 OVERRIDE(OP): Performed by: ANESTHESIOLOGY

## 2023-01-24 PROCEDURE — A9270 NON-COVERED ITEM OR SERVICE: HCPCS | Performed by: ANESTHESIOLOGY

## 2023-01-24 PROCEDURE — 700101 HCHG RX REV CODE 250: Performed by: SURGERY

## 2023-01-24 PROCEDURE — 160046 HCHG PACU - 1ST 60 MINS PHASE II: Performed by: SURGERY

## 2023-01-24 PROCEDURE — 160025 RECOVERY II MINUTES (STATS): Performed by: SURGERY

## 2023-01-24 PROCEDURE — 36415 COLL VENOUS BLD VENIPUNCTURE: CPT

## 2023-01-24 PROCEDURE — 700105 HCHG RX REV CODE 258: Performed by: ANESTHESIOLOGY

## 2023-01-24 PROCEDURE — 80048 BASIC METABOLIC PNL TOTAL CA: CPT

## 2023-01-24 PROCEDURE — 700111 HCHG RX REV CODE 636 W/ 250 OVERRIDE (IP): Performed by: ANESTHESIOLOGY

## 2023-01-24 PROCEDURE — 160041 HCHG SURGERY MINUTES - EA ADDL 1 MIN LEVEL 4: Performed by: SURGERY

## 2023-01-24 PROCEDURE — 160009 HCHG ANES TIME/MIN: Performed by: SURGERY

## 2023-01-24 PROCEDURE — 85610 PROTHROMBIN TIME: CPT

## 2023-01-24 PROCEDURE — 700101 HCHG RX REV CODE 250: Performed by: ANESTHESIOLOGY

## 2023-01-24 PROCEDURE — 160048 HCHG OR STATISTICAL LEVEL 1-5: Performed by: SURGERY

## 2023-01-24 PROCEDURE — 160002 HCHG RECOVERY MINUTES (STAT): Performed by: SURGERY

## 2023-01-24 PROCEDURE — C1781 MESH (IMPLANTABLE): HCPCS | Performed by: SURGERY

## 2023-01-24 PROCEDURE — 502714 HCHG ROBOTIC SURGERY SERVICES: Performed by: SURGERY

## 2023-01-24 PROCEDURE — 00840 ANES IPER PX LOWER ABD NOS: CPT | Performed by: ANESTHESIOLOGY

## 2023-01-24 PROCEDURE — 85730 THROMBOPLASTIN TIME PARTIAL: CPT

## 2023-01-24 PROCEDURE — 160029 HCHG SURGERY MINUTES - 1ST 30 MINS LEVEL 4: Performed by: SURGERY

## 2023-01-24 PROCEDURE — 160035 HCHG PACU - 1ST 60 MINS PHASE I: Performed by: SURGERY

## 2023-01-24 PROCEDURE — 99100 ANES PT EXTEME AGE<1 YR&>70: CPT | Performed by: ANESTHESIOLOGY

## 2023-01-24 DEVICE — MESH PROGRIP LAPROSCOPIC SELF FIXATING (1/CA): Type: IMPLANTABLE DEVICE | Site: GROIN | Status: FUNCTIONAL

## 2023-01-24 RX ORDER — HALOPERIDOL 5 MG/ML
1 INJECTION INTRAMUSCULAR
Status: DISCONTINUED | OUTPATIENT
Start: 2023-01-24 | End: 2023-01-24 | Stop reason: HOSPADM

## 2023-01-24 RX ORDER — HYDROMORPHONE HYDROCHLORIDE 1 MG/ML
0.1 INJECTION, SOLUTION INTRAMUSCULAR; INTRAVENOUS; SUBCUTANEOUS
Status: DISCONTINUED | OUTPATIENT
Start: 2023-01-24 | End: 2023-01-24 | Stop reason: HOSPADM

## 2023-01-24 RX ORDER — OXYCODONE HCL 5 MG/5 ML
5 SOLUTION, ORAL ORAL
Status: COMPLETED | OUTPATIENT
Start: 2023-01-24 | End: 2023-01-24

## 2023-01-24 RX ORDER — SODIUM CHLORIDE, SODIUM LACTATE, POTASSIUM CHLORIDE, CALCIUM CHLORIDE 600; 310; 30; 20 MG/100ML; MG/100ML; MG/100ML; MG/100ML
INJECTION, SOLUTION INTRAVENOUS CONTINUOUS
Status: DISCONTINUED | OUTPATIENT
Start: 2023-01-24 | End: 2023-01-24 | Stop reason: HOSPADM

## 2023-01-24 RX ORDER — HYDROMORPHONE HYDROCHLORIDE 1 MG/ML
0.4 INJECTION, SOLUTION INTRAMUSCULAR; INTRAVENOUS; SUBCUTANEOUS
Status: DISCONTINUED | OUTPATIENT
Start: 2023-01-24 | End: 2023-01-24 | Stop reason: HOSPADM

## 2023-01-24 RX ORDER — BUPIVACAINE HYDROCHLORIDE AND EPINEPHRINE 5; 5 MG/ML; UG/ML
INJECTION, SOLUTION EPIDURAL; INTRACAUDAL; PERINEURAL
Status: DISCONTINUED | OUTPATIENT
Start: 2023-01-24 | End: 2023-01-24 | Stop reason: HOSPADM

## 2023-01-24 RX ORDER — CEFAZOLIN SODIUM 1 G/3ML
INJECTION, POWDER, FOR SOLUTION INTRAMUSCULAR; INTRAVENOUS PRN
Status: DISCONTINUED | OUTPATIENT
Start: 2023-01-24 | End: 2023-01-24 | Stop reason: SURG

## 2023-01-24 RX ORDER — KETOROLAC TROMETHAMINE 30 MG/ML
INJECTION, SOLUTION INTRAMUSCULAR; INTRAVENOUS PRN
Status: DISCONTINUED | OUTPATIENT
Start: 2023-01-24 | End: 2023-01-24 | Stop reason: SURG

## 2023-01-24 RX ORDER — ENOXAPARIN SODIUM 100 MG/ML
100 INJECTION SUBCUTANEOUS EVERY 12 HOURS
COMMUNITY
End: 2023-03-07

## 2023-01-24 RX ORDER — METOPROLOL TARTRATE 1 MG/ML
1 INJECTION, SOLUTION INTRAVENOUS
Status: DISCONTINUED | OUTPATIENT
Start: 2023-01-24 | End: 2023-01-24 | Stop reason: HOSPADM

## 2023-01-24 RX ORDER — ONDANSETRON 2 MG/ML
4 INJECTION INTRAMUSCULAR; INTRAVENOUS
Status: DISCONTINUED | OUTPATIENT
Start: 2023-01-24 | End: 2023-01-24 | Stop reason: HOSPADM

## 2023-01-24 RX ORDER — HYDRALAZINE HYDROCHLORIDE 20 MG/ML
5 INJECTION INTRAMUSCULAR; INTRAVENOUS
Status: DISCONTINUED | OUTPATIENT
Start: 2023-01-24 | End: 2023-01-24 | Stop reason: HOSPADM

## 2023-01-24 RX ORDER — ROSUVASTATIN CALCIUM 5 MG/1
5 TABLET, COATED ORAL EVERY EVENING
COMMUNITY
End: 2023-03-07

## 2023-01-24 RX ORDER — OXYCODONE HYDROCHLORIDE AND ACETAMINOPHEN 5; 325 MG/1; MG/1
1 TABLET ORAL EVERY 4 HOURS PRN
Qty: 20 TABLET | Refills: 0 | Status: SHIPPED | OUTPATIENT
Start: 2023-01-24 | End: 2023-01-31

## 2023-01-24 RX ORDER — ONDANSETRON 2 MG/ML
INJECTION INTRAMUSCULAR; INTRAVENOUS PRN
Status: DISCONTINUED | OUTPATIENT
Start: 2023-01-24 | End: 2023-01-24 | Stop reason: SURG

## 2023-01-24 RX ORDER — LIDOCAINE HYDROCHLORIDE 20 MG/ML
INJECTION, SOLUTION EPIDURAL; INFILTRATION; INTRACAUDAL; PERINEURAL PRN
Status: DISCONTINUED | OUTPATIENT
Start: 2023-01-24 | End: 2023-01-24 | Stop reason: SURG

## 2023-01-24 RX ORDER — ROCURONIUM BROMIDE 10 MG/ML
INJECTION, SOLUTION INTRAVENOUS PRN
Status: DISCONTINUED | OUTPATIENT
Start: 2023-01-24 | End: 2023-01-24 | Stop reason: SURG

## 2023-01-24 RX ORDER — HYDROMORPHONE HYDROCHLORIDE 1 MG/ML
0.2 INJECTION, SOLUTION INTRAMUSCULAR; INTRAVENOUS; SUBCUTANEOUS
Status: DISCONTINUED | OUTPATIENT
Start: 2023-01-24 | End: 2023-01-24 | Stop reason: HOSPADM

## 2023-01-24 RX ORDER — DEXAMETHASONE SODIUM PHOSPHATE 4 MG/ML
INJECTION, SOLUTION INTRA-ARTICULAR; INTRALESIONAL; INTRAMUSCULAR; INTRAVENOUS; SOFT TISSUE PRN
Status: DISCONTINUED | OUTPATIENT
Start: 2023-01-24 | End: 2023-01-24 | Stop reason: SURG

## 2023-01-24 RX ORDER — OXYCODONE HCL 5 MG/5 ML
10 SOLUTION, ORAL ORAL
Status: COMPLETED | OUTPATIENT
Start: 2023-01-24 | End: 2023-01-24

## 2023-01-24 RX ORDER — SODIUM CHLORIDE, SODIUM LACTATE, POTASSIUM CHLORIDE, CALCIUM CHLORIDE 600; 310; 30; 20 MG/100ML; MG/100ML; MG/100ML; MG/100ML
INJECTION, SOLUTION INTRAVENOUS CONTINUOUS
Status: ACTIVE | OUTPATIENT
Start: 2023-01-24 | End: 2023-01-24

## 2023-01-24 RX ORDER — SODIUM CHLORIDE, SODIUM LACTATE, POTASSIUM CHLORIDE, CALCIUM CHLORIDE 600; 310; 30; 20 MG/100ML; MG/100ML; MG/100ML; MG/100ML
INJECTION, SOLUTION INTRAVENOUS
Status: DISCONTINUED | OUTPATIENT
Start: 2023-01-24 | End: 2023-01-24 | Stop reason: SURG

## 2023-01-24 RX ORDER — ALBUTEROL SULFATE 2.5 MG/3ML
2.5 SOLUTION RESPIRATORY (INHALATION)
Status: DISCONTINUED | OUTPATIENT
Start: 2023-01-24 | End: 2023-01-24 | Stop reason: HOSPADM

## 2023-01-24 RX ORDER — POLYETHYLENE GLYCOL 3350 17 G/17G
17 POWDER, FOR SOLUTION ORAL PRN
Qty: 500 G | Status: SHIPPED | OUTPATIENT
Start: 2023-01-24 | End: 2024-02-06

## 2023-01-24 RX ADMIN — FENTANYL CITRATE 100 MCG: 50 INJECTION, SOLUTION INTRAMUSCULAR; INTRAVENOUS at 14:18

## 2023-01-24 RX ADMIN — SUGAMMADEX 200 MG: 100 INJECTION, SOLUTION INTRAVENOUS at 15:03

## 2023-01-24 RX ADMIN — CEFAZOLIN 2 G: 330 INJECTION, POWDER, FOR SOLUTION INTRAMUSCULAR; INTRAVENOUS at 14:01

## 2023-01-24 RX ADMIN — SODIUM CHLORIDE, POTASSIUM CHLORIDE, SODIUM LACTATE AND CALCIUM CHLORIDE: 600; 310; 30; 20 INJECTION, SOLUTION INTRAVENOUS at 13:56

## 2023-01-24 RX ADMIN — PROPOFOL 200 MG: 10 INJECTION, EMULSION INTRAVENOUS at 14:01

## 2023-01-24 RX ADMIN — OXYCODONE HYDROCHLORIDE 5 MG: 5 SOLUTION ORAL at 15:45

## 2023-01-24 RX ADMIN — KETOROLAC TROMETHAMINE 30 MG: 30 INJECTION, SOLUTION INTRAMUSCULAR at 14:55

## 2023-01-24 RX ADMIN — LIDOCAINE HYDROCHLORIDE 100 MG: 20 INJECTION, SOLUTION EPIDURAL; INFILTRATION; INTRACAUDAL at 14:01

## 2023-01-24 RX ADMIN — ONDANSETRON 8 MG: 2 INJECTION INTRAMUSCULAR; INTRAVENOUS at 14:55

## 2023-01-24 RX ADMIN — ROCURONIUM BROMIDE 100 MG: 10 INJECTION, SOLUTION INTRAVENOUS at 14:01

## 2023-01-24 RX ADMIN — DEXAMETHASONE SODIUM PHOSPHATE 8 MG: 4 INJECTION, SOLUTION INTRA-ARTICULAR; INTRALESIONAL; INTRAMUSCULAR; INTRAVENOUS; SOFT TISSUE at 14:01

## 2023-01-24 RX ADMIN — FENTANYL CITRATE 100 MCG: 50 INJECTION, SOLUTION INTRAMUSCULAR; INTRAVENOUS at 14:01

## 2023-01-24 RX ADMIN — FENTANYL CITRATE 50 MCG: 50 INJECTION, SOLUTION INTRAMUSCULAR; INTRAVENOUS at 14:30

## 2023-01-24 ASSESSMENT — PAIN DESCRIPTION - PAIN TYPE
TYPE: SURGICAL PAIN

## 2023-01-24 ASSESSMENT — FIBROSIS 4 INDEX: FIB4 SCORE: 1.47

## 2023-01-24 ASSESSMENT — PAIN SCALES - GENERAL: PAIN_LEVEL: 4

## 2023-01-24 NOTE — DISCHARGE INSTRUCTIONS
HOME CARE INSTRUCTIONS    ACTIVITY: No heavy lifting or straining for 4 weeks after surgery. Rest and take it easy for the first 24 hours.  A responsible adult is recommended to remain with you during that time.  It is normal to feel sleepy.  We encourage you to not do anything that requires balance, judgment or coordination.    FOR 24 HOURS DO NOT:  Drive, operate machinery or run household appliances.  Drink beer or alcoholic beverages.  Make important decisions or sign legal documents.    SPECIAL INSTRUCTIONS:   Inguinal Hernia, Adult  An inguinal hernia is when fat or your intestines push through a weak spot in a muscle where your leg meets your lower belly (groin). This causes a rounded lump (bulge). This kind of hernia could also be:  In your scrotum, if you are male.  In folds of skin around your vagina, if you are female.  There are three types of inguinal hernias. These include:  Hernias that can be pushed back into the belly (are reducible). This type rarely causes pain.  Hernias that cannot be pushed back into the belly (are incarcerated).  Hernias that cannot be pushed back into the belly and lose their blood supply (are strangulated). This type needs emergency surgery.  If you do not have symptoms, you may not need treatment. If you have symptoms or a large hernia, you may need surgery.  Follow these instructions at home:  Lifestyle  Do these things if told by your doctor so you do not have trouble pooping (constipation):  Drink enough fluid to keep your pee (urine) pale yellow.  Eat foods that have a lot of fiber. These include fresh fruits and vegetables, whole grains, and beans.  Limit foods that are high in fat and processed sugars. These include foods that are fried or sweet.  Take medicine for trouble pooping.  Avoid lifting heavy objects.  Avoid standing for long amounts of time.  Do not use any products that contain nicotine or tobacco. These include cigarettes and e-cigarettes. If you need help  quitting, ask your doctor.  Stay at a healthy weight.  General instructions  You may try to push your hernia in by very gently pressing on it when you are lying down. Do not try to force the bulge back in if it will not push in easily.  Watch your hernia for any changes in shape, size, or color. Tell your doctor if you see any changes.  Take over-the-counter and prescription medicines only as told by your doctor.  Keep all follow-up visits as told by your doctor. This is important.  Contact a doctor if:  You have a fever.  You have new symptoms.  Your symptoms get worse.  Get help right away if:  The area where your leg meets your lower belly has:  Pain that gets worse suddenly.  A bulge that gets bigger suddenly, and it does not get smaller after that.  A bulge that turns red or purple.  A bulge that is painful when you touch it.  You are a man, and your scrotum:  Suddenly feels painful.  Suddenly changes in size.  You cannot push the hernia in by very gently pressing on it when you are lying down. Do not try to force the bulge back in if it will not push in easily.  You feel sick to your stomach (nauseous), and that feeling does not go away.  You throw up (vomit), and that keeps happening.  You have a fast heartbeat.  You cannot poop (have a bowel movement) or pass gas.  These symptoms may be an emergency. Do not wait to see if the symptoms will go away. Get medical help right away. Call your local emergency services (911 in the [place]  Summary  An inguinal hernia is when fat or your intestines push through a weak spot in a muscle where your leg meets your lower belly (groin). This causes a rounded lump (bulge).  If you do not have symptoms, you may not need treatment. If you have symptoms or a large hernia, you may need surgery.  Avoid lifting heavy objects. Also avoid standing for long amounts of time.  Do not try to force the bulge back in if it will not push in easily.  This information is not intended to  replace advice given to you by your health care provider. Make sure you discuss any questions you have with your health care provider.  Document Released: 01/18/2008 Document Revised: 01/19/2019 Document Reviewed: 09/19/2018  [employer] Patient Education © 2020 [employer]    DIET: To avoid nausea, slowly advance diet as tolerated, avoiding spicy or greasy foods for the first day.  Add more substantial food to your diet according to your physician's instructions.  Babies can be fed formula or breast milk as soon as they are hungry.  INCREASE FLUIDS AND FIBER TO AVOID CONSTIPATION.    SURGICAL DRESSING/BATHING: May shower daily with wounds and drain uncovered    MEDICATIONS: Resume taking daily medication.  Take prescribed pain medication with food.  If no medication is prescribed, you may take non-aspirin pain medication if needed.  PAIN MEDICATION CAN BE VERY CONSTIPATING.  Take a stool softener or laxative such as senokot, pericolace, or milk of magnesia if needed.    Prescription given for percocet, and miralax.  Last pain medication given at 3:45 (5mg oxycodone).    A follow-up appointment should be arranged with your doctor in 1-2 weeks; call to schedule.    You should CALL YOUR PHYSICIAN if you develop:  Fever greater than 101 degrees F.  Pain not relieved by medication, or persistent nausea or vomiting.  Excessive bleeding (blood soaking through dressing) or unexpected drainage from the wound.  Extreme redness or swelling around the incision site, drainage of pus or foul smelling drainage.  Inability to urinate or empty your bladder within 8 hours.  Problems with breathing or chest pain.    You should call 911 if you develop problems with breathing or chest pain.  If you are unable to contact your doctor or surgical center, you should go to the nearest emergency room or urgent care center.  Physician's telephone #: [phone] Dr. Carr    MILD FLU-LIKE SYMPTOMS ARE NORMAL.  YOU MAY EXPERIENCE GENERALIZED  MUSCLE ACHES, THROAT IRRITATION, HEADACHE AND/OR SOME NAUSEA.    If any questions arise, call your doctor.  If your doctor is not available, please feel free to call the Surgical Center at [phone].  The Center is open Monday through Friday from 7AM to 7PM.      A registered nurse may call you a few days after your surgery to see how you are doing after your procedure.    You may also receive a survey in the mail within the next two weeks and we ask that you take a few moments to complete the survey and return it to us.  Our goal is to provide you with very good care and we value your comments.     Depression / Suicide Risk    As you are discharged from this [facility] facility, it is important to learn how to keep safe from harming yourself.    Recognize the warning signs:  Abrupt changes in personality, positive or negative- including increase in energy   Giving away possessions  Change in eating patterns- significant weight changes-  positive or negative  Change in sleeping patterns- unable to sleep or sleeping all the time   Unwillingness or inability to communicate  Depression  Unusual sadness, discouragement and loneliness  Talk of wanting to die  Neglect of personal appearance   Rebelliousness- reckless behavior  Withdrawal from people/activities they love  Confusion- inability to concentrate     If you or a loved one observes any of these behaviors or has concerns about self-harm, here's what you can do:  Talk about it- your feelings and reasons for harming yourself  Remove any means that you might use to hurt yourself (examples: pills, rope, extension cords, firearm)  Get professional help from the community (Mental Health, Substance Abuse, psychological counseling)  Do not be alone:Call your Safe Contact- someone whom you trust who will be there for you.  Call your local CRISIS HOTLINE 934-6218 or 500-078-1418  Call your local Children's Mobile Crisis Response Team [place] (272) 798-0778  or www.[place].Ebyline  Call the toll free National Suicide Prevention Hotlines   National Suicide Prevention Lifeline 575-591-QTVI (9128)  National [employer] Line Network 800-SUICIDE (608-8733)    I acknowledge receipt and understanding of these Home Care instructions.

## 2023-01-24 NOTE — ANESTHESIA POSTPROCEDURE EVALUATION
Patient: Tavares Napoles    Procedure Summary     Date: 01/24/23 Room / Location: [place] [address] / SURGERY [facility]    Anesthesia Start: 1356 Anesthesia Stop: 1515    Procedure: ROBOTIC RIGHT INGUINAL HERNIA REPAIR (Right: Groin) Diagnosis: (RIGHT INGUINAL HERNIA)    Surgeons: Moi Carr M.D. Responsible Provider: Raji Martin D.O.    Anesthesia Type: general ASA Status: 2          Final Anesthesia Type: general  Last vitals  BP   Blood Pressure : 130/80    Temp   36.6 °C (97.9 °F)    Pulse   85   Resp   18    SpO2   96 %      Anesthesia Post Evaluation    Patient location during evaluation: PACU  Patient participation: complete - patient participated  Level of consciousness: awake and alert  Pain score: 4    Airway patency: patent  Anesthetic complications: no  Cardiovascular status: hemodynamically stable  Respiratory status: acceptable  Hydration status: euvolemic    PONV: none          No notable events documented.     Nurse Pain Score: 4 (NPRS)

## 2023-01-24 NOTE — ANESTHESIA TIME REPORT
Anesthesia Start and Stop Event Times     Date Time Event    1/24/2023 1345 Ready for Procedure     1356 Anesthesia Start     1515 Anesthesia Stop        Responsible Staff  01/24/23    Name Role Begin End    Raji Martin D.O. Anesth 1356 1515        Overtime Reason:  overtime    Comments:

## 2023-01-24 NOTE — ANESTHESIA PROCEDURE NOTES
Airway    Date/Time: 1/24/2023 2:02 PM  Performed by: Raji Martin D.O.  Authorized by: Raji Martin D.O.     Location:  OR  Urgency:  Elective  Indications for Airway Management:  Anesthesia      Spontaneous Ventilation: absent    Sedation Level:  Deep  Preoxygenated: Yes    Patient Position:  Sniffing  Mask Difficulty Assessment:  2 - vent by mask + OA or adjuvant +/- NMBA  Final Airway Type:  Endotracheal airway  Final Endotracheal Airway:  ETT  Cuffed: Yes    Technique Used for Successful ETT Placement:  Direct laryngoscopy    Insertion Site:  Oral  Blade Type:  Jony  Laryngoscope Blade/Videolaryngoscope Blade Size:  4  ETT Size (mm):  7.5  Measured from:  Teeth  ETT to Teeth (cm):  24  Placement Verified by: auscultation and capnometry    Cormack-Lehane Classification:  Grade IIa - partial view of glottis  Number of Attempts at Approach:  1

## 2023-01-24 NOTE — ANESTHESIA PREPROCEDURE EVALUATION
Case: [id number] Date/Time: 01/24/23 1215    Procedure: ROBOTIC RIGHT INGUINAL HERNIA REPAIR (Right: Groin)    Anesthesia type: General    Pre-op diagnosis: RIGHT INGUINAL HERNIA    Location: [facility] OR 15 / SURGERY [facility]    Surgeons: Moi Sandoval M.D.          Relevant Problems   CARDIAC   (positive) Post-phlebitic syndrome- RIGHT LEG      GI   (positive) Gastroesophageal reflux disease without esophagitis         (positive) Atrophy of right kidney- [facility]   (positive) Stage 3b chronic kidney disease (HCC)      Other   (positive) Right inguinal hernia- repair tbd 1/25/2023 dr sandoval       Physical Exam    Airway   Mallampati: II  TM distance: >3 FB  Neck ROM: full       Cardiovascular - normal exam  Rhythm: regular  Rate: normal  (-) murmur     Dental - normal exam           Pulmonary - normal exam  Breath sounds clear to auscultation     Abdominal    Neurological - normal exam                 Anesthesia Plan    ASA 2       Plan - general       Airway plan will be ETT          Induction: intravenous    Postoperative Plan: Postoperative administration of opioids is intended.    Pertinent diagnostic labs and testing reviewed    Informed Consent:    Anesthetic plan and risks discussed with patient.    Use of blood products discussed with: patient whom consented to blood products.

## 2023-01-25 NOTE — OR NURSING
Patient transferred to Phase II. Vitals taken. IV removed, WNL. Site clean, dry, intact. DC education reviewed with patient and family , questions encouraged and answered.

## 2023-01-25 NOTE — OR NURSING
Patient arrived to PACU in stable condition.  Oral airway in place, removed immediately  Surgical lap site x 3, closed with dermabond  Medicated for pain MAR  Daughter Paulina updated on patient condition   Patient tolerated clears without nausea or vomiting  Report given to Cruz BLUNT. Patient transferred to phase 2

## 2023-01-25 NOTE — OP REPORT
DATE OF SERVICE:  01/24/2023     PREOPERATIVE DIAGNOSIS:  Right inguinal hernia.     POSTOPERATIVE DIAGNOSIS:  Right inguinal hernia.     PROCEDURE:  Robotic right inguinal hernia repair with mesh.     SURGEON:  Moi Carr MD     ASSISTANT:  None.     ANESTHESIA:  General endotracheal anesthesia.     ESTIMATED BLOOD LOSS:  10 mL.     SPECIMENS:  None.     COMPLICATIONS:  None.     CONDITION:  Stable.     INDICATIONS FOR PROCEDURE:  This is a 72-year-old male who presents with a   reducible right inguinal hernia for elective repair.  Risks, benefits and   alternatives of surgery explained to him before proceeding.     OPERATIVE FINDINGS:  Reducible right inguinal hernia, mostly direct but some   indirect component as well, representing a pantaloon type hernia completely   reduced and repaired with preperitoneal mesh.     OPERATIVE TECHNIQUE:  After informed consent was obtained, the patient was   taken to the operating room and placed in supine position.  After adequate   endotracheal anesthesia was achieved, the abdomen was prepped and draped in   sterile fashion.  Operation was begun by making an 8 mm periumbilical incision   through which an 8 mm trocar was introduced into the abdomen using Optiview   technique.  After pneumoperitoneum was achieved, 2 additional 8 mm trocars   were placed into the abdomen.  All trocars were placed with 0.5% Marcaine with   epinephrine for local anesthesia.     We positioned the patient and docked the da Reid Xi robotic system.  We then   opened the preperitoneal space.  In the right groin, we dissected into the   space toward the inguinal canal and then freed up all adhesions.  We reflected   the epigastric vessels superiorly.  We then dissected down toward Ramon's   ligament and freed attachments in this area as well.  We then cleared the   indirect component of the pantaloon hernia off of the spermatic cord and   reduced this inferiorly.  We then meticulously dissected  around the direct   hernia until we were able to reduce the hernia contents inferiorly and we   freed all attachments as well, completing our dissection.     We then placed a 10x15 cm ProGrip mesh into the operative field.  This was   tacked down in the preperitoneal plane.  We then closed the space with a   running 2-0 Stratafix suture.  Needle was retrieved.  Pneumoperitoneum was   reduced and all trocars were removed.     Skin incisions were then closed with 4-0 Monocryl and Dermabond.  The patient   was returned to the PACU in stable condition.  All instrument counts were   correct at the end of the procedure.        ______________________________  MD KINGSLEY Sutton/KAITLIN/STACY    DD:  01/24/2023 15:44  DT:  01/24/2023 16:42    Job#:  [id number]

## 2023-01-30 DIAGNOSIS — Z79.01 CHRONIC ANTICOAGULATION: ICD-10-CM

## 2023-02-27 ENCOUNTER — ANTICOAGULATION VISIT (OUTPATIENT)
Dept: MEDICAL GROUP | Facility: MEDICAL CENTER | Age: 73
End: 2023-02-27
Payer: MEDICARE

## 2023-02-27 DIAGNOSIS — Z79.01 LONG TERM (CURRENT) USE OF ANTICOAGULANTS: ICD-10-CM

## 2023-02-27 DIAGNOSIS — Z79.01 ANTICOAGULATED ON WARFARIN: ICD-10-CM

## 2023-02-27 LAB — INR PPP: 2.5 (ref 2–3.5)

## 2023-02-27 PROCEDURE — 93793 ANTICOAG MGMT PT WARFARIN: CPT

## 2023-02-27 PROCEDURE — 85610 PROTHROMBIN TIME: CPT | Performed by: NURSE PRACTITIONER

## 2023-02-27 NOTE — PROGRESS NOTES
Anticoagulation Summary  As of 2023      INR goal:  2.0-3.0   TTR:  55.3 % (3.5 y)   INR used for dosin.50 (2023)   Warfarin maintenance plan:  7.5 mg (5 mg x 1.5) every Tue, Sat; 5 mg (5 mg x 1) all other days   Weekly warfarin total:  40 mg   Plan last modified:  Ganesh Mao, PharmD (2022)   Next INR check:  2023   Target end date:  Indefinite    Indications    Anticoagulated on warfarin [Z79.01]  Long term (current) use of anticoagulants [Z79.01]  DVT (deep venous thrombosis) (HCC) (Resolved) [I82.409]                 Anticoagulation Episode Summary       INR check location:  Anticoagulation Clinic    Preferred lab:      Send INR reminders to:      Comments:            Anticoagulation Care Providers       Provider Role Specialty Phone number    [facility]   [phone]    Keron Garcia M.D.  Internal Medicine [phone]                  Refer to Patient Findings for HPI:  Patient Findings       Negatives:  Signs/symptoms of thrombosis, Signs/symptoms of bleeding, Laboratory test error suspected, Change in health, Change in alcohol use, Change in activity, Upcoming invasive procedure, Emergency department visit, Upcoming dental procedure, Missed doses, Extra doses, Change in medications, Change in diet/appetite, Hospital admission, Bruising, Other complaints            There were no vitals filed for this visit.  pt declined vitals    Verified current warfarin dosing schedule.    Medications reconciled   Pt is not on antiplatelet therapy      A/P   INR  -therapeutic. Patient had hernia surgery  since last appointment. He was bridged by surgery team.  No signs of bleeding.    Warfarin dosing recommendation: Continue current warfarin regimen as above without changes      Pt educated to contact our clinic with any changes in medications or s/s of bleeding or thrombosis. Pt is aware to seek immediate medical attention for falls, head injury or deep cuts.    Follow  up appointment in 12 week(s).    Ammy Mclaughlin, AdamD

## 2023-03-07 ENCOUNTER — OFFICE VISIT (OUTPATIENT)
Dept: MEDICAL GROUP | Age: 73
End: 2023-03-07
Payer: MEDICARE

## 2023-03-07 VITALS
BODY MASS INDEX: 32.91 KG/M2 | TEMPERATURE: 96.9 F | SYSTOLIC BLOOD PRESSURE: 120 MMHG | HEART RATE: 83 BPM | WEIGHT: 243 LBS | DIASTOLIC BLOOD PRESSURE: 68 MMHG | HEIGHT: 72 IN | OXYGEN SATURATION: 95 %

## 2023-03-07 DIAGNOSIS — Z12.12 SCREENING FOR COLORECTAL CANCER: ICD-10-CM

## 2023-03-07 DIAGNOSIS — E78.2 MIXED HYPERLIPIDEMIA: ICD-10-CM

## 2023-03-07 DIAGNOSIS — F11.20 UNCOMPLICATED OPIOID DEPENDENCE (HCC): ICD-10-CM

## 2023-03-07 DIAGNOSIS — N30.00 ACUTE CYSTITIS WITHOUT HEMATURIA: ICD-10-CM

## 2023-03-07 DIAGNOSIS — N39.0 RECURRENT UTI: ICD-10-CM

## 2023-03-07 DIAGNOSIS — N18.31 STAGE 3A CHRONIC KIDNEY DISEASE: ICD-10-CM

## 2023-03-07 DIAGNOSIS — E55.9 HYPOVITAMINOSIS D: ICD-10-CM

## 2023-03-07 DIAGNOSIS — Z12.11 SCREENING FOR COLORECTAL CANCER: ICD-10-CM

## 2023-03-07 DIAGNOSIS — D68.51 FACTOR 5 LEIDEN MUTATION, HETEROZYGOUS (HCC): ICD-10-CM

## 2023-03-07 DIAGNOSIS — Z79.2 USING PROPHYLACTIC ANTIBIOTIC ON DAILY BASIS: ICD-10-CM

## 2023-03-07 DIAGNOSIS — N41.0 PROSTATITIS, ACUTE: ICD-10-CM

## 2023-03-07 DIAGNOSIS — G89.4 CHRONIC PAIN SYNDROME: ICD-10-CM

## 2023-03-07 DIAGNOSIS — E66.09 CLASS 1 OBESITY DUE TO EXCESS CALORIES WITH SERIOUS COMORBIDITY AND BODY MASS INDEX (BMI) OF 32.0 TO 32.9 IN ADULT: ICD-10-CM

## 2023-03-07 DIAGNOSIS — I87.009 POST-PHLEBITIC SYNDROME: ICD-10-CM

## 2023-03-07 PROBLEM — E66.9 OBESITY (BMI 30.0-34.9): Status: RESOLVED | Noted: 2017-04-07 | Resolved: 2023-03-07

## 2023-03-07 PROBLEM — M25.50 POLYARTHRALGIA: Status: RESOLVED | Noted: 2020-11-03 | Resolved: 2023-03-07

## 2023-03-07 PROBLEM — U07.1 COVID-19 VIRUS RNA DETECTED: Status: RESOLVED | Noted: 2021-11-23 | Resolved: 2023-03-07

## 2023-03-07 PROBLEM — K40.90 RIGHT INGUINAL HERNIA: Status: RESOLVED | Noted: 2022-12-07 | Resolved: 2023-03-07

## 2023-03-07 PROBLEM — F43.20 GRIEF REACTION: Status: RESOLVED | Noted: 2022-06-06 | Resolved: 2023-03-07

## 2023-03-07 PROBLEM — E66.811 OBESITY (BMI 30.0-34.9): Status: RESOLVED | Noted: 2017-04-07 | Resolved: 2023-03-07

## 2023-03-07 PROBLEM — F43.21 GRIEF REACTION: Status: RESOLVED | Noted: 2022-06-06 | Resolved: 2023-03-07

## 2023-03-07 PROBLEM — N18.32 STAGE 3B CHRONIC KIDNEY DISEASE: Status: RESOLVED | Noted: 2018-08-24 | Resolved: 2023-03-07

## 2023-03-07 PROCEDURE — 99214 OFFICE O/P EST MOD 30 MIN: CPT | Performed by: INTERNAL MEDICINE

## 2023-03-07 RX ORDER — OXYCODONE HYDROCHLORIDE AND ACETAMINOPHEN 5; 325 MG/1; MG/1
1 TABLET ORAL EVERY 6 HOURS PRN
Qty: 120 TABLET | Refills: 0 | Status: SHIPPED | OUTPATIENT
Start: 2023-03-07 | End: 2024-02-06 | Stop reason: SDUPTHER

## 2023-03-07 RX ORDER — FENOFIBRATE 160 MG/1
160 TABLET ORAL DAILY
Qty: 90 TABLET | Refills: 3 | Status: SHIPPED | OUTPATIENT
Start: 2023-03-07 | End: 2023-06-15 | Stop reason: SDUPTHER

## 2023-03-07 RX ORDER — CIPROFLOXACIN 500 MG/1
500 TABLET, FILM COATED ORAL 2 TIMES DAILY
Qty: 20 TABLET | Refills: 54 | Status: SHIPPED | OUTPATIENT
Start: 2023-03-07 | End: 2024-03-20 | Stop reason: SDUPTHER

## 2023-03-07 RX ORDER — MULTIVIT-MIN/IRON/FOLIC ACID/K 18-600-40
2000 CAPSULE ORAL DAILY
Qty: 100 CAPSULE | Refills: 3 | Status: SHIPPED | OUTPATIENT
Start: 2023-03-07 | End: 2023-06-15 | Stop reason: SDUPTHER

## 2023-03-07 ASSESSMENT — ENCOUNTER SYMPTOMS
GASTROINTESTINAL NEGATIVE: 1
MUSCULOSKELETAL NEGATIVE: 1
PSYCHIATRIC NEGATIVE: 1
CARDIOVASCULAR NEGATIVE: 1
CONSTITUTIONAL NEGATIVE: 1
EYES NEGATIVE: 1
RESPIRATORY NEGATIVE: 1
NEUROLOGICAL NEGATIVE: 1

## 2023-03-07 ASSESSMENT — PATIENT HEALTH QUESTIONNAIRE - PHQ9: CLINICAL INTERPRETATION OF PHQ2 SCORE: 0

## 2023-03-07 ASSESSMENT — FIBROSIS 4 INDEX: FIB4 SCORE: 1.47

## 2023-03-07 NOTE — PROGRESS NOTES
Subjective     Derick Chucho Napoles is a 72 y.o. male who presents with Follow-Up (Medication management refills )  Patient is here for pain management follow-up and refill of his Percocet and for follow-up of his ongoing medical problems listed below  Patient Active Problem List   Diagnosis    History of pulmonary embolism    Factor 5 Leiden mutation, heterozygous (HCC)    Gastroesophageal reflux disease without esophagitis    Hypovitaminosis D    Chronic pain syndrome- right leg from post phlebitic syn; on percocet once a day    Chronic venous hypertension due to DVT    Anticoagulated on warfarin    Mixed hyperlipidemia     Post-phlebitic syndrome- RIGHT LEG    Uncomplicated opioid dependence (HCC)    Benign prostatic hyperplasia with incomplete bladder emptying- self cath since 5/2018 ing hernia repair    Atrophy of right kidney- urology [facility]    Obstructive uropathy    Neurogenic bladder- self cath since 5/2018    Primary osteoarthritis of right knee    Post-phlebitic dermatosis of right lower extremity    Long term (current) use of anticoagulants    Using prophylactic antibiotic on daily basis- keflex for recurrent uti form self cath daily/neurogenic bladder    Obesity due to excess calories with serious comorbidity    Stage 3a chronic kidney disease (HCC)    Acute cystitis without hematuria     Outpatient Medications Prior to Visit   Medication Sig Dispense Refill    polyethylene glycol 3350 (MIRALAX) 17 GM/SCOOP Powder Take 17 g by mouth as needed (constipation). 500 g prn    warfarin (COUMADIN) 5 MG Tab TAKE 1 TO 1 & 1/2 (ONE & ONE-HALF) TABLETS BY MOUTH ONCE DAILY OR  AS  DIRECTED  BY  [facility] (Patient taking differently: Take 5-7.5 mg by mouth every day. TAKE 1 TO 1 & 1/2 (ONE & ONE-HALF) TABLETS BY MOUTH ONCE DAILY OR  AS  DIRECTED  BY  [facility]  Tuesdays 7.5 mg  and 5 mg qd) 135 Tablet 1    enoxaparin (LOVENOX) 100 MG/ML Solution Prefilled Syringe inj Inject 100  mg under the skin every 12 hours.      rosuvastatin (CRESTOR) 5 MG Tab Take 5 mg by mouth every evening.      ciprofloxacin (CIPRO) 500 MG Tab Take 1 Tablet by mouth 2 times a day. As needed for recurrent prostate or urinary tract infections. May have to reduce  warfarin dosage in half while taking. (Patient taking differently: Take 500 mg by mouth every evening. As needed for recurrent prostate or urinary tract infections. May have to reduce  warfarin dosage in half while taking.) 20 Tablet 2    fenofibrate (TRIGLIDE) 160 MG tablet Take 1 Tablet by mouth every day. (use generic fenofibrate only) 90 Tablet 3    Cholecalciferol (VITAMIN D) 2000 UNITS Cap Take 1 Cap by mouth every day. 100 Cap 3     No facility-administered medications prior to visit.               HPI    Review of Systems   Constitutional: Negative.    HENT: Negative.     Eyes: Negative.    Respiratory: Negative.     Cardiovascular: Negative.    Gastrointestinal: Negative.    Genitourinary: Negative.    Musculoskeletal: Negative.    Skin: Negative.    Neurological: Negative.    Endo/Heme/Allergies: Negative.    Psychiatric/Behavioral: Negative.              Objective     /68 (BP Location: Right arm, Patient Position: Sitting, BP Cuff Size: Adult)   Pulse 83   Temp 36.1 °C (96.9 °F) (Temporal)   Ht 1.829 m (6')   Wt 110 kg (243 lb)   SpO2 95%   BMI 32.96 kg/m²      Physical Exam  Constitutional:       General: He is not in acute distress.     Appearance: He is well-developed. He is not diaphoretic.   HENT:      Head: Normocephalic and atraumatic.      Right Ear: External ear normal.      Left Ear: External ear normal.      Nose: Nose normal.      Mouth/Throat:      Pharynx: No oropharyngeal exudate.   Eyes:      General: No scleral icterus.        Right eye: No discharge.         Left eye: No discharge.      Conjunctiva/sclera: Conjunctivae normal.      Pupils: Pupils are equal, round, and reactive to light.   Neck:      Thyroid: No  thyromegaly.      Vascular: No JVD.      Trachea: No tracheal deviation.   Cardiovascular:      Rate and Rhythm: Normal rate and regular rhythm.      Heart sounds: Normal heart sounds. No murmur heard.    No friction rub. No gallop.   Pulmonary:      Effort: Pulmonary effort is normal. No respiratory distress.      Breath sounds: Normal breath sounds. No stridor. No wheezing or rales.   Chest:      Chest wall: No tenderness.   Abdominal:      General: Bowel sounds are normal. There is no distension.      Palpations: Abdomen is soft. There is no mass.      Tenderness: There is no abdominal tenderness. There is no guarding or rebound.   Musculoskeletal:         General: No tenderness. Normal range of motion.      Cervical back: Normal range of motion and neck supple.   Lymphadenopathy:      Cervical: No cervical adenopathy.   Skin:     General: Skin is warm and dry.      Coloration: Skin is not pale.      Findings: No erythema or rash.   Neurological:      Mental Status: He is alert and oriented to person, place, and time.      Motor: No abnormal muscle tone.      Coordination: Coordination normal.      Deep Tendon Reflexes: Reflexes are normal and symmetric. Reflexes normal.   Psychiatric:         Behavior: Behavior normal.         Thought Content: Thought content normal.         Judgment: Judgment normal.     Anticoagulation Visit on 02/27/2023   Component Date Value    INR 02/27/2023 2.50       .alll  Lab Results   Component Value Date/Time    HBA1C 5.7 (H) 08/11/2021 03:17 PM    HBA1C 5.8 05/01/2013 08:53 AM     Lab Results   Component Value Date/Time    SODIUM 141 01/24/2023 11:14 AM    POTASSIUM 4.6 01/24/2023 11:14 AM    CHLORIDE 111 01/24/2023 11:14 AM    CO2 21 01/24/2023 11:14 AM    GLUCOSE 100 (H) 01/24/2023 11:14 AM    BUN 26 (H) 01/24/2023 11:14 AM    CREATININE 1.31 01/24/2023 11:14 AM    CREATININE 1.3 02/11/2009 04:05 AM    ALKPHOSPHAT 49 08/30/2022 11:36 AM    ASTSGOT 29 08/30/2022 11:36 AM    ALTSGPT  29 08/30/2022 11:36 AM    TBILIRUBIN 0.5 08/30/2022 11:36 AM     Lab Results   Component Value Date/Time    INR 2.50 02/27/2023 08:02 AM    INR 1.04 01/24/2023 11:14 AM    INR 2.30 12/05/2022 08:03 AM     Lab Results   Component Value Date/Time    CHOLSTRLTOT 192 08/30/2022 11:36 AM     (H) 08/30/2022 11:36 AM    HDL 45 08/30/2022 11:36 AM    TRIGLYCERIDE 89 08/30/2022 11:36 AM       Lab Results   Component Value Date/Time    TESTOSTERONE 222 01/22/2014 09:04 AM     No results found for: TSH  Lab Results   Component Value Date/Time    FREET4 0.84 05/01/2013 08:53 AM    FREET4 0.90 01/24/2012 10:38 AM     No results found for: URICACID  No components found for: VITB12  Lab Results   Component Value Date/Time    25HYDROXY 58 08/30/2022 11:36 AM    25HYDROXY 80 08/11/2021 03:17 PM     '                         Assessment & Plan        1. Stage 3a chronic kidney disease (HCC)-patient has improved from 3B to 3A chronic kidney disease.  Continue good hydration and avoidance of NSAIDs   Under good control. Continue same regimen.      2. Mixed hyperlipidemia    Under good control. Continue same regimen.  '  - fenofibrate (TRIGLIDE) 160 MG tablet; Take 1 Tablet by mouth every day. (use generic fenofibrate only)  Dispense: 90 Tablet; Refill: 3    3. Factor 5 Leiden mutation, heterozygous (HCC)   Under good control. Continue same regimen.'    4. Uncomplicated opioid dependence (HCC)   Under good control. Continue same regimen.'  - oxyCODONE-acetaminophen (PERCOCET) 5-325 MG Tab; Take 1 Tablet by mouth every 6 hours as needed for Severe Pain for up to 90 days.  Dispense: 120 Tablet; Refill: 0    5. Class 1 obesity due to excess calories with serious comorbidity and body mass index (BMI) of 32.0 to 32.9 in adult  Good control continue current regimen    6. Using prophylactic antibiotic on daily basis- keflex for recurrent uti form self cath daily/neurogenic bladder  Good control continue current regimen    7. Acute  cystitis without hematuria  Has acute cystitis following recent right inguinal herniorrhaphy.  Refilled Cipro.    8. Prostatitis, acute  As above  - ciprofloxacin (CIPRO) 500 MG Tab; Take 1 Tablet by mouth 2 times a day. As needed for recurrent prostate or urinary tract infections. May have to reduce  warfarin dosage in half while taking.  Dispense: 20 Tablet; Refill: 54    9. Recurrent UTI      As above  - ciprofloxacin (CIPRO) 500 MG Tab; Take 1 Tablet by mouth 2 times a day. As needed for recurrent prostate or urinary tract infections. May have to reduce  warfarin dosage in half while taking.  Dispense: 20 Tablet; Refill: 54                11. Hypovitaminosis D  Good control continue current regimen  - Cholecalciferol (VITAMIN D) 50 MCG (2000 UT) Cap; Take 1 Capsule by mouth every day.  Dispense: 100 Capsule; Refill: 3    12. Chronic pain syndrome- right leg from post phlebitic syn; on percocet once a day  Good control continue current regimen  - oxyCODONE-acetaminophen (PERCOCET) 5-325 MG Tab; Take 1 Tablet by mouth every 6 hours as needed for Severe Pain for up to 90 days.  Dispense: 120 Tablet; Refill: 0  - Consent for Opiate Prescription    13. Post-phlebitic syndrome- RIGHT LEG  Good control continue current regimen  - oxyCODONE-acetaminophen (PERCOCET) 5-325 MG Tab; Take 1 Tablet by mouth every 6 hours as needed for Severe Pain for up to 90 days.  Dispense: 120 Tablet; Refill: 0  - Consent for Opiate Prescription    14. Screening for colorectal cancer     - Referral to GI for Colonoscopy  - COLOGUARD (FIT DNA)

## 2023-05-22 ENCOUNTER — APPOINTMENT (OUTPATIENT)
Dept: MEDICAL GROUP | Facility: MEDICAL CENTER | Age: 73
End: 2023-05-22
Payer: MEDICARE

## 2023-05-28 NOTE — PROGRESS NOTES
Anticoagulation Summary as of 2/24/2017     INR goal 2.0-3.0   Selected INR 1.8! (2/24/2017)   Maintenance plan 10 mg (5 mg x 2) on Mon, Thu; 5 mg (5 mg x 1) all other days   Weekly total 45 mg   Plan last modified Ganesh Mao, PHARMD (2/24/2017)   Next INR check 3/10/2017   Target end date Indefinite    Indications   Post-phlebitic syndrome- RIGHT LEG [I87.009]  History of pulmonary embolism [Z86.711]  Anticoagulated on warfarin [Z79.01]  Factor 5 Leiden mutation  heterozygous (CMS-HCC) [D68.51]         Anticoagulation Episode Summary     INR check location     Preferred lab     Send INR reminders to     Comments       Anticoagulation Care Providers     Provider Role Specialty Phone number    Keron Garcia M.D. Referring Internal Medicine [phone]    [facility] Responsible  [phone]    Ganesh Mao, PHARMD Responsible          Anticoagulation Patient Findings   Left voicemail message to report a SUB therapeutic INR of 1.8.  Pt to bolus and increase regimen by 12.5%.  Requested pt contact the clinic for any s/s of unusual bleeding, bruising, clotting or any changes to diet or medication. FU 2 weeks.    Ganesh Mao, MARSHALLD      11

## 2023-06-01 ENCOUNTER — ANTICOAGULATION VISIT (OUTPATIENT)
Dept: MEDICAL GROUP | Facility: MEDICAL CENTER | Age: 73
End: 2023-06-01
Payer: MEDICARE

## 2023-06-01 DIAGNOSIS — Z79.01 ANTICOAGULATED ON WARFARIN: ICD-10-CM

## 2023-06-01 DIAGNOSIS — Z79.01 LONG TERM (CURRENT) USE OF ANTICOAGULANTS: ICD-10-CM

## 2023-06-01 LAB — INR PPP: 2.2 (ref 2–3.5)

## 2023-06-01 PROCEDURE — 85610 PROTHROMBIN TIME: CPT | Performed by: STUDENT IN AN ORGANIZED HEALTH CARE EDUCATION/TRAINING PROGRAM

## 2023-06-01 PROCEDURE — 93793 ANTICOAG MGMT PT WARFARIN: CPT | Performed by: STUDENT IN AN ORGANIZED HEALTH CARE EDUCATION/TRAINING PROGRAM

## 2023-06-01 NOTE — PROGRESS NOTES
OP Anticoagulation Service Note    Date: 2023    Anticoagulation Summary  As of 2023      INR goal:  2.0-3.0   TTR:  58.3 % (3.8 y)   INR used for dosin.20 (2023)   Warfarin maintenance plan:  7.5 mg (5 mg x 1.5) every Tue, Sat; 5 mg (5 mg x 1) all other days   Weekly warfarin total:  40 mg   Plan last modified:  Ganesh Mao, PharmD (2022)   Next INR check:  2023   Target end date:  Indefinite    Indications    Anticoagulated on warfarin [Z79.01]  Long term (current) use of anticoagulants [Z79.01]  DVT (deep venous thrombosis) (HCC) (Resolved) [I82.409]                 Anticoagulation Episode Summary       INR check location:  Anticoagulation Clinic    Preferred lab:      Send INR reminders to:      Comments:            Anticoagulation Care Providers       Provider Role Specialty Phone number    [facility]   [phone]    Keron Garcia M.D.  Internal Medicine [phone]          Anticoagulation Patient Findings  Patient Findings       Negatives:  Signs/symptoms of thrombosis, Signs/symptoms of bleeding, Laboratory test error suspected, Change in health, Change in alcohol use, Change in activity, Upcoming invasive procedure, Emergency department visit, Upcoming dental procedure, Missed doses, Extra doses, Change in medications, Change in diet/appetite, Hospital admission, Bruising, Other complaints            HPI:   Tavares Bonilla is in the Anticoagulation Clinic today for an INR check on their anticoagulation therapy.     The reason for today's visit is to prevent morbidity and mortality from a blood clot and/or stroke and to reduce the risk of bleeding while on a anticoagulant.     PCP:  Keron Garcia M.D.  [id number]  [place] [id number] [phone]    3 vitals included with today's appt-unless patient declined:  (BP, HR, weight, ht, RR)   There were no vitals filed for this visit.    Verified current warfarin dosing schedule.    Medications  reconciled   Pt is not on antiplatelet therapy    Assessment:   INR  therapeutic.     Plan:  Instructed pt to continue on with current regimen.    Follow up:  Follow up appointment in 12 week(s).       Other info:  Pt educated to contact our clinic with any changes in medications or s/s of bleeding or thrombosis.  Education was provided today regarding tips to reduce their bleed risk and dietary constraints while on a anticoagulant.    National Recommendations:  The CHEST guidelines recommends frequent INR monitoring at regular intervals (a few days up to a max of 12 weeks) to ensure patients are on the proper dose of warfarin, and patients are not having any complications from therapy.  INRs can dramatically change over a short time period due to diet, medications, and medical conditions.     Valente Clinton, PharmD, BCACP    [facility]  Phone [phone] fax [phone]

## 2023-06-15 ENCOUNTER — OFFICE VISIT (OUTPATIENT)
Dept: MEDICAL GROUP | Age: 73
End: 2023-06-15
Payer: MEDICARE

## 2023-06-15 VITALS
WEIGHT: 238 LBS | HEART RATE: 77 BPM | SYSTOLIC BLOOD PRESSURE: 120 MMHG | OXYGEN SATURATION: 95 % | HEIGHT: 72 IN | DIASTOLIC BLOOD PRESSURE: 78 MMHG | BODY MASS INDEX: 32.23 KG/M2 | TEMPERATURE: 97.6 F

## 2023-06-15 DIAGNOSIS — F11.20 UNCOMPLICATED OPIOID DEPENDENCE (HCC): ICD-10-CM

## 2023-06-15 DIAGNOSIS — G89.4 CHRONIC PAIN SYNDROME: ICD-10-CM

## 2023-06-15 DIAGNOSIS — Z12.12 SCREENING FOR COLORECTAL CANCER: ICD-10-CM

## 2023-06-15 DIAGNOSIS — D68.51 FACTOR 5 LEIDEN MUTATION, HETEROZYGOUS (HCC): ICD-10-CM

## 2023-06-15 DIAGNOSIS — H57.9 EYE PROBLEM: ICD-10-CM

## 2023-06-15 DIAGNOSIS — R73.01 IFG (IMPAIRED FASTING GLUCOSE): ICD-10-CM

## 2023-06-15 DIAGNOSIS — E78.2 MIXED HYPERLIPIDEMIA: ICD-10-CM

## 2023-06-15 DIAGNOSIS — Z00.00 MEDICARE ANNUAL WELLNESS VISIT, SUBSEQUENT: ICD-10-CM

## 2023-06-15 DIAGNOSIS — E55.9 HYPOVITAMINOSIS D: ICD-10-CM

## 2023-06-15 DIAGNOSIS — N18.31 STAGE 3A CHRONIC KIDNEY DISEASE: ICD-10-CM

## 2023-06-15 DIAGNOSIS — N40.0 BPH WITHOUT URINARY OBSTRUCTION: ICD-10-CM

## 2023-06-15 DIAGNOSIS — Z12.11 SCREENING FOR COLORECTAL CANCER: ICD-10-CM

## 2023-06-15 DIAGNOSIS — I87.009 POST-PHLEBITIC SYNDROME: ICD-10-CM

## 2023-06-15 PROCEDURE — 3078F DIAST BP <80 MM HG: CPT | Performed by: INTERNAL MEDICINE

## 2023-06-15 PROCEDURE — G0439 PPPS, SUBSEQ VISIT: HCPCS | Performed by: INTERNAL MEDICINE

## 2023-06-15 PROCEDURE — 99214 OFFICE O/P EST MOD 30 MIN: CPT | Mod: 25 | Performed by: INTERNAL MEDICINE

## 2023-06-15 PROCEDURE — 3074F SYST BP LT 130 MM HG: CPT | Performed by: INTERNAL MEDICINE

## 2023-06-15 RX ORDER — FENOFIBRATE 160 MG/1
160 TABLET ORAL DAILY
Qty: 90 TABLET | Refills: 3 | Status: SHIPPED | OUTPATIENT
Start: 2023-06-15

## 2023-06-15 RX ORDER — MULTIVIT-MIN/IRON/FOLIC ACID/K 18-600-40
2000 CAPSULE ORAL DAILY
Qty: 100 CAPSULE | Refills: 3 | Status: SHIPPED | OUTPATIENT
Start: 2023-06-15

## 2023-06-15 RX ORDER — TAMSULOSIN HYDROCHLORIDE 0.4 MG/1
CAPSULE ORAL
COMMUNITY

## 2023-06-15 RX ORDER — OXYCODONE HYDROCHLORIDE AND ACETAMINOPHEN 5; 325 MG/1; MG/1
1 TABLET ORAL EVERY 6 HOURS PRN
Qty: 120 TABLET | Refills: 0 | Status: SHIPPED | OUTPATIENT
Start: 2023-06-15 | End: 2023-09-13

## 2023-06-15 ASSESSMENT — PATIENT HEALTH QUESTIONNAIRE - PHQ9: CLINICAL INTERPRETATION OF PHQ2 SCORE: 0

## 2023-06-15 ASSESSMENT — ENCOUNTER SYMPTOMS: GENERAL WELL-BEING: GOOD

## 2023-06-15 ASSESSMENT — ACTIVITIES OF DAILY LIVING (ADL): BATHING_REQUIRES_ASSISTANCE: 0

## 2023-06-15 ASSESSMENT — FIBROSIS 4 INDEX: FIB4 SCORE: 1.47

## 2023-06-15 NOTE — PROGRESS NOTES
Chief Complaint   Patient presents with    Annual Exam     AWV       HPI:  Derick is a 72 y.o. here for Medicare Annual Wellness Visit        Patient Active Problem List    Diagnosis Date Noted    Stage 3a chronic kidney disease (HCC) 03/07/2023    Acute cystitis without hematuria 03/07/2023    Obesity due to excess calories with serious comorbidity 09/07/2022    Using prophylactic antibiotic on daily basis- keflex for recurrent uti form self cath daily/neurogenic bladder 11/03/2020    Long term (current) use of anticoagulants 08/08/2019    Obstructive uropathy 04/04/2019    Neurogenic bladder- self cath since 5/2018 04/04/2019    Primary osteoarthritis of right knee 04/04/2019    Post-phlebitic dermatosis of right lower extremity 04/04/2019    Benign prostatic hyperplasia with incomplete bladder emptying- self cath since 5/2018 ing hernia repair 08/24/2018    Atrophy of right kidney- urology nv 08/24/2018    Uncomplicated opioid dependence (HCC) 05/18/2018    Post-phlebitic syndrome- RIGHT LEG 05/22/2015    Chronic venous hypertension due to DVT 11/17/2014    Anticoagulated on warfarin 11/17/2014    Mixed hyperlipidemia  11/17/2014    Chronic pain syndrome- right leg from post phlebitic syn; on percocet once a day 03/12/2014    Hypovitaminosis D 03/26/2012    History of pulmonary embolism 01/24/2012    Factor 5 Leiden mutation, heterozygous ([facility]) 01/24/2012    Gastroesophageal reflux disease without esophagitis 01/24/2012       Current Outpatient Medications   Medication Sig Dispense Refill    fenofibrate (TRIGLIDE) 160 MG tablet Take 1 Tablet by mouth every day. (use generic fenofibrate only) 90 Tablet 3    Cholecalciferol (VITAMIN D) 50 MCG (2000 UT) Cap Take 1 Capsule by mouth every day. 100 Capsule 3    oxyCODONE-acetaminophen (PERCOCET) 5-325 MG Tab Take 1 Tablet by mouth every 6 hours as needed for Severe Pain for up to 90 days. 120 Tablet 0    ciprofloxacin (CIPRO) 500 MG Tab Take 1 Tablet by mouth 2 times a  day. As needed for recurrent prostate or urinary tract infections. May have to reduce  warfarin dosage in half while taking. 20 Tablet 54    polyethylene glycol 3350 (MIRALAX) 17 GM/SCOOP Powder Take 17 g by mouth as needed (constipation). 500 g prn    warfarin (COUMADIN) 5 MG Tab TAKE 1 TO 1 & 1/2 (ONE & ONE-HALF) TABLETS BY MOUTH ONCE DAILY OR  AS  DIRECTED  BY  [facility]  ANTICOAGULATION  CLINIC (Patient taking differently: Take 5-7.5 mg by mouth every day. TAKE 1 TO 1 & 1/2 (ONE & ONE-HALF) TABLETS BY MOUTH ONCE DAILY OR  AS  DIRECTED  BY  [facility]  ANTICOAGULATION  CLINIC  Tuesdays 7.5 mg  and 5 mg qd) 135 Tablet 1    tamsulosin (FLOMAX) 0.4 MG capsule tamsulosin 0.4 mg capsule   Take 1 capsule by mouth once daily at bedtime       No current facility-administered medications for this visit.        Patient is taking medications as noted in medication list.  Current supplements as per medication list.     Allergies: Other food, Shellfish allergy, Lovastatin, and Statins [hmg-coa-r inhibitors]    Current social contact/activities:  family    Is patient current with immunizations? Yes.    He  reports that he has quit smoking. He has never used smokeless tobacco. He reports that he does not currently use alcohol. He reports that he does not use drugs.  Counseling given: Not Answered      ROS:    Gait: Uses no assistive device   Ostomy: No   Other tubes: No   Amputations: No   Chronic oxygen use No   Last eye exam  unknown   Wears hearing aids: No   : Denies any urinary leakage during the last 6 months    Screening:    Depression Screening  Little interest or pleasure in doing things?  0 - not at all  Feeling down, depressed, or hopeless? 0 - not at all  Patient Health Questionnaire Score: 0    If depressive symptoms identified deferred to follow up visit unless specifically addressed in assessment and plan.    Interpretation of PHQ-9 Total Score   Score Severity   1-4 No Depression   5-9 Mild Depression   10-14  Moderate Depression   15-19 Moderately Severe Depression   20-27 Severe Depression    Screening for Cognitive Impairment  Three Minute Recall (daughter, heaven, mountain)  3/3    Jorge clock face with all 12 numbers and set the hands to show 10 past 11.  Yes    If cognitive concerns identified, deferred for follow up unless specifically addressed in assessment and plan.    Fall Risk Assessment  Has the patient had two or more falls in the last year or any fall with injury in the last year?  No  If fall risk identified, deferred for follow up unless specifically addressed in assessment and plan.    Safety Assessment  Throw rugs on floor.  Yes  Handrails on all stairs.  Yes  Good lighting in all hallways.  Yes  Difficulty hearing.  No  Patient counseled about all safety risks that were identified.    Functional Assessment ADLs  Are there any barriers preventing you from cooking for yourself or meeting nutritional needs?  No.    Are there any barriers preventing you from driving safely or obtaining transportation?  No.    Are there any barriers preventing you from using a telephone or calling for help?  No.    Are there any barriers preventing you from shopping?  No.    Are there any barriers preventing you from taking care of your own finances?  No.    Are there any barriers preventing you from managing your medications?  No.    Are there any barriers preventing you from showering, bathing or dressing yourself?  No.    Are you currently engaging in any exercise or physical activity?  Yes.     What is your perception of your health?  Good.    Advance Care Planning  Do you have an Advance Directive, Living Will, Durable Power of , or POLST? No               Health Maintenance Summary            Overdue - COVID-19 Vaccine (1) Overdue - never done      No completion history exists for this topic.              Ordered - COLORECTAL CANCER SCREENING (COLONOSCOPY - Every 10 Years) Ordered on 6/15/2023      03/08/2012   AMB REFERRAL TO GI FOR COLONOSCOPY    2012  OCCULT BLOOD FECES IMMUNOASSAY              Annual Wellness Visit (Every 366 Days) Next due on 6/15/2024      06/15/2023  Level of Service: ANNUAL WELLNESS VISIT-INCLUDES PPPS SUBSEQUE*    06/15/2023  Visit Dx: Medicare annual wellness visit, subsequent              IMM DTaP/Tdap/Td Vaccine (2 - Td or Tdap) Next due on 10/31/2026      10/31/2016  Imm Admin: Tdap Vaccine              IMM PNEUMOCOCCAL VACCINE: 65+ Years (Series Information) Completed      10/13/2017  Imm Admin: Pneumococcal polysaccharide vaccine (PPSV-23)    2014  Imm Admin: Pneumococcal Conjugate Vaccine (Prevnar/PCV-13)    2014  Imm Admin: Pneumococcal polysaccharide vaccine (PPSV-23)    02/10/2009  Imm Admin: Pneumococcal Vaccine (UF) - HISTORICAL DATA    02/10/2009  Imm Admin: Pneumococcal polysaccharide vaccine (PPSV-23)              ABDOMINAL AORTIC ANEURYSM (AAA) SCREEN  Completed      2020  CT-ABDOMEN-PELVIS WITH              IMM ZOSTER VACCINES (Series Information) Completed      10/03/2020  Imm Admin: Zoster Vaccine Recombinant (RZV) (SHINGRIX)    2020  Imm Admin: Zoster Vaccine Recombinant (RZV) (SHINGRIX)    2020  Imm Admin: Zoster Vaccine Recombinant (RZV) (SHINGRIX)    10/31/2016  Imm Admin: Varicella Vaccine Live    2014  Imm Admin: Zoster Vaccine Live (ZVL) (Zostavax) - HISTORICAL DATA              HEPATITIS C SCREENING  Completed      2022  HCV Scrn ( 0465-2204 1xLife)              IMM INFLUENZA (Series Information) Completed      10/27/2022  Imm Admin: Influenza Vaccine Adult HD    2021  Imm Admin: Influenza Vaccine Adult HD    2020  Imm Admin: Influenza Vaccine Adult HD    2020  Imm Admin: Influenza Vaccine Adult HD    10/18/2018  Imm Admin: Influenza Vaccine Adult HD    Only the first 5 history entries have been loaded, but more history exists.              HPV Vaccines (Series Information) Aged Out      No completion  history exists for this topic.              IMM MENINGOCOCCAL ACWY VACCINE (Series Information) Aged Out      No completion history exists for this topic.              Discontinued - IMM HEP B VACCINE  Discontinued      No completion history exists for this topic.                    Patient Care Team:  Keron Garcia M.D. as PCP - General  Keron Garcia M.D.  [facility]) as Consulting Physician  [facility] as Pharmacist    Social History     Tobacco Use    Smoking status: Former     Packs/day: 0.00     Types: Cigarettes    Smokeless tobacco: Never   Vaping Use    Vaping Use: Never used   Substance Use Topics    Alcohol use: Not Currently     Alcohol/week: 0.0 - 0.6 oz     Comment: once a month/ rare    Drug use: No     History reviewed. No pertinent family history.  He  has a past medical history of Blood clotting disorder (HCC) (2012), BPH (benign prostatic hyperplasia), Chronic pain, Factor V deficiency (HCC), Neoplasm of uncertain behavior of skin of face (04/04/2019), Obstructive uropathy (04/04/2019), Right inguinal hernia- repair tbd 1/25/2023 dr sandoval (12/7/2022), Stage 3 chronic kidney disease (HCC), and Urinary bladder disorder.   Past Surgical History:   Procedure Laterality Date    INGUINAL HERNIA REPAIR ROBOTIC XI Right 1/24/2023    Procedure: ROBOTIC RIGHT INGUINAL HERNIA REPAIR;  Surgeon: Moi Sandoval M.D.;  Location: SURGERY [facility];  Service: Gen Robotic    INGUINAL HERNIA REPAIR ROBOTIC Left 12/18/2017    Procedure: INGUINAL HERNIA REPAIR ROBOTIC- WITH MESH PLACEMENT;  Surgeon: Rasta Reyes M.D.;  Location: [facility];  Service: General    COLONOSCOPY  2013       Exam:   /78 (BP Location: Right arm, Patient Position: Sitting, BP Cuff Size: Adult)   Pulse 77   Temp 36.4 °C (97.6 °F) (Temporal)   Ht 1.829 m (6')   Wt 108 kg (238 lb)   SpO2 95%  Body mass index is 32.28 kg/m².    Hearing good.    Dentition  good  Alert, oriented in no acute distress.  Eye contact is good, speech goal directed, affect calm      Assessment and Plan. The following treatment and monitoring plan is recommended:    1. Medicare annual wellness visit, subsequent  All metrics reviewed and up-to-date except for COVID vaccination which she continues to refuse.  Does need colonoscopy screening for colon cancer.  Ordered.    2. Screening for colorectal cancer  As above  - Referral to GI for Colonoscopy    3. Mixed hyperlipidemia   Good control continue current regimen  - CBC WITH DIFFERENTIAL; Future  - TSH; Future  - Comp Metabolic Panel; Future  - Lipid Profile; Future  - fenofibrate (TRIGLIDE) 160 MG tablet; Take 1 Tablet by mouth every day. (use generic fenofibrate only)  Dispense: 90 Tablet; Refill: 3    4. Factor 5 Leiden mutation, heterozygous (HCC)  Good control continue current regimen with chronic anticoagulation due to his prior history of DVT currently.  Warfarin 5 mg daily per anticoagulation clinic or per their directions.    5. Stage 3a chronic kidney disease (HCC)       Good control stable.  Continue surveillance and avoidance of NSAIDs and continue with good hydration.  - Comp Metabolic Panel; Future    6. Hypovitaminosis D     - VITAMIN D,25 HYDROXY (DEFICIENCY); Future  - Cholecalciferol (VITAMIN D) 50 MCG (2000 UT) Cap; Take 1 Capsule by mouth every day.  Dispense: 100 Capsule; Refill: 3    7. BPH without urinary obstruction  Continue surveillance with PSA.  No dysuria frequency urgency or significant nocturia or blood in the urine.  - PROSTATE SPECIFIC AG DIAGNOSTIC; Future    8. IFG (impaired fasting glucose)  Mediterranean diet and exercise.  - HEMOGLOBIN A1C; Future    9. Uncomplicated opioid dependence (HCC)     - oxyCODONE-acetaminophen (PERCOCET) 5-325 MG Tab; Take 1 Tablet by mouth every 6 hours as needed for Severe Pain for up to 90 days.  Dispense: 120 Tablet; Refill: 0  - Consent for Opiate Prescription    10. Chronic  pain syndrome- right leg from post phlebitic syn; on percocet once a day     - oxyCODONE-acetaminophen (PERCOCET) 5-325 MG Tab; Take 1 Tablet by mouth every 6 hours as needed for Severe Pain for up to 90 days.  Dispense: 120 Tablet; Refill: 0  - Consent for Opiate Prescription    11. Post-phlebitic syndrome- RIGHT LEG  Good control no recurrent DVT.  Continue anticoagulation exercise like compression and elevation  - oxyCODONE-acetaminophen (PERCOCET) 5-325 MG Tab; Take 1 Tablet by mouth every 6 hours as needed for Severe Pain for up to 90 days.  Dispense: 120 Tablet; Refill: 0  - Consent for Opiate Prescription    12. Eye problem-is a new problem with some mild impairment of vision in both eyes.  Will refer to ophthalmology to rule out glaucoma or retinal disorder or cataracts     - Referral to Ophthalmology        Services suggested: No services needed at this time  Health Care Screening recommendations as per orders if indicated.  Referrals offered: PT/OT/Nutrition counseling/Behavioral Health/Smoking cessation as per orders if indicated.    Discussion today about general wellness and lifestyle habits:    Prevent falls and reduce trip hazards; Cautioned about securing or removing rugs.  Have a working fire alarm and carbon monoxide detector;   Engage in regular physical activity and social activities     Follow-up: No follow-ups on file.

## 2023-06-15 NOTE — PROGRESS NOTES
Chief Complaint   Patient presents with    Annual Exam     AWV       HPI:  Derick is a 72 y.o. here for Medicare Annual Wellness Visit        Patient Active Problem List    Diagnosis Date Noted    Stage 3a chronic kidney disease (HCC) 03/07/2023    Acute cystitis without hematuria 03/07/2023    Obesity due to excess calories with serious comorbidity 09/07/2022    Using prophylactic antibiotic on daily basis- keflex for recurrent uti form self cath daily/neurogenic bladder 11/03/2020    Long term (current) use of anticoagulants 08/08/2019    Obstructive uropathy 04/04/2019    Neurogenic bladder- self cath since 5/2018 04/04/2019    Primary osteoarthritis of right knee 04/04/2019    Post-phlebitic dermatosis of right lower extremity 04/04/2019    Benign prostatic hyperplasia with incomplete bladder emptying- self cath since 5/2018 ing hernia repair 08/24/2018    Atrophy of right kidney- urology nv 08/24/2018    Uncomplicated opioid dependence (HCC) 05/18/2018    Post-phlebitic syndrome- RIGHT LEG 05/22/2015    Chronic venous hypertension due to DVT 11/17/2014    Anticoagulated on warfarin 11/17/2014    Mixed hyperlipidemia  11/17/2014    Chronic pain syndrome- right leg from post phlebitic syn; on percocet once a day 03/12/2014    Hypovitaminosis D 03/26/2012    History of pulmonary embolism 01/24/2012    Factor 5 Leiden mutation, heterozygous ([facility]) 01/24/2012    Gastroesophageal reflux disease without esophagitis 01/24/2012       Current Outpatient Medications   Medication Sig Dispense Refill    ciprofloxacin (CIPRO) 500 MG Tab Take 1 Tablet by mouth 2 times a day. As needed for recurrent prostate or urinary tract infections. May have to reduce  warfarin dosage in half while taking. 20 Tablet 54    fenofibrate (TRIGLIDE) 160 MG tablet Take 1 Tablet by mouth every day. (use generic fenofibrate only) 90 Tablet 3    Cholecalciferol (VITAMIN D) 50 MCG (2000 UT) Cap Take 1 Capsule by mouth every day. 100 Capsule 3     polyethylene glycol 3350 (MIRALAX) 17 GM/SCOOP Powder Take 17 g by mouth as needed (constipation). 500 g prn    warfarin (COUMADIN) 5 MG Tab TAKE 1 TO 1 & 1/2 (ONE & ONE-HALF) TABLETS BY MOUTH ONCE DAILY OR  AS  DIRECTED  BY  [facility]  ANTICOAGULATION  CLINIC (Patient taking differently: Take 5-7.5 mg by mouth every day. TAKE 1 TO 1 & 1/2 (ONE & ONE-HALF) TABLETS BY MOUTH ONCE DAILY OR  AS  DIRECTED  BY  [facility]  ANTICOAGULATION  CLINIC  Tuesdays 7.5 mg  and 5 mg qd) 135 Tablet 1     No current facility-administered medications for this visit.        Patient is taking medications as noted in medication list.  Current supplements as per medication list.     Allergies: Other food, Shellfish allergy, Lovastatin, and Statins [hmg-coa-r inhibitors]    Current social contact/activities:  family    Is patient current with immunizations? Yes.    He  reports that he has quit smoking. He has never used smokeless tobacco. He reports that he does not currently use alcohol. He reports that he does not use drugs.  Counseling given: Not Answered      ROS:    Gait: Uses no assistive device   Ostomy: No   Other tubes: No   Amputations: No   Chronic oxygen use No   Last eye exam  unknown   Wears hearing aids: No   : Denies any urinary leakage during the last 6 months    Screening:    Depression Screening  Little interest or pleasure in doing things?  0 - not at all  Feeling down, depressed, or hopeless? 0 - not at all  Patient Health Questionnaire Score: 0    If depressive symptoms identified deferred to follow up visit unless specifically addressed in assessment and plan.    Interpretation of PHQ-9 Total Score   Score Severity   1-4 No Depression   5-9 Mild Depression   10-14 Moderate Depression   15-19 Moderately Severe Depression   20-27 Severe Depression    Screening for Cognitive Impairment  Three Minute Recall (daughter, heaven, mountain)  3/3    Jorge clock face with all 12 numbers and set the hands to show 10 past 11.  Yes     If cognitive concerns identified, deferred for follow up unless specifically addressed in assessment and plan.    Fall Risk Assessment  Has the patient had two or more falls in the last year or any fall with injury in the last year?  No  If fall risk identified, deferred for follow up unless specifically addressed in assessment and plan.    Safety Assessment  Throw rugs on floor.  Yes  Handrails on all stairs.  Yes  Good lighting in all hallways.  Yes  Difficulty hearing.  No  Patient counseled about all safety risks that were identified.    Functional Assessment ADLs  Are there any barriers preventing you from cooking for yourself or meeting nutritional needs?  No.    Are there any barriers preventing you from driving safely or obtaining transportation?  No.    Are there any barriers preventing you from using a telephone or calling for help?  No.    Are there any barriers preventing you from shopping?  No.    Are there any barriers preventing you from taking care of your own finances?  No.    Are there any barriers preventing you from managing your medications?  No.    Are there any barriers preventing you from showering, bathing or dressing yourself?  No.    Are you currently engaging in any exercise or physical activity?  Yes.     What is your perception of your health?  Good.    Advance Care Planning  Do you have an Advance Directive, Living Will, Durable Power of , or POLST? No               Health Maintenance Summary            Overdue - Annual Wellness Visit (Every 366 Days) Overdue - never done      No completion history exists for this topic.              Overdue - COVID-19 Vaccine (1) Overdue - never done      No completion history exists for this topic.              Ordered - COLORECTAL CANCER SCREENING (COLONOSCOPY - Every 10 Years) Ordered on 3/7/2023      03/08/2012  AMB REFERRAL TO GI FOR COLONOSCOPY    01/30/2012  OCCULT BLOOD FECES IMMUNOASSAY              IMM DTaP/Tdap/Td Vaccine (2 - Td  or Tdap) Next due on 10/31/2026      10/31/2016  Imm Admin: Tdap Vaccine              IMM PNEUMOCOCCAL VACCINE: 65+ Years (Series Information) Completed      10/13/2017  Imm Admin: Pneumococcal polysaccharide vaccine (PPSV-23)    2014  Imm Admin: Pneumococcal Conjugate Vaccine (Prevnar/PCV-13)    2014  Imm Admin: Pneumococcal polysaccharide vaccine (PPSV-23)    02/10/2009  Imm Admin: Pneumococcal Vaccine (UF) - HISTORICAL DATA    02/10/2009  Imm Admin: Pneumococcal polysaccharide vaccine (PPSV-23)              ABDOMINAL AORTIC ANEURYSM (AAA) SCREEN  Completed      2020  CT-ABDOMEN-PELVIS WITH              IMM ZOSTER VACCINES (Series Information) Completed      10/03/2020  Imm Admin: Zoster Vaccine Recombinant (RZV) (SHINGRIX)    2020  Imm Admin: Zoster Vaccine Recombinant (RZV) (SHINGRIX)    2020  Imm Admin: Zoster Vaccine Recombinant (RZV) (SHINGRIX)    10/31/2016  Imm Admin: Varicella Vaccine Live    2014  Imm Admin: Zoster Vaccine Live (ZVL) (Zostavax) - HISTORICAL DATA              HEPATITIS C SCREENING  Completed      2022  HCV Scrn ( [id number] 1xLife)              IMM INFLUENZA (Series Information) Completed      10/27/2022  Imm Admin: Influenza Vaccine Adult HD    2021  Imm Admin: Influenza Vaccine Adult HD    2020  Imm Admin: Influenza Vaccine Adult HD    2020  Imm Admin: Influenza Vaccine Adult HD    10/18/2018  Imm Admin: Influenza Vaccine Adult HD    Only the first 5 history entries have been loaded, but more history exists.              HPV Vaccines (Series Information) Aged Out      No completion history exists for this topic.              IMM MENINGOCOCCAL ACWY VACCINE (Series Information) Aged Out      No completion history exists for this topic.              Discontinued - IMM HEP B VACCINE  Discontinued      No completion history exists for this topic.                    Patient Care Team:  Keron Garcia M.D. as PCP - [facility]  TYREE Garcia M.D.  [facility] as Consulting Physician  [facility] as Pharmacist    Social History     Tobacco Use    Smoking status: Former     Packs/day: 0.00     Types: Cigarettes    Smokeless tobacco: Never   Vaping Use    Vaping Use: Never used   Substance Use Topics    Alcohol use: Not Currently     Alcohol/week: 0.0 - 0.6 oz     Comment: once a month/ rare    Drug use: No     History reviewed. No pertinent family history.  He  has a past medical history of Blood clotting disorder (HCC) (2012), BPH (benign prostatic hyperplasia), Chronic pain, Factor V deficiency (HCC), Neoplasm of uncertain behavior of skin of face (04/04/2019), Obstructive uropathy (04/04/2019), Right inguinal hernia- repair tbd 1/25/2023 dr sandoval (12/7/2022), Stage 3 chronic kidney disease (HCC), and Urinary bladder disorder.   Past Surgical History:   Procedure Laterality Date    INGUINAL HERNIA REPAIR ROBOTIC XI Right 1/24/2023    Procedure: ROBOTIC RIGHT INGUINAL HERNIA REPAIR;  Surgeon: Moi Sandoval M.D.;  [facility];  Service: Gen Robotic    INGUINAL HERNIA REPAIR ROBOTIC Left 12/18/2017    Procedure: INGUINAL HERNIA REPAIR ROBOTIC- WITH MESH PLACEMENT;  Surgeon: Rasta Reyes M.D.;  Location: [facility];  Service: General    COLONOSCOPY  2013       Exam:   Ht 1.829 m (6')   Wt 108 kg (238 lb)  Body mass index is 32.28 kg/m².    Hearing good.    Dentition good  Alert, oriented in no acute distress.  Eye contact is good, speech goal directed, affect calm      Assessment and Plan. The following treatment and monitoring plan is recommended:    1. Medicare annual wellness visit, subsequent  All metrics reviewed and up-to-date except for COVID vaccination which she continues to refuse.  Does need colonoscopy screening for colon cancer.  Ordered.    2. Screening for colorectal cancer  As above  - Referral to GI for Colonoscopy    3. Mixed hyperlipidemia    Good control continue current regimen  - CBC WITH DIFFERENTIAL; Future  - TSH; Future  - Comp Metabolic Panel; Future  - Lipid Profile; Future  - fenofibrate (TRIGLIDE) 160 MG tablet; Take 1 Tablet by mouth every day. (use generic fenofibrate only)  Dispense: 90 Tablet; Refill: 3    4. Factor 5 Leiden mutation, heterozygous (HCC)  Good control continue current regimen with chronic anticoagulation due to his prior history of DVT currently.  Warfarin 5 mg daily per anticoagulation clinic or per their directions.    5. Stage 3a chronic kidney disease (HCC)       Good control stable.  Continue surveillance and avoidance of NSAIDs and continue with good hydration.  - Comp Metabolic Panel; Future    6. Hypovitaminosis D     - VITAMIN D,25 HYDROXY (DEFICIENCY); Future  - Cholecalciferol (VITAMIN D) 50 MCG (2000 UT) Cap; Take 1 Capsule by mouth every day.  Dispense: 100 Capsule; Refill: 3    7. BPH without urinary obstruction  Continue surveillance with PSA.  No dysuria frequency urgency or significant nocturia or blood in the urine.  - PROSTATE SPECIFIC AG DIAGNOSTIC; Future    8. IFG (impaired fasting glucose)  Mediterranean diet and exercise.  - HEMOGLOBIN A1C; Future    9. Uncomplicated opioid dependence (HCC)     - oxyCODONE-acetaminophen (PERCOCET) 5-325 MG Tab; Take 1 Tablet by mouth every 6 hours as needed for Severe Pain for up to 90 days.  Dispense: 120 Tablet; Refill: 0  - Consent for Opiate Prescription    10. Chronic pain syndrome- right leg from post phlebitic syn; on percocet once a day     - oxyCODONE-acetaminophen (PERCOCET) 5-325 MG Tab; Take 1 Tablet by mouth every 6 hours as needed for Severe Pain for up to 90 days.  Dispense: 120 Tablet; Refill: 0  - Consent for Opiate Prescription    11. Post-phlebitic syndrome- RIGHT LEG  Good control no recurrent DVT.  Continue anticoagulation exercise like compression and elevation  - oxyCODONE-acetaminophen (PERCOCET) 5-325 MG Tab; Take 1 Tablet by mouth every 6  hours as needed for Severe Pain for up to 90 days.  Dispense: 120 Tablet; Refill: 0  - Consent for Opiate Prescription    12. Eye problem-is a new problem with some mild impairment of vision in both eyes.  Will refer to ophthalmology to rule out glaucoma or retinal disorder or cataracts     - Referral to Ophthalmology        Services suggested: No services needed at this time  Health Care Screening recommendations as per orders if indicated.  Referrals offered: PT/OT/Nutrition counseling/Behavioral Health/Smoking cessation as per orders if indicated.    Discussion today about general wellness and lifestyle habits:    Prevent falls and reduce trip hazards; Cautioned about securing or removing rugs.  Have a working fire alarm and carbon monoxide detector;   Engage in regular physical activity and social activities     Follow-up: No follow-ups on file.

## 2023-11-08 ENCOUNTER — TELEPHONE (OUTPATIENT)
Dept: MEDICAL GROUP | Age: 73
End: 2023-11-08
Payer: MEDICARE

## 2023-11-08 NOTE — TELEPHONE ENCOUNTER
Phone Number Called: [phone] (home)       Call outcome: Did not leave a detailed message. Requested patient to call back.    Message:pt contacted office to schedule an appointment due to having to cancel to travel to [place] for his mother. LVM requesting call back

## 2023-12-11 ENCOUNTER — ANTICOAGULATION VISIT (OUTPATIENT)
Dept: MEDICAL GROUP | Facility: MEDICAL CENTER | Age: 73
End: 2023-12-11
Payer: MEDICARE

## 2023-12-11 DIAGNOSIS — Z79.01 ANTICOAGULATED ON WARFARIN: ICD-10-CM

## 2023-12-11 DIAGNOSIS — Z79.01 LONG TERM (CURRENT) USE OF ANTICOAGULANTS: ICD-10-CM

## 2023-12-11 LAB — INR PPP: 2.3 (ref 2–3.5)

## 2023-12-11 PROCEDURE — 85610 PROTHROMBIN TIME: CPT | Performed by: STUDENT IN AN ORGANIZED HEALTH CARE EDUCATION/TRAINING PROGRAM

## 2023-12-11 PROCEDURE — 93793 ANTICOAG MGMT PT WARFARIN: CPT | Performed by: STUDENT IN AN ORGANIZED HEALTH CARE EDUCATION/TRAINING PROGRAM

## 2023-12-11 NOTE — PROGRESS NOTES
Anticoagulation Summary  As of 2023      INR goal:  2.0-3.0   TTR:  63.4 % (4.3 y)   INR used for dosin.30 (2023)   Warfarin maintenance plan:  7.5 mg (5 mg x 1.5) every Tue, Sat; 5 mg (5 mg x 1) all other days   Weekly warfarin total:  40 mg   No change documented:  Lindsay Garcia PharmD   Plan last modified:  Ganesh Mao, PharmD (2022)   Next INR check:  3/4/2024   Target end date:  Indefinite    Indications    Anticoagulated on warfarin [Z79.01]  Long term (current) use of anticoagulants [Z79.01]  DVT (deep venous thrombosis) (HCC) (Resolved) [I82.409]                 Anticoagulation Episode Summary       INR check location:  Anticoagulation Clinic    Preferred lab:      Send INR reminders to:      Comments:            Anticoagulation Care Providers       Provider Role Specialty Phone number    [facility]   [phone]    Keron Garcia M.D.  Internal Medicine [phone]          Anticoagulation Patient Findings  Patient Findings       Negatives:  Signs/symptoms of thrombosis, Signs/symptoms of bleeding, Laboratory test error suspected, Change in health, Change in alcohol use, Change in activity, Upcoming invasive procedure, Emergency department visit, Upcoming dental procedure, Missed doses, Extra doses, Change in medications, Change in diet/appetite, Hospital admission, Bruising, Other complaints             HPI:   Tavares Bonilla seen in clinic today, on anticoagulation therapy with warfarin due to history of DVT.    Patient's previous INR was therapeutic at 2.2 on 23 at which time patient was instructed to continue regimen.  Patient returns to clinic today to recheck INR to ensure it is therapeutic and thus preventing possible clotting and/or bleeding/bruising complications. Of note, he is overdue for an INR.    CHADS-VASc = n/a    Does patient have any changes to current medical/health status since last appt: No  Does patient have any  signs/symptoms of bleeding and/or thrombosis since the last appt: No  Does patient have any interval changes to diet or medications since last appt: No  Are there any complications or cost restrictions with current therapy: No     Does patient have [facility] PCP? Yes, Keron Garcia M.D. (If not, please document discussion that patient must be seen at [facility])     Vitals:  There were no vitals filed for this visit.     Asssessment:      INR therapeutic at 2.3, therefore at decreased risk of bleeding and stroke  Reason(s) for out of range INR today: N/A      Pt is not on antiplatelet therapy    Medication reconciliation completed today.    Plan:  Continue regimen as listed above.     Follow up:  Because warfarin is a high risk medication and current CHEST guidelines recommend regular monitoring intervals (few days up to 12 weeks), will have patient return to clinic in 12 weeks to recheck INR.    Lindsay Garcia, PharmD, BCACP

## 2024-01-21 DIAGNOSIS — Z79.01 CHRONIC ANTICOAGULATION: ICD-10-CM

## 2024-01-22 RX ORDER — WARFARIN SODIUM 5 MG/1
TABLET ORAL
Qty: 135 TABLET | Refills: 1 | Status: SHIPPED | OUTPATIENT
Start: 2024-01-22

## 2024-01-31 DIAGNOSIS — Z79.01 CHRONIC ANTICOAGULATION: ICD-10-CM

## 2024-02-06 ENCOUNTER — OFFICE VISIT (OUTPATIENT)
Dept: MEDICAL GROUP | Age: 74
End: 2024-02-06
Payer: MEDICARE

## 2024-02-06 VITALS
HEIGHT: 72 IN | WEIGHT: 236 LBS | DIASTOLIC BLOOD PRESSURE: 80 MMHG | SYSTOLIC BLOOD PRESSURE: 120 MMHG | TEMPERATURE: 98.5 F | HEART RATE: 111 BPM | BODY MASS INDEX: 31.97 KG/M2 | OXYGEN SATURATION: 95 %

## 2024-02-06 DIAGNOSIS — F11.20 UNCOMPLICATED OPIOID DEPENDENCE (HCC): ICD-10-CM

## 2024-02-06 DIAGNOSIS — E55.9 HYPOVITAMINOSIS D: ICD-10-CM

## 2024-02-06 DIAGNOSIS — G89.4 CHRONIC PAIN SYNDROME: ICD-10-CM

## 2024-02-06 DIAGNOSIS — D68.51 FACTOR 5 LEIDEN MUTATION, HETEROZYGOUS (HCC): ICD-10-CM

## 2024-02-06 DIAGNOSIS — I87.009 POST-PHLEBITIC SYNDROME: ICD-10-CM

## 2024-02-06 DIAGNOSIS — E66.01 CLASS 3 SEVERE OBESITY DUE TO EXCESS CALORIES WITH SERIOUS COMORBIDITY IN ADULT, UNSPECIFIED BMI (HCC): ICD-10-CM

## 2024-02-06 DIAGNOSIS — Z12.12 SCREENING FOR COLORECTAL CANCER: ICD-10-CM

## 2024-02-06 DIAGNOSIS — N18.31 STAGE 3A CHRONIC KIDNEY DISEASE: ICD-10-CM

## 2024-02-06 DIAGNOSIS — Z12.11 SCREENING FOR COLORECTAL CANCER: ICD-10-CM

## 2024-02-06 PROCEDURE — 3074F SYST BP LT 130 MM HG: CPT | Performed by: INTERNAL MEDICINE

## 2024-02-06 PROCEDURE — 3079F DIAST BP 80-89 MM HG: CPT | Performed by: INTERNAL MEDICINE

## 2024-02-06 PROCEDURE — 99214 OFFICE O/P EST MOD 30 MIN: CPT | Performed by: INTERNAL MEDICINE

## 2024-02-06 RX ORDER — OXYCODONE HYDROCHLORIDE AND ACETAMINOPHEN 5; 325 MG/1; MG/1
1 TABLET ORAL EVERY 6 HOURS PRN
Qty: 120 TABLET | Refills: 0 | Status: SHIPPED | OUTPATIENT
Start: 2024-02-06 | End: 2024-05-06

## 2024-02-06 ASSESSMENT — ENCOUNTER SYMPTOMS
CONSTITUTIONAL NEGATIVE: 1
RESPIRATORY NEGATIVE: 1
CARDIOVASCULAR NEGATIVE: 1
GASTROINTESTINAL NEGATIVE: 1
NEUROLOGICAL NEGATIVE: 1
PSYCHIATRIC NEGATIVE: 1
EYES NEGATIVE: 1
MUSCULOSKELETAL NEGATIVE: 1

## 2024-02-06 ASSESSMENT — FIBROSIS 4 INDEX: FIB4 SCORE: 1.49

## 2024-02-06 ASSESSMENT — PATIENT HEALTH QUESTIONNAIRE - PHQ9: CLINICAL INTERPRETATION OF PHQ2 SCORE: 0

## 2024-02-06 NOTE — PROGRESS NOTES
Subjective     Derick Bonilla is a 73 y.o. male who presents with Follow-Up (6 month medication refill referral GI )  And  The patient is here for followup of chronic medical problems listed below. The patient is compliant with medications and having no side effects from them. Denies chest pain, abdominal pain, dyspnea, myalgias, or cough.   Patient Active Problem List   Diagnosis    History of pulmonary embolism    Factor 5 Leiden mutation, heterozygous (HCC)    Gastroesophageal reflux disease without esophagitis    Hypovitaminosis D    Chronic pain syndrome- right leg from post phlebitic syn; on percocet once a day    Chronic venous hypertension due to DVT    Anticoagulated on warfarin    Mixed hyperlipidemia     Post-phlebitic syndrome- RIGHT LEG    Uncomplicated opioid dependence (HCC)    Benign prostatic hyperplasia with incomplete bladder emptying- self cath since 5/2018 ing hernia repair    Atrophy of right kidney- urology nv    Obstructive uropathy    Neurogenic bladder- self cath since 5/2018    Primary osteoarthritis of right knee    Post-phlebitic dermatosis of right lower extremity    Long term (current) use of anticoagulants    Using prophylactic antibiotic on daily basis- keflex for recurrent uti form self cath daily/neurogenic bladder    Obesity due to excess calories with serious comorbidity    Stage 3a chronic kidney disease (HCC)    Acute cystitis without hematuria     Outpatient Medications Prior to Visit   Medication Sig Dispense Refill    warfarin (COUMADIN) 5 MG Tab TAKE 1 TO 1 & 1/2 (ONE & ONE-HALF) TABLETS BY MOUTH ONCE DAILY AS NEEDED OR  AS  DIRECTED  BY  [facility]  ANTICOAGULATION  CLINIC 135 Tablet 1    fenofibrate (TRIGLIDE) 160 MG tablet Take 1 Tablet by mouth every day. (use generic fenofibrate only) 90 Tablet 3    Cholecalciferol (VITAMIN D) 50 MCG (2000 UT) Cap Take 1 Capsule by mouth every day. 100 Capsule 3    tamsulosin (FLOMAX) 0.4 MG capsule tamsulosin 0.4 mg capsule   Take 1  capsule by mouth once daily at bedtime      ciprofloxacin (CIPRO) 500 MG Tab Take 1 Tablet by mouth 2 times a day. As needed for recurrent prostate or urinary tract infections. May have to reduce  warfarin dosage in half while taking. 20 Tablet 54    polyethylene glycol 3350 (MIRALAX) 17 GM/SCOOP Powder Take 17 g by mouth as needed (constipation). 500 g prn     No facility-administered medications prior to visit.               HPI    Review of Systems   Constitutional: Negative.    HENT: Negative.     Eyes: Negative.    Respiratory: Negative.     Cardiovascular: Negative.    Gastrointestinal: Negative.    Genitourinary: Negative.    Musculoskeletal: Negative.    Skin: Negative.    Neurological: Negative.    Endo/Heme/Allergies: Negative.    Psychiatric/Behavioral: Negative.                Objective     /80 (BP Location: Right arm, Patient Position: Sitting, BP Cuff Size: Adult)   Pulse (!) 111   Temp 36.9 °C (98.5 °F) (Temporal)   Ht 1.829 m (6')   Wt 107 kg (236 lb)   SpO2 95%   BMI 32.01 kg/m²      Physical Exam  Constitutional:       General: He is not in acute distress.     Appearance: He is well-developed. He is not diaphoretic.   HENT:      Head: Normocephalic and atraumatic.      Right Ear: External ear normal.      Left Ear: External ear normal.      Nose: Nose normal.      Mouth/Throat:      Pharynx: No oropharyngeal exudate.   Eyes:      General: No scleral icterus.        Right eye: No discharge.         Left eye: No discharge.      Conjunctiva/sclera: Conjunctivae normal.      Pupils: Pupils are equal, round, and reactive to light.   Neck:      Thyroid: No thyromegaly.      Vascular: No JVD.      Trachea: No tracheal deviation.   Cardiovascular:      Rate and Rhythm: Normal rate and regular rhythm.      Heart sounds: Normal heart sounds. No murmur heard.     No friction rub. No gallop.   Pulmonary:      Effort: Pulmonary effort is normal. No respiratory distress.      Breath sounds: Normal  breath sounds. No stridor. No wheezing or rales.   Chest:      Chest wall: No tenderness.   Abdominal:      General: Bowel sounds are normal. There is no distension.      Palpations: Abdomen is soft. There is no mass.      Tenderness: There is no abdominal tenderness. There is no guarding or rebound.   Musculoskeletal:         General: No tenderness. Normal range of motion.      Cervical back: Normal range of motion and neck supple.   Lymphadenopathy:      Cervical: No cervical adenopathy.   Skin:     General: Skin is warm and dry.      Coloration: Skin is not pale.      Findings: No erythema or rash.   Neurological:      Mental Status: He is alert and oriented to person, place, and time.      Motor: No abnormal muscle tone.      Coordination: Coordination normal.      Deep Tendon Reflexes: Reflexes are normal and symmetric. Reflexes normal.   Psychiatric:         Behavior: Behavior normal.         Thought Content: Thought content normal.         Judgment: Judgment normal.                             Assessment & Plan         1. Screening for colorectal cancer     - Referral to GI for Colonoscopy    2. Class 3 severe obesity due to excess calories with serious comorbidity in adult, unspecified BMI (HCC)       diet/exercise/lose 15 lbs.; patient counseled    - Patient identified as having weight management issue.  Appropriate orders and counseling given.    3. Stage 3a chronic kidney disease (HCC)   Under good control. Continue same regimen.'    4. Factor 5 Leiden mutation, heterozygous (HCC)    Under good control. Continue same regimen.'    5. Uncomplicated opioid dependence (HCC)     - oxyCODONE-acetaminophen (PERCOCET) 5-325 MG Tab; Take 1 Tablet by mouth every 6 hours as needed for Severe Pain for up to 90 days.  Dispense: 120 Tablet; Refill: 0  - Controlled Substance Treatment Agreement  - Consent for Opiate Prescription    6. Chronic pain syndrome- right leg from post phlebitic syn; on percocet once a day     Under good control. Continue same regimen.'  - oxyCODONE-acetaminophen (PERCOCET) 5-325 MG Tab; Take 1 Tablet by mouth every 6 hours as needed for Severe Pain for up to 90 days.  Dispense: 120 Tablet; Refill: 0  - Controlled Substance Treatment Agreement  - Consent for Opiate Prescription    7. Post-phlebitic syndrome- RIGHT LEG    Under good control. Continue same regimen.'  - oxyCODONE-acetaminophen (PERCOCET) 5-325 MG Tab; Take 1 Tablet by mouth every 6 hours as needed for Severe Pain for up to 90 days.  Dispense: 120 Tablet; Refill: 0  - Controlled Substance Treatment Agreement  - Consent for Opiate Prescription

## 2024-03-04 ENCOUNTER — ANTICOAGULATION VISIT (OUTPATIENT)
Dept: MEDICAL GROUP | Facility: MEDICAL CENTER | Age: 74
End: 2024-03-04
Payer: MEDICARE

## 2024-03-04 DIAGNOSIS — Z79.01 ANTICOAGULATED ON WARFARIN: ICD-10-CM

## 2024-03-04 DIAGNOSIS — Z79.01 LONG TERM (CURRENT) USE OF ANTICOAGULANTS: ICD-10-CM

## 2024-03-04 LAB — INR PPP: 3.1 (ref 2–3.5)

## 2024-03-04 PROCEDURE — 99211 OFF/OP EST MAY X REQ PHY/QHP: CPT | Performed by: INTERNAL MEDICINE

## 2024-03-04 PROCEDURE — 85610 PROTHROMBIN TIME: CPT | Performed by: INTERNAL MEDICINE

## 2024-03-04 NOTE — PROGRESS NOTES
Anticoagulation Summary  As of 3/4/2024      INR goal:  2.0-3.0   TTR:  64.7% (4.5 y)   INR used for dosing:  3.10 (3/4/2024)   Warfarin maintenance plan:  7.5 mg (5 mg x 1.5) every Tue, Sat; 5 mg (5 mg x 1) all other days   Weekly warfarin total:  40 mg   Plan last modified:  Ganesh Mao, PharmD (9/12/2022)   Next INR check:  5/27/2024   Target end date:  Indefinite    Indications    Anticoagulated on warfarin [Z79.01]  Long term (current) use of anticoagulants [Z79.01]  DVT (deep venous thrombosis) (HCC) (Resolved) [I82.409]                 Anticoagulation Episode Summary       INR check location:  [facility]    Preferred lab:      Send INR reminders to:      Comments:            Anticoagulation Care Providers       Provider Role Specialty Phone number    [facility]   [phone]    Keron Garcia M.D.  Internal Medicine [phone]                  Refer to Patient Findings for HPI:  Patient Findings       Negatives:  Signs/symptoms of thrombosis, Signs/symptoms of bleeding, Laboratory test error suspected, Change in health, Change in alcohol use, Change in activity, Upcoming invasive procedure, Emergency department visit, Upcoming dental procedure, Missed doses, Extra doses, Change in medications, Change in diet/appetite, Hospital admission, Bruising, Other complaints            There were no vitals filed for this visit.  Pt declined vitals    Verified current warfarin dosing schedule.    Medications reconciled: No  Pt is not on antiplatelet therapy.      A/P   INR is slightly supratherapeutic - patient previously stable    Warfarin dosing recommendation: Continue regimen as listed above. Patient plans to increase vitamin K intake today and tomorrow.     Pt educated to contact our clinic with any changes in medications or s/s of bleeding or thrombosis. Pt is aware to seek immediate medical attention for falls, head injury or deep cuts.    Request pt to return in 12 week(s).  Pt agrees.    Adam RiveraD

## 2024-03-20 DIAGNOSIS — N41.0 PROSTATITIS, ACUTE: ICD-10-CM

## 2024-03-20 DIAGNOSIS — N39.0 RECURRENT UTI: ICD-10-CM

## 2024-03-20 NOTE — TELEPHONE ENCOUNTER
Received request via: Pharmacy    Was the patient seen in the last year in this department? Yes    Does the patient have an active prescription (recently filled or refills available) for medication(s) requested? No    Pharmacy Name: [facility]     Does the patient have [facility] Plus and need 100 day supply (blood pressure, diabetes and cholesterol meds only)? Patient does not have SCP

## 2024-03-21 RX ORDER — CIPROFLOXACIN 500 MG/1
500 TABLET, FILM COATED ORAL 2 TIMES DAILY
Qty: 20 TABLET | Refills: 54 | Status: SHIPPED | OUTPATIENT
Start: 2024-03-21

## 2024-06-03 ENCOUNTER — ANTICOAGULATION VISIT (OUTPATIENT)
Dept: MEDICAL GROUP | Facility: MEDICAL CENTER | Age: 74
End: 2024-06-03
Payer: MEDICARE

## 2024-06-03 VITALS
DIASTOLIC BLOOD PRESSURE: 83 MMHG | SYSTOLIC BLOOD PRESSURE: 117 MMHG | HEART RATE: 80 BPM | OXYGEN SATURATION: 96 % | WEIGHT: 235.89 LBS | BODY MASS INDEX: 31.99 KG/M2

## 2024-06-03 DIAGNOSIS — Z79.01 ANTICOAGULATED ON WARFARIN: ICD-10-CM

## 2024-06-03 DIAGNOSIS — Z79.01 LONG TERM (CURRENT) USE OF ANTICOAGULANTS: ICD-10-CM

## 2024-06-03 LAB — INR PPP: 2.4 (ref 2–3.5)

## 2024-06-03 PROCEDURE — 93793 ANTICOAG MGMT PT WARFARIN: CPT | Performed by: STUDENT IN AN ORGANIZED HEALTH CARE EDUCATION/TRAINING PROGRAM

## 2024-06-03 PROCEDURE — 3074F SYST BP LT 130 MM HG: CPT | Performed by: STUDENT IN AN ORGANIZED HEALTH CARE EDUCATION/TRAINING PROGRAM

## 2024-06-03 PROCEDURE — 85610 PROTHROMBIN TIME: CPT | Performed by: STUDENT IN AN ORGANIZED HEALTH CARE EDUCATION/TRAINING PROGRAM

## 2024-06-03 PROCEDURE — 3079F DIAST BP 80-89 MM HG: CPT | Performed by: STUDENT IN AN ORGANIZED HEALTH CARE EDUCATION/TRAINING PROGRAM

## 2024-06-03 ASSESSMENT — FIBROSIS 4 INDEX: FIB4 SCORE: 1.49

## 2024-07-11 DIAGNOSIS — Z79.01 CHRONIC ANTICOAGULATION: ICD-10-CM

## 2024-07-12 RX ORDER — WARFARIN SODIUM 5 MG/1
TABLET ORAL
Qty: 135 TABLET | Refills: 0 | Status: SHIPPED | OUTPATIENT
Start: 2024-07-12

## 2024-08-12 ENCOUNTER — APPOINTMENT (OUTPATIENT)
Dept: MEDICAL GROUP | Age: 74
End: 2024-08-12
Payer: MEDICARE

## 2024-08-12 ENCOUNTER — TELEPHONE (OUTPATIENT)
Dept: MEDICAL GROUP | Facility: MEDICAL CENTER | Age: 74
End: 2024-08-12

## 2024-08-12 VITALS
WEIGHT: 238 LBS | RESPIRATION RATE: 16 BRPM | HEART RATE: 74 BPM | SYSTOLIC BLOOD PRESSURE: 104 MMHG | HEIGHT: 72 IN | BODY MASS INDEX: 32.23 KG/M2 | DIASTOLIC BLOOD PRESSURE: 68 MMHG | OXYGEN SATURATION: 96 % | TEMPERATURE: 97.2 F

## 2024-08-12 DIAGNOSIS — G89.4 CHRONIC PAIN SYNDROME: ICD-10-CM

## 2024-08-12 DIAGNOSIS — R73.01 IFG (IMPAIRED FASTING GLUCOSE): ICD-10-CM

## 2024-08-12 DIAGNOSIS — E78.2 MIXED HYPERLIPIDEMIA: ICD-10-CM

## 2024-08-12 DIAGNOSIS — D48.5 NEOPLASM OF UNCERTAIN BEHAVIOR OF SKIN OF EAR: ICD-10-CM

## 2024-08-12 DIAGNOSIS — N41.0 PROSTATITIS, ACUTE: ICD-10-CM

## 2024-08-12 DIAGNOSIS — N40.0 BPH WITHOUT URINARY OBSTRUCTION: ICD-10-CM

## 2024-08-12 DIAGNOSIS — N39.0 RECURRENT UTI: ICD-10-CM

## 2024-08-12 DIAGNOSIS — Z00.00 MEDICARE ANNUAL WELLNESS VISIT, SUBSEQUENT: ICD-10-CM

## 2024-08-12 DIAGNOSIS — I87.009 POST-PHLEBITIC SYNDROME: ICD-10-CM

## 2024-08-12 DIAGNOSIS — Z12.11 COLON CANCER SCREENING: ICD-10-CM

## 2024-08-12 DIAGNOSIS — E55.9 VITAMIN D DEFICIENCY: ICD-10-CM

## 2024-08-12 DIAGNOSIS — F11.20 UNCOMPLICATED OPIOID DEPENDENCE (HCC): ICD-10-CM

## 2024-08-12 PROCEDURE — 99214 OFFICE O/P EST MOD 30 MIN: CPT | Mod: 25 | Performed by: INTERNAL MEDICINE

## 2024-08-12 PROCEDURE — 3074F SYST BP LT 130 MM HG: CPT | Performed by: INTERNAL MEDICINE

## 2024-08-12 PROCEDURE — G0439 PPPS, SUBSEQ VISIT: HCPCS | Performed by: INTERNAL MEDICINE

## 2024-08-12 PROCEDURE — 3078F DIAST BP <80 MM HG: CPT | Performed by: INTERNAL MEDICINE

## 2024-08-12 RX ORDER — TAMSULOSIN HYDROCHLORIDE 0.4 MG/1
0.4 CAPSULE ORAL DAILY
Qty: 100 CAPSULE | Refills: 3 | Status: SHIPPED | OUTPATIENT
Start: 2024-08-12

## 2024-08-12 RX ORDER — PRAVASTATIN SODIUM 40 MG
40 TABLET ORAL NIGHTLY
Qty: 30 TABLET | Refills: 11 | Status: SHIPPED | OUTPATIENT
Start: 2024-08-12

## 2024-08-12 RX ORDER — CIPROFLOXACIN 500 MG/1
500 TABLET, FILM COATED ORAL 2 TIMES DAILY
Qty: 20 TABLET | Refills: 3 | Status: SHIPPED | OUTPATIENT
Start: 2024-08-12

## 2024-08-12 RX ORDER — OXYCODONE AND ACETAMINOPHEN 5; 325 MG/1; MG/1
1 TABLET ORAL
Qty: 90 TABLET | Refills: 0 | Status: SHIPPED | OUTPATIENT
Start: 2024-08-12 | End: 2024-11-10

## 2024-08-12 RX ORDER — FENOFIBRATE 160 MG/1
160 TABLET ORAL DAILY
Qty: 90 TABLET | Refills: 3 | Status: SHIPPED | OUTPATIENT
Start: 2024-08-12 | End: 2024-08-12 | Stop reason: SINTOL

## 2024-08-12 ASSESSMENT — FIBROSIS 4 INDEX: FIB4 SCORE: 1.49

## 2024-08-12 ASSESSMENT — ENCOUNTER SYMPTOMS: GENERAL WELL-BEING: GOOD

## 2024-08-12 ASSESSMENT — ACTIVITIES OF DAILY LIVING (ADL): BATHING_REQUIRES_ASSISTANCE: 0

## 2024-08-12 ASSESSMENT — PATIENT HEALTH QUESTIONNAIRE - PHQ9: CLINICAL INTERPRETATION OF PHQ2 SCORE: 0

## 2024-08-12 NOTE — PROGRESS NOTES
MA COMPONENT     HPI:  Tavares Napoles is a 73 y.o. here today for a Medicare Annual Wellness Visit.     Patient Active Problem List    Diagnosis Date Noted    Stage 3a chronic kidney disease 03/07/2023    Acute cystitis without hematuria 03/07/2023    Obesity due to excess calories with serious comorbidity 09/07/2022    Using prophylactic antibiotic on daily basis- keflex for recurrent uti form self cath daily/neurogenic bladder 11/03/2020    Long term (current) use of anticoagulants 08/08/2019    Obstructive uropathy 04/04/2019    Neurogenic bladder- self cath since 5/2018 04/04/2019    Primary osteoarthritis of right knee 04/04/2019    Post-phlebitic dermatosis of right lower extremity 04/04/2019    Benign prostatic hyperplasia with incomplete bladder emptying- self cath since 5/2018 ing hernia repair 08/24/2018    Atrophy of right kidney- urology nv 08/24/2018    Uncomplicated opioid dependence (HCC) 05/18/2018    Post-phlebitic syndrome- RIGHT LEG 05/22/2015    Chronic venous hypertension due to DVT 11/17/2014    Anticoagulated on warfarin 11/17/2014    Mixed hyperlipidemia  11/17/2014    Chronic pain syndrome- right leg from post phlebitic syn; on percocet once a day 03/12/2014    Hypovitaminosis D 03/26/2012    History of pulmonary embolism 01/24/2012    Factor 5 Leiden mutation, heterozygous ([facility]) 01/24/2012    Gastroesophageal reflux disease without esophagitis 01/24/2012        Current supplements as per medication list.     Allergies: Other food, Shellfish allergy, Lovastatin, and Statins [hmg-coa-r inhibitors]     Current social contact/activities: yes      He  reports that he has quit smoking. He has never used smokeless tobacco. He reports that he does not currently use alcohol. He reports that he does not use drugs.     Lives with at home: spouse  Any caregivers: no  Is the caregiver paid or unpaid: no     ROS:   Gait: Uses no assistive device  Ostomy: No  Other tubes: No  Amputations:  No  Chronic oxygen use: No  Last eye exam: 1 year   Wears hearing aids: No  : Denies any urinary leakage during the last 6 months     Depression Screening  Little interest or pleasure in doing things?  0 - not at all  Feeling down, depressed , or hopeless? 0 - not at all  Patient Health Questionnaire Score: 0     If depressive symptoms identified deferred to follow up visit unless specifically addressed in assessment and plan.    Interpretation of PHQ-9 Total Score   Score Severity   1-4 No Depression   5-9 Mild Depression   10-14 Moderate Depression   15-19 Moderately Severe Depression   20-27 Severe Depression    Screening for Cognitive Impairment  Do you or any of your friends or family members have any concern about your memory? No  Three Minute Recall (Leader, Season, Table) 3/3    Jorge clock face with all 12 numbers and set the hands to show 10 minutes after 11.  No    Cognitive concerns identified deferred for follow up unless specifically addressed in assessment and plan.    Fall Risk Assessment  Has the patient had two or more falls in the last year or any fall with injury in the last year?  No    Safety Assessment  Do you always wear your seatbelt?  Yes  Any changes to home needed to function safely? No  Difficulty hearing.  No  Patient counseled about all safety risks that were identified.    Functional Assessment ADLs  Are there any barriers preventing you from cooking for yourself or meeting nutritional needs?  No.    Are there any barriers preventing you from driving safely or obtaining transportation?  No.    Are there any barriers preventing you from using a telephone or calling for help?  No    Are there any barriers preventing you from shopping?  No.    Are there any barriers preventing you from taking care of your own finances?  No    Are there any barriers preventing you from managing your medications?  No    Are there any barriers preventing you from showering, bathing or dressing yourself? No     Are there any barriers preventing you from doing housework or laundry? No  Are there any barriers preventing you from using the toilet?No  Are you currently engaging in any exercise or physical activity?  Yes.      Self-Assessment of Health  What is your perception of your health? Good    Do you sleep more than six hours a night? No    In the past 7 days, how much did pain keep you from doing your normal work? Some    Do you spend quality time with family or friends (virtually or in person)? Yes    Do you usually eat a heart healthy diet that constists of a variety of fruits, vegetables, whole grains and fiber? Yes    Do you eat foods high in fat and/or Fast Food more than three times per week? No    How concerned are you that your medical conditions are not being well managed? Not at all    Are you worried that in the next 2 months, you may not have stable housing that you own, rent, or stay in as part of a household? No      Advance Care Planning  Do you have an Advance Directive, Living Will, Durable Power of , or POLST? No                 Health Maintenance Summary            Ordered - Colorectal Cancer Screening (Colonoscopy - Every 10 Years) Ordered on 2/6/2024 03/08/2012  AMB REFERRAL TO GI FOR COLONOSCOPY    01/30/2012  OCCULT BLOOD FECES IMMUNOASSAY              Overdue - COVID-19 Vaccine (1 - 2023-24 season) Never done      No completion history exists for this topic.              Influenza Vaccine (1) Next due on 9/1/2024      10/27/2022  Imm Admin: Influenza Vaccine Adult HD    11/23/2021  Imm Admin: Influenza Vaccine Adult HD    11/03/2020  Imm Admin: Influenza Vaccine Adult HD    01/23/2020  Imm Admin: Influenza Vaccine Adult HD    10/18/2018  Imm Admin: Influenza Vaccine Adult HD    Only the first 5 history entries have been loaded, but more history exists.              IMM DTaP/Tdap/Td Vaccine (3 - Td or Tdap) Next due on 4/16/2034 04/16/2024  Outside Immunization: Tdap     10/31/2016  Imm Admin: Tdap Vaccine              Pneumococcal Vaccine: 65+ Years (Series Information) Completed      10/13/2017  Imm Admin: Pneumococcal polysaccharide vaccine (PPSV-23)    2014  Imm Admin: Pneumococcal Conjugate Vaccine (Prevnar/PCV-13)    2014  Imm Admin: Pneumococcal polysaccharide vaccine (PPSV-23)    02/10/2009  Imm Admin: Pneumococcal Vaccine (UF) - HISTORICAL DATA    02/10/2009  Imm Admin: Pneumococcal polysaccharide vaccine (PPSV-23)    Only the first 5 history entries have been loaded, but more history exists.              Abdominal Aortic Aneurysm (AAA) Screening  Tentatively Complete      2020  CT-ABDOMEN-PELVIS WITH              Zoster (Shingles) Vaccines (Series Information) Completed      10/03/2020  Imm Admin: Zoster Vaccine Recombinant (RZV) (SHINGRIX)    2020  Imm Admin: Zoster Vaccine Recombinant (RZV) (SHINGRIX)    2020  Imm Admin: Zoster Vaccine Recombinant (RZV) (SHINGRIX)    10/31/2016  Imm Admin: Varicella Vaccine Live    2014  Imm Admin: Zoster Vaccine Live (ZVL) (Zostavax) - HISTORICAL DATA    Only the first 5 history entries have been loaded, but more history exists.              Hepatitis C Screening  Tentatively Complete      2022  Hepatitis C Antibody component of HCV Scrn ( 1225-1739 1xLife)              Hepatitis A Vaccine (Hep A) (Series Information) Aged Out      No completion history exists for this topic.              HPV Vaccines (Series Information) Aged Out      No completion history exists for this topic.              Polio Vaccine (Inactivated Polio) (Series Information) Aged Out      No completion history exists for this topic.              Meningococcal Immunization (Series Information) Aged Out      No completion history exists for this topic.              Discontinued - Annual Wellness Visit  Discontinued        Frequency changed to Never automatically (Topic No Longer Applies)    06/15/2023  Visit Dx: Medicare  annual wellness visit, subsequent    06/15/2023  Level of Service: [place] ANNUAL WELLNESS VISIT-INCLUDES PPPS SUBSEQUE*              Discontinued - Hepatitis B Vaccine (Hep B)  Discontinued      No completion history exists for this topic.                    Patient Care Team:  Keron Garcia M.D. as PCP - General  Keron Garcia M.D.  [facility] (Inactive) as Consulting Physician  [facility] as Pharmacist         Patient Care Team:  Keron Garcia M.D. as PCP - General  Keron Garcia M.D.  [facility] (Inactive) as Consulting Physician  [facility] as Pharmacist     Social History     Tobacco Use    Smoking status: Former     Types: Cigarettes    Smokeless tobacco: Never   Vaping Use    Vaping status: Never Used   Substance Use Topics    Alcohol use: Not Currently     Alcohol/week: 0.0 - 0.6 oz     Comment: once a month/ rare    Drug use: No        History reviewed. No pertinent family history.     Past Surgical History:   Procedure Laterality Date    INGUINAL HERNIA REPAIR ROBOTIC XI Right 1/24/2023    Procedure: ROBOTIC RIGHT INGUINAL HERNIA REPAIR;  Surgeon: Moi Carr M.D.;  Location: SURGERY [facility];  Service: Gen Robotic    INGUINAL HERNIA REPAIR ROBOTIC Left 12/18/2017    Procedure: INGUINAL HERNIA REPAIR ROBOTIC- WITH MESH PLACEMENT;  Surgeon: Rasta Reyes M.D.;  Location: [facility];  Service: General    COLONOSCOPY  2013        Whisper test - stand 3 feet  behind patient and whisper 3 numbers for each ear while covering other ear..  - Right 3/3, Left 3/3     Vision test - using Snellen eye chart test both eyes separately and together, with and without correction.   - Without correction:      OD  /40, OS /40, OU /40  - With correction:             OD  /40, OS /40, OU/40     Rise and walk test - Have patient stand, walk 10 feet and return to chair.  -   seconds     Stay independent checklist:  Yes (2)  No (0) I have fallen in the past year. 0  Yes (2) No (0) I use or have been advised to use a cane or  walker to get around safely.0  Yes (1) No (0) Sometimes I feel unsteady when I am walking. 0  Yes (1) No (0) I steady myself by holding onto furniture  when walking at home.0  Yes (1) No (0) I am worried about falling0  Yes (1) No (0) I need to push with my hands to stand up  from a chair.0  Yes (1) No (0) I have some trouble stepping up onto a curb. 0  Yes (1) No (0) I often have to rush to the toilet. 0  Yes (1) No (0) I have lost some feeling in my feet. 0  Yes (1) No (0) I take medicine that sometimes makes me feel  light-headed or more tired than usual.0  Yes (1) No (0) I take medicine to help me sleep or improve  my mood. 0  Yes (1) No (0) I often feel sad or depressed. 0  Total Add up the number of points for each “yes” answer. 0     EXAM  /68 (BP Location: Left arm, Patient Position: Sitting, BP Cuff Size: Adult)   Pulse 74   Temp 36.2 °C (97.2 °F) (Temporal)   Resp 16   Ht 1.829 m (6')   Wt 108 kg (238 lb)   SpO2 96%  -       Hearing good.   Dentition good  Alert, oriented in no acute distress.  Eye contact is good, speech goal directed, affect calm              No visits with results within 1 Month(s) from this visit.   Latest known visit with results is:   Anticoagulation Visit on 06/03/2024   Component Date Value    INR 06/03/2024 2.40       Lab Results   Component Value Date/Time    HBA1C 5.7 (H) 08/11/2021 03:17 PM    HBA1C 5.8 05/01/2013 08:53 AM     Lab Results   Component Value Date/Time    SODIUM 141 01/24/2023 11:14 AM    POTASSIUM 4.6 01/24/2023 11:14 AM    CHLORIDE 111 01/24/2023 11:14 AM    CO2 21 01/24/2023 11:14 AM    GLUCOSE 100 (H) 01/24/2023 11:14 AM    BUN 26 (H) 01/24/2023 11:14 AM    CREATININE 1.31 01/24/2023 11:14 AM    CREATININE 1.3 02/11/2009 04:05 AM    ALKPHOSPHAT 49 08/30/2022 11:36 AM    ASTSGOT 29 08/30/2022 11:36 AM    ALTSGPT 29 08/30/2022 11:36 AM    TBILIRUBIN  "0.5 08/30/2022 11:36 AM     Lab Results   Component Value Date/Time    INR 2.40 06/03/2024 08:31 AM    INR 3.10 03/04/2024 08:23 AM    INR 2.30 12/11/2023 08:44 AM     Lab Results   Component Value Date/Time    CHOLSTRLTOT 192 08/30/2022 11:36 AM     (H) 08/30/2022 11:36 AM    HDL 45 08/30/2022 11:36 AM    TRIGLYCERIDE 89 08/30/2022 11:36 AM       Lab Results   Component Value Date/Time    TESTOSTERONE 222 01/22/2014 09:04 AM     No results found for: \"TSH\"  Lab Results   Component Value Date/Time    FREET4 0.84 05/01/2013 08:53 AM    FREET4 0.90 01/24/2012 10:38 AM     No results found for: \"URICACID\"  No components found for: \"VITB12\"  Lab Results   Component Value Date/Time    25HYDROXY 58 08/30/2022 11:36 AM    25HYDROXY 80 08/11/2021 03:17 PM   '    Health maintenance review: (catch them up on all care gaps - vaccines, mammogram, colon cancer screen)  Health Maintenance Summary            Ordered - Colorectal Cancer Screening (Colonoscopy - Every 10 Years) Ordered on 2/6/2024 03/08/2012  AMB REFERRAL TO GI FOR COLONOSCOPY    01/30/2012  OCCULT BLOOD FECES IMMUNOASSAY              Overdue - COVID-19 Vaccine (1 - 2023-24 season) Never done      No completion history exists for this topic.              Influenza Vaccine (1) Next due on 9/1/2024      10/27/2022  Imm Admin: Influenza Vaccine Adult HD    11/23/2021  Imm Admin: Influenza Vaccine Adult HD    11/03/2020  Imm Admin: Influenza Vaccine Adult HD    01/23/2020  Imm Admin: Influenza Vaccine Adult HD    10/18/2018  Imm Admin: Influenza Vaccine Adult HD    Only the first 5 history entries have been loaded, but more history exists.              IMM DTaP/Tdap/Td Vaccine (3 - Td or Tdap) Next due on 4/16/2034 04/16/2024  Outside Immunization: Tdap    10/31/2016  Imm Admin: Tdap Vaccine              Pneumococcal Vaccine: 65+ Years (Series Information) Completed      10/13/2017  Imm Admin: Pneumococcal polysaccharide vaccine (PPSV-23)    09/29/2014 "  Imm Admin: Pneumococcal Conjugate Vaccine (Prevnar/PCV-13)    2014  Imm Admin: Pneumococcal polysaccharide vaccine (PPSV-23)    02/10/2009  Imm Admin: Pneumococcal Vaccine (UF) - HISTORICAL DATA    02/10/2009  Imm Admin: Pneumococcal polysaccharide vaccine (PPSV-23)    Only the first 5 history entries have been loaded, but more history exists.              Abdominal Aortic Aneurysm (AAA) Screening  Tentatively Complete      2020  CT-ABDOMEN-PELVIS WITH              Zoster (Shingles) Vaccines (Series Information) Completed      10/03/2020  Imm Admin: Zoster Vaccine Recombinant (RZV) (SHINGRIX)    2020  Imm Admin: Zoster Vaccine Recombinant (RZV) (SHINGRIX)    2020  Imm Admin: Zoster Vaccine Recombinant (RZV) (SHINGRIX)    10/31/2016  Imm Admin: Varicella Vaccine Live    2014  Imm Admin: Zoster Vaccine Live (ZVL) (Zostavax) - HISTORICAL DATA    Only the first 5 history entries have been loaded, but more history exists.              Hepatitis C Screening  Tentatively Complete      2022  Hepatitis C Antibody component of HCV Scrn ( 2080-6198 1xLife)              Hepatitis A Vaccine (Hep A) (Series Information) Aged Out      No completion history exists for this topic.              HPV Vaccines (Series Information) Aged Out      No completion history exists for this topic.              Polio Vaccine (Inactivated Polio) (Series Information) Aged Out      No completion history exists for this topic.              Meningococcal Immunization (Series Information) Aged Out      No completion history exists for this topic.              Discontinued - Annual Wellness Visit  Discontinued        Frequency changed to Never automatically (Topic No Longer Applies)    06/15/2023  Visit Dx: Medicare annual wellness visit, subsequent    06/15/2023  Level of Service: [place] ANNUAL WELLNESS VISIT-INCLUDES PPPS SUBSEQUE*              Discontinued - Hepatitis B Vaccine (Hep B)  Discontinued      No  completion history exists for this topic.                     Medication review:  Current Outpatient Medications   Medication Sig Dispense Refill    warfarin (COUMADIN) 5 MG Tab TAKE 1 TO 1 & 1/2 (ONE & ONE-HALF) TABLETS BY MOUTH ONCE DAILY AS NEEDED OR AS DIRECTED BY [facility] 135 Tablet 0    ciprofloxacin (CIPRO) 500 MG Tab Take 1 Tablet by mouth 2 times a day. As needed for recurrent prostate or urinary tract infections. May have to reduce  warfarin dosage in half while taking. 20 Tablet 54    fenofibrate (TRIGLIDE) 160 MG tablet Take 1 Tablet by mouth every day. (use generic fenofibrate only) 90 Tablet 3    Cholecalciferol (VITAMIN D) 50 MCG (2000 UT) Cap Take 1 Capsule by mouth every day. 100 Capsule 3    tamsulosin (FLOMAX) 0.4 MG capsule tamsulosin 0.4 mg capsule   Take 1 capsule by mouth once daily at bedtime       No current facility-administered medications for this visit.        If opioid/benzo/sedative on med list discussion of reduction/elimination - referral     At this point document if pt refuses to consider changes off of controlled substance      Vital sign review - unintentional >10 pound weight loss evaluation (BMI and Muscle Mass review)      Whisper test fail - audiology referral      Eye test fail - ophthalmology referral      Stay independent checklist - PT referral for a score of 4 or more      PHQ evaluation - consider visit for medication change/referral      Mini-cog evaluation - consider visit for further evaluation/referral      POLST on file- if not need to have on hand for patient to fill out - consider Palliative referral etc.     NICKI screen -     Audit-C Questionnaire     1. How often did you have a drink containing alcohol in the past year?   Never (0 points)  * If you answered Never, score questions 2 and 3 below as zero.    Monthly or less (1 point)   2 to 4 times a month (2 points)   2 or 3 times per week (3 points)   4 or more times a week (4 points)     0      2. How many drinks did you have on a typical day when you were drinking in the past year?   1 - 2 (0 points)   3 - 4 (1point)   5 - 6 (2 points)   7 - 9 (3 points)   10 or more (4 points)     0     3. How often did you have 6 or more drinks on one occasion in the past year?   Never (0 points)   Less than monthly (1 point)   Monthly (2 points)   Weekly (3 points)   Daily or almost daily (4 points)     0     Total: 0     Scorin or more for men and 3 or more for women is an indication for further evaluation for hazardous drinking.     Chronic pain screen:        PEG: A Three-Item Scale Assessing Pain Intensity and Interference  1. What number best describes your pain on average in the past week?   0       1         2              3                     4                          5              6               7             8           9            10  No pain                                                                                                            Pain as bad as you can imagine     Depends on what he is doing      2. What number best describes how, during the past week, pain has interfered  with your enjoyment of life?  0       1         2              3                     4                          5              6               7             8           9            10  No pain                                                                                                            Pain as bad as you can imagine     Depends on what he is doing   3. What number best describes how, during the past week, pain has interfered  with your general activity?   0       1         2              3                     4                          5              6               7             8           9            10  No pain                                                                                                            Pain as bad as you can imagine     Depends on what he is doing      Scoring:  "average of 3 scales:   Serves as a baseline measurement for chronic pain issues and quality of life and can be used to prompt pain clinic referral.           Tobacco use - consider visit for discussion if user - discuss alternatives and quitting     Food insecurity screen - (The Hunger Vital Sign)  Within the past 12 months were you worried whether food would run out before you got money to buy more?  Often true          sometimes true          never true  never     Within the past 12 months did the food you bought not last and you did not have money to buy more food?  Often true         sometimes  true          never true  Never      Scoring: answering \"often true\" or \"sometimes true\" to either question is a positive screen for food insecurity and should prompt a social work/ referral if needed     Review of Rise and walk test - PT referral for a score over 13 seconds     Clasp arms behind back and head test - have the patient try and clasp hands behind back and then over head, failure to accomplish may indicate shoulder issues- PT/ortho/pmr referral         Assessment and Plan. The following treatment and monitoring plan is recommended:          Services suggested: No services needed at this time  Health Care Screening: Age-appropriate preventive services recommended by USPTF and ACIP covered by Medicare were discussed today. Services ordered if indicated and agreed upon by the patient.  Referrals offered: Community-based lifestyle interventions to reduce health risks and promote self-management and wellness, fall prevention, nutrition, physical activity, tobacco-use cessation, weight loss, and mental health services as per orders if indicated.     Discussion today about general wellness and lifestyle habits:   ?              Prevent falls and reduce trip hazards; Cautioned about securing or removing rugs.  ?              Have a working fire alarm and carbon monoxide detector;  ?              Engage in " regular physical activity and social activities     1. Colon cancer screening  Cologuard screening ordered.  No blood in the stool.  Asymptomatic.  No family history or personal history of colon polyps or cancer.  - Cologuard Colon Cancer Screening    2. Prostatitis, acute  This is under good control but still has prostatitis from time to time for which Cipro works well and has been prescribed with refills.  He no longer has to self cath and has been successful without doing this for the past year now.  - ciprofloxacin (CIPRO) 500 MG Tab; Take 1 Tablet by mouth 2 times a day. As needed for recurrent prostate or urinary tract infections. May have to reduce  warfarin dosage in half while taking.  Dispense: 20 Tablet; Refill: 3    3. Recurrent UTI  As above  - ciprofloxacin (CIPRO) 500 MG Tab; Take 1 Tablet by mouth 2 times a day. As needed for recurrent prostate or urinary tract infections. May have to reduce  warfarin dosage in half while taking.  Dispense: 20 Tablet; Refill: 3    4. Mixed hyperlipidemia   Not at goal.  Cannot tolerate Crestor or Lipitor.  He could not tolerate fenofibrate all due to muscle pain.  Will try pravastatin 40 mg daily and recheck in 3 months with labs prior to visit  - Comp Metabolic Panel; Future  - CBC WITH DIFFERENTIAL; Future  - TSH; Future  - Lipid Profile; Future  - pravastatin (PRAVACHOL) 40 MG tablet; Take 1 Tablet by mouth every evening.  Dispense: 30 Tablet; Refill: 11    5. IFG (impaired fasting glucose)  Good control with Mediterranean diet and exercise.  Recheck labs  - HEMOGLOBIN A1C; Future    6. Vitamin D deficiency  Continue with vitamin D3 2000 units daily  - VITAMIN D,25 HYDROXY (DEFICIENCY); Future    7. BPH without urinary obstruction  Good control continue current regimen  - PROSTATE SPECIFIC AG DIAGNOSTIC; Future  - tamsulosin (FLOMAX) 0.4 MG capsule; Take 1 Capsule by mouth every day.  Dispense: 100 Capsule; Refill: 3    8. 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  Referral to dermatology for evaluation of this particular of the right ear.  - Referral to Dermatology    9. Uncomplicated opioid dependence (HCC)  Good control continue current regimen  - oxyCODONE-acetaminophen (PERCOCET) 5-325 MG Tab; Take 1 Tablet by mouth 1 time a day as needed for Severe Pain (chronic pain) for up to 90 days.  Dispense: 90 Tablet; Refill: 0  - Consent for Opiate Prescription    10. Chronic pain syndrome- right leg from post phlebitic syn; on percocet once a day  As above  - oxyCODONE-acetaminophen (PERCOCET) 5-325 MG Tab; Take 1 Tablet by mouth 1 time a day as needed for Severe Pain (chronic pain) for up to 90 days.  Dispense: 90 Tablet; Refill: 0  - Consent for Opiate Prescription    11. Post-phlebitic syndrome- RIGHT LEG  Above  - oxyCODONE-acetaminophen (PERCOCET) 5-325 MG Tab; Take 1 Tablet by mouth 1 time a day as needed for Severe Pain (chronic pain) for up to 90 days.  Dispense: 90 Tablet; Refill: 0  - Consent for Opiate Prescription       Follow-up: No follow-ups on file.

## 2024-08-12 NOTE — TELEPHONE ENCOUNTER
Called patient due to new Rx for cipro prescribed today which can increase INR while on warfarin. Patient states he has been taking for several years now without fluctuation to INR. Will continue with current warfarin dosing and f/u with INR in several weeks.     Adam RiveraD

## 2024-08-26 ENCOUNTER — ANTICOAGULATION VISIT (OUTPATIENT)
Dept: MEDICAL GROUP | Facility: MEDICAL CENTER | Age: 74
End: 2024-08-26
Payer: MEDICARE

## 2024-08-26 VITALS — DIASTOLIC BLOOD PRESSURE: 88 MMHG | OXYGEN SATURATION: 95 % | HEART RATE: 76 BPM | SYSTOLIC BLOOD PRESSURE: 124 MMHG

## 2024-08-26 DIAGNOSIS — Z79.01 ANTICOAGULATED ON WARFARIN: ICD-10-CM

## 2024-08-26 DIAGNOSIS — Z79.01 LONG TERM (CURRENT) USE OF ANTICOAGULANTS: ICD-10-CM

## 2024-08-26 LAB — INR PPP: 2.3 (ref 2–3.5)

## 2024-08-26 PROCEDURE — 3074F SYST BP LT 130 MM HG: CPT | Performed by: STUDENT IN AN ORGANIZED HEALTH CARE EDUCATION/TRAINING PROGRAM

## 2024-08-26 PROCEDURE — 3079F DIAST BP 80-89 MM HG: CPT | Performed by: STUDENT IN AN ORGANIZED HEALTH CARE EDUCATION/TRAINING PROGRAM

## 2024-08-26 PROCEDURE — 93793 ANTICOAG MGMT PT WARFARIN: CPT | Performed by: STUDENT IN AN ORGANIZED HEALTH CARE EDUCATION/TRAINING PROGRAM

## 2024-08-26 PROCEDURE — 85610 PROTHROMBIN TIME: CPT | Performed by: STUDENT IN AN ORGANIZED HEALTH CARE EDUCATION/TRAINING PROGRAM

## 2024-08-26 NOTE — PROGRESS NOTES
Anticoagulation Summary  As of 2024      INR goal:  2.0-3.0   TTR:  67.3% (5 y)   INR used for dosin.30 (2024)   Warfarin maintenance plan:  7.5 mg (5 mg x 1.5) every Tue, Sat; 5 mg (5 mg x 1) all other days   Weekly warfarin total:  40 mg   Plan last modified:  Adam MacedoD (2022)   Next INR check:  2024   Target end date:  Indefinite    Indications    Anticoagulated on warfarin [Z79.01]  Long term (current) use of anticoagulants [Z79.01]  DVT (deep venous thrombosis) (HCC) (Resolved) [I82.409]                 Anticoagulation Episode Summary       INR check location:  Anticoagulation Clinic    Preferred lab:  --    Send INR reminders to:  --    Comments:  --          Anticoagulation Care Providers       Provider Role Specialty Phone number    [facility]   [phone]    Keron Garcia M.D.  Internal Medicine [phone]                  Refer to Patient Findings for HPI:  Patient Findings       Negatives:  Signs/symptoms of thrombosis, Signs/symptoms of bleeding, Laboratory test error suspected, Change in health, Change in alcohol use, Change in activity, Upcoming invasive procedure, Emergency department visit, Upcoming dental procedure, Missed doses, Extra doses, Change in medications, Change in diet/appetite, Hospital admission, Bruising, Other complaints            Vitals:    24 0758   BP: 124/88   Pulse: 76   SpO2: 95%       Verified current warfarin dosing schedule.    Medications reconciled.  Pt is not on antiplatelet therapy.      A/P   INR is therapeutic  Reason(s) for out of range INR today: N/A      Warfarin dosing recommendation: Continue regimen as listed above.    Pt educated to contact our clinic with any changes in medications or s/s of bleeding or thrombosis. Pt is aware to seek immediate medical attention for falls, head injury or deep cuts.    Request pt to return in 12 week(s). Pt agrees.    Adam RiveraD

## 2024-10-02 DIAGNOSIS — Z79.01 CHRONIC ANTICOAGULATION: ICD-10-CM

## 2024-10-02 RX ORDER — WARFARIN SODIUM 5 MG/1
TABLET ORAL
Qty: 135 TABLET | Refills: 0 | Status: SHIPPED | OUTPATIENT
Start: 2024-10-02

## 2024-10-31 ENCOUNTER — HOSPITAL ENCOUNTER (OUTPATIENT)
Dept: LAB | Facility: MEDICAL CENTER | Age: 74
End: 2024-10-31
Attending: INTERNAL MEDICINE
Payer: MEDICARE

## 2024-10-31 DIAGNOSIS — E78.2 MIXED HYPERLIPIDEMIA: ICD-10-CM

## 2024-10-31 DIAGNOSIS — N40.0 BPH WITHOUT URINARY OBSTRUCTION: ICD-10-CM

## 2024-10-31 DIAGNOSIS — E55.9 VITAMIN D DEFICIENCY: ICD-10-CM

## 2024-10-31 DIAGNOSIS — R73.01 IFG (IMPAIRED FASTING GLUCOSE): ICD-10-CM

## 2024-10-31 LAB
25(OH)D3 SERPL-MCNC: 44 NG/ML (ref 30–100)
ALBUMIN SERPL BCP-MCNC: 4.1 G/DL (ref 3.2–4.9)
ALBUMIN/GLOB SERPL: 1.4 G/DL
ALP SERPL-CCNC: 84 U/L (ref 30–99)
ALT SERPL-CCNC: 18 U/L (ref 2–50)
ANION GAP SERPL CALC-SCNC: 12 MMOL/L (ref 7–16)
AST SERPL-CCNC: 19 U/L (ref 12–45)
BASOPHILS # BLD AUTO: 0.6 % (ref 0–1.8)
BASOPHILS # BLD: 0.04 K/UL (ref 0–0.12)
BILIRUB SERPL-MCNC: 0.5 MG/DL (ref 0.1–1.5)
BUN SERPL-MCNC: 23 MG/DL (ref 8–22)
CALCIUM ALBUM COR SERPL-MCNC: 9.2 MG/DL (ref 8.5–10.5)
CALCIUM SERPL-MCNC: 9.3 MG/DL (ref 8.5–10.5)
CHLORIDE SERPL-SCNC: 108 MMOL/L (ref 96–112)
CHOLEST SERPL-MCNC: 221 MG/DL (ref 100–199)
CO2 SERPL-SCNC: 19 MMOL/L (ref 20–33)
CREAT SERPL-MCNC: 1.23 MG/DL (ref 0.5–1.4)
EOSINOPHIL # BLD AUTO: 0.24 K/UL (ref 0–0.51)
EOSINOPHIL NFR BLD: 3.5 % (ref 0–6.9)
ERYTHROCYTE [DISTWIDTH] IN BLOOD BY AUTOMATED COUNT: 46 FL (ref 35.9–50)
EST. AVERAGE GLUCOSE BLD GHB EST-MCNC: 120 MG/DL
GFR SERPLBLD CREATININE-BSD FMLA CKD-EPI: 61 ML/MIN/1.73 M 2
GLOBULIN SER CALC-MCNC: 3 G/DL (ref 1.9–3.5)
GLUCOSE SERPL-MCNC: 93 MG/DL (ref 65–99)
HBA1C MFR BLD: 5.8 % (ref 4–5.6)
HCT VFR BLD AUTO: 50.3 % (ref 42–52)
HDLC SERPL-MCNC: 34 MG/DL
HGB BLD-MCNC: 16.8 G/DL (ref 14–18)
IMM GRANULOCYTES # BLD AUTO: 0.01 K/UL (ref 0–0.11)
IMM GRANULOCYTES NFR BLD AUTO: 0.1 % (ref 0–0.9)
LDLC SERPL CALC-MCNC: 155 MG/DL
LYMPHOCYTES # BLD AUTO: 1.3 K/UL (ref 1–4.8)
LYMPHOCYTES NFR BLD: 18.9 % (ref 22–41)
MCH RBC QN AUTO: 31.7 PG (ref 27–33)
MCHC RBC AUTO-ENTMCNC: 33.4 G/DL (ref 32.3–36.5)
MCV RBC AUTO: 94.9 FL (ref 81.4–97.8)
MONOCYTES # BLD AUTO: 0.64 K/UL (ref 0–0.85)
MONOCYTES NFR BLD AUTO: 9.3 % (ref 0–13.4)
NEUTROPHILS # BLD AUTO: 4.65 K/UL (ref 1.82–7.42)
NEUTROPHILS NFR BLD: 67.6 % (ref 44–72)
NRBC # BLD AUTO: 0 K/UL
NRBC BLD-RTO: 0 /100 WBC (ref 0–0.2)
PLATELET # BLD AUTO: 213 K/UL (ref 164–446)
PMV BLD AUTO: 10.4 FL (ref 9–12.9)
POTASSIUM SERPL-SCNC: 4.4 MMOL/L (ref 3.6–5.5)
PROT SERPL-MCNC: 7.1 G/DL (ref 6–8.2)
PSA SERPL-MCNC: 2.03 NG/ML (ref 0–4)
RBC # BLD AUTO: 5.3 M/UL (ref 4.7–6.1)
SODIUM SERPL-SCNC: 139 MMOL/L (ref 135–145)
TRIGL SERPL-MCNC: 162 MG/DL (ref 0–149)
TSH SERPL-ACNC: 1.34 UIU/ML (ref 0.35–5.5)
WBC # BLD AUTO: 6.9 K/UL (ref 4.8–10.8)

## 2024-10-31 PROCEDURE — 36415 COLL VENOUS BLD VENIPUNCTURE: CPT

## 2024-10-31 PROCEDURE — 85025 COMPLETE CBC W/AUTO DIFF WBC: CPT

## 2024-10-31 PROCEDURE — 83036 HEMOGLOBIN GLYCOSYLATED A1C: CPT | Mod: GA

## 2024-10-31 PROCEDURE — 80053 COMPREHEN METABOLIC PANEL: CPT

## 2024-10-31 PROCEDURE — 84153 ASSAY OF PSA TOTAL: CPT

## 2024-10-31 PROCEDURE — 84443 ASSAY THYROID STIM HORMONE: CPT

## 2024-10-31 PROCEDURE — 82306 VITAMIN D 25 HYDROXY: CPT

## 2024-10-31 PROCEDURE — 80061 LIPID PANEL: CPT

## 2024-11-06 ENCOUNTER — OFFICE VISIT (OUTPATIENT)
Dept: DERMATOLOGY | Facility: IMAGING CENTER | Age: 74
End: 2024-11-06
Payer: MEDICARE

## 2024-11-06 DIAGNOSIS — Z12.83 SKIN CANCER SCREENING: ICD-10-CM

## 2024-11-06 DIAGNOSIS — L57.0 ACTINIC KERATOSIS: ICD-10-CM

## 2024-11-06 PROCEDURE — 17003 DESTRUCT PREMALG LES 2-14: CPT | Performed by: STUDENT IN AN ORGANIZED HEALTH CARE EDUCATION/TRAINING PROGRAM

## 2024-11-06 PROCEDURE — 17000 DESTRUCT PREMALG LESION: CPT | Performed by: STUDENT IN AN ORGANIZED HEALTH CARE EDUCATION/TRAINING PROGRAM

## 2024-11-06 NOTE — PROGRESS NOTES
[facility] DERMATOLOGY CLINIC NOTE    Chief Complaint   Patient presents with    Skin Lesion     Right ear helix skin lesion x 6 months . States is getting better but PCP still referred patient for eval         HPI:    Here for spot check on ears and face. He notes 8 months ago he had a sunburn on right side of face, since then has had persistent tender scaly spots on his ears that concerned him. Also notes scaly spots on face that come and go.   No history of skin cancers has had things frozen in past.    No other symptomatic (itching, painful, burning) or changing lesions.       Dermatology History:      - [place] skin cancer risk factors[place]       - Prior history of skin cancer: none, Aks treated in past with LN2        Review of Systems: No fevers, chill. Pertinent positives and negatives above.       Medications, Medical History, Surgical History, Family History & Allergies:  Reviewed in the chart, relevant history noted above.         PHYSICAL EXAM, ASSESSMENT, & PLAN (per problem):   A focused skin exam was performed including the affected areas of the head (including face) and neck. Notable findings on exam today listed below and/or in assessment/plan..       Actinic Keratosis n= 5 for LN2   Exam: Scaly gritty pink papules on right helix and antihelix x 2, bilateral cheeks x 3   - discussed pre-cancerous nature of lesions, potential for progression to cancer if left untreated over time, marker of sun damage.    - proceed with LN2 destructive treatment today of largest/thickest lesions, see procedure note.   - discussed in future, options for treatment include efudex and field treatment for background sun damage     Cryotherapy procedure note:  Risks (including, but not limited to: hypo or hyperpigmentation, redness, blister, scar, recurrence) and benefits of cryotherapy discussed.   Patient verbally agreed to proceed with treatment. 2 cryotherapy cycles x 10 sec applied to 5  lesion/s in location as specified in physical exam.   Aftercare instructions: to apply vaseline 2x daily to lesions until healed.    If areas do not heal or continue to grow, crust, bleed, etc, please call office for re-evaluation.        Skin Cancer Prevention Counseling   - advise regular sun protection/sunscreen use, SPF 30 or greater with broad spectrum coverage need for reapplication every  minutes.   - recommend broad brimmed hats, UPF sun protective clothing when outdoors for extended periods of time   - recommend self checks at home,  ABCDEs of melanoma discussed   - handouts provided at visit         Follow up:  would recommend RACHEL within next 6mo - 1 year given Aks as above       Ramandeep Anthony MD   [facility]

## 2024-11-18 ENCOUNTER — ANTICOAGULATION VISIT (OUTPATIENT)
Dept: MEDICAL GROUP | Facility: MEDICAL CENTER | Age: 74
End: 2024-11-18
Payer: MEDICARE

## 2024-11-18 VITALS
DIASTOLIC BLOOD PRESSURE: 79 MMHG | SYSTOLIC BLOOD PRESSURE: 130 MMHG | OXYGEN SATURATION: 95 % | RESPIRATION RATE: 15 BRPM | HEART RATE: 93 BPM

## 2024-11-18 DIAGNOSIS — Z79.01 ANTICOAGULATED ON WARFARIN: ICD-10-CM

## 2024-11-18 DIAGNOSIS — Z79.01 LONG TERM (CURRENT) USE OF ANTICOAGULANTS: ICD-10-CM

## 2024-11-18 LAB — INR PPP: 2.1 (ref 2–3.5)

## 2024-11-18 PROCEDURE — 85610 PROTHROMBIN TIME: CPT | Performed by: INTERNAL MEDICINE

## 2024-11-18 PROCEDURE — 93793 ANTICOAG MGMT PT WARFARIN: CPT | Performed by: INTERNAL MEDICINE

## 2024-11-18 PROCEDURE — 3075F SYST BP GE 130 - 139MM HG: CPT | Performed by: INTERNAL MEDICINE

## 2024-11-18 PROCEDURE — 3078F DIAST BP <80 MM HG: CPT | Performed by: INTERNAL MEDICINE

## 2024-11-18 NOTE — PROGRESS NOTES
Anticoagulation Summary  As of 2024      INR goal:  2.0-3.0   TTR:  68.7% (5.3 y)   INR used for dosin.10 (2024)   Warfarin maintenance plan:  7.5 mg (5 mg x 1.5) every Tue, Sat; 5 mg (5 mg x 1) all other days   Weekly warfarin total:  40 mg   Plan last modified:  Adam MacedoD (2022)   Next INR check:  2/10/2025   Target end date:  Indefinite    Indications    Anticoagulated on warfarin [Z79.01]  Long term (current) use of anticoagulants [Z79.01]  DVT (deep venous thrombosis) (HCC) (Resolved) [I82.409]                 Anticoagulation Episode Summary       INR check location:  Anticoagulation Clinic    Preferred lab:  --    Send INR reminders to:  --    Comments:  --          Anticoagulation Care Providers       Provider Role Specialty Phone number    [facility]   [phone]    Keron Garcia M.D.  Internal Medicine [phone]                  Refer to Patient Findings for HPI:  Patient Findings       Negatives:  Signs/symptoms of thrombosis, Signs/symptoms of bleeding, Laboratory test error suspected, Change in health, Change in alcohol use, Change in activity, Upcoming invasive procedure, Emergency department visit, Upcoming dental procedure, Missed doses, Extra doses, Change in medications, Change in diet/appetite, Hospital admission, Bruising, Other complaints            Vitals:    24 0809   BP: 130/79   Pulse: 93   Resp: 15   SpO2: 95%       Verified current warfarin dosing schedule.    Medications reconciled.  Pt is not on antiplatelet therapy.      A/P   INR is therapeutic  Reason(s) for out of range INR today: N/A      Warfarin dosing recommendation: Continue regimen as listed above.    Pt educated to contact our clinic with any changes in medications or s/s of bleeding or thrombosis. Pt is aware to seek immediate medical attention for falls, head injury or deep cuts.    Request pt to return in 12 week(s). Pt agrees.    Adam RiveraD

## 2024-12-03 ENCOUNTER — OFFICE VISIT (OUTPATIENT)
Dept: MEDICAL GROUP | Age: 74
End: 2024-12-03
Payer: MEDICARE

## 2024-12-03 VITALS
SYSTOLIC BLOOD PRESSURE: 120 MMHG | TEMPERATURE: 96.8 F | DIASTOLIC BLOOD PRESSURE: 62 MMHG | HEIGHT: 72 IN | HEART RATE: 104 BPM | BODY MASS INDEX: 32.78 KG/M2 | WEIGHT: 242 LBS

## 2024-12-03 DIAGNOSIS — I87.009 POST-PHLEBITIC SYNDROME: ICD-10-CM

## 2024-12-03 DIAGNOSIS — N41.0 PROSTATITIS, ACUTE: ICD-10-CM

## 2024-12-03 DIAGNOSIS — N39.0 RECURRENT UTI: ICD-10-CM

## 2024-12-03 DIAGNOSIS — Z23 NEED FOR VACCINATION: ICD-10-CM

## 2024-12-03 DIAGNOSIS — I25.10 CORONARY ARTERY DISEASE INVOLVING NATIVE CORONARY ARTERY OF NATIVE HEART WITHOUT ANGINA PECTORIS: ICD-10-CM

## 2024-12-03 DIAGNOSIS — E78.2 MIXED HYPERLIPIDEMIA: ICD-10-CM

## 2024-12-03 DIAGNOSIS — G89.4 CHRONIC PAIN SYNDROME: ICD-10-CM

## 2024-12-03 DIAGNOSIS — I70.0 THORACIC AORTA ATHEROSCLEROSIS (HCC): ICD-10-CM

## 2024-12-03 DIAGNOSIS — F11.20 UNCOMPLICATED OPIOID DEPENDENCE (HCC): ICD-10-CM

## 2024-12-03 DIAGNOSIS — R73.01 IFG (IMPAIRED FASTING GLUCOSE): ICD-10-CM

## 2024-12-03 DIAGNOSIS — N40.0 BPH WITHOUT URINARY OBSTRUCTION: ICD-10-CM

## 2024-12-03 DIAGNOSIS — Z79.01 CHRONIC ANTICOAGULATION: ICD-10-CM

## 2024-12-03 DIAGNOSIS — E55.9 HYPOVITAMINOSIS D: ICD-10-CM

## 2024-12-03 PROBLEM — N18.31 STAGE 3A CHRONIC KIDNEY DISEASE: Status: RESOLVED | Noted: 2023-03-07 | Resolved: 2024-12-03

## 2024-12-03 PROCEDURE — 3074F SYST BP LT 130 MM HG: CPT | Performed by: INTERNAL MEDICINE

## 2024-12-03 PROCEDURE — 99214 OFFICE O/P EST MOD 30 MIN: CPT | Mod: 25 | Performed by: INTERNAL MEDICINE

## 2024-12-03 PROCEDURE — 3078F DIAST BP <80 MM HG: CPT | Performed by: INTERNAL MEDICINE

## 2024-12-03 PROCEDURE — G0009 ADMIN PNEUMOCOCCAL VACCINE: HCPCS | Performed by: INTERNAL MEDICINE

## 2024-12-03 PROCEDURE — G0008 ADMIN INFLUENZA VIRUS VAC: HCPCS | Performed by: INTERNAL MEDICINE

## 2024-12-03 PROCEDURE — 90662 IIV NO PRSV INCREASED AG IM: CPT | Performed by: INTERNAL MEDICINE

## 2024-12-03 PROCEDURE — 90677 PCV20 VACCINE IM: CPT | Performed by: INTERNAL MEDICINE

## 2024-12-03 RX ORDER — EZETIMIBE 10 MG/1
10 TABLET ORAL DAILY
Qty: 90 TABLET | Refills: 3 | Status: SHIPPED | OUTPATIENT
Start: 2024-12-03

## 2024-12-03 RX ORDER — OXYCODONE AND ACETAMINOPHEN 5; 325 MG/1; MG/1
1 TABLET ORAL
Qty: 90 TABLET | Refills: 0 | Status: SHIPPED | OUTPATIENT
Start: 2024-12-03 | End: 2025-03-03

## 2024-12-03 RX ORDER — MULTIVIT-MIN/IRON/FOLIC ACID/K 18-600-40
2000 CAPSULE ORAL DAILY
Qty: 100 CAPSULE | Refills: 3 | Status: SHIPPED | OUTPATIENT
Start: 2024-12-03

## 2024-12-03 RX ORDER — WARFARIN SODIUM 5 MG/1
TABLET ORAL
Qty: 135 TABLET | Refills: 0 | Status: SHIPPED | OUTPATIENT
Start: 2024-12-03

## 2024-12-03 RX ORDER — TAMSULOSIN HYDROCHLORIDE 0.4 MG/1
0.4 CAPSULE ORAL DAILY
Qty: 100 CAPSULE | Refills: 3 | Status: SHIPPED | OUTPATIENT
Start: 2024-12-03

## 2024-12-03 RX ORDER — CIPROFLOXACIN 500 MG/1
500 TABLET, FILM COATED ORAL 2 TIMES DAILY
Qty: 20 TABLET | Refills: 3 | Status: SHIPPED | OUTPATIENT
Start: 2024-12-03

## 2024-12-03 ASSESSMENT — FIBROSIS 4 INDEX: FIB4 SCORE: 1.58

## 2024-12-04 NOTE — PROGRESS NOTES
History of Present Illness  The patient is a 74-year-old male who presents for lab review, follow-up of chronic medical problems, and refill of pain medications.    He continues to take oxycodone as needed, but not more than once daily.    He experienced cramps while on pravastatin, which resolved after discontinuing the medication. He has tried several other statins in the past. He has no history of stroke or heart attack but has had blood clots due to a genetic mutation. His warfarin dosage is adjusted based on his INR readings, which are monitored at the [facility].    He is currently on Flomax and vitamin D, and takes ciprofloxacin nightly. He attempts to walk 2 miles daily. He has undergone cryotherapy for his ears and a few facial spots.    He has not received the influenza vaccine and is not interested in the COVID-19 vaccine. He received the Prevnar 13 vaccine in  and the polysaccharide ointment 23 in 2017.    FAMILY HISTORY  His father  of heart problem.    ALLERGIES  He is allergic to FENOFIBRATE and LOVASTATIN.    IMMUNIZATIONS  He has received shingles vaccine.    Outpatient Medications Prior to Visit   Medication Sig Dispense Refill    warfarin (COUMADIN) 5 MG Tab TAKE 1 TO 1 & 1/2 (ONE & ONE-HALF) TABLETS BY MOUTH ONCE DAILY AS NEEDED OR AS DIRECTED BY [facility]ICO[facility] 135 Tablet 0    ciprofloxacin (CIPRO) 500 MG Tab Take 1 Tablet by mouth 2 times a day. As needed for recurrent prostate or urinary tract infections. May have to reduce  warfarin dosage in half while taking. 20 Tablet 3    tamsulosin (FLOMAX) 0.4 MG capsule Take 1 Capsule by mouth every day. 100 Capsule 3    pravastatin (PRAVACHOL) 40 MG tablet Take 1 Tablet by mouth every evening. (Patient not taking: Reported on 12/3/2024) 30 Tablet 11    Cholecalciferol (VITAMIN D) 50 MCG ( UT) Cap Take 1 Capsule by mouth every day. 100 Capsule 3     No facility-administered medications prior to visit.      .prop  Patient Active Problem List   Diagnosis    History of pulmonary embolism    Factor 5 Leiden mutation, heterozygous (HCC)    Gastroesophageal reflux disease without esophagitis    Hypovitaminosis D    Chronic pain syndrome- right leg from post phlebitic syn; on percocet once a day    Chronic venous hypertension due to DVT    Anticoagulated on warfarin    Mixed hyperlipidemia     Post-phlebitic syndrome- RIGHT LEG    Uncomplicated opioid dependence (HCC)    Benign prostatic hyperplasia with incomplete bladder emptying    Atrophy of right kidney- urology [facility]    Obstructive uropathy    Neurogenic bladder- self cath since 5/2018    Primary osteoarthritis of right knee    Post-phlebitic dermatosis of right lower extremity    Long term (current) use of anticoagulants    Using prophylactic antibiotic on daily basis- keflex for recurrent uti form self cath daily/neurogenic bladder    Obesity due to excess calories with serious comorbidity    Acute cystitis without hematuria    Thoracic aorta atherosclerosis (HCC)           APGARS  One minute Five minutes Ten minutes Fifteen minutes Twenty minutes   Skin color:                 Heart rate:                 Grimace:                  Muscle tone:                  Breathing:                  Totals:                      .apsexc  Assessment & Plan  1. Dyslipidemia.  Despite previous attempts with various statins, his cholesterol levels remain elevated. His risk for heart attack or stroke is significantly higher than average, estimated at 26% over the next decade. This increased risk is likely due to atherosclerosis, as evidenced by CAT scans and x-rays showing plaque buildup in his aorta and coronary arteries. Previous trials with fenofibrate were unsuccessful due to intolerance. Zetia 10 mg once daily was prescribed, with a 90-day supply and 3 refills. Blood work will be ordered to reassess his cholesterol levels. A referral to a cardiologist was suggested for further  evaluation and potential initiation of Praluent injections every 2 weeks. A coronary calcification scoring test was also recommended.    2. Pain Management.  He continues to take oxycodone as needed, typically not more than once a day. A 90-day supply with zero refills was provided. He was advised to contact the office if additional medication is needed while he is in [place].    3. Medication Management.  He requested a refill for ciprofloxacin, which he takes nightly. He continues to take Flomax and vitamin D. Warfarin management is being followed by the anticoagulation clinic, and he performs home INR readings.    4. Health Maintenance.  He was advised to receive the influenza vaccine, RSV vaccine, and Prevnar 20 vaccine. The flu shot and pneumonia shot were administered during the visit.    Follow-up  Return in May 2025 for follow-up.

## 2024-12-15 ENCOUNTER — HOSPITAL ENCOUNTER (OUTPATIENT)
Dept: RADIOLOGY | Facility: MEDICAL CENTER | Age: 74
End: 2024-12-15
Attending: INTERNAL MEDICINE
Payer: COMMERCIAL

## 2024-12-15 DIAGNOSIS — I70.0 THORACIC AORTA ATHEROSCLEROSIS (HCC): ICD-10-CM

## 2024-12-15 DIAGNOSIS — E78.2 MIXED HYPERLIPIDEMIA: ICD-10-CM

## 2024-12-15 DIAGNOSIS — I25.10 CORONARY ARTERY DISEASE INVOLVING NATIVE CORONARY ARTERY OF NATIVE HEART WITHOUT ANGINA PECTORIS: ICD-10-CM

## 2024-12-15 PROCEDURE — 4410556 CT-CARDIAC SCORING (SELF PAY ONLY)

## 2024-12-16 ENCOUNTER — TELEPHONE (OUTPATIENT)
Dept: MEDICAL GROUP | Age: 74
End: 2024-12-16
Payer: MEDICARE

## 2024-12-16 DIAGNOSIS — R93.1 AGATSTON CAC SCORE, >400: ICD-10-CM

## 2024-12-16 DIAGNOSIS — I25.10 CORONARY ARTERY DISEASE INVOLVING NATIVE CORONARY ARTERY OF NATIVE HEART WITHOUT ANGINA PECTORIS: ICD-10-CM

## 2024-12-16 DIAGNOSIS — I25.10 CORONARY ARTERY CALCIFICATION SEEN ON CT SCAN: ICD-10-CM

## 2024-12-16 RX ORDER — ROSUVASTATIN CALCIUM 5 MG/1
5 TABLET, COATED ORAL EVERY EVENING
Qty: 30 TABLET | Refills: 11 | Status: SHIPPED | OUTPATIENT
Start: 2024-12-16

## 2024-12-20 ENCOUNTER — OFFICE VISIT (OUTPATIENT)
Dept: CARDIOLOGY | Facility: MEDICAL CENTER | Age: 74
End: 2024-12-20
Attending: INTERNAL MEDICINE
Payer: MEDICARE

## 2024-12-20 ENCOUNTER — TELEPHONE (OUTPATIENT)
Dept: CARDIOLOGY | Facility: MEDICAL CENTER | Age: 74
End: 2024-12-20

## 2024-12-20 VITALS
HEART RATE: 108 BPM | BODY MASS INDEX: 32.78 KG/M2 | RESPIRATION RATE: 12 BRPM | HEIGHT: 72 IN | DIASTOLIC BLOOD PRESSURE: 68 MMHG | OXYGEN SATURATION: 94 % | WEIGHT: 242 LBS | SYSTOLIC BLOOD PRESSURE: 106 MMHG

## 2024-12-20 DIAGNOSIS — I35.8 AORTIC VALVE SCLEROSIS: ICD-10-CM

## 2024-12-20 DIAGNOSIS — E78.2 MIXED HYPERLIPIDEMIA: ICD-10-CM

## 2024-12-20 DIAGNOSIS — Z79.01 LONG TERM (CURRENT) USE OF ANTICOAGULANTS: ICD-10-CM

## 2024-12-20 PROCEDURE — 93005 ELECTROCARDIOGRAM TRACING: CPT | Mod: TC | Performed by: INTERNAL MEDICINE

## 2024-12-20 PROCEDURE — 3074F SYST BP LT 130 MM HG: CPT | Performed by: INTERNAL MEDICINE

## 2024-12-20 PROCEDURE — 99212 OFFICE O/P EST SF 10 MIN: CPT | Performed by: INTERNAL MEDICINE

## 2024-12-20 PROCEDURE — 3078F DIAST BP <80 MM HG: CPT | Performed by: INTERNAL MEDICINE

## 2024-12-20 PROCEDURE — 99204 OFFICE O/P NEW MOD 45 MIN: CPT | Performed by: INTERNAL MEDICINE

## 2024-12-20 ASSESSMENT — FIBROSIS 4 INDEX: FIB4 SCORE: 1.58

## 2024-12-20 NOTE — ASSESSMENT & PLAN NOTE
Noted history of aortic valve sclerosis on his prior echocardiogram back in 2018.  At this time I would not recommend further workup and will continue to monitor clinically

## 2024-12-20 NOTE — PROGRESS NOTES
[facility]    PatientName:Tavares Rankin Mc CraryDate: 2024  :1950    74 y.o.PCP:Keron Garcia M.D.  MRN:[id number]          Problems and Plans    Mixed hyperlipidemia   Clinically, he is doing fair and not having any active ischemic symptoms however he did have a markedly abnormal coronary calcium scoring and has largely been intolerant of statin therapy.  He is tolerating his Crestor therapy at this time and is tolerated Zetia therapy.  We will add Repatha 140 mg subcutaneous every 2 weeks with subsequent lipid panel testing in 3 months.  He will be evaluated in the clinic after returning from [place] roughly in 2025.    Long term (current) use of anticoagulants  Noted history of prior pulmonary embolism and DVT ultimately found to have factor V Leiden.  He remains on warfarin therapy indefinitely    Aortic valve sclerosis  Noted history of aortic valve sclerosis on his prior echocardiogram back in .  At this time I would not recommend further workup and will continue to monitor clinically    Return in about 4 months (around 2025).      Encounter    Reason for Visit / Chief Complaint: Dyslipidemia    HPI    74-year-old male with known history of factor V deficiency with prior idiopathic pulmonary embolism and DVT on chronic anticoagulation, chronic kidney disease stage III, benign prostatic hypertrophy, dyslipidemia presents in consultation for evaluation of dyslipidemia.    Currently, he notes that he is feeling well and not having significant cardiovascular complaints.  He is walking upwards of 2 miles a day for exercise and is active on his various properties found in [place], [place], [place].  He notes that he has been trialed on multiple statins and had a similar response of overall muscle ache with subsequent overall decreased functional capacity of the muscle.  He describes it as an ascending loss of functional capacity when he is on  statins.  He has tolerated his rosuvastatin taking this every other day.  He is also tolerated Zetia therapy.    His most recent lipid panel consists of total cholesterol of 221 mg/dL, triglycerides 162 mg/dL, HDL 34 mg/dL and LDL of 155 mg/dL on October 31, 2024    Coronary artery calcium scoring on 12/15/2024 demonstrated a score of 1332.5    Echocardiogram performed on 5/14/2018 demonstrated normal LV systolic function with trace mitral valve regurgitation and aortic valve sclerosis without stenosis and normal pericardium.    Nuclear medicine stress testing on 5/15/2018 demonstrated mild reduction in activity in the apical inferior segment likely representing artifact normal LV perfusion with no fixed or reversible defects ejection fraction was estimated be 58%      Past Medical History  Past Medical History:   Diagnosis Date    Blood clotting disorder (HCC) 2012    leg and lung    BPH (benign prostatic hyperplasia)     Chronic pain     Factor V deficiency (HCC)     Neoplasm of uncertain behavior of skin of face 04/04/2019    Obstructive uropathy 04/04/2019    Right inguinal hernia- repair tbd 1/25/2023 dr sandoval 12/7/2022    Stage 3 chronic kidney disease     Urinary bladder disorder      Past Surgical History  Past Surgical History:   Procedure Laterality Date    INGUINAL HERNIA REPAIR ROBOTIC XI Right 1/24/2023    Procedure: ROBOTIC RIGHT INGUINAL HERNIA REPAIR;  Surgeon: Moi Sandoval M.D.;  Location: SURGERY [facility];  Service: Gen Robotic    INGUINAL HERNIA REPAIR ROBOTIC Left 12/18/2017    Procedure: INGUINAL HERNIA REPAIR ROBOTIC- WITH MESH PLACEMENT;  Surgeon: Rasta Reyes M.D.;  Location: SURGERY [facility];  Service: General    COLONOSCOPY  2013     Social History  Social History     Socioeconomic History    Marital status:      Spouse name: Not on file    Number of children: Not on file    Years of education: Not on file    Highest education level: Not on file   Occupational  History    Not on file   Tobacco Use    Smoking status: Former     Types: Cigarettes    Smokeless tobacco: Never   Vaping Use    Vaping status: Never Used   Substance and Sexual Activity    Alcohol use: Not Currently     Alcohol/week: 0.0 - 0.6 oz     Comment: once a month/ rare    Drug use: No    Sexual activity: Not on file   Other Topics Concern    Not on file   Social History Narrative    Not on file     Social Drivers of Health     Financial Resource Strain: Not on file   Food Insecurity: Not on file   Transportation Needs: Not on file   Physical Activity: Not on file   Stress: Not on file   Social Connections: Not on file   Intimate Partner Violence: Not on file   Housing Stability: Not on file     Past Family History  History reviewed. No pertinent family history.  Medication(s)    Current Outpatient Medications:     MAGNESIUM PO, Take  by mouth., Taking    Multiple Vitamins-Minerals (ZINC PO), Take  by mouth., Taking    Evolocumab, 140 mg, Subcutaneous, Q14 DAYS, Taking    rosuvastatin, 5 mg, Oral, Q EVENING, Taking    oxyCODONE-acetaminophen, 1 Tablet, Oral, QDAY PRN, PRN    warfarin, TAKE 1 TO 1 & 1/2 (ONE & ONE-HALF) TABLETS BY MOUTH ONCE DAILY AS NEEDED OR  AS  DIRECTED  BY  [facility]  ANTICOAGULATION  CLINIC, Taking    Vitamin D, 2,000 Units, Oral, DAILY, Taking    tamsulosin, 0.4 mg, Oral, DAILY, Taking    ciprofloxacin, 500 mg, Oral, BID, Taking    ezetimibe, 10 mg, Oral, DAILY, Unknown  Allergies  Other food, Shellfish allergy, Fenofibrate, Lovastatin, Pravastatin, and Statins [hmg-coa-r inhibitors]    Review of Systems    A comprehensive 10 system review was conducted and is negative except as noted above in the HPI or here.      Vital Signs  /68 (BP Location: Left arm, Patient Position: Sitting, BP Cuff Size: Adult)   Pulse (!) 108   Resp 12   Ht 1.829 m (6')   Wt 110 kg (242 lb)   SpO2 94%   BMI 32.82 kg/m²     Physical Exam  Constitutional:       Appearance: Normal appearance. He is  obese.   HENT:      Head: Normocephalic and atraumatic.      Mouth/Throat:      Mouth: Mucous membranes are moist.      Pharynx: Oropharynx is clear.   Eyes:      Extraocular Movements: Extraocular movements intact.      Conjunctiva/sclera: Conjunctivae normal.   Cardiovascular:      Rate and Rhythm: Normal rate and regular rhythm.      Pulses: Normal pulses.      Heart sounds: Normal heart sounds. No murmur heard.     No friction rub. No gallop.   Pulmonary:      Effort: Pulmonary effort is normal.      Breath sounds: Normal breath sounds.   Abdominal:      General: Bowel sounds are normal.      Palpations: Abdomen is soft.   Musculoskeletal:         General: Normal range of motion.      Cervical back: Normal range of motion and neck supple.   Skin:     General: Skin is warm and dry.   Neurological:      General: No focal deficit present.      Mental Status: He is alert and oriented to person, place, and time. Mental status is at baseline.   Psychiatric:         Mood and Affect: Mood normal.         Behavior: Behavior normal.         Thought Content: Thought content normal.         Judgment: Judgment normal.         Lab Results   Component Value Date/Time    TSHULTRASEN 1.340 10/31/2024 1125        Lab Results   Component Value Date/Time    FREET4 0.84 05/01/2013 0853        Lab Results   Component Value Date/Time    HBA1C 5.8 (H) 10/31/2024 11:25 AM       Lab Results   Component Value Date/Time    CHOLSTRLTOT 221 (H) 10/31/2024 11:25 AM     (H) 10/31/2024 11:25 AM    HDL 34 (A) 10/31/2024 11:25 AM    TRIGLYCERIDE 162 (H) 10/31/2024 11:25 AM         Lab Results   Component Value Date/Time    SODIUM 139 10/31/2024 11:25 AM    POTASSIUM 4.4 10/31/2024 11:25 AM    CHLORIDE 108 10/31/2024 11:25 AM    CO2 19 (L) 10/31/2024 11:25 AM    GLUCOSE 93 10/31/2024 11:25 AM    BUN 23 (H) 10/31/2024 11:25 AM    CREATININE 1.23 10/31/2024 11:25 AM    CREATININE 1.3 02/11/2009 04:05 AM       Lab Results   Component Value  Date/Time    ALKPHOSPHAT 84 10/31/2024 11:25 AM    ASTSGOT 19 10/31/2024 11:25 AM    ALTSGPT 18 10/31/2024 11:25 AM    TBILIRUBIN 0.5 10/31/2024 11:25 AM         Imaging  EKG demonstrates sinus tachycardia with normal intervals.  I reviewed interpreted EKG.  Findings are discussed with the patient    Total patient time was estimated to be 45 minutes consisting of chart review, direct patient interaction, medication renewal, plan development and overall communication with the cardiovascular team.        Electronically signed by:   Grabiel Lorenz DO, MPH  [facility]    Portions of this note were completed using voice recognition software (Dragon Naturally speaking software) . Occasional transcription errors may have escaped proof reading. I have made every reasonable attempt to correct obvious errors, but I expect that there are errors of grammar and possibly content that I did not discover before finalizing the note.

## 2024-12-20 NOTE — ASSESSMENT & PLAN NOTE
Noted history of prior pulmonary embolism and DVT ultimately found to have factor V Leiden.  He remains on warfarin therapy indefinitely

## 2024-12-20 NOTE — TELEPHONE ENCOUNTER
Received ERX in Cardiology [facility], ran test claim and rejected pharmacy not contracted. Due to Repatha being a new start medication for the patient, [employer] insurance will require a PA be submitted for coverage.       Prior Authorization for Repatha 140mg/mL auto injector pens (Quantity: 6mL, Days: 84) has been submitted via Cover My Meds: Key ([id number])    Insurance: [employer]    Will follow up in 48-72 business hours.

## 2024-12-20 NOTE — ASSESSMENT & PLAN NOTE
Clinically, he is doing fair and not having any active ischemic symptoms however he did have a markedly abnormal coronary calcium scoring and has largely been intolerant of statin therapy.  He is tolerating his Crestor therapy at this time and is tolerated Zetia therapy.  We will add Repatha 140 mg subcutaneous every 2 weeks with subsequent lipid panel testing in 3 months.  He will be evaluated in the clinic after returning from [place] roughly in June 2025.

## 2024-12-20 NOTE — PATIENT INSTRUCTIONS
Follow up in 4 months  Start Repatha 140mg SC every 2 weeks  Continue current medications  Lipid panel in 3 months  Call with questions.

## 2024-12-21 NOTE — TELEPHONE ENCOUNTER
BD    Caller: Derick Serra     Topic/issue: Pt is wondering when the Evolocumab (REPATHA) 140 MG/ML Solution Auto-injector SubQ injection pen will be sent to the pharmacy   Please advise     Callback Number:[phone]    Thank You   Natasha DRAPER

## 2024-12-23 NOTE — TELEPHONE ENCOUNTER
Hey Ethyl,    This patient's [facility] will need another prescription sent to them please. Can we please resend his Repatha prescription ASAP?    Thank you!    Yessy CRESPO  Rx Coordinator

## 2024-12-23 NOTE — TELEPHONE ENCOUNTER
EUGENIO    Caller: Tavares Napoles     Topic/issue: The patient called for a status update on the following prescription:    Evolocumab (REPATHA) 140 MG/ML Solution Auto-injector SubQ injection [id number] - [place], [place] - [address] [[id number]]     Please advise.     Callback Number: [phone]     Thank you,  Jose NIEVES

## 2024-12-23 NOTE — TELEPHONE ENCOUNTER
Prior Authorization for Repatha 140mg/mL auto injector pens  has been approved for a quantity of 2mL , day supply 28    Prior Authorization reference number: (Key: [id number]) - [id number]  Insurance: [employer]   Effective dates: 12/21/24 - 12/21/25  Copay: Unknown- insurance lockout     Is patient eligible to fill with [facility]? No, test claim rejected stating Must fill prescription with [facility].    Next Steps:  Released Rx to insurance's required pharmacy for filling / delivery

## 2024-12-23 NOTE — TELEPHONE ENCOUNTER
Called [facility] to verify if the co-pay was affordable for the patient.     Spoke with a representative who informed me that his co-pay is $111.18 / 28 days and he is eligible for a co-pay card.     Contacting patient to inform of insurance requirement for pharmacy dispensing of Repatha and offer a co-pay card to lower his cost to $5 / 28 days.

## 2024-12-25 LAB — EKG IMPRESSION: NORMAL

## 2024-12-25 PROCEDURE — 93010 ELECTROCARDIOGRAM REPORT: CPT | Performed by: INTERNAL MEDICINE

## 2024-12-26 ENCOUNTER — PATIENT MESSAGE (OUTPATIENT)
Dept: CARDIOLOGY | Facility: MEDICAL CENTER | Age: 74
End: 2024-12-26
Payer: MEDICARE

## 2024-12-26 DIAGNOSIS — E78.2 MIXED HYPERLIPIDEMIA: ICD-10-CM

## 2025-01-06 ENCOUNTER — OFFICE VISIT (OUTPATIENT)
Dept: MEDICAL GROUP | Age: 75
End: 2025-01-06
Payer: MEDICARE

## 2025-01-06 ENCOUNTER — DOCUMENTATION (OUTPATIENT)
Dept: VASCULAR LAB | Facility: MEDICAL CENTER | Age: 75
End: 2025-01-06

## 2025-01-06 VITALS
TEMPERATURE: 96.9 F | SYSTOLIC BLOOD PRESSURE: 112 MMHG | HEIGHT: 72 IN | WEIGHT: 247 LBS | DIASTOLIC BLOOD PRESSURE: 60 MMHG | HEART RATE: 77 BPM | BODY MASS INDEX: 33.46 KG/M2 | OXYGEN SATURATION: 95 %

## 2025-01-06 DIAGNOSIS — E66.09 CLASS 1 OBESITY DUE TO EXCESS CALORIES WITH SERIOUS COMORBIDITY AND BODY MASS INDEX (BMI) OF 33.0 TO 33.9 IN ADULT: ICD-10-CM

## 2025-01-06 DIAGNOSIS — R93.1 AGATSTON CAC SCORE, >400: ICD-10-CM

## 2025-01-06 DIAGNOSIS — N41.1 CHRONIC PROSTATITIS: ICD-10-CM

## 2025-01-06 DIAGNOSIS — I25.10 CORONARY ARTERY DISEASE INVOLVING NATIVE CORONARY ARTERY OF NATIVE HEART WITHOUT ANGINA PECTORIS: ICD-10-CM

## 2025-01-06 DIAGNOSIS — E66.811 CLASS 1 OBESITY DUE TO EXCESS CALORIES WITH SERIOUS COMORBIDITY AND BODY MASS INDEX (BMI) OF 33.0 TO 33.9 IN ADULT: ICD-10-CM

## 2025-01-06 DIAGNOSIS — E55.9 HYPOVITAMINOSIS D: ICD-10-CM

## 2025-01-06 DIAGNOSIS — I25.10 CORONARY ARTERY CALCIFICATION SEEN ON CT SCAN: ICD-10-CM

## 2025-01-06 DIAGNOSIS — G89.4 CHRONIC PAIN SYNDROME: ICD-10-CM

## 2025-01-06 DIAGNOSIS — F11.20 UNCOMPLICATED OPIOID DEPENDENCE (HCC): ICD-10-CM

## 2025-01-06 DIAGNOSIS — I87.009 POST-PHLEBITIC SYNDROME: ICD-10-CM

## 2025-01-06 DIAGNOSIS — Z79.2 USING PROPHYLACTIC ANTIBIOTIC ON DAILY BASIS: ICD-10-CM

## 2025-01-06 DIAGNOSIS — N40.0 BPH WITHOUT URINARY OBSTRUCTION: ICD-10-CM

## 2025-01-06 DIAGNOSIS — N30.20 CHRONIC CYSTITIS WITHOUT HEMATURIA: ICD-10-CM

## 2025-01-06 DIAGNOSIS — E78.2 MIXED HYPERLIPIDEMIA: ICD-10-CM

## 2025-01-06 DIAGNOSIS — Z79.01 CHRONIC ANTICOAGULATION: ICD-10-CM

## 2025-01-06 PROCEDURE — 3074F SYST BP LT 130 MM HG: CPT | Performed by: INTERNAL MEDICINE

## 2025-01-06 PROCEDURE — 3078F DIAST BP <80 MM HG: CPT | Performed by: INTERNAL MEDICINE

## 2025-01-06 PROCEDURE — 99214 OFFICE O/P EST MOD 30 MIN: CPT | Performed by: INTERNAL MEDICINE

## 2025-01-06 RX ORDER — MULTIVIT-MIN/IRON/FOLIC ACID/K 18-600-40
2000 CAPSULE ORAL DAILY
Qty: 100 CAPSULE | Refills: 3 | Status: SHIPPED | OUTPATIENT
Start: 2025-01-06 | End: 2025-01-06 | Stop reason: SDUPTHER

## 2025-01-06 RX ORDER — CIPROFLOXACIN 500 MG/1
500 TABLET, FILM COATED ORAL 2 TIMES DAILY
Qty: 20 TABLET | Refills: 3 | Status: SHIPPED | OUTPATIENT
Start: 2025-01-06

## 2025-01-06 RX ORDER — ROSUVASTATIN CALCIUM 5 MG/1
5 TABLET, COATED ORAL EVERY EVENING
Qty: 90 TABLET | Refills: 3 | Status: SHIPPED | OUTPATIENT
Start: 2025-01-06

## 2025-01-06 RX ORDER — OXYCODONE AND ACETAMINOPHEN 5; 325 MG/1; MG/1
1 TABLET ORAL
Qty: 90 TABLET | Refills: 0 | Status: SHIPPED | OUTPATIENT
Start: 2025-01-06 | End: 2025-04-06

## 2025-01-06 RX ORDER — MULTIVIT-MIN/IRON/FOLIC ACID/K 18-600-40
2000 CAPSULE ORAL DAILY
Qty: 100 CAPSULE | Refills: 3 | Status: SHIPPED | OUTPATIENT
Start: 2025-01-06

## 2025-01-06 RX ORDER — TAMSULOSIN HYDROCHLORIDE 0.4 MG/1
0.4 CAPSULE ORAL DAILY
Qty: 100 CAPSULE | Refills: 3 | Status: SHIPPED | OUTPATIENT
Start: 2025-01-06

## 2025-01-06 RX ORDER — EZETIMIBE 10 MG/1
10 TABLET ORAL DAILY
Qty: 90 TABLET | Refills: 3 | Status: SHIPPED | OUTPATIENT
Start: 2025-01-06

## 2025-01-06 RX ORDER — ROSUVASTATIN CALCIUM 5 MG/1
5 TABLET, COATED ORAL EVERY EVENING
Qty: 30 TABLET | Refills: 11 | Status: SHIPPED | OUTPATIENT
Start: 2025-01-06 | End: 2025-01-06 | Stop reason: SDUPTHER

## 2025-01-06 RX ORDER — WARFARIN SODIUM 5 MG/1
TABLET ORAL
Status: SHIPPED
Start: 2025-01-06

## 2025-01-06 RX ORDER — TAMSULOSIN HYDROCHLORIDE 0.4 MG/1
0.4 CAPSULE ORAL DAILY
Qty: 100 CAPSULE | Refills: 3 | Status: SHIPPED | OUTPATIENT
Start: 2025-01-06 | End: 2025-01-06 | Stop reason: SDUPTHER

## 2025-01-06 ASSESSMENT — PATIENT HEALTH QUESTIONNAIRE - PHQ9: CLINICAL INTERPRETATION OF PHQ2 SCORE: 0

## 2025-01-06 ASSESSMENT — FIBROSIS 4 INDEX: FIB4 SCORE: 1.58

## 2025-01-06 NOTE — PROGRESS NOTES
History of Present Illness  The patient is a 74-year-old male who presents for evaluation of cholesterol management, weight gain, and immunization status.    He has been prescribed a subcutaneous injection for his cholesterol, to be administered every 14 days, but has not yet received it. He anticipates receiving the medication within the next 2 days. He has been on rosuvastatin, which he reports as well-tolerated without any associated cramps. His last cholesterol check was conducted in August 2024. A recent consultation with a cardiologist revealed some narrowing in his heart, but overall, his heart was deemed healthy. The cardiologist recommended the initiation of a new cholesterol medication. A CT scan performed on 12/16/2024 showed some blockages and calcium deposits, but no heart muscle damage. He was advised to monitor his condition closely. He also underwent an EKG.    He has expressed concerns about weight gain, despite maintaining his usual dietary habits. He is seeking advice on potential weight loss strategies that would be compatible with his current medication regimen.    He has received his influenza, pneumonia, shingles, and Tdap vaccines. He has not yet received the RSV vaccine.    Supplemental Information  He is on warfarin, vitamin D, tamsulosin, magnesium, and ciprofloxacin. He self-catheterizes.    MEDICATIONS  Current: Rosuvastatin, warfarin, pravastatin, vitamin D, tamsulosin, magnesium, and ciprofloxacin.    IMMUNIZATIONS  He has received his influenza, pneumonia, shingles, and Tdap vaccines. He has not yet received the RSV vaccine.    Outpatient Medications Prior to Visit   Medication Sig Dispense Refill    MAGNESIUM PO Take  by mouth.      Multiple Vitamins-Minerals (ZINC PO) Take  by mouth.      rosuvastatin (CRESTOR) 5 MG Tab Take 1 Tablet by mouth every evening. 30 Tablet 11    warfarin (COUMADIN) 5 MG Tab TAKE 1 TO 1 & 1/2 (ONE & ONE-HALF) TABLETS BY MOUTH ONCE DAILY AS NEEDED OR  AS   DIRECTED  BY  [facility]  ANTICOAGULATION  CLINIC 135 Tablet 0    ciprofloxacin (CIPRO) 500 MG Tab Take 1 Tablet by mouth 2 times a day. As needed for recurrent prostate or urinary tract infections. May have to reduce  warfarin dosage in half while taking. 20 Tablet 3    ezetimibe (ZETIA) 10 MG Tab Take 1 Tablet by mouth every day. 90 Tablet 3    Evolocumab (REPATHA) 140 MG/ML Solution Auto-injector SubQ injection pen Inject 1 mL under the skin every 14 days. 2.1 Each 11    oxyCODONE-acetaminophen (PERCOCET) 5-325 MG Tab Take 1 Tablet by mouth 1 time a day as needed for Severe Pain (chronic pain, 90-day supply with 0 refills) for up to 90 days. 90 Tablet 0    Cholecalciferol (VITAMIN D) 50 MCG ( UT) Cap Take 1 Capsule by mouth every day. 100 Capsule 3    tamsulosin (FLOMAX) 0.4 MG capsule Take 1 Capsule by mouth every day. 100 Capsule 3     No facility-administered medications prior to visit.     Office Visit on 2024   Component Date Value    Report 2024                      Value:[facility] [place]    Test Date:  2024  Pt Name:    CLEMENCIA LOBATO              Department: [facility]  MRN:        [id number]                      Room:  Gender:     Male                         Technician:   :        [id number]                   Requested By:PAOLA STEVENS  Order #:    [id number]                    Reading MD: Paola Stevens MD    Measurements  Intervals                                Axis  Rate:       101                          P:          63  NE:         156                          QRS:        -19  QRSD:       66                           T:          64  QT:         310  QTc:        402    Interpretive Statements  Sinus tachycardia  Borderline left axis deviation  Compared to ECG 2023 15:19:51  Sinus rhythm no longer present  Electronically Signed On 2024 15:55:48 PST by Paola Stevens MD        Lab Results   Component Value Date/Time    HBA1C 5.8 (H) 10/31/2024 11:25 AM     "HBA1C 5.7 (H) 08/11/2021 03:17 PM     Lab Results   Component Value Date/Time    SODIUM 139 10/31/2024 11:25 AM    POTASSIUM 4.4 10/31/2024 11:25 AM    CHLORIDE 108 10/31/2024 11:25 AM    CO2 19 (L) 10/31/2024 11:25 AM    GLUCOSE 93 10/31/2024 11:25 AM    BUN 23 (H) 10/31/2024 11:25 AM    CREATININE 1.23 10/31/2024 11:25 AM    CREATININE 1.3 02/11/2009 04:05 AM    ALKPHOSPHAT 84 10/31/2024 11:25 AM    ASTSGOT 19 10/31/2024 11:25 AM    ALTSGPT 18 10/31/2024 11:25 AM    TBILIRUBIN 0.5 10/31/2024 11:25 AM     Lab Results   Component Value Date/Time    INR 2.10 11/18/2024 08:08 AM    INR 2.30 08/26/2024 12:00 AM    INR 2.40 06/03/2024 08:31 AM     Lab Results   Component Value Date/Time    CHOLSTRLTOT 221 (H) 10/31/2024 11:25 AM     (H) 10/31/2024 11:25 AM    HDL 34 (A) 10/31/2024 11:25 AM    TRIGLYCERIDE 162 (H) 10/31/2024 11:25 AM       Lab Results   Component Value Date/Time    TESTOSTERONE 222 01/22/2014 09:04 AM     No results found for: \"TSH\"  Lab Results   Component Value Date/Time    FREET4 0.84 05/01/2013 08:53 AM    FREET4 0.90 01/24/2012 10:38 AM     No results found for: \"URICACID\"  No components found for: \"VITB12\"  Lab Results   Component Value Date/Time    25HYDROXY 44 10/31/2024 11:25 AM    25HYDROXY 58 08/30/2022 11:36 AM   1. Class 1 obesity due to excess calories with serious comorbidity and body mass index (BMI) of 33.0 to 33.9 in adult  Long discussion regarding GLP-1 agonist.  Agrees to start on Ozempic at lowest dose to 5 mg weekly and recheck in 4 to 6 weeks as doing with this then quarterly thereafter.  Adjust dosage according to weight loss response and symptoms  - Semaglutide,0.25 or 0.5MG/DOS, 2 MG/3ML Solution Pen-injector; Inject 0.25 mg under the skin every 7 days.  Dispense: 3 mL; Refill: 1    2. Mixed hyperlipidemia   Patient extremely high coronary calcification scoring of 1300 and started on Repatha by cardiology.  Continue Zetia.  Unable to tolerate more than 5 mg a day of " rosuvastatin which she will remain on.  - Evolocumab (REPATHA) 140 MG/ML Solution Auto-injector SubQ injection pen; Inject 1 mL under the skin every 14 days.  - ezetimibe (ZETIA) 10 MG Tab; Take 1 Tablet by mouth every day.  Dispense: 90 Tablet; Refill: 3    3. Hypovitaminosis D  Good control continue current regimen  - Cholecalciferol (VITAMIN D) 50 MCG (2000 UT) Cap; Take 1 Capsule by mouth every day.  Dispense: 100 Capsule; Refill: 3    4. BPH without urinary obstruction  Good control continue current regimen  - tamsulosin (FLOMAX) 0.4 MG capsule; Take 1 Capsule by mouth every day.  Dispense: 100 Capsule; Refill: 3    5. Uncomplicated opioid dependence (HCC)  Good control continue current regimen  - oxyCODONE-acetaminophen (PERCOCET) 5-325 MG Tab; Take 1 Tablet by mouth 1 time a day as needed for Severe Pain (chronic pain, 90-day supply with 0 refills) for up to 90 days.  Dispense: 90 Tablet; Refill: 0    6. Chronic pain syndrome- right leg from post phlebitic syn; on percocet once a day     - oxyCODONE-acetaminophen (PERCOCET) 5-325 MG Tab; Take 1 Tablet by mouth 1 time a day as needed for Severe Pain (chronic pain, 90-day supply with 0 refills) for up to 90 days.  Dispense: 90 Tablet; Refill: 0    7. Post-phlebitic syndrome- RIGHT LEG     - oxyCODONE-acetaminophen (PERCOCET) 5-325 MG Tab; Take 1 Tablet by mouth 1 time a day as needed for Severe Pain (chronic pain, 90-day supply with 0 refills) for up to 90 days.  Dispense: 90 Tablet; Refill: 0    8. Coronary artery disease involving native coronary artery of native heart without angina pectoris     - rosuvastatin (CRESTOR) 5 MG Tab; Take 1 Tablet by mouth every evening.  Dispense: 30 Tablet; Refill: 11    9. Coronary artery calcification seen on CT scan  See below  - rosuvastatin (CRESTOR) 5 MG Tab; Take 1 Tablet by mouth every evening.  Dispense: 30 Tablet; Refill: 11    10. Agatston CAC score, >400  Greater than 1300.  Start PCSK9 inhibitor Repatha per  cardiology  - rosuvastatin (CRESTOR) 5 MG Tab; Take 1 Tablet by mouth every evening.  Dispense: 30 Tablet; Refill: 11    11. Prostatitis, chronic     - ciprofloxacin (CIPRO) 500 MG Tab; Take 1 Tablet by mouth 2 times a day. As needed for recurrent prostate or urinary tract infections. May have to reduce  warfarin dosage in half while taking.  Dispense: 20 Tablet; Refill: 3    12. Recurrent UTI     - ciprofloxacin (CIPRO) 500 MG Tab; Take 1 Tablet by mouth 2 times a day. As needed for recurrent prostate or urinary tract infections. May have to reduce  warfarin dosage in half while taking.  Dispense: 20 Tablet; Refill: 3    13. Chronic anticoagulation     - warfarin (COUMADIN) 5 MG Tab; TAKE 1 TO 1 & 1/2 (ONE & ONE-HALF) TABLETS BY MOUTH ONCE DAILY AS NEEDED OR  AS  DIRECTED  BY  [facility]  ANTICOAGULATION  CLINIC     Assessment & Plan  1. Cholesterol management.  His coronary calcium score is significantly elevated at 1332, indicating a high risk for myocardial infarction due to extensive plaque accumulation. Despite this, there is no current evidence of severe blockages that would necessitate immediate intervention. He is currently on rosuvastatin 5 mg, which he tolerates well without muscle cramps. He will continue with rosuvastatin 5 mg and start Repatha (evolocumab) injections every 14 days. A follow-up cholesterol level check will be conducted in approximately 3 months to assess the effectiveness of the Repatha injections. He is advised to undergo laboratory tests a week prior to his appointment with Dr. Lorenz in March 2025.    2. Weight gain.  He will be initiated on Ozempic (semaglutide) injections, starting with the lowest dose to mitigate potential gastrointestinal side effects. The dosage will be gradually increased monthly based on tolerance and weight loss progress. He is informed about the possible side effects, including nausea, GI upset, reflux, bloating, gas, belching, and delayed stomach emptying. He  will receive the medication through a mail-order pharmacy, and the cost is approximately $ 175 for a 2 mL vial, which will last for several months at the lowest dose.    3. Immunization status.  He is advised to receive the RSV vaccine at his local pharmacy, as it is covered by Medicare.    Follow-up  The patient will follow up in 4 to 6 weeks.

## 2025-01-06 NOTE — PROGRESS NOTES
[facility] [place] [facility]      Received a message from the patient's PCP stating that he was started on a medication that may have a DDI with warfarin.        Called the patient, but no answer.   Appears the medication prescribed is Ciprofloxacin 500mg BID x 10 days (sent to mail order pharmacy).  LVM requesting the patient call the clinic back to inform us when he will be starting on the abx and so we can set up an appt to test INR and adjust dosing accordingly due to DDI with warfarin.     Will reach out to the patient again if we do not hear back from him in a few days.    Regina Pedersen  PharmD      Addendum:  Patient called back and stated he only uses it prn as he self-caths and does get UTIs occasionally but his use of Ciprofloxacin has not affected his INRs in the past so he will continue to carry on as usual.     Regina Pedersen  PharmD

## 2025-01-15 ENCOUNTER — TELEPHONE (OUTPATIENT)
Dept: CARDIOLOGY | Facility: MEDICAL CENTER | Age: 75
End: 2025-01-15
Payer: MEDICARE

## 2025-01-15 NOTE — TELEPHONE ENCOUNTER
Phone number called: [phone] (home)     To BD: pt wants BD's opinion on which medication has the better efficacy brand name vs generic. I told pt that generic drug is just as effective as brand name drugs but pt wants BD's opinion about this. Pt states that he is willing to pay for the brand name drugs (Repatha, Ozempic) but wants to know if it will make a difference. Please advice. Thank you.

## 2025-01-15 NOTE — TELEPHONE ENCOUNTER
BD    Caller:     Topic/issue: Patient is calling about the Rapatha, the pharmacy ([facility] he use doesn't it in stock. He has questions about Ozempic and Evolocumab. He needs to know what the best coarse of action should be and what medication is needed.    Please advise.    Callback Number: [phone]     Thank you,  Angeles

## 2025-01-17 NOTE — TELEPHONE ENCOUNTER
Grabiel Lorenz D.O.  You2 days ago     BD  Patient will be obtaining branded product given that there is no generic substitute for either medication at this time aside from a compounded pharmacy for Ozempic but recommend utilizing Ozempic from a reputable pharmacy prior to consideration from a compounding pharmacy

## 2025-01-20 DIAGNOSIS — Z79.01 CHRONIC ANTICOAGULATION: ICD-10-CM

## 2025-01-20 NOTE — TELEPHONE ENCOUNTER
Hey Ethyl,    This patient's insurance will only cover his Repatha at [facility]. I will call the [facility] to find out why there is an issue with filling the Repatha.    Thank you!    Yessy CRESPO  Rx Coordinator

## 2025-02-10 ENCOUNTER — ANTICOAGULATION VISIT (OUTPATIENT)
Dept: MEDICAL GROUP | Facility: MEDICAL CENTER | Age: 75
End: 2025-02-10
Payer: MEDICARE

## 2025-02-10 VITALS
SYSTOLIC BLOOD PRESSURE: 107 MMHG | BODY MASS INDEX: 33.5 KG/M2 | HEIGHT: 72 IN | HEART RATE: 96 BPM | DIASTOLIC BLOOD PRESSURE: 74 MMHG | RESPIRATION RATE: 15 BRPM

## 2025-02-10 DIAGNOSIS — Z79.01 ANTICOAGULATED ON WARFARIN: ICD-10-CM

## 2025-02-10 DIAGNOSIS — Z79.01 LONG TERM (CURRENT) USE OF ANTICOAGULANTS: ICD-10-CM

## 2025-02-10 LAB — INR PPP: 1.7 (ref 2–3.5)

## 2025-02-10 PROCEDURE — 99211 OFF/OP EST MAY X REQ PHY/QHP: CPT | Performed by: PHYSICIAN ASSISTANT

## 2025-02-10 PROCEDURE — 3078F DIAST BP <80 MM HG: CPT | Performed by: PHYSICIAN ASSISTANT

## 2025-02-10 PROCEDURE — 3074F SYST BP LT 130 MM HG: CPT | Performed by: PHYSICIAN ASSISTANT

## 2025-02-10 PROCEDURE — 85610 PROTHROMBIN TIME: CPT | Performed by: PHYSICIAN ASSISTANT

## 2025-02-10 NOTE — PROGRESS NOTES
Anticoagulation Summary  As of 2/10/2025      INR goal:  2.0-3.0   TTR:  66.9% (5.5 y)   INR used for dosin.70 (2/10/2025)   Warfarin maintenance plan:  7.5 mg (5 mg x 1.5) every Tue, Sat; 5 mg (5 mg x 1) all other days   Weekly warfarin total:  40 mg   Plan last modified:  Ganesh Mao, PharmD (2022)   Next INR check:  2025   Target end date:  Indefinite    Indications    Anticoagulated on warfarin [Z79.01]  Long term (current) use of anticoagulants [Z79.01]  DVT (deep venous thrombosis) (HCC) (Resolved) [I82.409]                 Anticoagulation Episode Summary       INR check location:  Anticoagulation Clinic    Preferred lab:  --    Send INR reminders to:  --    Comments:  --          Anticoagulation Care Providers       Provider Role Specialty Phone number    [facility]   [phone]    Keron Garcia M.D.  Internal Medicine [phone]                Refer to Patient Findings for HPI:  Patient Findings       Negatives:  Signs/symptoms of thrombosis, Signs/symptoms of bleeding, Laboratory test error suspected, Change in health, Change in alcohol use, Change in activity, Upcoming invasive procedure, Emergency department visit, Upcoming dental procedure, Missed doses, Extra doses, Change in medications, Change in diet/appetite, Hospital admission, Bruising, Other complaints            Date Referral Placed: 25      Vitals:  Vitals:    02/10/25 0933   BP: 107/74   Pulse: 96       Verified current warfarin dosing schedule.    Medications reconciled.  Pt is not on antiplatelet therapy.      A/P   INR is subtherapeutic  Reason(s) for out of range INR today: No obvious causes      Warfarin dosing recommendation: Increase to Warfarin 7.5 mg and then resume previous warfarin dosing.     Pt educated to contact our clinic with any changes in medications or s/s of bleeding or thrombosis. Pt is aware to seek immediate medical attention for falls, head injury or deep  cuts.    Request pt to return in 2 week(s). Pt agrees.    Ammy Mclaughlin, AdamD

## 2025-02-11 ENCOUNTER — DOCUMENTATION (OUTPATIENT)
Dept: VASCULAR LAB | Facility: MEDICAL CENTER | Age: 75
End: 2025-02-11

## 2025-02-11 ENCOUNTER — OFFICE VISIT (OUTPATIENT)
Dept: MEDICAL GROUP | Age: 75
End: 2025-02-11
Payer: MEDICARE

## 2025-02-11 VITALS
WEIGHT: 238 LBS | DIASTOLIC BLOOD PRESSURE: 70 MMHG | OXYGEN SATURATION: 95 % | SYSTOLIC BLOOD PRESSURE: 108 MMHG | BODY MASS INDEX: 32.23 KG/M2 | HEART RATE: 106 BPM | HEIGHT: 72 IN | TEMPERATURE: 97.1 F

## 2025-02-11 DIAGNOSIS — I25.10 CORONARY ARTERY DISEASE INVOLVING NATIVE CORONARY ARTERY OF NATIVE HEART WITHOUT ANGINA PECTORIS: ICD-10-CM

## 2025-02-11 DIAGNOSIS — F11.20 UNCOMPLICATED OPIOID DEPENDENCE (HCC): ICD-10-CM

## 2025-02-11 DIAGNOSIS — R93.1 AGATSTON CAC SCORE, >400: ICD-10-CM

## 2025-02-11 DIAGNOSIS — Z79.01 CHRONIC ANTICOAGULATION: ICD-10-CM

## 2025-02-11 DIAGNOSIS — E55.9 VITAMIN D DEFICIENCY: ICD-10-CM

## 2025-02-11 DIAGNOSIS — N40.0 BPH WITHOUT URINARY OBSTRUCTION: ICD-10-CM

## 2025-02-11 DIAGNOSIS — E66.09 CLASS 1 OBESITY DUE TO EXCESS CALORIES WITH SERIOUS COMORBIDITY AND BODY MASS INDEX (BMI) OF 32.0 TO 32.9 IN ADULT: ICD-10-CM

## 2025-02-11 DIAGNOSIS — I87.009 POST-PHLEBITIC SYNDROME: ICD-10-CM

## 2025-02-11 DIAGNOSIS — G89.4 CHRONIC PAIN SYNDROME: ICD-10-CM

## 2025-02-11 DIAGNOSIS — E78.2 MIXED HYPERLIPIDEMIA: ICD-10-CM

## 2025-02-11 DIAGNOSIS — R73.01 IFG (IMPAIRED FASTING GLUCOSE): ICD-10-CM

## 2025-02-11 DIAGNOSIS — E66.811 CLASS 1 OBESITY DUE TO EXCESS CALORIES WITH SERIOUS COMORBIDITY AND BODY MASS INDEX (BMI) OF 32.0 TO 32.9 IN ADULT: ICD-10-CM

## 2025-02-11 DIAGNOSIS — N41.1 CHRONIC PROSTATITIS: ICD-10-CM

## 2025-02-11 DIAGNOSIS — I25.10 CORONARY ARTERY CALCIFICATION SEEN ON CT SCAN: ICD-10-CM

## 2025-02-11 PROCEDURE — 3078F DIAST BP <80 MM HG: CPT | Performed by: INTERNAL MEDICINE

## 2025-02-11 PROCEDURE — 3074F SYST BP LT 130 MM HG: CPT | Performed by: INTERNAL MEDICINE

## 2025-02-11 PROCEDURE — 99214 OFFICE O/P EST MOD 30 MIN: CPT | Performed by: INTERNAL MEDICINE

## 2025-02-11 RX ORDER — ROSUVASTATIN CALCIUM 5 MG/1
5 TABLET, COATED ORAL EVERY EVENING
Qty: 90 TABLET | Refills: 3 | Status: SHIPPED | OUTPATIENT
Start: 2025-02-11

## 2025-02-11 RX ORDER — OXYCODONE AND ACETAMINOPHEN 5; 325 MG/1; MG/1
1 TABLET ORAL
Qty: 90 TABLET | Refills: 0 | Status: SHIPPED | OUTPATIENT
Start: 2025-02-11 | End: 2025-05-12

## 2025-02-11 RX ORDER — EZETIMIBE 10 MG/1
10 TABLET ORAL DAILY
Qty: 90 TABLET | Refills: 3 | Status: SHIPPED | OUTPATIENT
Start: 2025-02-11

## 2025-02-11 RX ORDER — WARFARIN SODIUM 5 MG/1
TABLET ORAL
Status: SHIPPED
Start: 2025-02-11

## 2025-02-11 RX ORDER — CIPROFLOXACIN 500 MG/1
500 TABLET, FILM COATED ORAL 2 TIMES DAILY
Qty: 20 TABLET | Refills: 3 | Status: SHIPPED | OUTPATIENT
Start: 2025-02-11

## 2025-02-11 ASSESSMENT — FIBROSIS 4 INDEX: FIB4 SCORE: 1.58

## 2025-02-11 ASSESSMENT — ENCOUNTER SYMPTOMS
MUSCULOSKELETAL NEGATIVE: 1
CONSTITUTIONAL NEGATIVE: 1
PSYCHIATRIC NEGATIVE: 1
NEUROLOGICAL NEGATIVE: 1
CARDIOVASCULAR NEGATIVE: 1
EYES NEGATIVE: 1
GASTROINTESTINAL NEGATIVE: 1
RESPIRATORY NEGATIVE: 1

## 2025-02-11 NOTE — ASSESSMENT & PLAN NOTE
Orders:    ciprofloxacin (CIPRO) 500 MG Tab; Take 1 Tablet by mouth 2 times a day. As needed for recurrent prostate or urinary tract infections. May have to reduce  warfarin dosage in half while taking.

## 2025-02-11 NOTE — PROGRESS NOTES
[facility] [place] [facility]      Received notification from the patient's PCP that he prescribed a medication that may interact with warfarin.  Appears the patient was prescribed Ciprofloxacin 500mg BID x 10 days.     Called the patient to discuss further but there was no answer.   LVM requesting the patient call the clinic back to discuss DDI and also requested that he check his INR sooner than his previously scheduled date.     Will await call back from patient.     Regina Pedersen PharmD  [facility]   [phone]

## 2025-02-11 NOTE — ASSESSMENT & PLAN NOTE
Orders:    oxyCODONE-acetaminophen (PERCOCET) 5-325 MG Tab; Take 1 Tablet by mouth 1 time a day as needed for Severe Pain (chronic pain, 90-day supply with 0 refills) for up to 90 days.    Consent for Opiate Prescription

## 2025-02-11 NOTE — ASSESSMENT & PLAN NOTE
Orders:    Evolocumab (REPATHA) 140 MG/ML Solution Auto-injector SubQ injection pen; Inject 1 mL under the skin every 14 days.    Comp Metabolic Panel; Future    CBC WITH DIFFERENTIAL; Future    TSH; Future    Lipid Profile; Future    ezetimibe (ZETIA) 10 MG Tab; Take 1 Tablet by mouth every day.

## 2025-02-11 NOTE — PROGRESS NOTES
Subjective     Derick Napoles is a 74 y.o. male who presents with Medication Problem (Has question on some medication the cardiologist put him on )    History of Present Illness  The patient is a 74-year-old male who presents for evaluation of weight management, pain management, and medication management.    He has been self-administering semaglutide injections at a dose of 0.1 mL every 7 days for the past 2 months, which he reports as being effective. He has experienced no adverse effects such as reflux, heartburn, or indigestion, except for a single episode of diarrhea following the first injection. His weight has decreased from 241 pounds last month to 238 pounds currently. He is seeking a refill of his semaglutide prescription.    He is also requesting a refill of his oxycodone prescription, which he takes daily for pain management. He anticipates a need for additional refills as he plans to travel to [place] for several months and wants to ensure he has an adequate supply of medication.    He had his INR done yesterday, which came back at 1.7. He missed a partial dose of his medication. He is on Repatha, which he got from the cardiologist and it works well. He is doing the injections himself. He has an upcoming appointment with the cardiologist and will get blood work done a week before that.    MEDICATIONS  Current: Repatha, semaglutide, oxycodone    IMMUNIZATIONS  He has received all his pneumonia vaccines, shingles vaccine, Tdap, and varicella.    Outpatient Medications Prior to Visit   Medication Sig Dispense Refill    ezetimibe (ZETIA) 10 MG Tab Take 1 Tablet by mouth every day. 90 Tablet 3    ciprofloxacin (CIPRO) 500 MG Tab Take 1 Tablet by mouth 2 times a day. As needed for recurrent prostate or urinary tract infections. May have to reduce  warfarin dosage in half while taking. 20 Tablet 3    tamsulosin (FLOMAX) 0.4 MG capsule Take 1 Capsule by mouth every day. 100 Capsule 3    Cholecalciferol  (VITAMIN D) 50 MCG (2000 UT) Cap Take 1 Capsule by mouth every day. 100 Capsule 3    warfarin (COUMADIN) 5 MG Tab TAKE 1 TO 1 & 1/2 (ONE & ONE-HALF) TABLETS BY MOUTH ONCE DAILY AS NEEDED OR  AS  DIRECTED  BY  [facility]      rosuvastatin (CRESTOR) 5 MG Tab Take 1 Tablet by mouth every evening. 90 Tablet 3    MAGNESIUM PO Take  by mouth.      Multiple Vitamins-Minerals (ZINC PO) Take  by mouth.      Evolocumab (REPATHA) 140 MG/ML Solution Auto-injector SubQ injection pen Inject 1 mL under the skin every 14 days. 6 Each 3    Semaglutide,0.25 or 0.5MG/DOS, 2 MG/3ML Solution Pen-injector Inject 0.25 mg under the skin every 7 days. (Patient not taking: Reported on 2/10/2025) 3 mL 1    oxyCODONE-acetaminophen (PERCOCET) 5-325 MG Tab Take 1 Tablet by mouth 1 time a day as needed for Severe Pain (chronic pain, 90-day supply with 0 refills) for up to 90 days. 90 Tablet 0     No facility-administered medications prior to visit.     Lab Results   Component Value Date/Time    HBA1C 5.8 (H) 10/31/2024 11:25 AM    HBA1C 5.7 (H) 08/11/2021 03:17 PM     Lab Results   Component Value Date/Time    SODIUM 139 10/31/2024 11:25 AM    POTASSIUM 4.4 10/31/2024 11:25 AM    CHLORIDE 108 10/31/2024 11:25 AM    CO2 19 (L) 10/31/2024 11:25 AM    GLUCOSE 93 10/31/2024 11:25 AM    BUN 23 (H) 10/31/2024 11:25 AM    CREATININE 1.23 10/31/2024 11:25 AM    CREATININE 1.3 02/11/2009 04:05 AM    ALKPHOSPHAT 84 10/31/2024 11:25 AM    ASTSGOT 19 10/31/2024 11:25 AM    ALTSGPT 18 10/31/2024 11:25 AM    TBILIRUBIN 0.5 10/31/2024 11:25 AM     Lab Results   Component Value Date/Time    INR 1.70 (A) 02/10/2025 09:31 AM    INR 2.10 11/18/2024 08:08 AM    INR 2.30 08/26/2024 12:00 AM     Lab Results   Component Value Date/Time    CHOLSTRLTOT 221 (H) 10/31/2024 11:25 AM     (H) 10/31/2024 11:25 AM    HDL 34 (A) 10/31/2024 11:25 AM    TRIGLYCERIDE 162 (H) 10/31/2024 11:25 AM       Lab Results   Component Value Date/Time     "TESTOSTERONE 222 01/22/2014 09:04 AM     No results found for: \"TSH\"  Lab Results   Component Value Date/Time    FREET4 0.84 05/01/2013 08:53 AM    FREET4 0.90 01/24/2012 10:38 AM     No results found for: \"URICACID\"  No components found for: \"VITB12\"  Lab Results   Component Value Date/Time    25HYDROXY 44 10/31/2024 11:25 AM    25HYDROXY 58 08/30/2022 11:36 AM     Anticoagulation Visit on 02/10/2025   Component Date Value    INR 02/10/2025 1.70 (A)                    HPI    Review of Systems   Constitutional: Negative.    HENT: Negative.     Eyes: Negative.    Respiratory: Negative.     Cardiovascular: Negative.    Gastrointestinal: Negative.    Genitourinary: Negative.    Musculoskeletal: Negative.    Skin: Negative.    Neurological: Negative.    Endo/Heme/Allergies: Negative.    Psychiatric/Behavioral: Negative.                Objective     /70 (BP Location: Right arm, Patient Position: Sitting, BP Cuff Size: Adult)   Pulse (!) 106   Temp 36.2 °C (97.1 °F) (Temporal)   Ht 1.829 m (6')   Wt 108 kg (238 lb)   SpO2 95%   BMI 32.28 kg/m²      Physical Exam  Constitutional:       General: He is not in acute distress.     Appearance: He is well-developed. He is not diaphoretic.   HENT:      Head: Normocephalic and atraumatic.      Right Ear: External ear normal.      Left Ear: External ear normal.      Nose: Nose normal.      Mouth/Throat:      Pharynx: No oropharyngeal exudate.   Eyes:      General: No scleral icterus.        Right eye: No discharge.         Left eye: No discharge.      Conjunctiva/sclera: Conjunctivae normal.      Pupils: Pupils are equal, round, and reactive to light.   Neck:      Thyroid: No thyromegaly.      Vascular: No JVD.      Trachea: No tracheal deviation.   Cardiovascular:      Rate and Rhythm: Normal rate and regular rhythm.      Heart sounds: Normal heart sounds. No murmur heard.     No friction rub. No gallop.   Pulmonary:      Effort: Pulmonary effort is normal. No " respiratory distress.      Breath sounds: Normal breath sounds. No stridor. No wheezing or rales.   Chest:      Chest wall: No tenderness.   Abdominal:      General: Bowel sounds are normal. There is no distension.      Palpations: Abdomen is soft. There is no mass.      Tenderness: There is no abdominal tenderness. There is no guarding or rebound.   Musculoskeletal:         General: No tenderness. Normal range of motion.      Cervical back: Normal range of motion and neck supple.   Lymphadenopathy:      Cervical: No cervical adenopathy.   Skin:     General: Skin is warm and dry.      Coloration: Skin is not pale.      Findings: No erythema or rash.   Neurological:      Mental Status: He is alert and oriented to person, place, and time.      Motor: No abnormal muscle tone.      Coordination: Coordination normal.      Deep Tendon Reflexes: Reflexes are normal and symmetric. Reflexes normal.   Psychiatric:         Behavior: Behavior normal.         Thought Content: Thought content normal.         Judgment: Judgment normal.                             Assessment & Plan        Assessment & Plan  Mixed hyperlipidemia  Good control continue current regimen  Orders:    Evolocumab (REPATHA) 140 MG/ML Solution Auto-injector SubQ injection pen; Inject 1 mL under the skin every 14 days.    Comp Metabolic Panel; Future    CBC WITH DIFFERENTIAL; Future    TSH; Future    Lipid Profile; Future    ezetimibe (ZETIA) 10 MG Tab; Take 1 Tablet by mouth every day.    Uncomplicated opioid dependence (HCC)  Good control continue current regimen  Orders:    oxyCODONE-acetaminophen (PERCOCET) 5-325 MG Tab; Take 1 Tablet by mouth 1 time a day as needed for Severe Pain (chronic pain, 90-day supply with 0 refills) for up to 90 days.    Consent for Opiate Prescription    Chronic pain syndrome- right leg from post phlebitic syn; on percocet once a day    Orders:    oxyCODONE-acetaminophen (PERCOCET) 5-325 MG Tab; Take 1 Tablet by mouth 1 time a  day as needed for Severe Pain (chronic pain, 90-day supply with 0 refills) for up to 90 days.    Consent for Opiate Prescription    Post-phlebitic syndrome- RIGHT LEG    Orders:    oxyCODONE-acetaminophen (PERCOCET) 5-325 MG Tab; Take 1 Tablet by mouth 1 time a day as needed for Severe Pain (chronic pain, 90-day supply with 0 refills) for up to 90 days.    Consent for Opiate Prescription    IFG (impaired fasting glucose)  Mentoring diet and exercise  Orders:    HEMOGLOBIN A1C; Future    Vitamin D deficiency  Good control continue 1000 units of vitamin D3 daily.  Orders:    VITAMIN D,25 HYDROXY (DEFICIENCY); Future    BPH without urinary obstruction  Continue self cathing.  Antibiotics to be refilled.  Orders:    PROSTATE SPECIFIC AG DIAGNOSTIC; Future    Class 1 obesity due to excess calories with serious comorbidity and body mass index (BMI) of 32.0 to 32.9 in adult  Doing well on current regimen.  Increase semaglutide to 0.375 or 0.5 mg every 7 days (0.15 or 0.2 mL).  He will adjust according to any potential side effects.  Orders:    Semaglutide,0.25 or 0.5MG/DOS, 2 MG/3ML Solution Pen-injector; Inject 0.5 mg under the skin every 7 days.    Patient identified as having weight management issue.  Appropriate orders and counseling given.    Chronic prostatitis    Orders:    ciprofloxacin (CIPRO) 500 MG Tab; Take 1 Tablet by mouth 2 times a day. As needed for recurrent prostate or urinary tract infections. May have to reduce  warfarin dosage in half while taking.    Mixed hyperlipidemia     Orders:    Evolocumab (REPATHA) 140 MG/ML Solution Auto-injector SubQ injection pen; Inject 1 mL under the skin every 14 days.    Comp Metabolic Panel; Future    CBC WITH DIFFERENTIAL; Future    TSH; Future    Lipid Profile; Future    ezetimibe (ZETIA) 10 MG Tab; Take 1 Tablet by mouth every day.    Coronary artery disease involving native coronary artery of native heart without angina pectoris    Orders:    rosuvastatin (CRESTOR)  5 MG Tab; Take 1 Tablet by mouth every evening.    Coronary artery calcification seen on CT scan    Orders:    rosuvastatin (CRESTOR) 5 MG Tab; Take 1 Tablet by mouth every evening.    Agatston CAC score, >400    Orders:    rosuvastatin (CRESTOR) 5 MG Tab; Take 1 Tablet by mouth every evening.    Chronic anticoagulation    Orders:    warfarin (COUMADIN) 5 MG Tab; TAKE 1 TO 1 & 1/2 (ONE & ONE-HALF) TABLETS BY MOUTH ONCE DAILY AS NEEDED OR  AS  DIRECTED  BY  [facility]

## 2025-02-11 NOTE — ASSESSMENT & PLAN NOTE
Good control continue current regimen  Orders:    Evolocumab (REPATHA) 140 MG/ML Solution Auto-injector SubQ injection pen; Inject 1 mL under the skin every 14 days.    Comp Metabolic Panel; Future    CBC WITH DIFFERENTIAL; Future    TSH; Future    Lipid Profile; Future    ezetimibe (ZETIA) 10 MG Tab; Take 1 Tablet by mouth every day.

## 2025-02-11 NOTE — ASSESSMENT & PLAN NOTE
Doing well on current regimen.  Increase semaglutide to 0.375 or 0.5 mg every 7 days (0.15 or 0.2 mL).  He will adjust according to any potential side effects.  Orders:    Semaglutide,0.25 or 0.5MG/DOS, 2 MG/3ML Solution Pen-injector; Inject 0.5 mg under the skin every 7 days.    Patient identified as having weight management issue.  Appropriate orders and counseling given.

## 2025-02-11 NOTE — ASSESSMENT & PLAN NOTE
Good control continue current regimen  Orders:    oxyCODONE-acetaminophen (PERCOCET) 5-325 MG Tab; Take 1 Tablet by mouth 1 time a day as needed for Severe Pain (chronic pain, 90-day supply with 0 refills) for up to 90 days.    Consent for Opiate Prescription

## 2025-02-27 ENCOUNTER — ANTICOAGULATION VISIT (OUTPATIENT)
Dept: MEDICAL GROUP | Facility: MEDICAL CENTER | Age: 75
End: 2025-02-27
Payer: MEDICARE

## 2025-02-27 VITALS
DIASTOLIC BLOOD PRESSURE: 77 MMHG | HEART RATE: 86 BPM | HEIGHT: 72 IN | WEIGHT: 238 LBS | BODY MASS INDEX: 32.23 KG/M2 | SYSTOLIC BLOOD PRESSURE: 119 MMHG

## 2025-02-27 DIAGNOSIS — Z79.01 LONG TERM (CURRENT) USE OF ANTICOAGULANTS: ICD-10-CM

## 2025-02-27 DIAGNOSIS — Z79.01 ANTICOAGULATED ON WARFARIN: ICD-10-CM

## 2025-02-27 LAB — INR PPP: 2.7 (ref 2–3.5)

## 2025-02-27 ASSESSMENT — FIBROSIS 4 INDEX: FIB4 SCORE: 1.58

## 2025-02-27 NOTE — PROGRESS NOTES
Anticoagulation Summary  As of 2025      INR goal:  2.0-3.0   TTR:  66.9% (5.5 y)   INR used for dosin.70 (2025)   Warfarin maintenance plan:  7.5 mg (5 mg x 1.5) every Tue, Sat; 5 mg (5 mg x 1) all other days   Weekly warfarin total:  40 mg   Plan last modified:  Ganesh Mao, PharmD (2022)   Next INR check:  4/10/2025   Target end date:  Indefinite    Indications    Anticoagulated on warfarin [Z79.01]  Long term (current) use of anticoagulants [Z79.01]  DVT (deep venous thrombosis) (HCC) (Resolved) [I82.409]                 Anticoagulation Episode Summary       INR check location:  Anticoagulation [facility]    Preferred lab:  --    Send INR reminders to:  --    Comments:  --          Anticoagulation Care Providers       Provider Role Specialty Phone number    [facility]   [phone]    Keron Garcia M.D.  Internal Medicine [phone]                Refer to Patient Findings for HPI:  Patient Findings       Negatives:  Signs/symptoms of thrombosis, Signs/symptoms of bleeding, Laboratory test error suspected, Change in health, Change in alcohol use, Change in activity, Upcoming invasive procedure, Emergency department visit, Upcoming dental procedure, Missed doses, Extra doses, Change in medications, Change in diet/appetite, Hospital admission, Bruising, Other complaints            Date Referral Placed: 25      Vitals:  Vitals:    25 0753   BP: 119/77   Pulse: 86   Weight: 108 kg (238 lb)   Height: 1.829 m (6')       Verified current warfarin dosing schedule.    Medications reconciled.  Pt is not on antiplatelet therapy.      A/P   INR is therapeutic  Reason(s) for out of range INR today: N/A      Warfarin dosing recommendation: Continue regimen as listed above.    Pt educated to contact our clinic with any changes in medications or s/s of bleeding or thrombosis. Pt is aware to seek immediate medical attention for falls, head injury or deep cuts.    Request pt  to return in 6 week(s). Pt agrees.    Adam RiveraD

## 2025-03-05 ENCOUNTER — APPOINTMENT (OUTPATIENT)
Dept: MEDICAL GROUP | Age: 75
End: 2025-03-05
Payer: MEDICARE

## 2025-03-08 ENCOUNTER — HOSPITAL ENCOUNTER (OUTPATIENT)
Dept: LAB | Facility: MEDICAL CENTER | Age: 75
End: 2025-03-08
Attending: INTERNAL MEDICINE
Payer: MEDICARE

## 2025-03-08 DIAGNOSIS — N40.0 BPH WITHOUT URINARY OBSTRUCTION: ICD-10-CM

## 2025-03-08 DIAGNOSIS — R73.01 IFG (IMPAIRED FASTING GLUCOSE): ICD-10-CM

## 2025-03-08 DIAGNOSIS — E55.9 VITAMIN D DEFICIENCY: ICD-10-CM

## 2025-03-08 DIAGNOSIS — E78.2 MIXED HYPERLIPIDEMIA: ICD-10-CM

## 2025-03-08 LAB
25(OH)D3 SERPL-MCNC: 41 NG/ML (ref 30–100)
ALBUMIN SERPL BCP-MCNC: 4.2 G/DL (ref 3.2–4.9)
ALBUMIN/GLOB SERPL: 1.4 G/DL
ALP SERPL-CCNC: 70 U/L (ref 30–99)
ALT SERPL-CCNC: 37 U/L (ref 2–50)
ANION GAP SERPL CALC-SCNC: 10 MMOL/L (ref 7–16)
AST SERPL-CCNC: 27 U/L (ref 12–45)
BASOPHILS # BLD AUTO: 0.5 % (ref 0–1.8)
BASOPHILS # BLD: 0.04 K/UL (ref 0–0.12)
BILIRUB SERPL-MCNC: 0.5 MG/DL (ref 0.1–1.5)
BUN SERPL-MCNC: 20 MG/DL (ref 8–22)
CALCIUM ALBUM COR SERPL-MCNC: 10.1 MG/DL (ref 8.5–10.5)
CALCIUM SERPL-MCNC: 10.3 MG/DL (ref 8.5–10.5)
CHLORIDE SERPL-SCNC: 104 MMOL/L (ref 96–112)
CHOLEST SERPL-MCNC: 77 MG/DL (ref 100–199)
CO2 SERPL-SCNC: 24 MMOL/L (ref 20–33)
CREAT SERPL-MCNC: 1.3 MG/DL (ref 0.5–1.4)
EOSINOPHIL # BLD AUTO: 0.24 K/UL (ref 0–0.51)
EOSINOPHIL NFR BLD: 2.8 % (ref 0–6.9)
ERYTHROCYTE [DISTWIDTH] IN BLOOD BY AUTOMATED COUNT: 44.9 FL (ref 35.9–50)
EST. AVERAGE GLUCOSE BLD GHB EST-MCNC: 117 MG/DL
GFR SERPLBLD CREATININE-BSD FMLA CKD-EPI: 57 ML/MIN/1.73 M 2
GLOBULIN SER CALC-MCNC: 2.9 G/DL (ref 1.9–3.5)
GLUCOSE SERPL-MCNC: 97 MG/DL (ref 65–99)
HBA1C MFR BLD: 5.7 % (ref 4–5.6)
HCT VFR BLD AUTO: 52.9 % (ref 42–52)
HDLC SERPL-MCNC: 45 MG/DL
HGB BLD-MCNC: 17.5 G/DL (ref 14–18)
IMM GRANULOCYTES # BLD AUTO: 0.02 K/UL (ref 0–0.11)
IMM GRANULOCYTES NFR BLD AUTO: 0.2 % (ref 0–0.9)
LDLC SERPL CALC-MCNC: 13 MG/DL
LYMPHOCYTES # BLD AUTO: 1.3 K/UL (ref 1–4.8)
LYMPHOCYTES NFR BLD: 15 % (ref 22–41)
MCH RBC QN AUTO: 31.3 PG (ref 27–33)
MCHC RBC AUTO-ENTMCNC: 33.1 G/DL (ref 32.3–36.5)
MCV RBC AUTO: 94.5 FL (ref 81.4–97.8)
MONOCYTES # BLD AUTO: 0.66 K/UL (ref 0–0.85)
MONOCYTES NFR BLD AUTO: 7.6 % (ref 0–13.4)
NEUTROPHILS # BLD AUTO: 6.42 K/UL (ref 1.82–7.42)
NEUTROPHILS NFR BLD: 73.9 % (ref 44–72)
NRBC # BLD AUTO: 0 K/UL
NRBC BLD-RTO: 0 /100 WBC (ref 0–0.2)
PLATELET # BLD AUTO: 230 K/UL (ref 164–446)
PMV BLD AUTO: 10.1 FL (ref 9–12.9)
POTASSIUM SERPL-SCNC: 5.1 MMOL/L (ref 3.6–5.5)
PROT SERPL-MCNC: 7.1 G/DL (ref 6–8.2)
PSA SERPL DL<=0.01 NG/ML-MCNC: 1.76 NG/ML (ref 0–4)
RBC # BLD AUTO: 5.6 M/UL (ref 4.7–6.1)
SODIUM SERPL-SCNC: 138 MMOL/L (ref 135–145)
TRIGL SERPL-MCNC: 93 MG/DL (ref 0–149)
TSH SERPL-ACNC: 0.94 UIU/ML (ref 0.35–5.5)
WBC # BLD AUTO: 8.7 K/UL (ref 4.8–10.8)

## 2025-03-08 PROCEDURE — 36415 COLL VENOUS BLD VENIPUNCTURE: CPT

## 2025-03-08 PROCEDURE — 84443 ASSAY THYROID STIM HORMONE: CPT

## 2025-03-08 PROCEDURE — 85025 COMPLETE CBC W/AUTO DIFF WBC: CPT

## 2025-03-08 PROCEDURE — 83036 HEMOGLOBIN GLYCOSYLATED A1C: CPT | Mod: GA

## 2025-03-08 PROCEDURE — 80061 LIPID PANEL: CPT

## 2025-03-08 PROCEDURE — 82306 VITAMIN D 25 HYDROXY: CPT

## 2025-03-08 PROCEDURE — 80053 COMPREHEN METABOLIC PANEL: CPT

## 2025-03-08 PROCEDURE — 84153 ASSAY OF PSA TOTAL: CPT

## 2025-03-12 ENCOUNTER — OFFICE VISIT (OUTPATIENT)
Dept: MEDICAL GROUP | Age: 75
End: 2025-03-12
Payer: MEDICARE

## 2025-03-12 ENCOUNTER — TELEPHONE (OUTPATIENT)
Dept: VASCULAR LAB | Facility: MEDICAL CENTER | Age: 75
End: 2025-03-12

## 2025-03-12 VITALS
BODY MASS INDEX: 31.83 KG/M2 | DIASTOLIC BLOOD PRESSURE: 70 MMHG | HEIGHT: 72 IN | HEART RATE: 93 BPM | WEIGHT: 235 LBS | TEMPERATURE: 97.3 F | SYSTOLIC BLOOD PRESSURE: 118 MMHG | OXYGEN SATURATION: 94 %

## 2025-03-12 DIAGNOSIS — R93.1 AGATSTON CAC SCORE, >400: ICD-10-CM

## 2025-03-12 DIAGNOSIS — I25.10 CORONARY ARTERY CALCIFICATION SEEN ON CT SCAN: ICD-10-CM

## 2025-03-12 DIAGNOSIS — E55.9 HYPOVITAMINOSIS D: ICD-10-CM

## 2025-03-12 DIAGNOSIS — E78.2 MIXED HYPERLIPIDEMIA: ICD-10-CM

## 2025-03-12 DIAGNOSIS — Z79.01 CHRONIC ANTICOAGULATION: ICD-10-CM

## 2025-03-12 DIAGNOSIS — E66.811 CLASS 1 OBESITY DUE TO EXCESS CALORIES WITH SERIOUS COMORBIDITY AND BODY MASS INDEX (BMI) OF 32.0 TO 32.9 IN ADULT: ICD-10-CM

## 2025-03-12 DIAGNOSIS — I87.091: ICD-10-CM

## 2025-03-12 DIAGNOSIS — N41.1 CHRONIC PROSTATITIS: ICD-10-CM

## 2025-03-12 DIAGNOSIS — I25.10 CORONARY ARTERY DISEASE INVOLVING NATIVE CORONARY ARTERY OF NATIVE HEART WITHOUT ANGINA PECTORIS: ICD-10-CM

## 2025-03-12 DIAGNOSIS — N40.0 BPH WITHOUT URINARY OBSTRUCTION: ICD-10-CM

## 2025-03-12 DIAGNOSIS — G89.4 CHRONIC PAIN SYNDROME: ICD-10-CM

## 2025-03-12 DIAGNOSIS — E66.09 CLASS 1 OBESITY DUE TO EXCESS CALORIES WITH SERIOUS COMORBIDITY AND BODY MASS INDEX (BMI) OF 32.0 TO 32.9 IN ADULT: ICD-10-CM

## 2025-03-12 DIAGNOSIS — F11.20 UNCOMPLICATED OPIOID DEPENDENCE (HCC): ICD-10-CM

## 2025-03-12 DIAGNOSIS — I87.009 POST-PHLEBITIC SYNDROME: ICD-10-CM

## 2025-03-12 PROCEDURE — 3078F DIAST BP <80 MM HG: CPT | Performed by: INTERNAL MEDICINE

## 2025-03-12 PROCEDURE — 3074F SYST BP LT 130 MM HG: CPT | Performed by: INTERNAL MEDICINE

## 2025-03-12 PROCEDURE — 99214 OFFICE O/P EST MOD 30 MIN: CPT | Performed by: INTERNAL MEDICINE

## 2025-03-12 RX ORDER — ROSUVASTATIN CALCIUM 5 MG/1
5 TABLET, COATED ORAL EVERY EVENING
Qty: 90 TABLET | Refills: 3 | Status: SHIPPED | OUTPATIENT
Start: 2025-03-12

## 2025-03-12 RX ORDER — WARFARIN SODIUM 5 MG/1
TABLET ORAL
Status: SHIPPED
Start: 2025-03-12

## 2025-03-12 RX ORDER — EZETIMIBE 10 MG/1
10 TABLET ORAL DAILY
Qty: 90 TABLET | Refills: 3 | Status: SHIPPED | OUTPATIENT
Start: 2025-03-12

## 2025-03-12 RX ORDER — CIPROFLOXACIN 500 MG/1
500 TABLET, FILM COATED ORAL 2 TIMES DAILY
Qty: 20 TABLET | Refills: 3 | Status: SHIPPED | OUTPATIENT
Start: 2025-03-12

## 2025-03-12 RX ORDER — ROSUVASTATIN CALCIUM 5 MG/1
5 TABLET, COATED ORAL EVERY EVENING
Qty: 90 TABLET | Refills: 3 | Status: SHIPPED | OUTPATIENT
Start: 2025-03-12 | End: 2025-03-12 | Stop reason: SDUPTHER

## 2025-03-12 RX ORDER — MULTIVIT-MIN/IRON/FOLIC ACID/K 18-600-40
2000 CAPSULE ORAL DAILY
Qty: 100 CAPSULE | Refills: 3 | Status: SHIPPED | OUTPATIENT
Start: 2025-03-12

## 2025-03-12 RX ORDER — OXYCODONE AND ACETAMINOPHEN 5; 325 MG/1; MG/1
1 TABLET ORAL
Qty: 90 TABLET | Refills: 0 | Status: SHIPPED | OUTPATIENT
Start: 2025-03-12 | End: 2025-06-10

## 2025-03-12 RX ORDER — EZETIMIBE 10 MG/1
10 TABLET ORAL DAILY
Qty: 90 TABLET | Refills: 3 | Status: SHIPPED | OUTPATIENT
Start: 2025-03-12 | End: 2025-03-12 | Stop reason: SDUPTHER

## 2025-03-12 RX ORDER — TAMSULOSIN HYDROCHLORIDE 0.4 MG/1
0.4 CAPSULE ORAL DAILY
Qty: 100 CAPSULE | Refills: 3 | Status: SHIPPED | OUTPATIENT
Start: 2025-03-12

## 2025-03-12 RX ORDER — CIPROFLOXACIN 500 MG/1
500 TABLET, FILM COATED ORAL 2 TIMES DAILY
Qty: 20 TABLET | Refills: 3 | Status: SHIPPED | OUTPATIENT
Start: 2025-03-12 | End: 2025-03-12 | Stop reason: SDUPTHER

## 2025-03-12 RX ORDER — TAMSULOSIN HYDROCHLORIDE 0.4 MG/1
0.4 CAPSULE ORAL DAILY
Qty: 100 CAPSULE | Refills: 3 | Status: SHIPPED | OUTPATIENT
Start: 2025-03-12 | End: 2025-03-12 | Stop reason: SDUPTHER

## 2025-03-12 ASSESSMENT — FIBROSIS 4 INDEX: FIB4 SCORE: 1.45

## 2025-03-12 ASSESSMENT — ENCOUNTER SYMPTOMS
MUSCULOSKELETAL NEGATIVE: 1
CARDIOVASCULAR NEGATIVE: 1
RESPIRATORY NEGATIVE: 1
CONSTITUTIONAL NEGATIVE: 1
EYES NEGATIVE: 1
GASTROINTESTINAL NEGATIVE: 1
NEUROLOGICAL NEGATIVE: 1
PSYCHIATRIC NEGATIVE: 1

## 2025-03-12 NOTE — ASSESSMENT & PLAN NOTE
Orders:    Cholecalciferol (VITAMIN D) 50 MCG (2000 UT) Cap; Take 1 Capsule by mouth every day.

## 2025-03-12 NOTE — ASSESSMENT & PLAN NOTE
Patient is currently on semaglutide 5 mg per 2 mL vials through [facility] mail order pharmacy.  He has been taking 0.375 mg/week.  He will increase it to 0.5 mg weekly which is 0.2 mL.  If he is unable to get this from [facility], he will let us know and we will switch him to [facility].  Patient be leaving next month for 2-month stay in [place] until June, 3 months from now, when he returns.  Orders:    Semaglutide,0.25 or 0.5MG/DOS, 2 MG/3ML Solution Pen-injector; Inject 0.5 mg under the skin every 7 days.

## 2025-03-12 NOTE — ASSESSMENT & PLAN NOTE
Orders:    Comp Metabolic Panel; Future    CBC WITH DIFFERENTIAL; Future    TSH; Future    Lipid Profile; Future    Evolocumab (REPATHA) 140 MG/ML Solution Auto-injector SubQ injection pen; Inject 1 mL under the skin every 14 days.    ezetimibe (ZETIA) 10 MG Tab; Take 1 Tablet by mouth every day.

## 2025-03-12 NOTE — PROGRESS NOTES
Subjective     Derick Napoles is a 74 y.o. male who presents with Follow-Up (Lab review )    History of Present Illness  The patient is a 74-year-old male who presents for evaluation of cholesterol management, diabetes mellitus, and health maintenance.    He has been adhering to a regimen of Repatha injections every 2 weeks, which has resulted in a significant reduction in his cholesterol levels. He expresses a desire to further improve his vascular health.    He has also been self-administering semaglutide injections weekly, with an increased dosage of 50 percent, without any adverse reactions. However, he notes a transient cognitive impairment on the day of the injection, necessitating abstinence from driving. He reports no gastrointestinal disturbances such as bloating or gas. He has made commendable efforts to reduce his sugar intake, achieving approximately 99 percent compliance. An incident of consuming a sugar-laden cherry pie resulted in a mild illness. He has experienced a weight loss of 3 pounds since the last visit and reports a decreased appetite. He is currently using 4 pens of semaglutide per month and requires a refill. He is planning a trip to [place] for a duration of 8 weeks and will ensure to carry sufficient medication for the entire period.    He has not received the RSV vaccine but has completed the shingles and influenza vaccines. He has an upcoming appointment with his cardiologist on 04/14/2025 or 04/15/2025.    Supplemental Information  He has sufficient pain medication to last for 3 months, which he uses sparingly, only as needed.    MEDICATIONS  Repatha, semaglutide.    IMMUNIZATIONS  He has received the shingles and influenza vaccines.      Patient Active Problem List   Diagnosis    History of pulmonary embolism    Factor 5 Leiden mutation, heterozygous (HCC)    Gastroesophageal reflux disease without esophagitis    Hypovitaminosis D    Chronic pain syndrome- right leg from post  phlebitic syn; on percocet once a day    Chronic venous hypertension due to DVT    Anticoagulated on warfarin    Mixed hyperlipidemia     Post-phlebitic syndrome- RIGHT LEG    Uncomplicated opioid dependence (HCC)    Benign prostatic hyperplasia with incomplete bladder emptying    Atrophy of right kidney- urology [facility]    Obstructive uropathy    Neurogenic bladder- self cath since 5/2018    Primary osteoarthritis of right knee    Post-phlebitic dermatosis of right lower extremity    Long term (current) use of anticoagulants    Using prophylactic antibiotic on daily basis- keflex for recurrent uti form self cath daily/neurogenic bladder    Obesity due to excess calories with serious comorbidity    Chronic cystitis without hematuria    Thoracic aorta atherosclerosis (HCC)    Aortic valve sclerosis    Chronic prostatitis     Outpatient Medications Prior to Visit   Medication Sig Dispense Refill    MAGNESIUM PO Take  by mouth.      Multiple Vitamins-Minerals (ZINC PO) Take  by mouth.      Semaglutide,0.25 or 0.5MG/DOS, 2 MG/3ML Solution Pen-injector Inject 0.5 mg under the skin every 7 days. 3 mL 1    Evolocumab (REPATHA) 140 MG/ML Solution Auto-injector SubQ injection pen Inject 1 mL under the skin every 14 days.      oxyCODONE-acetaminophen (PERCOCET) 5-325 MG Tab Take 1 Tablet by mouth 1 time a day as needed for Severe Pain (chronic pain, 90-day supply with 0 refills) for up to 90 days. 90 Tablet 0    ciprofloxacin (CIPRO) 500 MG Tab Take 1 Tablet by mouth 2 times a day. As needed for recurrent prostate or urinary tract infections. May have to reduce  warfarin dosage in half while taking. 20 Tablet 3    ezetimibe (ZETIA) 10 MG Tab Take 1 Tablet by mouth every day. 90 Tablet 3    rosuvastatin (CRESTOR) 5 MG Tab Take 1 Tablet by mouth every evening. 90 Tablet 3    warfarin (COUMADIN) 5 MG Tab TAKE 1 TO 1 & 1/2 (ONE & ONE-HALF) TABLETS BY MOUTH ONCE DAILY AS NEEDED OR  AS  DIRECTED  BY  [facility]       tamsulosin (FLOMAX) 0.4 MG capsule Take 1 Capsule by mouth every day. 100 Capsule 3    Cholecalciferol (VITAMIN D) 50 MCG (2000 UT) Cap Take 1 Capsule by mouth every day. 100 Capsule 3     No facility-administered medications prior to visit.     Hospital Outpatient Visit on 03/08/2025   Component Date Value    25-Hydroxy   Vitamin D 25 03/08/2025 41     Prostatic Specific Antig* 03/08/2025 1.76     Glycohemoglobin 03/08/2025 5.7 (H)     Est Avg Glucose 03/08/2025 117     Cholesterol,Tot 03/08/2025 77 (L)     Triglycerides 03/08/2025 93     HDL 03/08/2025 45     LDL 03/08/2025 13     TSH 03/08/2025 0.943     WBC 03/08/2025 8.7     RBC 03/08/2025 5.60     Hemoglobin 03/08/2025 17.5     Hematocrit 03/08/2025 52.9 (H)     MCV 03/08/2025 94.5     MCH 03/08/2025 31.3     MCHC 03/08/2025 33.1     RDW 03/08/2025 44.9     Platelet Count 03/08/2025 230     MPV 03/08/2025 10.1     Neutrophils-Polys 03/08/2025 73.90 (H)     Lymphocytes 03/08/2025 15.00 (L)     Monocytes 03/08/2025 7.60     Eosinophils 03/08/2025 2.80     Basophils 03/08/2025 0.50     Immature Granulocytes 03/08/2025 0.20     Nucleated RBC 03/08/2025 0.00     Neutrophils (Absolute) 03/08/2025 6.42     Lymphs (Absolute) 03/08/2025 1.30     Monos (Absolute) 03/08/2025 0.66     Eos (Absolute) 03/08/2025 0.24     Baso (Absolute) 03/08/2025 0.04     Immature Granulocytes (a* 03/08/2025 0.02     NRBC (Absolute) 03/08/2025 0.00     Sodium 03/08/2025 138     Potassium 03/08/2025 5.1     Chloride 03/08/2025 104     Co2 03/08/2025 24     Anion Gap 03/08/2025 10.0     Glucose 03/08/2025 97     Bun 03/08/2025 20     Creatinine 03/08/2025 1.30     Calcium 03/08/2025 10.3     Correct Calcium 03/08/2025 10.1     AST(SGOT) 03/08/2025 27     ALT(SGPT) 03/08/2025 37     Alkaline Phosphatase 03/08/2025 70     Total Bilirubin 03/08/2025 0.5     Albumin 03/08/2025 4.2     Total Protein 03/08/2025 7.1     Globulin 03/08/2025 2.9     A-G Ratio 03/08/2025 1.4     GFR (CKD-EPI)  "03/08/2025 57 (A)    Anticoagulation Visit on 02/27/2025   Component Date Value    INR 02/27/2025 2.70       Lab Results   Component Value Date/Time    HBA1C 5.7 (H) 03/08/2025 11:01 AM    HBA1C 5.8 (H) 10/31/2024 11:25 AM     Lab Results   Component Value Date/Time    SODIUM 138 03/08/2025 11:01 AM    POTASSIUM 5.1 03/08/2025 11:01 AM    CHLORIDE 104 03/08/2025 11:01 AM    CO2 24 03/08/2025 11:01 AM    GLUCOSE 97 03/08/2025 11:01 AM    BUN 20 03/08/2025 11:01 AM    CREATININE 1.30 03/08/2025 11:01 AM    CREATININE 1.3 02/11/2009 04:05 AM    ALKPHOSPHAT 70 03/08/2025 11:01 AM    ASTSGOT 27 03/08/2025 11:01 AM    ALTSGPT 37 03/08/2025 11:01 AM    TBILIRUBIN 0.5 03/08/2025 11:01 AM     Lab Results   Component Value Date/Time    INR 2.70 02/27/2025 07:51 AM    INR 1.70 (A) 02/10/2025 09:31 AM    INR 2.10 11/18/2024 08:08 AM     Lab Results   Component Value Date/Time    CHOLSTRLTOT 77 (L) 03/08/2025 11:01 AM    LDL 13 03/08/2025 11:01 AM    HDL 45 03/08/2025 11:01 AM    TRIGLYCERIDE 93 03/08/2025 11:01 AM       Lab Results   Component Value Date/Time    TESTOSTERONE 222 01/22/2014 09:04 AM     No results found for: \"TSH\"  Lab Results   Component Value Date/Time    FREET4 0.84 05/01/2013 08:53 AM    FREET4 0.90 01/24/2012 10:38 AM     No results found for: \"URICACID\"  No components found for: \"VITB12\"  Lab Results   Component Value Date/Time    25HYDROXY 41 03/08/2025 11:01 AM    25HYDROXY 44 10/31/2024 11:25 AM   '            HPI    Review of Systems   Constitutional: Negative.    HENT: Negative.     Eyes: Negative.    Respiratory: Negative.     Cardiovascular: Negative.    Gastrointestinal: Negative.    Genitourinary: Negative.    Musculoskeletal: Negative.    Skin: Negative.    Neurological: Negative.    Endo/Heme/Allergies: Negative.    Psychiatric/Behavioral: Negative.                Objective     /70 (BP Location: Right arm, Patient Position: Sitting, BP Cuff Size: Adult)   Pulse 93   Temp 36.3 °C (97.3 " °F) (Temporal)   Ht 1.829 m (6')   Wt 107 kg (235 lb)   SpO2 94%   BMI 31.87 kg/m²      Physical Exam  Constitutional:       General: He is not in acute distress.     Appearance: He is well-developed. He is not diaphoretic.   HENT:      Head: Normocephalic and atraumatic.      Right Ear: External ear normal.      Left Ear: External ear normal.      Nose: Nose normal.      Mouth/Throat:      Pharynx: No oropharyngeal exudate.   Eyes:      General: No scleral icterus.        Right eye: No discharge.         Left eye: No discharge.      Conjunctiva/sclera: Conjunctivae normal.      Pupils: Pupils are equal, round, and reactive to light.   Neck:      Thyroid: No thyromegaly.      Vascular: No JVD.      Trachea: No tracheal deviation.   Cardiovascular:      Rate and Rhythm: Normal rate and regular rhythm.      Heart sounds: Normal heart sounds. No murmur heard.     No friction rub. No gallop.   Pulmonary:      Effort: Pulmonary effort is normal. No respiratory distress.      Breath sounds: Normal breath sounds. No stridor. No wheezing or rales.   Chest:      Chest wall: No tenderness.   Abdominal:      General: Bowel sounds are normal. There is no distension.      Palpations: Abdomen is soft. There is no mass.      Tenderness: There is no abdominal tenderness. There is no guarding or rebound.   Musculoskeletal:         General: No tenderness. Normal range of motion.      Cervical back: Normal range of motion and neck supple.   Lymphadenopathy:      Cervical: No cervical adenopathy.   Skin:     General: Skin is warm and dry.      Coloration: Skin is not pale.      Findings: No erythema or rash.   Neurological:      Mental Status: He is alert and oriented to person, place, and time.      Motor: No abnormal muscle tone.      Coordination: Coordination normal.      Deep Tendon Reflexes: Reflexes are normal and symmetric. Reflexes normal.   Psychiatric:         Behavior: Behavior normal.         Thought Content: Thought  content normal.         Judgment: Judgment normal.                                  Assessment & Plan  1. Cholesterol management.  His cholesterol levels have shown significant improvement, negating the need for statin therapy. He will continue with the Repatha injections every 2 weeks.    2. Diabetes mellitus.  His A1c levels remain slightly elevated at 5.7, but fasting glucose levels have improved to below 100. He has successfully reduced his sugar intake by 99 percent. He will increase his semaglutide dosage to 2 units weekly. If gastrointestinal upset occurs, he will revert to the previous dosage of 0.5 units. A prescription refill for semaglutide has been provided.    3. Health maintenance.  He has received the shingles and influenza vaccines. He is advised to receive the RSV vaccine at his pharmacy. Blood work, including a chem panel, CBC, and lipid panel, has been ordered for 06/09/2025.    Follow-up  The patient will follow up in 3 months.      Assessment & Plan  Mixed hyperlipidemia     Orders:    Comp Metabolic Panel; Future    CBC WITH DIFFERENTIAL; Future    TSH; Future    Lipid Profile; Future    Evolocumab (REPATHA) 140 MG/ML Solution Auto-injector SubQ injection pen; Inject 1 mL under the skin every 14 days.    ezetimibe (ZETIA) 10 MG Tab; Take 1 Tablet by mouth every day.    Chronic anticoagulation    Orders:    warfarin (COUMADIN) 5 MG Tab; TAKE 1 TO 1 & 1/2 (ONE & ONE-HALF) TABLETS BY MOUTH ONCE DAILY AS NEEDED OR  AS  DIRECTED  BY  [facility]  ANTICOAGULATION  CLINIC    Coronary artery disease involving native coronary artery of native heart without angina pectoris    Orders:    rosuvastatin (CRESTOR) 5 MG Tab; Take 1 Tablet by mouth every evening.    Coronary artery calcification seen on CT scan    Orders:    rosuvastatin (CRESTOR) 5 MG Tab; Take 1 Tablet by mouth every evening.    Agatston CAC score, >400    Orders:    rosuvastatin (CRESTOR) 5 MG Tab; Take 1 Tablet by mouth every evening.    Chronic  prostatitis    Orders:    ciprofloxacin (CIPRO) 500 MG Tab; Take 1 Tablet by mouth 2 times a day. As needed for recurrent prostate or urinary tract infections. May have to reduce  warfarin dosage in half while taking.    Hypovitaminosis D    Orders:    Cholecalciferol (VITAMIN D) 50 MCG (2000 UT) Cap; Take 1 Capsule by mouth every day.    BPH without urinary obstruction    Orders:    tamsulosin (FLOMAX) 0.4 MG capsule; Take 1 Capsule by mouth every day.    Class 1 obesity due to excess calories with serious comorbidity and body mass index (BMI) of 32.0 to 32.9 in adult  Patient is currently on semaglutide 5 mg per 2 mL vials through [facility] mail order pharmacy.  He has been taking 0.375 mg/week.  He will increase it to 0.5 mg weekly which is 0.2 mL.  If he is unable to get this from [facility], he will let us know and we will switch him to [facility].  Patient be leaving next month for 2-month stay in [place] until June, 3 months from now, when he returns.  Orders:    Semaglutide,0.25 or 0.5MG/DOS, 2 MG/3ML Solution Pen-injector; Inject 0.5 mg under the skin every 7 days.    Uncomplicated opioid dependence (HCC)    Orders:    oxyCODONE-acetaminophen (PERCOCET) 5-325 MG Tab; Take 1 Tablet by mouth 1 time a day as needed for Severe Pain (chronic pain, 90-day supply with 0 refills) for up to 90 days.    Chronic pain syndrome- right leg from post phlebitic syn; on percocet once a day    Orders:    oxyCODONE-acetaminophen (PERCOCET) 5-325 MG Tab; Take 1 Tablet by mouth 1 time a day as needed for Severe Pain (chronic pain, 90-day supply with 0 refills) for up to 90 days.    Post-phlebitic syndrome- RIGHT LEG    Orders:    oxyCODONE-acetaminophen (PERCOCET) 5-325 MG Tab; Take 1 Tablet by mouth 1 time a day as needed for Severe Pain (chronic pain, 90-day supply with 0 refills) for up to 90 days.    Post-phlebitic dermatosis of right lower extremity

## 2025-03-12 NOTE — TELEPHONE ENCOUNTER
Received alert that patient had been prescribed course of ciprofloxacin.  S/w patient, who confirms this is still used PRN for UTI secondary to continuous catheter use.  Is not currently dosing, but will inform clinic if course of medication started.  Christiano Ayala, PharmD, BCACP

## 2025-03-12 NOTE — ASSESSMENT & PLAN NOTE
Orders:    oxyCODONE-acetaminophen (PERCOCET) 5-325 MG Tab; Take 1 Tablet by mouth 1 time a day as needed for Severe Pain (chronic pain, 90-day supply with 0 refills) for up to 90 days.

## 2025-04-09 DIAGNOSIS — Z79.01 CHRONIC ANTICOAGULATION: ICD-10-CM

## 2025-04-10 ENCOUNTER — APPOINTMENT (OUTPATIENT)
Dept: MEDICAL GROUP | Facility: MEDICAL CENTER | Age: 75
End: 2025-04-10
Payer: MEDICARE

## 2025-04-11 RX ORDER — WARFARIN SODIUM 5 MG/1
TABLET ORAL
Qty: 135 TABLET | Refills: 0 | Status: SHIPPED | OUTPATIENT
Start: 2025-04-11

## 2025-04-14 ENCOUNTER — TELEPHONE (OUTPATIENT)
Dept: CARDIOLOGY | Facility: MEDICAL CENTER | Age: 75
End: 2025-04-14
Payer: MEDICARE

## 2025-04-14 NOTE — TELEPHONE ENCOUNTER
Pt had labs completed 3/8, including lipid profile, for PCP. Follow-up labs are ordered by PCP with plan to repeat in June.     BD, please advise if any labs are needed or appropriate for 4/18 FV. Thank you!

## 2025-04-14 NOTE — TELEPHONE ENCOUNTER
BD    Caller: Tavares Napoles    Topic/issue: Patient has appointment on 4/18 with BD and wants to know if there is any additional lab work BD would like to order to have done before that appointment? Patient states he has been on quite a bit of medication to help lower his cholesterol and is feeling better. Would like to know how his levels are doing. Please update patient.    Callback Number: [phone]    Thank you,  Cindy BURTON

## 2025-04-15 NOTE — TELEPHONE ENCOUNTER
Noted:  Grabiel Lorenz D.O.  You6 hours ago (11:59 PM)     BD  No further labs needed at this time.

## 2025-04-16 ENCOUNTER — HOSPITAL ENCOUNTER (OUTPATIENT)
Facility: MEDICAL CENTER | Age: 75
End: 2025-04-16
Attending: INTERNAL MEDICINE
Payer: MEDICARE

## 2025-04-16 DIAGNOSIS — E78.2 MIXED HYPERLIPIDEMIA: ICD-10-CM

## 2025-04-16 LAB
ALBUMIN SERPL BCP-MCNC: 4.1 G/DL (ref 3.2–4.9)
ALBUMIN/GLOB SERPL: 1.5 G/DL
ALP SERPL-CCNC: 69 U/L (ref 30–99)
ALT SERPL-CCNC: 31 U/L (ref 2–50)
ANION GAP SERPL CALC-SCNC: 11 MMOL/L (ref 7–16)
AST SERPL-CCNC: 24 U/L (ref 12–45)
BASOPHILS # BLD AUTO: 0.5 % (ref 0–1.8)
BASOPHILS # BLD: 0.05 K/UL (ref 0–0.12)
BILIRUB SERPL-MCNC: 0.4 MG/DL (ref 0.1–1.5)
BUN SERPL-MCNC: 25 MG/DL (ref 8–22)
CALCIUM ALBUM COR SERPL-MCNC: 10.4 MG/DL (ref 8.5–10.5)
CALCIUM SERPL-MCNC: 10.5 MG/DL (ref 8.5–10.5)
CHLORIDE SERPL-SCNC: 108 MMOL/L (ref 96–112)
CHOLEST SERPL-MCNC: 81 MG/DL (ref 100–199)
CO2 SERPL-SCNC: 21 MMOL/L (ref 20–33)
CREAT SERPL-MCNC: 1.4 MG/DL (ref 0.5–1.4)
EOSINOPHIL # BLD AUTO: 0.25 K/UL (ref 0–0.51)
EOSINOPHIL NFR BLD: 2.4 % (ref 0–6.9)
ERYTHROCYTE [DISTWIDTH] IN BLOOD BY AUTOMATED COUNT: 46.3 FL (ref 35.9–50)
FASTING STATUS PATIENT QL REPORTED: NORMAL
GFR SERPLBLD CREATININE-BSD FMLA CKD-EPI: 52 ML/MIN/1.73 M 2
GLOBULIN SER CALC-MCNC: 2.7 G/DL (ref 1.9–3.5)
GLUCOSE SERPL-MCNC: 108 MG/DL (ref 65–99)
HCT VFR BLD AUTO: 51.3 % (ref 42–52)
HDLC SERPL-MCNC: 47 MG/DL
HGB BLD-MCNC: 16.9 G/DL (ref 14–18)
IMM GRANULOCYTES # BLD AUTO: 0.03 K/UL (ref 0–0.11)
IMM GRANULOCYTES NFR BLD AUTO: 0.3 % (ref 0–0.9)
LDLC SERPL CALC-MCNC: 17 MG/DL
LYMPHOCYTES # BLD AUTO: 1.49 K/UL (ref 1–4.8)
LYMPHOCYTES NFR BLD: 14.2 % (ref 22–41)
MCH RBC QN AUTO: 31.3 PG (ref 27–33)
MCHC RBC AUTO-ENTMCNC: 32.9 G/DL (ref 32.3–36.5)
MCV RBC AUTO: 95 FL (ref 81.4–97.8)
MONOCYTES # BLD AUTO: 0.84 K/UL (ref 0–0.85)
MONOCYTES NFR BLD AUTO: 8 % (ref 0–13.4)
NEUTROPHILS # BLD AUTO: 7.8 K/UL (ref 1.82–7.42)
NEUTROPHILS NFR BLD: 74.6 % (ref 44–72)
NRBC # BLD AUTO: 0 K/UL
NRBC BLD-RTO: 0 /100 WBC (ref 0–0.2)
PLATELET # BLD AUTO: 220 K/UL (ref 164–446)
PMV BLD AUTO: 9.8 FL (ref 9–12.9)
POTASSIUM SERPL-SCNC: 4.4 MMOL/L (ref 3.6–5.5)
PROT SERPL-MCNC: 6.8 G/DL (ref 6–8.2)
RBC # BLD AUTO: 5.4 M/UL (ref 4.7–6.1)
SODIUM SERPL-SCNC: 140 MMOL/L (ref 135–145)
TRIGL SERPL-MCNC: 86 MG/DL (ref 0–149)
TSH SERPL DL<=0.005 MIU/L-ACNC: 1.63 UIU/ML (ref 0.38–5.33)
WBC # BLD AUTO: 10.5 K/UL (ref 4.8–10.8)

## 2025-04-16 PROCEDURE — 85025 COMPLETE CBC W/AUTO DIFF WBC: CPT

## 2025-04-16 PROCEDURE — 80061 LIPID PANEL: CPT

## 2025-04-16 PROCEDURE — 84443 ASSAY THYROID STIM HORMONE: CPT

## 2025-04-16 PROCEDURE — 80053 COMPREHEN METABOLIC PANEL: CPT

## 2025-04-16 PROCEDURE — 36415 COLL VENOUS BLD VENIPUNCTURE: CPT

## 2025-04-18 ENCOUNTER — OFFICE VISIT (OUTPATIENT)
Dept: CARDIOLOGY | Facility: MEDICAL CENTER | Age: 75
End: 2025-04-18
Attending: INTERNAL MEDICINE
Payer: MEDICARE

## 2025-04-18 VITALS
DIASTOLIC BLOOD PRESSURE: 60 MMHG | BODY MASS INDEX: 31.29 KG/M2 | RESPIRATION RATE: 16 BRPM | HEIGHT: 72 IN | WEIGHT: 231 LBS | HEART RATE: 96 BPM | OXYGEN SATURATION: 96 % | SYSTOLIC BLOOD PRESSURE: 100 MMHG

## 2025-04-18 DIAGNOSIS — Z79.01 LONG TERM (CURRENT) USE OF ANTICOAGULANTS: ICD-10-CM

## 2025-04-18 DIAGNOSIS — I70.0 THORACIC AORTA ATHEROSCLEROSIS (HCC): ICD-10-CM

## 2025-04-18 DIAGNOSIS — E78.2 MIXED HYPERLIPIDEMIA: ICD-10-CM

## 2025-04-18 PROCEDURE — 99212 OFFICE O/P EST SF 10 MIN: CPT | Performed by: INTERNAL MEDICINE

## 2025-04-18 PROCEDURE — 99214 OFFICE O/P EST MOD 30 MIN: CPT | Performed by: INTERNAL MEDICINE

## 2025-04-18 PROCEDURE — 3078F DIAST BP <80 MM HG: CPT | Performed by: INTERNAL MEDICINE

## 2025-04-18 PROCEDURE — 3074F SYST BP LT 130 MM HG: CPT | Performed by: INTERNAL MEDICINE

## 2025-04-18 ASSESSMENT — FIBROSIS 4 INDEX: FIB4 SCORE: 1.47

## 2025-04-18 NOTE — PROGRESS NOTES
[facility]    PatientName:Tavares Rankin Mc KodiryDate: 2025  :1950    74 y.o.PCP:Keron Garcia M.D.  MRN:[id number]        Problems and Plans    Thoracic aorta atherosclerosis (HCC)  Regarding his thoracic aortic atherosclerosis he continues to focusing on primary prevention with ongoing cholesterol minimization.  Blood pressure is also well-controlled in the office which I suspect is significantly improved due to his weight loss    Mixed hyperlipidemia   Recommend continued ongoing lifestyle modification with integration of statin therapy Zetia and Repatha therapy.  Repeat lipid panel in 1 year    Long term (current) use of anticoagulants  Noted history of prior pulmonary embolism and DVT ultimately found to have factor V Leiden.  He remains on warfarin therapy indefinitely    Return in about 6 months (around 10/18/2025).      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    Currently, he notes he is feeling well and not having any active cardiovascular complaints.  He has been diligently working on improving his overall diet and states that he is feeling wonderful as a result of some noted weight reduction.    Total cholesterol 81 mg/dL, triglycerides 86 mg/dL, HDL 47 mg/dL and LDL less than 50 mg/dL      Past Medical History  Past Medical History:   Diagnosis Date    Blood clotting disorder (HCC)     leg and lung    BPH (benign prostatic hyperplasia)     Chronic pain     Factor V deficiency (HCC)     Neoplasm of uncertain behavior of skin of face 2019    Obstructive uropathy 2019    Right inguinal hernia- repair tbd 2023 dr sandoval 2022     Stage 3 chronic kidney disease     Urinary bladder disorder      Past Surgical History  Past Surgical History:   Procedure Laterality Date    INGUINAL HERNIA REPAIR ROBOTIC XI Right 1/24/2023    Procedure: ROBOTIC RIGHT INGUINAL HERNIA REPAIR;  Surgeon: Moi Carr M.D.;  Location: SURGERY [facility];  Service: Gen Robotic    INGUINAL HERNIA REPAIR ROBOTIC Left 12/18/2017    Procedure: INGUINAL HERNIA REPAIR ROBOTIC- WITH MESH PLACEMENT;  Surgeon: Rasta Reyes M.D.;  Location: SURGERY [facility];  Service: General    COLONOSCOPY  2013     Social History  Social History     Socioeconomic History    Marital status:      Spouse name: Not on file    Number of children: Not on file    Years of education: Not on file    Highest education level: Not on file   Occupational History    Not on file   Tobacco Use    Smoking status: Former     Types: Cigarettes    Smokeless tobacco: Never   Vaping Use    Vaping status: Never Used   Substance and Sexual Activity    Alcohol use: Not Currently     Alcohol/week: 0.0 - 0.6 oz     Comment: once a month/ rare    Drug use: No    Sexual activity: Not on file   Other Topics Concern    Not on file   Social History Narrative    Not on file     Social Drivers of Health     Financial Resource Strain: Not on file   Food Insecurity: Not on file   Transportation Needs: Not on file   Physical Activity: Not on file   Stress: Not on file   Social Connections: Not on file   Intimate Partner Violence: Not on file   Housing Stability: Not on file     Past Family History  History reviewed. No pertinent family history.  Medication(s)    Current Outpatient Medications:     warfarin, TAKE 1 TO 1 & 1/2 (ONE & ONE-HALF) TABLETS BY MOUTH ONCE DAILY AS NEEDED OR  AS  DIRECTED  BY  [facility]  ANTICOAGULATION  CLINIC, Taking    Vitamin D, 2,000 Units, Oral, DAILY, Taking    Evolocumab, 140 mg, Subcutaneous, Q14 DAYS, Taking    rosuvastatin, 5 mg, Oral, Q EVENING, Taking    ciprofloxacin, 500  mg, Oral, BID, PRN    ezetimibe, 10 mg, Oral, DAILY, Taking    tamsulosin, 0.4 mg, Oral, DAILY, Taking    oxyCODONE-acetaminophen, 1 Tablet, Oral, QDAY PRN, PRN    MAGNESIUM PO, Take  by mouth., Taking    Multiple Vitamins-Minerals (ZINC PO), Take  by mouth., Taking  Allergies  Other food, Shellfish allergy, Fenofibrate, Lovastatin, Pravastatin, and Statins [hmg-coa-r inhibitors]    Review of Systems    A comprehensive 10 system review was conducted and is negative except as noted above in the HPI or here.      Vital Signs  /60 (BP Location: Left arm, Patient Position: Sitting, BP Cuff Size: Adult)   Pulse 96   Resp 16   Ht 1.829 m (6')   Wt 105 kg (231 lb)   SpO2 96%   BMI 31.33 kg/m²     Physical Exam  Constitutional:       Appearance: Normal appearance. He is obese.   HENT:      Head: Normocephalic and atraumatic.      Mouth/Throat:      Mouth: Mucous membranes are moist.      Pharynx: Oropharynx is clear.   Eyes:      Extraocular Movements: Extraocular movements intact.      Conjunctiva/sclera: Conjunctivae normal.   Cardiovascular:      Rate and Rhythm: Normal rate and regular rhythm.      Pulses: Normal pulses.      Heart sounds: Normal heart sounds. No murmur heard.     No friction rub. No gallop.   Pulmonary:      Effort: Pulmonary effort is normal.      Breath sounds: Normal breath sounds.   Abdominal:      General: Bowel sounds are normal.      Palpations: Abdomen is soft.   Musculoskeletal:         General: Normal range of motion.      Cervical back: Normal range of motion and neck supple.   Skin:     General: Skin is warm and dry.   Neurological:      General: No focal deficit present.      Mental Status: He is alert and oriented to person, place, and time. Mental status is at baseline.   Psychiatric:         Mood and Affect: Mood normal.         Behavior: Behavior normal.         Thought Content: Thought content normal.         Judgment: Judgment normal.         Lab Results   Component Value  Date/Time    TSHULTRASEN 1.630 04/16/2025 0807        Lab Results   Component Value Date/Time    FREET4 0.84 05/01/2013 0853        Lab Results   Component Value Date/Time    HBA1C 5.7 (H) 03/08/2025 11:01 AM       Lab Results   Component Value Date/Time    CHOLSTRLTOT 81 (L) 04/16/2025 08:07 AM    LDL 17 04/16/2025 08:07 AM    HDL 47 04/16/2025 08:07 AM    TRIGLYCERIDE 86 04/16/2025 08:07 AM         Lab Results   Component Value Date/Time    SODIUM 140 04/16/2025 08:07 AM    POTASSIUM 4.4 04/16/2025 08:07 AM    CHLORIDE 108 04/16/2025 08:07 AM    CO2 21 04/16/2025 08:07 AM    GLUCOSE 108 (H) 04/16/2025 08:07 AM    BUN 25 (H) 04/16/2025 08:07 AM    CREATININE 1.40 04/16/2025 08:07 AM    CREATININE 1.3 02/11/2009 04:05 AM       Lab Results   Component Value Date/Time    ALKPHOSPHAT 69 04/16/2025 08:07 AM    ASTSGOT 24 04/16/2025 08:07 AM    ALTSGPT 31 04/16/2025 08:07 AM    TBILIRUBIN 0.4 04/16/2025 08:07 AM           Total patient time was estimated to be 30 minutes consisting of chart review, direct patient interaction, medication renewal, plan development and overall communication with the cardiovascular team.        Electronically signed by:   Grabiel Lorenz DO, MPH  [facility]    Portions of this note were completed using voice recognition software (Dragon Naturally speaking software) . Occasional transcription errors may have escaped proof reading. I have made every reasonable attempt to correct obvious errors, but I expect that there are errors of grammar and possibly content that I did not discover before finalizing the note.

## 2025-04-20 NOTE — ASSESSMENT & PLAN NOTE
Recommend continued ongoing lifestyle modification with integration of statin therapy Zetia and Repatha therapy.  Repeat lipid panel in 1 year

## 2025-04-20 NOTE — ASSESSMENT & PLAN NOTE
Regarding his thoracic aortic atherosclerosis he continues to focusing on primary prevention with ongoing cholesterol minimization.  Blood pressure is also well-controlled in the office which I suspect is significantly improved due to his weight loss

## 2025-04-24 ENCOUNTER — HOSPITAL ENCOUNTER (OUTPATIENT)
Facility: MEDICAL CENTER | Age: 75
End: 2025-04-24
Attending: INTERNAL MEDICINE
Payer: MEDICARE

## 2025-04-24 DIAGNOSIS — E78.2 MIXED HYPERLIPIDEMIA: ICD-10-CM

## 2025-04-24 PROCEDURE — 36415 COLL VENOUS BLD VENIPUNCTURE: CPT

## 2025-04-24 PROCEDURE — 80061 LIPID PANEL: CPT

## 2025-04-25 LAB
CHOLEST SERPL-MCNC: 76 MG/DL (ref 100–199)
HDLC SERPL-MCNC: 42 MG/DL
LDLC SERPL CALC-MCNC: 4 MG/DL
TRIGL SERPL-MCNC: 152 MG/DL (ref 0–149)

## 2025-06-05 ENCOUNTER — APPOINTMENT (OUTPATIENT)
Dept: MEDICAL GROUP | Age: 75
End: 2025-06-05
Payer: MEDICARE

## 2025-06-17 DIAGNOSIS — Z79.01 CHRONIC ANTICOAGULATION: ICD-10-CM

## (undated) DEVICE — DERMABOND ADVANCED - (12EA/BX)

## (undated) DEVICE — KIT ROOM DECONTAMINATION

## (undated) DEVICE — SUTURE 2-0 20CM STRATAFIX SPIRAL SH NEEDLE (12/BX)

## (undated) DEVICE — NEEDLE INSFL 120MM 14GA VRRS - (20/BX)

## (undated) DEVICE — CLOSURE SKIN STRIP 1/2 X 4 IN - (STERI STRIP) (50/BX 4BX/CA)

## (undated) DEVICE — SUTURE 0 PDS CT-2 (36PK/BX)

## (undated) DEVICE — SENSOR OXIMETER ADULT SPO2 RD SET (20EA/BX)

## (undated) DEVICE — COVER TIP ENDOWRIST HOT SHEAR - (10EA/BX) DA VINCI

## (undated) DEVICE — SUTURE2-0 20CM STRATAFIX SPIRAL PDS CT-1 (12EA/BX)

## (undated) DEVICE — SET EXTENSION WITH 2 PORTS (48EA/CA) ***PART #2C8610 IS A SUBSTITUTE*****

## (undated) DEVICE — Device

## (undated) DEVICE — SYSTEM CLEARIFY VISUALIZATION (10EA/PK)

## (undated) DEVICE — KIT ANESTHESIA W/CIRCUIT & 3/LT BAG W/FILTER (20EA/CA)

## (undated) DEVICE — DRAPE COLUMN  BOX OF 20

## (undated) DEVICE — SET LEADWIRE 5 LEAD BEDSIDE DISPOSABLE ECG (1SET OF 5/EA)

## (undated) DEVICE — OBTURATOR BLADELESS STANDARD 8MM (6EA/BX)

## (undated) DEVICE — GLOVE BIOGEL PI INDICATOR SZ 6.5 SURGICAL PF LF - (50/BX 4BX/CA)

## (undated) DEVICE — TOWEL STOP TIMEOUT SAFETY FLAG (40EA/CA)

## (undated) DEVICE — GLOVE BIOGEL SZ 6.5 SURGICAL PF LTX (50PR/BX 4BX/CA)

## (undated) DEVICE — COVER LIGHT HANDLE ALC PLUS DISP (18EA/BX)

## (undated) DEVICE — CHLORAPREP 26 ML APPLICATOR - ORANGE TINT(25/CA)

## (undated) DEVICE — GOWN SURGEONS X-LARGE - DISP. (30/CA)

## (undated) DEVICE — GLOVE BIOGEL INDICATOR SZ 7.5 SURGICAL PF LTX - (50PR/BX 4BX/CA)

## (undated) DEVICE — SLEEVE VASO CALF MED - (10PR/CA)

## (undated) DEVICE — GLOVE BIOGEL SZ 7 SURGICAL PF LTX - (50PR/BX 4BX/CA)

## (undated) DEVICE — HEAD HOLDER JUNIOR/ADULT

## (undated) DEVICE — SUTURE 0 VICRYL PLUS CT-2 - 27 INCH (36/BX)

## (undated) DEVICE — DRAPE ARM  BOX OF 20

## (undated) DEVICE — GOWN WARMING STANDARD FLEX - (30/CA)

## (undated) DEVICE — GLOVE, BIOGEL ECLIPSE, SZ 7.0, PF LTX (50/BX)

## (undated) DEVICE — NEPTUNE 4 PORT MANIFOLD - (20/PK)

## (undated) DEVICE — SUTURE 4-0 VICRYL PLUS FS-2 - 27 INCH (36/BX)

## (undated) DEVICE — TUBE NG SALEM SUMP 16FR (50EA/CA)

## (undated) DEVICE — ELECTRODE DUAL RETURN W/ CORD - (50/PK)

## (undated) DEVICE — SUCTION INSTRUMENT YANKAUER BULBOUS TIP W/O VENT (50EA/CA)

## (undated) DEVICE — NEEDLE DRIVER MEGA SUTURECUT DA VINCI 15X'S REUSABLE

## (undated) DEVICE — LACTATED RINGERS INJ 1000 ML - (14EA/CA 60CA/PF)

## (undated) DEVICE — CANISTER SUCTION 3000ML MECHANICAL FILTER AUTO SHUTOFF MEDI-VAC NONSTERILE LF DISP  (40EA/CA)

## (undated) DEVICE — SUTURE GENERAL

## (undated) DEVICE — SEAL 5MM-8MM UNIVERSAL  BOX OF 10

## (undated) DEVICE — PROTECTOR ULNA NERVE - (36PR/CA)

## (undated) DEVICE — BIPOLAR FORCE DA VINCI 12X'S REISABLE

## (undated) DEVICE — TROCAR 12 X 150 KII FIOS Z THREAD (6EA/BX)

## (undated) DEVICE — DRESSING TRANSPARENT FILM TEGADERM 2.375 X 2.75"  (100EA/BX)"

## (undated) DEVICE — OBTURATOR 8MM BLADELESS - (24EA/BX) DA VINCI

## (undated) DEVICE — ELECTRODE 850 FOAM ADHESIVE - HYDROGEL RADIOTRNSPRNT (50/PK)

## (undated) DEVICE — SHEARS MONOPOLAR CURVED  DA VINCI 10X'S REUSABLE

## (undated) DEVICE — SYRINGE SAFETY 10 ML 18 GA X 1 1/2 BLUNT LL (100/BX 4BX/CA)

## (undated) DEVICE — ROBOTIC SURGERY SERVICES

## (undated) DEVICE — GLOVE BIOGEL SZ 8 SURGICAL PF LTX - (50PR/BX 4BX/CA)

## (undated) DEVICE — CORDS BIPOLAR COAGULATION - 12FT STERILE DISP. (10EA/BX)

## (undated) DEVICE — TROCAR 5X100 NON BLADED Z-TH - READ KII (6/BX)

## (undated) DEVICE — SENSOR SPO2 NEO LNCS ADHESIVE (20/BX) SEE USER NOTES

## (undated) DEVICE — SUTURE 0 COATED VICRYL 6-18IN - (12PK/BX)

## (undated) DEVICE — SLEEVE, VASO, THIGH, MED

## (undated) DEVICE — TUBING CLEARLINK DUO-VENT - C-FLO (48EA/CA)

## (undated) DEVICE — SUTURE 4-0 MONOCRYL PLUSPC-3 - 18 INCH (12/BX)

## (undated) DEVICE — FORCEP BIPOLAR FENESTRATED - DA VINCI 10X'S REUSABLE

## (undated) DEVICE — MASK ANESTHESIA ADULT  - (100/CA)

## (undated) DEVICE — GLOVE BIOGEL INDICATOR SZ 7SURGICAL PF LTX - (50/BX 4BX/CA)

## (undated) DEVICE — DRAPE 3 ARM DA VANCI SI - (5EA/CA)

## (undated) DEVICE — TUBE CONNECT SUCTION CLEAR 120 X 1/4" (50EA/CA)"

## (undated) DEVICE — SODIUM CHL IRRIGATION 0.9% 1000ML (12EA/CA)

## (undated) DEVICE — BLADE SURGICAL CLIPPER - (50EA/CA)

## (undated) DEVICE — SPONGE GAUZESTER. 2X2 4-PL - (2/PK 50PK/BX 30BX/CS)